# Patient Record
Sex: FEMALE | Race: WHITE | NOT HISPANIC OR LATINO | Employment: OTHER | URBAN - METROPOLITAN AREA
[De-identification: names, ages, dates, MRNs, and addresses within clinical notes are randomized per-mention and may not be internally consistent; named-entity substitution may affect disease eponyms.]

---

## 2019-02-25 ENCOUNTER — OFFICE VISIT (OUTPATIENT)
Dept: OBSTETRICS AND GYNECOLOGY | Facility: CLINIC | Age: 79
End: 2019-02-25
Payer: MEDICARE

## 2019-02-25 VITALS
SYSTOLIC BLOOD PRESSURE: 144 MMHG | WEIGHT: 130.75 LBS | BODY MASS INDEX: 26.36 KG/M2 | HEIGHT: 59 IN | DIASTOLIC BLOOD PRESSURE: 70 MMHG

## 2019-02-25 DIAGNOSIS — Z12.31 ENCOUNTER FOR SCREENING MAMMOGRAM FOR MALIGNANT NEOPLASM OF BREAST: ICD-10-CM

## 2019-02-25 DIAGNOSIS — N30.00 ACUTE CYSTITIS WITHOUT HEMATURIA: ICD-10-CM

## 2019-02-25 DIAGNOSIS — Z00.00 PREVENTATIVE HEALTH CARE: Primary | ICD-10-CM

## 2019-02-25 DIAGNOSIS — M89.9 DISORDER OF BONE: ICD-10-CM

## 2019-02-25 DIAGNOSIS — Z01.419 GYNECOLOGIC EXAM NORMAL: ICD-10-CM

## 2019-02-25 PROCEDURE — 87086 URINE CULTURE/COLONY COUNT: CPT

## 2019-02-25 PROCEDURE — 87186 SC STD MICRODIL/AGAR DIL: CPT

## 2019-02-25 PROCEDURE — G0101 PR CA SCREEN;PELVIC/BREAST EXAM: ICD-10-PCS | Mod: S$PBB,,, | Performed by: NURSE PRACTITIONER

## 2019-02-25 PROCEDURE — 99203 OFFICE O/P NEW LOW 30 MIN: CPT | Mod: PBBFAC | Performed by: NURSE PRACTITIONER

## 2019-02-25 PROCEDURE — 87088 URINE BACTERIA CULTURE: CPT

## 2019-02-25 PROCEDURE — 87480 CANDIDA DNA DIR PROBE: CPT

## 2019-02-25 PROCEDURE — 87077 CULTURE AEROBIC IDENTIFY: CPT

## 2019-02-25 PROCEDURE — 99999 PR PBB SHADOW E&M-NEW PATIENT-LVL III: ICD-10-PCS | Mod: PBBFAC,,, | Performed by: NURSE PRACTITIONER

## 2019-02-25 PROCEDURE — 99999 PR PBB SHADOW E&M-NEW PATIENT-LVL III: CPT | Mod: PBBFAC,,, | Performed by: NURSE PRACTITIONER

## 2019-02-25 PROCEDURE — G0101 CA SCREEN;PELVIC/BREAST EXAM: HCPCS | Mod: S$PBB,,, | Performed by: NURSE PRACTITIONER

## 2019-02-25 RX ORDER — GLIMEPIRIDE 4 MG/1
4 TABLET ORAL 2 TIMES DAILY
Refills: 1 | COMMUNITY
Start: 2018-11-23 | End: 2019-11-13 | Stop reason: SDUPTHER

## 2019-02-25 RX ORDER — NITROFURANTOIN 25; 75 MG/1; MG/1
100 CAPSULE ORAL 2 TIMES DAILY
Qty: 14 CAPSULE | Refills: 0 | Status: SHIPPED | OUTPATIENT
Start: 2019-02-25 | End: 2019-03-04

## 2019-02-25 RX ORDER — CARVEDILOL 12.5 MG/1
12.5 TABLET ORAL DAILY
COMMUNITY
End: 2020-11-09

## 2019-02-25 RX ORDER — LEVOTHYROXINE SODIUM 150 UG/1
TABLET ORAL
COMMUNITY
End: 2019-04-03 | Stop reason: SDUPTHER

## 2019-02-25 RX ORDER — TRIAMCINOLONE ACETONIDE 1 MG/G
CREAM TOPICAL 2 TIMES DAILY
Qty: 15 G | Refills: 0 | Status: SHIPPED | OUTPATIENT
Start: 2019-02-25 | End: 2021-05-31

## 2019-02-25 RX ORDER — LINAGLIPTIN 5 MG/1
TABLET, FILM COATED ORAL DAILY
Refills: 1 | COMMUNITY
Start: 2018-11-23 | End: 2021-03-03

## 2019-02-25 NOTE — PROGRESS NOTES
"CC: Well woman exam    Odin Oliva is a 79 y.o. female  presents for well woman exam.  LMP: . No AUB. Never had a mammogram or bone scan. Not sexually active. Reports vaginal itching. Urine in clinic indicated nitrates. Mild dysuria , no hematuria or flank pain.    Past Medical History:   Diagnosis Date    Diabetes mellitus     Thyroid disease      Past Surgical History:   Procedure Laterality Date    CHOLECYSTECTOMY      HERNIA MESH REMOVAL      tissue from uterus       TOTAL THYROIDECTOMY       Social History     Socioeconomic History    Marital status:      Spouse name: Not on file    Number of children: Not on file    Years of education: Not on file    Highest education level: Not on file   Social Needs    Financial resource strain: Not on file    Food insecurity - worry: Not on file    Food insecurity - inability: Not on file    Transportation needs - medical: Not on file    Transportation needs - non-medical: Not on file   Occupational History    Not on file   Tobacco Use    Smoking status: Never Smoker    Smokeless tobacco: Never Used   Substance and Sexual Activity    Alcohol use: No     Frequency: Never    Drug use: No    Sexual activity: No   Other Topics Concern    Not on file   Social History Narrative    Not on file     Family History   Problem Relation Age of Onset    Asthma Mother      OB History      Para Term  AB Living    7 5 5   2 5    SAB TAB Ectopic Multiple Live Births    2       5          BP (!) 144/70 (BP Location: Right arm, Patient Position: Sitting, BP Method: Medium (Manual))   Ht 4' 11.06" (1.5 m)   Wt 59.3 kg (130 lb 11.7 oz)   BMI 26.36 kg/m²       ROS:  GENERAL: Denies weight gain or weight loss. Feeling well overall.   SKIN: Denies rash or lesions.   HEAD: Denies head injury or headache.   NODES: Denies enlarged lymph nodes.   CHEST: Denies chest pain or shortness of breath.   CARDIOVASCULAR: Denies palpitations or left " sided chest pain.   ABDOMEN: No abdominal pain, constipation, diarrhea, nausea, vomiting or rectal bleeding.   URINARY:HPI  REPRODUCTIVE: See HPI.   BREASTS: The patient performs breast self-examination and denies pain, lumps, or nipple discharge.   HEMATOLOGIC: No easy bruisability or excessive bleeding.   MUSCULOSKELETAL: Denies joint pain or swelling.   NEUROLOGIC: Denies syncope or weakness.   PSYCHIATRIC: Denies depression, anxiety or mood swings.    PHYSICAL EXAM:  APPEARANCE: Well nourished, well developed, in no acute distress.  AFFECT: WNL, alert and oriented x 3  SKIN: No acne or hirsutism  NECK: Neck symmetric without masses or thyromegaly  NODES: No inguinal, cervical, axillary, or femoral lymph node enlargement  CHEST: Good respiratory effect  ABDOMEN: Soft.  No tenderness or masses.  No hepatosplenomegaly.  No hernias.  BREASTS: Symmetrical, no skin changes or visible lesions.  No palpable masses, nipple discharge bilaterally.  PELVIC: Normal external genitalia without lesions.  Normal hair distribution.  Adequate perineal body, normal urethral meatus.  Vagina atrophy.without lesions or discharge.  Cervix pink, without lesions, discharge or tenderness.  No significant cystocele or rectocele.  Bimanual exam shows uterus to be normal size, regular, mobile and nontender.  Adnexa without masses or tenderness.    EXTREMITIES: No edema.    1. Preventative health care  Mammo Digital Screening Bilat   2. Gynecologic exam normal  Mammo Digital Screening Bilat    DXA Bone Density Spine And Hip   3. Encounter for screening mammogram for malignant neoplasm of breast   Mammo Digital Screening Bilat   4. Disorder of bone   DXA Bone Density Spine And Hip   5. Acute cystitis without hematuria  Urine culture    PLAN:  Mammogram  Dex scan  Urine sent for cx  Macrobid rx   Kenalog cream

## 2019-02-28 ENCOUNTER — TELEPHONE (OUTPATIENT)
Dept: OBSTETRICS AND GYNECOLOGY | Facility: CLINIC | Age: 79
End: 2019-02-28

## 2019-02-28 LAB
BACTERIA UR CULT: NORMAL
BACTERIAL VAGINOSIS DNA: NEGATIVE
CANDIDA GLABRATA DNA: NEGATIVE
CANDIDA KRUSEI DNA: NEGATIVE
CANDIDA RRNA VAG QL PROBE: NEGATIVE
T VAGINALIS RRNA GENITAL QL PROBE: NEGATIVE

## 2019-02-28 NOTE — TELEPHONE ENCOUNTER
----- Message from Meagan Emanuel NP sent at 2/28/2019 11:11 AM CST -----  Please call patient and inform her that urine cx indicated e-coli. She needs to complete the Macrobid.

## 2019-02-28 NOTE — PROGRESS NOTES
Please call patient and inform her that urine cx indicated e-coli. She needs to complete the Macrobid.

## 2019-02-28 NOTE — TELEPHONE ENCOUNTER
Spoke with patient's daughter notified of urine culture results. Advised to complete Macrobid. Patient verbalized understanding.

## 2019-04-02 NOTE — PROGRESS NOTES
"Subjective:         Patient ID: Odin Oliva is a 79 y.o. female.  Patient's current PCP is [facility]     Chief Complaint: Diabetes Mellitus    HPI  Odin Oliva is a 79 y.o.  female presenting for new consultation for Diabetes. Patient has been diagnosed with diabetes for over 10 years and has the following complications associated with diabetes: nephropathy. Blood glucose testing is performed regularly. In the past 2 weeks patient reports blood glucose values to have approximately ranged from 130-300. Condition is uncontrolled. Patient is compliant with medication. .     Patient has previously taken metformin with no issue. Believes she was taken off due to kidney dysfunction.    She denies any recent hospital admissions or emergency room visits related to Diabetes Mellitus.      Height: 4' 11.5" (151.1 cm)  //  Weight: 59.1 kg (130 lb 4.7 oz), Body mass index is 25.88 kg/m².    Her blood sugar in clinic today is:   Lab Results   Component Value Date    POCGLU 138 (A) 04/03/2019       Labs reviewed and are noted below.    Her most recent A1C is: No results found for: HGBA1C  No results found for: CPEPTIDE  No results found for: GLUTAMICACID  No results found for: LDLDIRECT    CURRENT DM MEDICATIONS:   Current Outpatient Medications   Medication Sig Dispense Refill    TRADJENTA 5 mg Tab tablet TK 1 T PO QD  1     No current facility-administered medications for this visit.        Health Maintenance   Topic Date Due    Lipid Panel  1940    Foot Exam  01/16/1951    Eye Exam  01/16/1951    Urine Microalbumin  01/16/1951    TETANUS VACCINE  01/16/1959    DEXA SCAN  01/16/1981    Zoster Vaccine  01/16/2001    Pneumococcal Vaccine (65+ Low/Medium Risk) (1 of 2 - PCV13) 01/16/2006    Hemoglobin A1c  05/29/2013    Influenza Vaccine  08/01/2018       STANDARDS OF CARE:  Current Ophthalmologist/Optometrist: No   Current Podiatrist: No  ACE/ARB: No  Statin: No  She  has attended diabetes " education in the past.     LIFESTYLE:    BLOOD GLUCOSE TESTING: Patient reports testing on average a total of 2 times per day.       Review of Systems   Constitutional: Negative.  Negative for activity change, appetite change, fatigue and unexpected weight change.   Eyes: Negative for visual disturbance.   Gastrointestinal: Negative for constipation, diarrhea and nausea.   Endocrine: Negative.  Negative for cold intolerance, heat intolerance, polydipsia, polyphagia and polyuria.   Skin: Negative for wound.         Objective:      Physical Exam   Constitutional: She appears well-developed and well-nourished.   HENT:   Head: Normocephalic and atraumatic.   Cardiovascular: Normal rate.   Pulmonary/Chest: Effort normal.   Skin: Skin is warm and dry.   Psychiatric: She has a normal mood and affect. Her speech is normal and behavior is normal. Judgment and thought content normal.   Nursing note and vitals reviewed.      Assessment:       1. Type 2 diabetes mellitus with other specified complication, without long-term current use of insulin        Plan:   Type 2 diabetes mellitus with other specified complication, without long-term current use of insulin  -     POCT Glucose, Hand-Held Device  -     Comprehensive metabolic panel; Future; Expected date: 04/03/2019  -     Microalbumin/creatinine urine ratio; Future; Expected date: 04/03/2019  -     Hemoglobin A1c; Future; Expected date: 04/03/2019  -     TSH; Future; Expected date: 04/03/2019  -     GLUCOSE MONITORING CONTINUOUS MIN 72 HOURS; Future  -     lancets Misc; 1 each by Misc.(Non-Drug; Combo Route) route 3 (three) times daily.  Dispense: 100 each; Refill: 11  -     blood-glucose meter kit; Use as instructed  Dispense: 1 each; Refill: 0  -     blood sugar diagnostic Strp; 1 each by Misc.(Non-Drug; Combo Route) route 3 (three) times daily.  Dispense: 100 strip; Refill: 11  -     levothyroxine (SYNTHROID) 150 MCG tablet; 1 tablet(s) by mouth daily  Dispense: 90 tablet;  Refill: 0  -     T4, free; Future; Expected date: 04/03/2019      - Condition uncontrolled. CGMS for 2 weeks for evaluation. Labs today. DM education reviewed. Patient encouraged to carb count and exercise per recommendations. Labs and Referrals as noted. Patient instructed to send in log weekly for review and potential medication adjustments. RV scheduled in 2 weeks.         Additional Details:    1. Patient was instructed to monitor blood glucose 2 - 3 x daily, fasting and ac dinner or at bedtime. Discussed ADA goals for blood glucose levels and hypoglycemic protocol.  Reminded to bring BG records or meter to each visit for review.    2. The patient was explained the above plan and given opportunity to ask questions.  She understands, chooses and consents to this plan and accepts all the risks, which include but are not limited to the risks mentioned above. She understands the alternative of having no testing, interventions or treatments at this time. She left content and without further questions.     A total of 60 minutes was spent in face to face time, of which over 50% was spent in counseling patient on disease process, complications, treatment, and side effects of medications.        CHEYENNE Galvan

## 2019-04-03 ENCOUNTER — LAB VISIT (OUTPATIENT)
Dept: LAB | Facility: HOSPITAL | Age: 79
End: 2019-04-03
Attending: NURSE PRACTITIONER
Payer: MEDICARE

## 2019-04-03 ENCOUNTER — CLINICAL SUPPORT (OUTPATIENT)
Dept: DIABETES | Facility: CLINIC | Age: 79
End: 2019-04-03
Payer: MEDICARE

## 2019-04-03 ENCOUNTER — OFFICE VISIT (OUTPATIENT)
Dept: DIABETES | Facility: CLINIC | Age: 79
End: 2019-04-03
Payer: MEDICARE

## 2019-04-03 VITALS
HEIGHT: 60 IN | WEIGHT: 130.31 LBS | DIASTOLIC BLOOD PRESSURE: 60 MMHG | BODY MASS INDEX: 25.58 KG/M2 | SYSTOLIC BLOOD PRESSURE: 118 MMHG

## 2019-04-03 DIAGNOSIS — E11.69 TYPE 2 DIABETES MELLITUS WITH OTHER SPECIFIED COMPLICATION, WITHOUT LONG-TERM CURRENT USE OF INSULIN: ICD-10-CM

## 2019-04-03 DIAGNOSIS — E11.69 TYPE 2 DIABETES MELLITUS WITH OTHER SPECIFIED COMPLICATION, WITHOUT LONG-TERM CURRENT USE OF INSULIN: Primary | ICD-10-CM

## 2019-04-03 PROBLEM — E11.9 DIABETES MELLITUS: Status: ACTIVE | Noted: 2019-04-03

## 2019-04-03 LAB
ALBUMIN SERPL BCP-MCNC: 3.7 G/DL (ref 3.5–5.2)
ALP SERPL-CCNC: 98 U/L (ref 55–135)
ALT SERPL W/O P-5'-P-CCNC: 17 U/L (ref 10–44)
ANION GAP SERPL CALC-SCNC: 9 MMOL/L (ref 8–16)
AST SERPL-CCNC: 23 U/L (ref 10–40)
BILIRUB SERPL-MCNC: 0.3 MG/DL (ref 0.1–1)
BUN SERPL-MCNC: 30 MG/DL (ref 8–23)
CALCIUM SERPL-MCNC: 9 MG/DL (ref 8.7–10.5)
CHLORIDE SERPL-SCNC: 109 MMOL/L (ref 95–110)
CO2 SERPL-SCNC: 22 MMOL/L (ref 23–29)
CREAT SERPL-MCNC: 1.4 MG/DL (ref 0.5–1.4)
EST. GFR  (AFRICAN AMERICAN): 41.5 ML/MIN/1.73 M^2
EST. GFR  (NON AFRICAN AMERICAN): 36 ML/MIN/1.73 M^2
GLUCOSE SERPL-MCNC: 138 MG/DL (ref 70–110)
GLUCOSE SERPL-MCNC: 81 MG/DL (ref 70–110)
POTASSIUM SERPL-SCNC: 4.7 MMOL/L (ref 3.5–5.1)
PROT SERPL-MCNC: 7.1 G/DL (ref 6–8.4)
SODIUM SERPL-SCNC: 140 MMOL/L (ref 136–145)
T4 FREE SERPL-MCNC: 1.24 NG/DL (ref 0.71–1.51)
TSH SERPL DL<=0.005 MIU/L-ACNC: 0.61 UIU/ML (ref 0.4–4)

## 2019-04-03 PROCEDURE — 83036 HEMOGLOBIN GLYCOSYLATED A1C: CPT

## 2019-04-03 PROCEDURE — 99205 PR OFFICE/OUTPT VISIT, NEW, LEVL V, 60-74 MIN: ICD-10-PCS | Mod: S$PBB,,, | Performed by: NURSE PRACTITIONER

## 2019-04-03 PROCEDURE — 80053 COMPREHEN METABOLIC PANEL: CPT

## 2019-04-03 PROCEDURE — 99999 PR PBB SHADOW E&M-EST. PATIENT-LVL III: CPT | Mod: PBBFAC,,, | Performed by: NURSE PRACTITIONER

## 2019-04-03 PROCEDURE — 95250 CONT GLUC MNTR PHYS/QHP EQP: CPT | Mod: PBBFAC | Performed by: NURSE PRACTITIONER

## 2019-04-03 PROCEDURE — 82962 GLUCOSE BLOOD TEST: CPT | Mod: PBBFAC | Performed by: NURSE PRACTITIONER

## 2019-04-03 PROCEDURE — 36415 COLL VENOUS BLD VENIPUNCTURE: CPT

## 2019-04-03 PROCEDURE — 84439 ASSAY OF FREE THYROXINE: CPT

## 2019-04-03 PROCEDURE — 84443 ASSAY THYROID STIM HORMONE: CPT

## 2019-04-03 PROCEDURE — 99213 OFFICE O/P EST LOW 20 MIN: CPT | Mod: PBBFAC | Performed by: NURSE PRACTITIONER

## 2019-04-03 PROCEDURE — 99205 OFFICE O/P NEW HI 60 MIN: CPT | Mod: S$PBB,,, | Performed by: NURSE PRACTITIONER

## 2019-04-03 PROCEDURE — 99999 PR PBB SHADOW E&M-EST. PATIENT-LVL III: ICD-10-PCS | Mod: PBBFAC,,, | Performed by: NURSE PRACTITIONER

## 2019-04-03 RX ORDER — LEVOTHYROXINE SODIUM 150 UG/1
TABLET ORAL
Qty: 90 TABLET | Refills: 0 | Status: SHIPPED | OUTPATIENT
Start: 2019-04-03 | End: 2019-11-13 | Stop reason: SDUPTHER

## 2019-04-03 RX ORDER — INSULIN PUMP SYRINGE, 3 ML
EACH MISCELLANEOUS
Qty: 1 EACH | Refills: 0 | Status: SHIPPED | OUTPATIENT
Start: 2019-04-03 | End: 2019-05-08 | Stop reason: SDUPTHER

## 2019-04-03 RX ORDER — LANCETS
1 EACH MISCELLANEOUS 3 TIMES DAILY
Qty: 100 EACH | Refills: 11 | Status: SHIPPED | OUTPATIENT
Start: 2019-04-03 | End: 2019-05-08 | Stop reason: SDUPTHER

## 2019-04-03 NOTE — PROGRESS NOTES
Patient is here in clinic today for placement of continuous glucose monitoring system (CGMS) Freestyle [employer].      Site on back of patient's left upper arm was cleaned and sensor was placed and started.      No further questions from patient. Explained to patient that this is a blind procedure and will not actually see BG in real time.      Instructed pt. its ok to shower with sensor on and informed patient of need to check blood sugars as prescribed.      Patient was also informed to avoid all imaging procedures and acetaminophen during the procedure. Voiced understanding of all instructions given.     Follow up appointment given.

## 2019-04-04 LAB
ESTIMATED AVG GLUCOSE: 140 MG/DL (ref 68–131)
HBA1C MFR BLD HPLC: 6.5 % (ref 4–5.6)

## 2019-04-14 ENCOUNTER — HOSPITAL ENCOUNTER (EMERGENCY)
Facility: HOSPITAL | Age: 79
Discharge: HOME | End: 2019-04-14
Attending: EMERGENCY MEDICINE
Payer: MEDICARE

## 2019-04-14 PROCEDURE — 99284 EMERGENCY DEPT VISIT MOD MDM: CPT | Mod: 25

## 2019-04-14 PROCEDURE — 25000003 PHARM REV CODE 250: Performed by: EMERGENCY MEDICINE

## 2019-04-14 RX ORDER — HYDROCODONE BITARTRATE AND ACETAMINOPHEN 7.5; 325 MG/1; MG/1
1 TABLET ORAL EVERY 6 HOURS PRN
Status: DISCONTINUED | OUTPATIENT
Start: 2019-04-14 | End: 2019-04-14 | Stop reason: HOSPADM

## 2019-04-14 RX ORDER — ONDANSETRON 4 MG/1
TABLET, ORALLY DISINTEGRATING ORAL
Status: DISPENSED
Start: 2019-04-14 | End: 2019-04-15

## 2019-04-14 RX ORDER — ONDANSETRON 4 MG/1
4 TABLET, ORALLY DISINTEGRATING ORAL
Status: COMPLETED | OUTPATIENT
Start: 2019-04-14 | End: 2019-04-14

## 2019-04-14 RX ORDER — HYDROCODONE BITARTRATE AND ACETAMINOPHEN 7.5; 325 MG/1; MG/1
TABLET ORAL
Status: DISPENSED
Start: 2019-04-14 | End: 2019-04-15

## 2019-04-14 RX ADMIN — HYDROCODONE BITARTRATE AND ACETAMINOPHEN 1 TABLET: 7.5; 325 TABLET ORAL at 04:04

## 2019-04-14 RX ADMIN — ONDANSETRON 4 MG: 4 TABLET, ORALLY DISINTEGRATING ORAL at 04:04

## 2019-05-08 DIAGNOSIS — E11.69 TYPE 2 DIABETES MELLITUS WITH OTHER SPECIFIED COMPLICATION, WITHOUT LONG-TERM CURRENT USE OF INSULIN: ICD-10-CM

## 2019-05-08 RX ORDER — LANCETS
1 EACH MISCELLANEOUS 3 TIMES DAILY
Qty: 100 EACH | Refills: 11 | Status: SHIPPED | OUTPATIENT
Start: 2019-05-08 | End: 2021-02-02

## 2019-05-08 RX ORDER — INSULIN PUMP SYRINGE, 3 ML
EACH MISCELLANEOUS
Qty: 1 EACH | Refills: 0 | Status: SHIPPED | OUTPATIENT
Start: 2019-05-08 | End: 2021-02-02

## 2019-09-12 ENCOUNTER — PATIENT OUTREACH (OUTPATIENT)
Dept: ADMINISTRATIVE | Facility: HOSPITAL | Age: 79
End: 2019-09-12

## 2019-10-18 ENCOUNTER — CLINICAL SUPPORT (OUTPATIENT)
Dept: CARDIOLOGY | Facility: CLINIC | Age: 79
End: 2019-10-18
Payer: MEDICARE

## 2019-10-18 ENCOUNTER — OFFICE VISIT (OUTPATIENT)
Dept: PODIATRY | Facility: CLINIC | Age: 79
End: 2019-10-18
Payer: MEDICARE

## 2019-10-18 ENCOUNTER — HOSPITAL ENCOUNTER (OUTPATIENT)
Dept: RADIOLOGY | Facility: HOSPITAL | Age: 79
Discharge: HOME | End: 2019-10-18
Attending: PODIATRIST
Payer: MEDICARE

## 2019-10-18 VITALS
DIASTOLIC BLOOD PRESSURE: 78 MMHG | BODY MASS INDEX: 26.23 KG/M2 | WEIGHT: 133.63 LBS | HEIGHT: 60 IN | SYSTOLIC BLOOD PRESSURE: 142 MMHG | HEART RATE: 69 BPM

## 2019-10-18 DIAGNOSIS — M20.5X1 ADDUCTOVARUS ROTATION OF TOE, ACQUIRED, RIGHT: ICD-10-CM

## 2019-10-18 DIAGNOSIS — M20.5X2 ADDUCTOVARUS ROTATION OF TOE, ACQUIRED, LEFT: Primary | ICD-10-CM

## 2019-10-18 DIAGNOSIS — L60.0 ONYCHOCRYPTOSIS: ICD-10-CM

## 2019-10-18 DIAGNOSIS — E11.42 DM TYPE 2 WITH DIABETIC PERIPHERAL NEUROPATHY: ICD-10-CM

## 2019-10-18 DIAGNOSIS — Z01.818 PREOP TESTING: ICD-10-CM

## 2019-10-18 DIAGNOSIS — L84 CORN OR CALLUS: ICD-10-CM

## 2019-10-18 PROCEDURE — 73630 XR FOOT COMPLETE 3 VIEW BILATERAL: ICD-10-PCS | Mod: 26,50,, | Performed by: RADIOLOGY

## 2019-10-18 PROCEDURE — 73630 X-RAY EXAM OF FOOT: CPT | Mod: 26,50,, | Performed by: RADIOLOGY

## 2019-10-18 PROCEDURE — 93005 ELECTROCARDIOGRAM TRACING: CPT | Mod: PBBFAC | Performed by: INTERNAL MEDICINE

## 2019-10-18 PROCEDURE — 93010 EKG 12-LEAD: ICD-10-PCS | Mod: S$PBB,,, | Performed by: INTERNAL MEDICINE

## 2019-10-18 PROCEDURE — 99999 PR PBB SHADOW E&M-EST. PATIENT-LVL V: ICD-10-PCS | Mod: PBBFAC,,, | Performed by: PODIATRIST

## 2019-10-18 PROCEDURE — 71046 XR CHEST PA AND LATERAL: ICD-10-PCS | Mod: 26,,, | Performed by: RADIOLOGY

## 2019-10-18 PROCEDURE — 71046 X-RAY EXAM CHEST 2 VIEWS: CPT | Mod: 26,,, | Performed by: RADIOLOGY

## 2019-10-18 PROCEDURE — 99215 OFFICE O/P EST HI 40 MIN: CPT | Mod: PBBFAC,25 | Performed by: PODIATRIST

## 2019-10-18 PROCEDURE — 99203 OFFICE O/P NEW LOW 30 MIN: CPT | Mod: 25,S$PBB,, | Performed by: PODIATRIST

## 2019-10-18 PROCEDURE — 99999 PR PBB SHADOW E&M-EST. PATIENT-LVL V: CPT | Mod: PBBFAC,,, | Performed by: PODIATRIST

## 2019-10-18 PROCEDURE — 11056 PR TRIM BENIGN HYPERKERATOTIC SKIN LESION,2-4: ICD-10-PCS | Mod: Q9,S$PBB,, | Performed by: PODIATRIST

## 2019-10-18 PROCEDURE — 93010 ELECTROCARDIOGRAM REPORT: CPT | Mod: S$PBB,,, | Performed by: INTERNAL MEDICINE

## 2019-10-18 PROCEDURE — 11056 PARNG/CUTG B9 HYPRKR LES 2-4: CPT | Mod: Q9,PBBFAC | Performed by: PODIATRIST

## 2019-10-18 PROCEDURE — 11056 PARNG/CUTG B9 HYPRKR LES 2-4: CPT | Mod: Q9,S$PBB,, | Performed by: PODIATRIST

## 2019-10-18 PROCEDURE — 99203 PR OFFICE/OUTPT VISIT, NEW, LEVL III, 30-44 MIN: ICD-10-PCS | Mod: 25,S$PBB,, | Performed by: PODIATRIST

## 2019-10-18 PROCEDURE — 73630 X-RAY EXAM OF FOOT: CPT | Mod: 50,TC

## 2019-10-18 PROCEDURE — 71046 X-RAY EXAM CHEST 2 VIEWS: CPT | Mod: TC

## 2019-10-18 NOTE — PROGRESS NOTES
[facility]  PODIATRIC MEDICINE AND SURGERY      CHIEF COMPLAINT  Chief Complaint   Patient presents with    Diabetic Foot Exam     PCP Dr. Perez 10/1/19 DFE         HPI    SUBJECTIVE: Odin Oliva is a 79 y.o. female who  has a past medical history of Diabetes mellitus and Thyroid disease. Fatumahpresents to clinic for high risk diabetic foot exam and care.  Odin admits numbness, burning, and/or tingling sensations in their feet. Patient relates blood sugars normally run around 125 mg/dl. Pt has established care with primary care physician and would like to establish care with a podiatric physician. She complains about burning to both feet along toes. She complains about callus that continues to form on both fifth digit and fourth. This has been a longstanding problem. She has been treated for this in the past with conservative options- routine debridements, padding, and wider shoes. She is accompanied with her daughter who would like to discuss surgical intervention. Patient has no other pedal complaints at this time      HgA1c:   Hemoglobin A1C   Date Value Ref Range Status   04/03/2019 6.5 (H) 4.0 - 5.6 % Final     Comment:     ADA Screening Guidelines:  5.7-6.4%  Consistent with prediabetes  >or=6.5%  Consistent with diabetes  High levels of fetal hemoglobin interfere with the HbA1C  assay. Heterozygous hemoglobin variants (HbS, HgC, etc)do  not significantly interfere with this assay.   However, presence of multiple variants may affect accuracy.           PMH  Past Medical History:   Diagnosis Date    Diabetes mellitus     Thyroid disease        MEDS  Current Outpatient Medications on File Prior to Visit   Medication Sig Dispense Refill    blood sugar diagnostic Strp 1 each by Misc.(Non-Drug; Combo Route) route 3 (three) times daily. 100 strip 11    blood-glucose meter kit Use as instructed 1 each 0    carvedilol (COREG) 12.5 MG tablet Take 12.5 mg by mouth once daily.      glimepiride  "(AMARYL) 4 MG tablet TK 1 T PO BID  1    lancets Misc 1 each by Misc.(Non-Drug; Combo Route) route 3 (three) times daily. 100 each 11    levothyroxine (SYNTHROID) 150 MCG tablet 1 tablet(s) by mouth daily 90 tablet 0    TRADJENTA 5 mg Tab tablet TK 1 T PO QD  1    triamcinolone acetonide 0.1% (KENALOG) 0.1 % cream Apply topically 2 (two) times daily. Do not use on face for 14 days 15 g 0     No current facility-administered medications on file prior to visit.        PSH     Past Surgical History:   Procedure Laterality Date    CHOLECYSTECTOMY      HERNIA MESH REMOVAL      tissue from uterus       TOTAL THYROIDECTOMY          ALL  Review of patient's allergies indicates:  No Known Allergies    SOC     Social History     Tobacco Use    Smoking status: Never Smoker    Smokeless tobacco: Never Used   Substance Use Topics    Alcohol use: No     Frequency: Never    Drug use: No         Family HX    Family History   Problem Relation Age of Onset    Asthma Mother           REVIEW OF SYSTEMS  General: Denies any fever or chills  Chest: Denies shortness of breath, wheezing, coughing, or sputum production  Heart: Denies chest pain.  As noted above and per history of current illness above, otherwise negative in the remainder of the 14 systems.     PHYSICAL EXAM  Vitals:    10/18/19 1405   BP: (!) 142/78   Pulse: 69   Weight: 60.6 kg (133 lb 9.6 oz)   Height: 4' 11.5" (1.511 m)       GEN:  This patient is well-developed, well-nourished and appears stated age, well-oriented to person, place and time, and cooperative and pleasant on today's visit.      LOWER EXTREMITY PHYSICAL EXAMINATION   VASCULAR  DP pedal pulse 2/4 RIGHT, LEFT2/4   PT pedal pulse 2/4 RIGHT, LEFT2/4  Capillary refill time immediate to the toes.   Feet are warm to the touch. Skin temperature warm to warm from proximally to distally   There are varicosities, telangiectasias noted to bilateral foot and ankle regions.   There are NO ecchymoses noted to " bilateral foot and ankle regions.   There is NO gross lower extremity edema.    DERMATOLOGIC  Skin moist with healthy texture and turgor.  There are no open ulcerations, lacerations, or fissures to bilateral foot and ankle regions. There are no signs of infection as there is no erythema, no proximal-extending lymphangiitis, no fluctuance, or crepitus noted on palpation to bilateral foot and ankle regions.   There is no interdigital maceration.   There are hyperkeratotic lesions noted medial ASPECT of fifth digit b/l and lateral aspect of fourth digit.  Thickened nail LEFT hallux with lateral border incurvated, no acute SOI    NEUROLOGIC  Protective sensation intact at 10/10 sites upon examination with [place] Weinsten 5.07 g monofilament.  Propioception intact at 1st MTPJ b/l.  Achilles and patellar deep tendon reflexes intact  Babinski reflex absent    ORTHOPEDIC/BIOMECHANICAL  TTP fifth digit b/l, fourth digit  Muscle strength AT/EHL/EDL/PT: 5/5; Achilles/Gastroc/Soleus: 5/5; PB/PL: 5/5 Muscle tone is normal.  Ankle joint ROM limited DF/PF, non-crepitus  STJ ROM  inv/ev, non crepitus       ASSESSMENT  Encounter Diagnoses   Name Primary?    Adductovarus rotation of toe, acquired, left Yes    Adductovarus rotation of toe, acquired, right     Onychocryptosis     Preop testing     DM type 2 with diabetic peripheral neuropathy     Corn or callus          Plan:  -Discuss presenting problems, etiology, pathologic processes and management options with patient today.   -I counseled the patient on their conditions, their implications and medical management. An in depth discussion on diabetic management, risk prevention, amputation prevention verbally and provided educational literature in written format.    Reviewed findings and explained condition to the patient with visual reference to the foot and x-rays. I explained that the toe deformity is usually inherited or acquired over time. Repetitive pressure along the  side of the toe results in a thickened keratotic build up or a corn that can become tender and sensitive. I explained that sometimes surgical intervention involving a soft tissue re-balancing/repair, osseus reconstruction, or a combination of both is the permanent solution, but conservative measures should be attempted first. The keratotic lesions as noted in exam were sharply excisionally debrided x3 and offloading cushion pads applied. Patient was also guided on shoe gear selection of extra depth or larger toe boxed shoes. Pt would like to proceed with surgical intervention due to long standing deformities which have not been resolved with conservative management. We will schedule.  Surgery scheduled 11/8/19 b/l derotational arthroplasty  Preop testing and eval by his PCP Dr. Perez.    Surgical Consent Information  Procedure Date: 11/8/19     Treatment/Procedure: Derotational Arthroplasty Fifth Digit, Bilateral foot      Sedation: Moderate     Patient Condition/Indication for Procedure:  To alleviate pain and deformity on painful hammertoes of fifth digit, bilateral foot.     Utilizing sterile toenail clippers I aggressively trimmed the offending nail border approximately 3 mm from its edge and carried the nail plate incision down at an angle in order to wedge out the offending cryptotic portion of the nail plate. The offending border was then removed in toto. The remaining nail was grinded down with an electric  down to nail bed. Minimal blood was drawn. Applied betadine and covered with band-aid. Patient tolerated the procedure well and related significant relief.    - Shoe inspection. Diabetic Foot Education. Patient reminded of the importance of good nutrition and blood sugar control to help prevent podiatric complications of diabetes. Patient instructed on proper foot hygeine. We discussed wearing proper shoe gear, daily foot inspections, never walking without protective shoe gear, never putting sharp  instruments to feet, routine podiatric visits annually/semi annually or sooner if symptoms arise        Disclaimer: This note was partially prepared using a voice recognition system and is likely to have sound alike errors within the text.        Future Appointments   Date Time Provider Department Center   10/23/2019 11:30 AM Tabatha Posey NP [facility]  Medical    11/6/2019  2:20 PM Lissy Comer DPM [facility] POD [place]       Report Electronically Signed By:     Lissy Comer DPM   Podiatry  [facility]   10/20/2019

## 2019-10-18 NOTE — LETTER
October 20, 2019      [place]  [address] [place] [id number]           [facility]  [address] [facility]  [place] [address]  Phone: [phone]  Fax: [phone]          Patient: Odin Oliva   MR Number: [id number]   YOB: 1940   Date of Visit: 10/18/2019       Dear Tabatha Posey:    Thank you for referring Odin Oliva to me for evaluation. Attached you will find relevant portions of my assessment and plan of care.    If you have questions, please do not hesitate to call me. I look forward to following Odin Oliva along with you.    Sincerely,    Lissy Comer, SEBASTIAN    Enclosure  CC:  No Recipients    If you would like to receive this communication electronically, please contact [email or web address] or [phone] to request more information on [employer] access.    For providers and/or their staff who would like to refer a patient to [facility], please contact us through our one-stop-shop provider referral line, [facility] , at [phone].    If you feel you have received this communication in error or would no longer like to receive these types of communications, please e-mail [email or web address]

## 2019-10-20 RX ORDER — LIDOCAINE HYDROCHLORIDE 10 MG/ML
1 INJECTION, SOLUTION EPIDURAL; INFILTRATION; INTRACAUDAL; PERINEURAL ONCE
Status: CANCELLED | OUTPATIENT
Start: 2019-10-20 | End: 2019-10-20

## 2019-10-23 ENCOUNTER — OFFICE VISIT (OUTPATIENT)
Dept: DIABETES | Facility: CLINIC | Age: 79
End: 2019-10-23
Payer: MEDICARE

## 2019-10-23 ENCOUNTER — LAB VISIT (OUTPATIENT)
Dept: LAB | Facility: HOSPITAL | Age: 79
End: 2019-10-23
Attending: NURSE PRACTITIONER
Payer: MEDICARE

## 2019-10-23 VITALS
WEIGHT: 133.19 LBS | BODY MASS INDEX: 26.15 KG/M2 | DIASTOLIC BLOOD PRESSURE: 60 MMHG | HEIGHT: 60 IN | SYSTOLIC BLOOD PRESSURE: 102 MMHG

## 2019-10-23 DIAGNOSIS — E11.69 TYPE 2 DIABETES MELLITUS WITH OTHER SPECIFIED COMPLICATION, WITHOUT LONG-TERM CURRENT USE OF INSULIN: ICD-10-CM

## 2019-10-23 DIAGNOSIS — E11.69 TYPE 2 DIABETES MELLITUS WITH OTHER SPECIFIED COMPLICATION, WITHOUT LONG-TERM CURRENT USE OF INSULIN: Primary | ICD-10-CM

## 2019-10-23 DIAGNOSIS — N18.4 CHRONIC KIDNEY DISEASE (CKD), STAGE IV (SEVERE): ICD-10-CM

## 2019-10-23 DIAGNOSIS — E03.8 OTHER SPECIFIED HYPOTHYROIDISM: ICD-10-CM

## 2019-10-23 LAB — GLUCOSE SERPL-MCNC: 109 MG/DL (ref 70–110)

## 2019-10-23 PROCEDURE — 99214 PR OFFICE/OUTPT VISIT, EST, LEVL IV, 30-39 MIN: ICD-10-PCS | Mod: S$PBB,,, | Performed by: NURSE PRACTITIONER

## 2019-10-23 PROCEDURE — 80061 LIPID PANEL: CPT

## 2019-10-23 PROCEDURE — 83036 HEMOGLOBIN GLYCOSYLATED A1C: CPT

## 2019-10-23 PROCEDURE — 36415 COLL VENOUS BLD VENIPUNCTURE: CPT

## 2019-10-23 PROCEDURE — 99213 OFFICE O/P EST LOW 20 MIN: CPT | Mod: PBBFAC | Performed by: NURSE PRACTITIONER

## 2019-10-23 PROCEDURE — 99214 OFFICE O/P EST MOD 30 MIN: CPT | Mod: S$PBB,,, | Performed by: NURSE PRACTITIONER

## 2019-10-23 PROCEDURE — 82962 GLUCOSE BLOOD TEST: CPT | Mod: PBBFAC | Performed by: NURSE PRACTITIONER

## 2019-10-23 PROCEDURE — 99999 PR PBB SHADOW E&M-EST. PATIENT-LVL III: ICD-10-PCS | Mod: PBBFAC,,, | Performed by: NURSE PRACTITIONER

## 2019-10-23 PROCEDURE — 99999 PR PBB SHADOW E&M-EST. PATIENT-LVL III: CPT | Mod: PBBFAC,,, | Performed by: NURSE PRACTITIONER

## 2019-10-24 ENCOUNTER — TELEPHONE (OUTPATIENT)
Dept: DIABETES | Facility: CLINIC | Age: 79
End: 2019-10-24

## 2019-10-24 LAB
CHOLEST SERPL-MCNC: 161 MG/DL (ref 120–199)
CHOLEST/HDLC SERPL: 4.9 {RATIO} (ref 2–5)
ESTIMATED AVG GLUCOSE: 154 MG/DL (ref 68–131)
HBA1C MFR BLD HPLC: 7 % (ref 4–5.6)
HDLC SERPL-MCNC: 33 MG/DL (ref 40–75)
HDLC SERPL: 20.5 % (ref 20–50)
LDLC SERPL CALC-MCNC: 90.8 MG/DL (ref 63–159)
NONHDLC SERPL-MCNC: 128 MG/DL
TRIGL SERPL-MCNC: 186 MG/DL (ref 30–150)

## 2019-10-24 NOTE — TELEPHONE ENCOUNTER
----- Message from Tabatha Posey NP sent at 10/24/2019  2:03 PM CDT -----  Please call and let patient know all labs stable. A1C remains stable.

## 2019-10-28 PROBLEM — E03.8 OTHER SPECIFIED HYPOTHYROIDISM: Status: ACTIVE | Noted: 2019-10-28

## 2019-10-28 NOTE — PROGRESS NOTES
"Subjective:         Patient ID: Odin Oliva is a 79 y.o. female.  Patient's current PCP is Lucia Perez MD.     Chief Complaint: Diabetes Mellitus    HPI  Odin Oliva is a 79 y.o.  female presenting for follow up for Diabetes. Patient has been diagnosed with diabetes for over10 years and has the following complications associated with diabetes: nephropathy. Blood glucose testing is performed regularly. In the past 2 weeks patient reports blood glucose values to have approximately ranged from 130-150. Condition is controlled. Patient is compliant with medication. .     Patient has previously taken metformin with no issue. Believes she was taken off due to kidney dysfunction.    She denies any recent hospital admissions or emergency room visits related to Diabetes Mellitus.      Height: 4' 11.5" (151.1 cm)  //  Weight: 60.4 kg (133 lb 2.5 oz), Body mass index is 26.44 kg/m².    Her blood sugar in clinic today is:   Lab Results   Component Value Date    POCGLU 109 10/23/2019       Labs reviewed and are noted below.    Her most recent A1C is:   Lab Results   Component Value Date    HGBA1C 7.0 (H) 10/23/2019     No results found for: CPEPTIDE  No results found for: GLUTAMICACID  No results found for: LDLDIRECT    CURRENT DM MEDICATIONS:   Current Outpatient Medications   Medication Sig Dispense Refill    TRADJENTA 5 mg Tab tablet    Glimepiride TK 1 T PO QD  1     No current facility-administered medications for this visit.        Health Maintenance   Topic Date Due    Foot Exam  01/16/1950    Eye Exam  01/16/1950    DEXA SCAN  01/16/1981    Pneumococcal Vaccine (65+ High/Highest Risk) (1 of 2 - PCV13) 01/16/2006    Hemoglobin A1c  04/23/2020    Lipid Panel  10/23/2020    TETANUS VACCINE  04/11/2029       STANDARDS OF CARE:  Current Ophthalmologist/Optometrist: No   Current Podiatrist: UTD  ACE/ARB: No  Statin: No  She  has attended diabetes education in the past.     LIFESTYLE:    BLOOD GLUCOSE " TESTING: Patient reports testing on average a total of 2 times per day.       Review of Systems   Constitutional: Negative.  Negative for activity change, appetite change, fatigue and unexpected weight change.   Eyes: Negative for visual disturbance.   Gastrointestinal: Negative for constipation, diarrhea and nausea.   Endocrine: Negative.  Negative for cold intolerance, heat intolerance, polydipsia, polyphagia and polyuria.   Skin: Negative for wound.         Objective:      Physical Exam   Constitutional: She appears well-developed and well-nourished.   HENT:   Head: Normocephalic and atraumatic.   Cardiovascular: Normal rate.   Pulmonary/Chest: Effort normal.   Skin: Skin is warm and dry.   Psychiatric: She has a normal mood and affect. Her speech is normal and behavior is normal. Judgment and thought content normal.   Nursing note and vitals reviewed.      Assessment:       1. Type 2 diabetes mellitus with other specified complication, without long-term current use of insulin    2. Chronic kidney disease (CKD), stage IV (severe)        Plan:   Type 2 diabetes mellitus with other specified complication, without long-term current use of insulin  -     POCT Glucose, Hand-Held Device  -     Hemoglobin A1c; Future; Expected date: 10/23/2019  -     Lipid panel; Future; Expected date: 10/23/2019  -     Microalbumin/creatinine urine ratio; Future; Expected date: 10/23/2019  -     Ambulatory referral to Nephrology    Chronic kidney disease (CKD), stage IV (severe)      - Condition at goal. Continue current regimen. Labs today. DM education reviewed. Patient encouraged to carb count and exercise per recommendations. Labs and Referrals as noted. Patient instructed to send in log weekly for review and potential medication adjustments. RV scheduled in 12 weeks.         Additional Details:    1. Patient was instructed to monitor blood glucose 2 - 3 x daily, fasting and ac dinner or at bedtime. Discussed ADA goals for blood  glucose levels and hypoglycemic protocol.  Reminded to bring BG records or meter to each visit for review.    2. The patient was explained the above plan and given opportunity to ask questions.  She understands, chooses and consents to this plan and accepts all the risks, which include but are not limited to the risks mentioned above. She understands the alternative of having no testing, interventions or treatments at this time. She left content and without further questions.     A total of 30 minutes was spent in face to face time, of which over 50% was spent in counseling patient on disease process, complications, treatment, and side effects of medications.        ANGELO GalvanC

## 2019-10-29 ENCOUNTER — TELEPHONE (OUTPATIENT)
Dept: DIABETES | Facility: CLINIC | Age: 79
End: 2019-10-29

## 2019-10-29 NOTE — TELEPHONE ENCOUNTER
----- Message from Tabatha Posey NP sent at 10/28/2019  1:20 AM CDT -----  Please schedule eye exam for patient before end of the year

## 2019-11-04 ENCOUNTER — HOSPITAL ENCOUNTER (OUTPATIENT)
Dept: PREADMISSION TESTING | Facility: HOSPITAL | Age: 79
Discharge: HOME | End: 2019-11-04
Attending: ORTHOPAEDIC SURGERY
Payer: MEDICARE

## 2019-11-04 VITALS
DIASTOLIC BLOOD PRESSURE: 67 MMHG | SYSTOLIC BLOOD PRESSURE: 145 MMHG | TEMPERATURE: 98 F | HEART RATE: 68 BPM | RESPIRATION RATE: 16 BRPM | OXYGEN SATURATION: 100 %

## 2019-11-04 DIAGNOSIS — Z01.818 PREOP TESTING: Primary | ICD-10-CM

## 2019-11-04 DIAGNOSIS — I10 HYPERTENSION, UNSPECIFIED TYPE: ICD-10-CM

## 2019-11-04 DIAGNOSIS — Z48.89 AFTERCARE FOLLOWING SURGERY: ICD-10-CM

## 2019-11-04 DIAGNOSIS — M20.40 ACQUIRED HAMMER TOE: ICD-10-CM

## 2019-11-04 DIAGNOSIS — E03.8 OTHER SPECIFIED HYPOTHYROIDISM: ICD-10-CM

## 2019-11-04 DIAGNOSIS — N18.4 CHRONIC KIDNEY DISEASE (CKD), STAGE IV (SEVERE): ICD-10-CM

## 2019-11-04 RX ORDER — SODIUM CHLORIDE, SODIUM LACTATE, POTASSIUM CHLORIDE, CALCIUM CHLORIDE 600; 310; 30; 20 MG/100ML; MG/100ML; MG/100ML; MG/100ML
INJECTION, SOLUTION INTRAVENOUS
Status: CANCELLED | OUTPATIENT
Start: 2019-11-04 | End: 2019-11-04

## 2019-11-04 RX ORDER — FAMOTIDINE 20 MG/1
20 TABLET, FILM COATED ORAL
Status: CANCELLED | OUTPATIENT
Start: 2019-11-04 | End: 2019-11-04

## 2019-11-04 RX ORDER — ACETAMINOPHEN 500 MG
1000 TABLET ORAL
Status: CANCELLED | OUTPATIENT
Start: 2019-11-04 | End: 2019-11-04

## 2019-11-04 NOTE — H&P
Preoperative History and Physical                                                             Hospital Medicine      Chief Complaint: Preoperative evaluation     History of Present Illness:      Odin Oliva is a 79 y.o. female with DM, CKD4, HTN, Hypothyroid who presents to the office today for a preoperative consultation at the request of Dr. Comer who plans on performing 5th toe arthroplasty on November 8.     Functional Status:      The patient is not able to climb a flight of stairs. The patient is able to ambulate household distances without difficulty. The patient's functional status is affected by the surgical problem. The patient's functional status is not affected by shortness of breath, chest pain, dyspnea on exertion and fatigue.    MET score greater than 4    Past Medical History:      Past Medical History:   Diagnosis Date    Diabetes mellitus     HTN (hypertension)     Thyroid disease         Past Surgical History:      Past Surgical History:   Procedure Laterality Date    CATARACT EXTRACTION, BILATERAL      CHOLECYSTECTOMY      TOTAL THYROIDECTOMY      UMBILICAL HERNIA REPAIR      x 2        Social History:      Social History     Socioeconomic History    Marital status:      Spouse name: Not on file    Number of children: Not on file    Years of education: Not on file    Highest education level: Not on file   Occupational History    Not on file   Social Needs    Financial resource strain: Not on file    Food insecurity:     Worry: Not on file     Inability: Not on file    Transportation needs:     Medical: Not on file     Non-medical: Not on file   Tobacco Use    Smoking status: Never Smoker    Smokeless tobacco: Never Used   Substance and Sexual Activity    Alcohol use: No     Frequency: Never    Drug use: No    Sexual activity: Never   Lifestyle    Physical activity:     Days per week: Not on file     Minutes per  session: Not on file    Stress: Not on file   Relationships    Social connections:     Talks on phone: Not on file     Gets together: Not on file     Attends [employer] service: Not on file     Active member of club or organization: Not on file     Attends meetings of clubs or organizations: Not on file     Relationship status: Not on file   Other Topics Concern    Not on file   Social History Narrative    Not on file        Family History:      Family History   Problem Relation Age of Onset    Asthma Mother     No Known Problems Father     Lung cancer Brother 65    Diabetes Brother     Diabetes Brother 80    Heart disease Daughter        Allergies:      Review of patient's allergies indicates:  No Known Allergies    Medications:      Current Outpatient Medications   Medication Sig    carvedilol (COREG) 12.5 MG tablet Take 12.5 mg by mouth once daily. Take am of surgery    glimepiride (AMARYL) 4 MG tablet Take 4 mg by mouth 2 (two) times daily. Hold am of surgery    levothyroxine (SYNTHROID) 150 MCG tablet 1 tablet(s) by mouth daily    TRADJENTA 5 mg Tab tablet 2 (two) times daily.     blood sugar diagnostic Strp 1 each by Misc.(Non-Drug; Combo Route) route 3 (three) times daily.    blood-glucose meter kit Use as instructed    lancets Misc 1 each by Misc.(Non-Drug; Combo Route) route 3 (three) times daily.    triamcinolone acetonide 0.1% (KENALOG) 0.1 % cream Apply topically 2 (two) times daily. Do not use on face for 14 days     No current facility-administered medications for this encounter.        Vitals:      Vitals:    11/04/19 1050   BP: (!) 145/67   Pulse: 68   Resp: 16   Temp: 98 °F (36.7 °C)       Review of Systems:        Constitutional: Negative for fever, chills, weight loss, malaise/fatigue and diaphoresis.   HENT: Negative for hearing loss, ear pain, nosebleeds, congestion, sore throat, neck pain, tinnitus and ear discharge.    Eyes: Negative for blurred vision, double vision,  photophobia, pain, discharge and redness.   Respiratory: Negative for cough, hemoptysis, sputum production, shortness of breath, wheezing and stridor.    Cardiovascular: Negative for chest pain, palpitations, orthopnea, claudication, leg swelling and PND.   Gastrointestinal: Negative for heartburn, nausea, vomiting, abdominal pain, diarrhea, constipation, blood in stool and melena.   Genitourinary: Negative for dysuria, urgency, frequency, hematuria and flank pain.   Musculoskeletal: +chronic arthritis pain to hips, legs, and feet Negative for myalgias and falls.   Skin: Negative for itching and rash.   Neurological: Negative for dizziness, tingling, tremors, sensory change, speech change, focal weakness, seizures, loss of consciousness, weakness and headaches.   Endo/Heme/Allergies: Negative for environmental allergies and polydipsia. Does not bruise/bleed easily.   Psychiatric/Behavioral: Negative for depression, suicidal ideas, hallucinations, memory loss and substance abuse. The patient is not nervous/anxious and does not have insomnia.    All 14 systems reviewed and negative except as noted above.    Physical Exam:      Constitutional: Appears elderly, frail  and in no acute distress.  Patient is oriented to person, place, and time.   Head: Normocephalic and atraumatic. Mucous membranes moist.  Neck: Neck supple no mass.   Cardiovascular: Normal rate and regular rhythm.  S1 S2 appreciated by ascultation.  Pulmonary/Chest: Effort normal and clear to auscultation bilaterally. No respiratory distress.   Abdomen: Soft. Non-tender and non-distended. Bowel sounds are normal.   Neurological: Patient is alert and oriented to person, place and time. Moves all extremities.  Skin: Warm and dry. No lesions.  Extremities: No clubbing, cyanosis or edema.    Laboratory data:      Reviewed and noted in plan where applicable. Please see chart for full laboratory data.    No results for input(s): CPK, CPKMB, TROPONINI, MB in the  last 24 hours. No results for input(s): POCTGLUCOSE in the last 24 hours.     No results found for: INR, PROTIME    Lab Results   Component Value Date    WBC 4.06 10/18/2019    HGB 10.3 (L) 10/18/2019    HCT 31.0 (L) 10/18/2019    MCV 85 10/18/2019     10/18/2019       No results for input(s): GLU, NA, K, CL, CO2, BUN, CREATININE, CALCIUM, MG in the last 24 hours.    Predictors of intubation difficulty:       Morbid obesity? no   Anatomically abnormal facies? no   Prominent incisors? no   Receding mandible? no   Short, thick neck? no   Neck range of motion: normal   Dentition: Missing teeth (a few )    Cardiographics:      ECG:10/18/19  Normal sinus rhythm  Incomplete right bundle branch block  Minimal voltage criteria for LVH, may be normal variant  Borderline Abnormal ECG  No previous ECGs available  Confirmed by ALPA GUERRERO MD ([id number]) on 10/18/2019 5:52:13 PM    Imaging:      Chest x-ray: 10/18/19   Lungs are well expanded.  No focal consolidation.  No effusion.  Aortic atherosclerosis.  Cardiomediastinal silhouette is top-normal in size..  Degenerative changes of the spine are noted  Assessment and Plan:      Acquired hammer toe  The patient has hammer toe to the 5th digit bilaterally and will have Derotational Arthroplasty Fifth Digit, Bilateral foot on 11/8/19     Known risk factors for perioperative complications:     DM   CKD4  Hypothyroid  HTN      Difficulty with intubation is not anticipated.    Cardiac Risk Estimation: low-moderate intraoperative cardiac risk     1.) Preoperative workup as follows: none.  2.) Change in medication regimen before surgery:  Discontinue Glimepiride am of surgery   3.) Prophylaxis for cardiac events with perioperative beta-blockers: cont Coreg .  4.) Invasive hemodynamic monitoring perioperatively: not indicated.  5.) Deep vein thrombosis prophylaxis postoperatively: regimen to be chosen by surgical team.  6.) Surveillance for postoperative MI with ECG immediately  postoperatively and on postoperati ve days 1 and 2 AND troponin levels 24 hours postoperatively and on day 4 or hospital discharge (whichever comes first): not indicated.  7.) Current medications which may produce withdrawal symptoms if withheld perioperatively: none   8.) Other measures: Careful attention to perioperative glycemic control (Accu check pre/post ).  Adjustment of dosing of applicable medications for renal insufficiency.    Diabetes mellitus  Hgb A1c was 7.0 on 10/23/19   Cont Tradjenta and Glimepiride  Hold Glimepiride am of surgery     Chronic kidney disease (CKD), stage IV (severe)  Stable   Renal dose medications     Other specified hypothyroidism  Stable   Cont Levothyroxine     Hypertension  Cont Coreg

## 2019-11-04 NOTE — ASSESSMENT & PLAN NOTE
The patient has hammer toe to the 5th digit bilaterally and will have Derotational Arthroplasty Fifth Digit, Bilateral foot on 11/8/19     Known risk factors for perioperative complications:     DM   CKD4  Hypothyroid  HTN      Difficulty with intubation is not anticipated.    Cardiac Risk Estimation: low-moderate intraoperative cardiac risk     1.) Preoperative workup as follows: none.  2.) Change in medication regimen before surgery:  Discontinue Glimepiride am of surgery   3.) Prophylaxis for cardiac events with perioperative beta-blockers: cont Coreg .  4.) Invasive hemodynamic monitoring perioperatively: not indicated.  5.) Deep vein thrombosis prophylaxis postoperatively: regimen to be chosen by surgical team.  6.) Surveillance for postoperative MI with ECG immediately postoperatively and on postoperati ve days 1 and 2 AND troponin levels 24 hours postoperatively and on day 4 or hospital discharge (whichever comes first): not indicated.  7.) Current medications which may produce withdrawal symptoms if withheld perioperatively: none   8.) Other measures: Careful attention to perioperative glycemic control (Accu check pre/post ).  Adjustment of dosing of applicable medications for renal insufficiency.

## 2019-11-04 NOTE — DISCHARGE INSTRUCTIONS
To confirm, Your doctor has instructed you that surgery is scheduled for 11/8/2019.       Please report to [facility].  Pre admit office will call afternoon prior to surgery with final arrival time    INSTRUCTIONS IMPORTANT!!!   Do not eat, drink, or smoke after 12 midnight-including water. OK to brush teeth, no gum, candy or mints!    ¨ Take only these medicines with a small swallow of water-morning of surgery.  Coreg  Synthroid    Pre operative instructions:  Please review the Pre-Operative Instruction booklet that you were given.        Bathing Instructions--See page 6 in the Pre-operative booklet.      Prevention of surgical site infections:     -Keep incisions clean and dry.   -Do not soak/submerge incisions in water until completely healed.   -Do not apply lotions, powders, creams, or deodorants to site.   -Always make sure hands are cleaned with antibacterial soap/ alcohol-based   prior to touching the surgical site.  (This includes doctors,nurses, staff, and yourself.)    Signs and symptoms:   -Redness and pain around the area where you had surgery   -Drainage of cloudy fluid from your surgical wound   -Fever over 100.4       I have read or had read and explained to me, and understand the above information.  Additional comments or instructions:  Received a copy of Pre-operative instructions booklet, FAQ surgical site infection sheet, and packets of hibiclens (if indicated).

## 2019-11-05 ENCOUNTER — TELEPHONE (OUTPATIENT)
Dept: PODIATRY | Facility: CLINIC | Age: 79
End: 2019-11-05

## 2019-11-05 NOTE — TELEPHONE ENCOUNTER
Called patient to confirm per-op appointment with Dr. Jones on 11/6/19 at 2:20pm. No way to leave voicemail.

## 2019-11-06 ENCOUNTER — OFFICE VISIT (OUTPATIENT)
Dept: PODIATRY | Facility: CLINIC | Age: 79
End: 2019-11-06
Payer: MEDICARE

## 2019-11-06 ENCOUNTER — ANESTHESIA EVENT (OUTPATIENT)
Dept: SURGERY | Facility: HOSPITAL | Age: 79
End: 2019-11-06
Payer: MEDICARE

## 2019-11-06 VITALS
DIASTOLIC BLOOD PRESSURE: 65 MMHG | BODY MASS INDEX: 26.75 KG/M2 | HEIGHT: 59 IN | SYSTOLIC BLOOD PRESSURE: 144 MMHG | HEART RATE: 68 BPM | WEIGHT: 132.69 LBS

## 2019-11-06 DIAGNOSIS — M20.5X1 ADDUCTOVARUS ROTATION OF TOE, ACQUIRED, RIGHT: ICD-10-CM

## 2019-11-06 DIAGNOSIS — M79.675 TOE PAIN, BILATERAL: ICD-10-CM

## 2019-11-06 DIAGNOSIS — M79.674 TOE PAIN, BILATERAL: ICD-10-CM

## 2019-11-06 DIAGNOSIS — M20.5X2 ADDUCTOVARUS ROTATION OF TOE, ACQUIRED, LEFT: Primary | ICD-10-CM

## 2019-11-06 DIAGNOSIS — M89.8X9 EXOSTOSIS: ICD-10-CM

## 2019-11-06 DIAGNOSIS — L84 CORN OR CALLUS: ICD-10-CM

## 2019-11-06 PROCEDURE — 99213 OFFICE O/P EST LOW 20 MIN: CPT | Mod: PBBFAC | Performed by: PODIATRIST

## 2019-11-06 PROCEDURE — 99214 PR OFFICE/OUTPT VISIT, EST, LEVL IV, 30-39 MIN: ICD-10-PCS | Mod: S$PBB,,, | Performed by: PODIATRIST

## 2019-11-06 PROCEDURE — 99999 PR PBB SHADOW E&M-EST. PATIENT-LVL III: CPT | Mod: PBBFAC,,, | Performed by: PODIATRIST

## 2019-11-06 PROCEDURE — 99214 OFFICE O/P EST MOD 30 MIN: CPT | Mod: S$PBB,,, | Performed by: PODIATRIST

## 2019-11-06 PROCEDURE — 99999 PR PBB SHADOW E&M-EST. PATIENT-LVL III: ICD-10-PCS | Mod: PBBFAC,,, | Performed by: PODIATRIST

## 2019-11-06 RX ORDER — CEPHALEXIN 500 MG/1
500 CAPSULE ORAL EVERY 12 HOURS
Qty: 20 CAPSULE | Refills: 0 | Status: SHIPPED | OUTPATIENT
Start: 2019-11-06 | End: 2019-11-16

## 2019-11-06 RX ORDER — HYDROCODONE BITARTRATE AND ACETAMINOPHEN 5; 325 MG/1; MG/1
1 TABLET ORAL EVERY 6 HOURS PRN
Qty: 28 TABLET | Refills: 0 | Status: ON HOLD | OUTPATIENT
Start: 2019-11-06 | End: 2019-11-08

## 2019-11-06 NOTE — ANESTHESIA PREPROCEDURE EVALUATION
11/06/2019  Odin Oliva is a 79 y.o., female.    Anesthesia Evaluation    I have reviewed the Patient Summary Reports.    I have reviewed the Nursing Notes.   I have reviewed the Medications.     Review of Systems  Anesthesia Hx:  No problems with previous Anesthesia  Denies Family Hx of Anesthesia complications.   Denies Personal Hx of Anesthesia complications.   Social:  No Alcohol Use, Non-Smoker    Hematology/Oncology:         -- Anemia:   Cardiovascular:   Hypertension hyperlipidemia ECG has been reviewed.    Pulmonary:  Pulmonary Normal    Renal/:   Chronic Renal Disease, CRI    Hepatic/GI:  Hepatic/GI Normal    Musculoskeletal:   Arthritis  Elderly, frail, limited mobility.   Neurological:  Neurology Normal    Endocrine:   Diabetes, type 2 Hypothyroidism    Psych:  Psychiatric Normal           Physical Exam  General:  Well nourished    Airway/Jaw/Neck:  Airway Findings: Mouth Opening: Normal Tongue: Normal  General Airway Assessment: Adult  Mallampati: II  TM Distance: Normal, at least 6 cm  Jaw/Neck Findings:  Neck ROM: Normal ROM  Neck Findings:     Eyes/Ears/Nose:  Eyes/Ears/Nose Findings:    Dental:  Dental Findings: In tact   Chest/Lungs:  Chest/Lungs Findings: Clear to auscultation, Normal Respiratory Rate     Heart/Vascular:  Heart Findings: Rate: Normal  Rhythm: Regular Rhythm  Sounds: Normal  Heart murmur: negative Vascular Findings: Normal    Abdomen:  Abdomen Findings: Normal    Musculoskeletal:  Musculoskeletal Findings: Normal   Skin:  Skin Findings: Normal    Mental Status:  Mental Status Findings:  Alert and Oriented         Anesthesia Plan  Type of Anesthesia, risks & benefits discussed:  Anesthesia Type:  MAC  Patient's Preference:   Intra-op Monitoring Plan: standard ASA monitors  Intra-op Monitoring Plan Comments:   Post Op Pain Control Plan: per primary service following  discharge from PACU and multimodal analgesia  Post Op Pain Control Plan Comments:   Induction:   IV  Beta Blocker:  Patient is not currently on a Beta-Blocker (No further documentation required).       Informed Consent: Patient understands risks and agrees with Anesthesia plan.  Questions answered. Anesthesia consent signed with patient.  ASA Score: 3     Day of Surgery Review of History & Physical:    H&P update referred to the surgeon.         Ready For Surgery From Anesthesia Perspective.

## 2019-11-06 NOTE — PROGRESS NOTES
PODIATRIC MEDICINE AND SURGERY    PODIATRIC SURGERY PREOP HISTORY AND PHYSICAL EXAMINATION    CC:   Chief Complaint   Patient presents with    Pre-op Exam     Bilateral Derotational Arthroplasty, Fifth Digit; Procedure scheduled for 11/8/19; pt reports pain at 8/10 in toes.          AISHA Oliva is a 79 y.o. female who presents to clinic for preoperative history and physical examination for bilateral  foot surgery due to continued pain and deformity unresolved by conservative treatment. She has completed preoperative clearance. She is accompanied with her daughter. She denies any current N/V/F/C/SOB/CP.    Hemoglobin A1C   Date Value Ref Range Status   10/23/2019 7.0 (H) 4.0 - 5.6 % Final     Comment:     ADA Screening Guidelines:  5.7-6.4%  Consistent with prediabetes  >or=6.5%  Consistent with diabetes  High levels of fetal hemoglobin interfere with the HbA1C  assay. Heterozygous hemoglobin variants (HbS, HgC, etc)do  not significantly interfere with this assay.   However, presence of multiple variants may affect accuracy.     04/03/2019 6.5 (H) 4.0 - 5.6 % Final     Comment:     ADA Screening Guidelines:  5.7-6.4%  Consistent with prediabetes  >or=6.5%  Consistent with diabetes  High levels of fetal hemoglobin interfere with the HbA1C  assay. Heterozygous hemoglobin variants (HbS, HgC, etc)do  not significantly interfere with this assay.   However, presence of multiple variants may affect accuracy.       PMH  Past Medical History:   Diagnosis Date    Diabetes mellitus     HTN (hypertension)     Thyroid disease         PSH  Past Surgical History:   Procedure Laterality Date    CATARACT EXTRACTION, BILATERAL      CHOLECYSTECTOMY      TOTAL THYROIDECTOMY      UMBILICAL HERNIA REPAIR      x 2        MEDS    Current Outpatient Medications:     blood sugar diagnostic Strp, 1 each by Misc.(Non-Drug; Combo Route) route 3 (three) times daily., Disp: 100 strip, Rfl: 11    blood-glucose meter kit,  Use as instructed, Disp: 1 each, Rfl: 0    carvedilol (COREG) 12.5 MG tablet, Take 12.5 mg by mouth once daily. Take am of surgery, Disp: , Rfl:     glimepiride (AMARYL) 4 MG tablet, Take 4 mg by mouth 2 (two) times daily. Hold am of surgery, Disp: , Rfl: 1    lancets Misc, 1 each by Misc.(Non-Drug; Combo Route) route 3 (three) times daily., Disp: 100 each, Rfl: 11    levothyroxine (SYNTHROID) 150 MCG tablet, 1 tablet(s) by mouth daily, Disp: 90 tablet, Rfl: 0    cephALEXin (KEFLEX) 500 MG capsule, Take 1 capsule (500 mg total) by mouth every 12 (twelve) hours. for 10 days, Disp: 20 capsule, Rfl: 0    HYDROcodone-acetaminophen (NORCO) 5-325 mg per tablet, Take 1 tablet by mouth every 6 (six) hours as needed for Pain., Disp: 28 tablet, Rfl: 0    TRADJENTA 5 mg Tab tablet, 2 (two) times daily. , Disp: , Rfl: 1    triamcinolone acetonide 0.1% (KENALOG) 0.1 % cream, Apply topically 2 (two) times daily. Do not use on face for 14 days, Disp: 15 g, Rfl: 0    ALLERGIES  Review of patient's allergies indicates:  No Known Allergies    SOC HX  Social History     Socioeconomic History    Marital status:      Spouse name: Not on file    Number of children: Not on file    Years of education: Not on file    Highest education level: Not on file   Occupational History    Not on file   Social Needs    Financial resource strain: Not on file    Food insecurity:     Worry: Not on file     Inability: Not on file    Transportation needs:     Medical: Not on file     Non-medical: Not on file   Tobacco Use    Smoking status: Never Smoker    Smokeless tobacco: Never Used   Substance and Sexual Activity    Alcohol use: No     Frequency: Never    Drug use: No    Sexual activity: Never   Lifestyle    Physical activity:     Days per week: Not on file     Minutes per session: Not on file    Stress: Not on file   Relationships    Social connections:     Talks on phone: Not on file     Gets together: Not on file      "Attends Mor[employer] service: Not on file     Active member of club or organization: Not on file     Attends meetings of clubs or organizations: Not on file     Relationship status: Not on file   Other Topics Concern    Not on file   Social History Narrative    Not on file        FAM HX  Family History   Problem Relation Age of Onset    Asthma Mother     No Known Problems Father     Lung cancer Brother 65    Diabetes Brother     Diabetes Brother 80    Heart disease Daughter           REVIEW OF SYSTEMS   General: Denies any fever or chills   Chest: Denies shortness of breath, wheezing, coughing, or sputum production   Heart: Denies chest pain, cold extremities, orthopenia, or reduced exercise tolerance   As noted above and per history of current illness above, otherwise negative in the remainder of the 14 systems    The patient denies any current soft tissue infection, sores in the mouth, painful teeth, or any signifcant health changes since she last visit with his primary care physician.    PHYSICAL EXAM   BP (!) 144/65 (BP Location: Left arm, Patient Position: Sitting)   Pulse 68   Ht 4' 11" (1.499 m)   Wt 60.2 kg (132 lb 11.5 oz)   BMI 26.81 kg/m²       General: This patient is well-developed, well-nourished and appears stated age, well-oriented to person, place and time, and cooperative and pleasant on today's visit.    LOWER EXTREMITY PHYSICAL EXAMINATION   VASCULAR  DP pedal pulse 2/4 RIGHT, LEFT2/4   PT pedal pulse 2/4 RIGHT, LEFT2/4  Capillary refill time immediate to the toes.   Feet are warm to the touch. Skin temperature warm to warm from proximally to distally   There are varicosities, telangiectasias noted to bilateral foot and ankle regions.   There are NO ecchymoses noted to bilateral foot and ankle regions.   There is NO gross lower extremity edema.     DERMATOLOGIC  Skin moist with healthy texture and turgor.  There are no open ulcerations, lacerations, or fissures to bilateral foot and " ankle regions. There are no signs of infection as there is no erythema, no proximal-extending lymphangiitis, no fluctuance, or crepitus noted on palpation to bilateral foot and ankle regions.   There is no interdigital maceration.   There are hyperkeratotic lesions noted medial ASPECT of fifth digit b/l and lateral aspect of fourth digit.  Thickened nail LEFT hallux with lateral border incurvated, no acute SOI     NEUROLOGIC  Protective sensation intact at 10/10 sites upon examination with [place] Weinsten 5.07 g monofilament.  Propioception intact at 1st MTPJ b/l.  Achilles and patellar deep tendon reflexes intact  Babinski reflex absent     ORTHOPEDIC/BIOMECHANICAL  TTP fifth digit b/l, fourth digit LEFT foot, TTP at lateral aspect of RIGHT fifth digit nail fold  Muscle strength AT/EHL/EDL/PT: 5/5; Achilles/Gastroc/Soleus: 5/5; PB/PL: 5/5 Muscle tone is normal.  Ankle joint ROM limited DF/PF, non-crepitus  STJ ROM  inv/ev, non crepitu        IMAGING   Reviewed by me, 3 views of foot/ankle, reveal:   COMPARISON:  None    FINDINGS:  Bones are demineralized.  Small Achilles enthesophyte are seen on either side.  No fracture or dislocation.  Scattered multi articular degenerative changes including at the 1st MTP joint and at the DIP joints.     LABS  -Reviewed by me    ASSESSMENT  1. Adductovarus rotation fifth digit bilateral  2. Exostosis Fourth Digit  3. Painful callosities b/l foot  4. Listers corn, fifth digit, Right foot    PLAN:   The patient was examined and evaluated.     Planned surgical procedures:  1. Derotational arthroplasty, fifth digit, b/l  2. Condylectomy/Exostectomy, Fourth Digit, Left  3. Exostectomy, Fifth Digit, Right    Tentative surgical date: 11/8/19    Preop medical H&P and risk stratification performed by PAT.    Possible risks and complications of the surgical procedure(s) were discussed in detail with the patient including but not limited to soft tissue infection, bone infection, bleeding,  adhesions, scar formation, keloid scar formation, numbness and tingling sensation, damage to adjacent nerve(s), blood vessel(s), and/or tendon(s); stiffness of toes, foot, and/or ankle; delayed soft tissue or bone healing, wound healing complications, skin ulceration, nonunion (lack of bone healing), displacement/loosening of implants, malunion (healing of bone in abnormal position), inability to tolerate implants, allergic reaction to implants, recurrence of pain and/or deformity after surgery, possible need for amputation, pain in another location of the foot and/or ankle, altered gait, prolonged limping, continued pain despite surgery, prolonged or unresolved postoperative swelling, need for future procedure(s), complications with anesthesia, reflex sympathetic dystrophy - complex regional pain syndrome (increased pain response after surgery), blood clot (deep venous thrombosis), pulmonary embolism, and death.     The patient was provided with information on the diagnosis, possible/planned treatments and procedures, usual treatment or postoperative course, risks, complications, alternatives, benefits, prognosis and indications for the different treatment options and procedures. Perioperative expectations were reviewed both short-term and long-term.  They understood and had all of their questions answered to their satisfaction and came to the conclusion on their own accord and elected to proceed with the current treatment plan. Absolutely no guarantees were made or implied.     The patient was advised to not eat or drink anything after midnight the night prior to surgery.    Discussed that the patient will need to be WBAT to the b/l foot after surgery. The patient has the option of using crutches, a knee scooter, or wheel chair for ambulation assistance. The knee scooter is available commercially at multiple websites - patient understands that she needs to purchase it if desired.     Discussed that if the patient  has any problems including postoperative pain that is not controlled with pain medications, nausea, vomiting, fevers, chills, shortness of breath, chest pain, calf pain or other significant problem he is urged to call immediately and report to the emergency department.     A prescription for post operative medications were provided for patient, not to be started until after surgery.     The patient was scheduled to follow-up in 5-7 days after surgery, prior as needed.     She was urged to call to make an appointment if needed prior to the scheduled appointment.     She is welcome to call my MA or LPN  or email me with questions or concerns.     Future Appointments   Date Time Provider Department Center   11/14/2019  2:20 PM Lissy Comer DPM [facility]   11/21/2019  1:20 PM SEBASTIAN Khan [facility]OD [facility]   12/18/2019  2:20 PM MD XI [facility]ns[facility]   1/8/2020 11:00 AM NAVID Mayne [facility]       Report Electronically Signed By:  Lissy Comer DPM   Podiatric Medicine & Surgery  [facility]  11/6/2019

## 2019-11-06 NOTE — H&P (VIEW-ONLY)
PODIATRIC MEDICINE AND SURGERY    PODIATRIC SURGERY PREOP HISTORY AND PHYSICAL EXAMINATION    CC:   Chief Complaint   Patient presents with    Pre-op Exam     Bilateral Derotational Arthroplasty, Fifth Digit; Procedure scheduled for 11/8/19; pt reports pain at 8/10 in toes.          AISHA Oliva is a 79 y.o. female who presents to clinic for preoperative history and physical examination for bilateral  foot surgery due to continued pain and deformity unresolved by conservative treatment. She has completed preoperative clearance. She is accompanied with her daughter. She denies any current N/V/F/C/SOB/CP.    Hemoglobin A1C   Date Value Ref Range Status   10/23/2019 7.0 (H) 4.0 - 5.6 % Final     Comment:     ADA Screening Guidelines:  5.7-6.4%  Consistent with prediabetes  >or=6.5%  Consistent with diabetes  High levels of fetal hemoglobin interfere with the HbA1C  assay. Heterozygous hemoglobin variants (HbS, HgC, etc)do  not significantly interfere with this assay.   However, presence of multiple variants may affect accuracy.     04/03/2019 6.5 (H) 4.0 - 5.6 % Final     Comment:     ADA Screening Guidelines:  5.7-6.4%  Consistent with prediabetes  >or=6.5%  Consistent with diabetes  High levels of fetal hemoglobin interfere with the HbA1C  assay. Heterozygous hemoglobin variants (HbS, HgC, etc)do  not significantly interfere with this assay.   However, presence of multiple variants may affect accuracy.       PMH  Past Medical History:   Diagnosis Date    Diabetes mellitus     HTN (hypertension)     Thyroid disease         PSH  Past Surgical History:   Procedure Laterality Date    CATARACT EXTRACTION, BILATERAL      CHOLECYSTECTOMY      TOTAL THYROIDECTOMY      UMBILICAL HERNIA REPAIR      x 2        MEDS    Current Outpatient Medications:     blood sugar diagnostic Strp, 1 each by Misc.(Non-Drug; Combo Route) route 3 (three) times daily., Disp: 100 strip, Rfl: 11    blood-glucose meter kit,  Use as instructed, Disp: 1 each, Rfl: 0    carvedilol (COREG) 12.5 MG tablet, Take 12.5 mg by mouth once daily. Take am of surgery, Disp: , Rfl:     glimepiride (AMARYL) 4 MG tablet, Take 4 mg by mouth 2 (two) times daily. Hold am of surgery, Disp: , Rfl: 1    lancets Misc, 1 each by Misc.(Non-Drug; Combo Route) route 3 (three) times daily., Disp: 100 each, Rfl: 11    levothyroxine (SYNTHROID) 150 MCG tablet, 1 tablet(s) by mouth daily, Disp: 90 tablet, Rfl: 0    cephALEXin (KEFLEX) 500 MG capsule, Take 1 capsule (500 mg total) by mouth every 12 (twelve) hours. for 10 days, Disp: 20 capsule, Rfl: 0    HYDROcodone-acetaminophen (NORCO) 5-325 mg per tablet, Take 1 tablet by mouth every 6 (six) hours as needed for Pain., Disp: 28 tablet, Rfl: 0    TRADJENTA 5 mg Tab tablet, 2 (two) times daily. , Disp: , Rfl: 1    triamcinolone acetonide 0.1% (KENALOG) 0.1 % cream, Apply topically 2 (two) times daily. Do not use on face for 14 days, Disp: 15 g, Rfl: 0    ALLERGIES  Review of patient's allergies indicates:  No Known Allergies    SOC HX  Social History     Socioeconomic History    Marital status:      Spouse name: Not on file    Number of children: Not on file    Years of education: Not on file    Highest education level: Not on file   Occupational History    Not on file   Social Needs    Financial resource strain: Not on file    Food insecurity:     Worry: Not on file     Inability: Not on file    Transportation needs:     Medical: Not on file     Non-medical: Not on file   Tobacco Use    Smoking status: Never Smoker    Smokeless tobacco: Never Used   Substance and Sexual Activity    Alcohol use: No     Frequency: Never    Drug use: No    Sexual activity: Never   Lifestyle    Physical activity:     Days per week: Not on file     Minutes per session: Not on file    Stress: Not on file   Relationships    Social connections:     Talks on phone: Not on file     Gets together: Not on file      "Attends Zoroastrianism service: Not on file     Active member of club or organization: Not on file     Attends meetings of clubs or organizations: Not on file     Relationship status: Not on file   Other Topics Concern    Not on file   Social History Narrative    Not on file        FAM HX  Family History   Problem Relation Age of Onset    Asthma Mother     No Known Problems Father     Lung cancer Brother 65    Diabetes Brother     Diabetes Brother 80    Heart disease Daughter           REVIEW OF SYSTEMS   General: Denies any fever or chills   Chest: Denies shortness of breath, wheezing, coughing, or sputum production   Heart: Denies chest pain, cold extremities, orthopenia, or reduced exercise tolerance   As noted above and per history of current illness above, otherwise negative in the remainder of the 14 systems    The patient denies any current soft tissue infection, sores in the mouth, painful teeth, or any signifcant health changes since she last visit with his primary care physician.    PHYSICAL EXAM   BP (!) 144/65 (BP Location: Left arm, Patient Position: Sitting)   Pulse 68   Ht 4' 11" (1.499 m)   Wt 60.2 kg (132 lb 11.5 oz)   BMI 26.81 kg/m²       General: This patient is well-developed, well-nourished and appears stated age, well-oriented to person, place and time, and cooperative and pleasant on today's visit.    LOWER EXTREMITY PHYSICAL EXAMINATION   VASCULAR  DP pedal pulse 2/4 RIGHT, LEFT2/4   PT pedal pulse 2/4 RIGHT, LEFT2/4  Capillary refill time immediate to the toes.   Feet are warm to the touch. Skin temperature warm to warm from proximally to distally   There are varicosities, telangiectasias noted to bilateral foot and ankle regions.   There are NO ecchymoses noted to bilateral foot and ankle regions.   There is NO gross lower extremity edema.     DERMATOLOGIC  Skin moist with healthy texture and turgor.  There are no open ulcerations, lacerations, or fissures to bilateral foot and " ankle regions. There are no signs of infection as there is no erythema, no proximal-extending lymphangiitis, no fluctuance, or crepitus noted on palpation to bilateral foot and ankle regions.   There is no interdigital maceration.   There are hyperkeratotic lesions noted medial ASPECT of fifth digit b/l and lateral aspect of fourth digit.  Thickened nail LEFT hallux with lateral border incurvated, no acute SOI     NEUROLOGIC  Protective sensation intact at 10/10 sites upon examination with [place] Weinsten 5.07 g monofilament.  Propioception intact at 1st MTPJ b/l.  Achilles and patellar deep tendon reflexes intact  Babinski reflex absent     ORTHOPEDIC/BIOMECHANICAL  TTP fifth digit b/l, fourth digit LEFT foot, TTP at lateral aspect of RIGHT fifth digit nail fold  Muscle strength AT/EHL/EDL/PT: 5/5; Achilles/Gastroc/Soleus: 5/5; PB/PL: 5/5 Muscle tone is normal.  Ankle joint ROM limited DF/PF, non-crepitus  STJ ROM  inv/ev, non crepitu        IMAGING   Reviewed by me, 3 views of foot/ankle, reveal:   COMPARISON:  None    FINDINGS:  Bones are demineralized.  Small Achilles enthesophyte are seen on either side.  No fracture or dislocation.  Scattered multi articular degenerative changes including at the 1st MTP joint and at the DIP joints.     LABS  -Reviewed by me    ASSESSMENT  1. Adductovarus rotation fifth digit bilateral  2. Exostosis Fourth Digit  3. Painful callosities b/l foot  4. Listers corn, fifth digit, Right foot    PLAN:   The patient was examined and evaluated.     Planned surgical procedures:  1. Derotational arthroplasty, fifth digit, b/l  2. Condylectomy/Exostectomy, Fourth Digit, Left  3. Exostectomy, Fifth Digit, Right    Tentative surgical date: 11/8/19    Preop medical H&P and risk stratification performed by PAT.    Possible risks and complications of the surgical procedure(s) were discussed in detail with the patient including but not limited to soft tissue infection, bone infection, bleeding,  adhesions, scar formation, keloid scar formation, numbness and tingling sensation, damage to adjacent nerve(s), blood vessel(s), and/or tendon(s); stiffness of toes, foot, and/or ankle; delayed soft tissue or bone healing, wound healing complications, skin ulceration, nonunion (lack of bone healing), displacement/loosening of implants, malunion (healing of bone in abnormal position), inability to tolerate implants, allergic reaction to implants, recurrence of pain and/or deformity after surgery, possible need for amputation, pain in another location of the foot and/or ankle, altered gait, prolonged limping, continued pain despite surgery, prolonged or unresolved postoperative swelling, need for future procedure(s), complications with anesthesia, reflex sympathetic dystrophy - complex regional pain syndrome (increased pain response after surgery), blood clot (deep venous thrombosis), pulmonary embolism, and death.     The patient was provided with information on the diagnosis, possible/planned treatments and procedures, usual treatment or postoperative course, risks, complications, alternatives, benefits, prognosis and indications for the different treatment options and procedures. Perioperative expectations were reviewed both short-term and long-term.  They understood and had all of their questions answered to their satisfaction and came to the conclusion on their own accord and elected to proceed with the current treatment plan. Absolutely no guarantees were made or implied.     The patient was advised to not eat or drink anything after midnight the night prior to surgery.    Discussed that the patient will need to be WBAT to the b/l foot after surgery. The patient has the option of using crutches, a knee scooter, or wheel chair for ambulation assistance. The knee scooter is available commercially at multiple websites - patient understands that she needs to purchase it if desired.     Discussed that if the patient  has any problems including postoperative pain that is not controlled with pain medications, nausea, vomiting, fevers, chills, shortness of breath, chest pain, calf pain or other significant problem he is urged to call immediately and report to the emergency department.     A prescription for post operative medications were provided for patient, not to be started until after surgery.     The patient was scheduled to follow-up in 5-7 days after surgery, prior as needed.     She was urged to call to make an appointment if needed prior to the scheduled appointment.     She is welcome to call my MA or LPN  or email me with questions or concerns.     Future Appointments   Date Time Provider Department Center   11/14/2019  2:20 PM Lissy Comer DPM [facility]   11/21/2019  1:20 PM SEBASTIAN Khan POD [facility]   12/18/2019  2:20 PM MD XI Sorensen [facility]   1/8/2020 11:00 AM NAVID Mayen [facility]       Report Electronically Signed By:  Lissy Comer DPM   Podiatric Medicine & Surgery  [facility]  11/6/2019

## 2019-11-08 ENCOUNTER — HOSPITAL ENCOUNTER (OUTPATIENT)
Facility: HOSPITAL | Age: 79
Discharge: HOME | End: 2019-11-08
Attending: PODIATRIST | Admitting: PODIATRIST
Payer: MEDICARE

## 2019-11-08 ENCOUNTER — ANESTHESIA (OUTPATIENT)
Dept: SURGERY | Facility: HOSPITAL | Age: 79
End: 2019-11-08
Payer: MEDICARE

## 2019-11-08 ENCOUNTER — HOSPITAL ENCOUNTER (OUTPATIENT)
Dept: RADIOLOGY | Facility: HOSPITAL | Age: 79
Discharge: HOME | End: 2019-11-08
Attending: PODIATRIST | Admitting: PODIATRIST
Payer: MEDICARE

## 2019-11-08 DIAGNOSIS — M20.5X2 ADDUCTOVARUS ROTATION OF TOE, ACQUIRED, LEFT: ICD-10-CM

## 2019-11-08 DIAGNOSIS — M20.5X1 ADDUCTOVARUS ROTATION OF TOE, ACQUIRED, RIGHT: ICD-10-CM

## 2019-11-08 DIAGNOSIS — Z01.818 PREOP TESTING: ICD-10-CM

## 2019-11-08 LAB
POCT GLUCOSE: 111 MG/DL (ref 70–110)
POCT GLUCOSE: 111 MG/DL (ref 70–110)

## 2019-11-08 PROCEDURE — 25000003 PHARM REV CODE 250: Performed by: PODIATRIST

## 2019-11-08 PROCEDURE — D9220A PRA ANESTHESIA: ICD-10-PCS | Mod: ANES,,, | Performed by: ANESTHESIOLOGY

## 2019-11-08 PROCEDURE — 36000707: Performed by: PODIATRIST

## 2019-11-08 PROCEDURE — 27201423 OPTIME MED/SURG SUP & DEVICES STERILE SUPPLY: Performed by: PODIATRIST

## 2019-11-08 PROCEDURE — 71000033 HC RECOVERY, INTIAL HOUR: Performed by: PODIATRIST

## 2019-11-08 PROCEDURE — D9220A PRA ANESTHESIA: Mod: ANES,,, | Performed by: ANESTHESIOLOGY

## 2019-11-08 PROCEDURE — 63600175 PHARM REV CODE 636 W HCPCS: Performed by: PODIATRIST

## 2019-11-08 PROCEDURE — 37000008 HC ANESTHESIA 1ST 15 MINUTES: Performed by: PODIATRIST

## 2019-11-08 PROCEDURE — 82962 GLUCOSE BLOOD TEST: CPT | Performed by: PODIATRIST

## 2019-11-08 PROCEDURE — 73620 X-RAY EXAM OF FOOT: CPT | Mod: 26,RT,, | Performed by: RADIOLOGY

## 2019-11-08 PROCEDURE — 63600175 PHARM REV CODE 636 W HCPCS: Performed by: NURSE ANESTHETIST, CERTIFIED REGISTERED

## 2019-11-08 PROCEDURE — 25000003 PHARM REV CODE 250: Performed by: NURSE PRACTITIONER

## 2019-11-08 PROCEDURE — 73620 PR  X-RAY FOOT 2 VW: ICD-10-PCS | Mod: 26,59,LT, | Performed by: RADIOLOGY

## 2019-11-08 PROCEDURE — 28285 REPAIR OF HAMMERTOE: CPT | Mod: 51,50,ICN, | Performed by: PODIATRIST

## 2019-11-08 PROCEDURE — 37000009 HC ANESTHESIA EA ADD 15 MINS: Performed by: PODIATRIST

## 2019-11-08 PROCEDURE — S0020 INJECTION, BUPIVICAINE HYDRO: HCPCS | Performed by: PODIATRIST

## 2019-11-08 PROCEDURE — 73620 X-RAY EXAM OF FOOT: CPT | Mod: 50,TC

## 2019-11-08 PROCEDURE — 28288 PR PART REMV BONE METATARSAL HEAD,EA: ICD-10-PCS | Mod: 51,T9,ICN, | Performed by: PODIATRIST

## 2019-11-08 PROCEDURE — D9220A PRA ANESTHESIA: Mod: CRNA,,, | Performed by: NURSE ANESTHETIST, CERTIFIED REGISTERED

## 2019-11-08 PROCEDURE — 28285 PR REPAIR OF HAMMERTOE,ONE: ICD-10-PCS | Mod: 51,50,ICN, | Performed by: PODIATRIST

## 2019-11-08 PROCEDURE — 71000015 HC POSTOP RECOV 1ST HR: Performed by: PODIATRIST

## 2019-11-08 PROCEDURE — 63600175 PHARM REV CODE 636 W HCPCS: Performed by: ANESTHESIOLOGY

## 2019-11-08 PROCEDURE — 28288 PARTIAL REMOVAL OF FOOT BONE: CPT | Mod: T3,ICN,, | Performed by: PODIATRIST

## 2019-11-08 PROCEDURE — 73620 X-RAY EXAM OF FOOT: CPT | Mod: 26,59,LT, | Performed by: RADIOLOGY

## 2019-11-08 PROCEDURE — 36000706: Performed by: PODIATRIST

## 2019-11-08 PROCEDURE — D9220A PRA ANESTHESIA: ICD-10-PCS | Mod: CRNA,,, | Performed by: NURSE ANESTHETIST, CERTIFIED REGISTERED

## 2019-11-08 RX ORDER — LIDOCAINE HCL/PF 100 MG/5ML
SYRINGE (ML) INTRAVENOUS
Status: DISCONTINUED | OUTPATIENT
Start: 2019-11-08 | End: 2019-11-08

## 2019-11-08 RX ORDER — ONDANSETRON 2 MG/ML
4 INJECTION INTRAMUSCULAR; INTRAVENOUS ONCE AS NEEDED
Status: CANCELLED | OUTPATIENT
Start: 2019-11-08 | End: 2031-04-06

## 2019-11-08 RX ORDER — PROPOFOL 10 MG/ML
VIAL (ML) INTRAVENOUS
Status: DISCONTINUED | OUTPATIENT
Start: 2019-11-08 | End: 2019-11-08

## 2019-11-08 RX ORDER — LIDOCAINE HYDROCHLORIDE 10 MG/ML
INJECTION, SOLUTION EPIDURAL; INFILTRATION; INTRACAUDAL; PERINEURAL
Status: DISCONTINUED
Start: 2019-11-08 | End: 2019-11-08 | Stop reason: HOSPADM

## 2019-11-08 RX ORDER — MEPERIDINE HYDROCHLORIDE 25 MG/ML
12.5 INJECTION INTRAMUSCULAR; INTRAVENOUS; SUBCUTANEOUS ONCE
Status: DISCONTINUED | OUTPATIENT
Start: 2019-11-08 | End: 2019-11-08 | Stop reason: HOSPADM

## 2019-11-08 RX ORDER — FENTANYL CITRATE 50 UG/ML
25 INJECTION, SOLUTION INTRAMUSCULAR; INTRAVENOUS EVERY 5 MIN PRN
Status: DISCONTINUED | OUTPATIENT
Start: 2019-11-08 | End: 2019-11-08 | Stop reason: HOSPADM

## 2019-11-08 RX ORDER — ALBUTEROL SULFATE 0.83 MG/ML
2.5 SOLUTION RESPIRATORY (INHALATION) EVERY 4 HOURS PRN
Status: CANCELLED | OUTPATIENT
Start: 2019-11-08

## 2019-11-08 RX ORDER — SODIUM CHLORIDE, SODIUM LACTATE, POTASSIUM CHLORIDE, CALCIUM CHLORIDE 600; 310; 30; 20 MG/100ML; MG/100ML; MG/100ML; MG/100ML
INJECTION, SOLUTION INTRAVENOUS CONTINUOUS
Status: DISCONTINUED | OUTPATIENT
Start: 2019-11-08 | End: 2024-02-12

## 2019-11-08 RX ORDER — LIDOCAINE HYDROCHLORIDE 10 MG/ML
INJECTION, SOLUTION EPIDURAL; INFILTRATION; INTRACAUDAL; PERINEURAL
Status: DISCONTINUED | OUTPATIENT
Start: 2019-11-08 | End: 2019-11-08 | Stop reason: HOSPADM

## 2019-11-08 RX ORDER — LIDOCAINE HYDROCHLORIDE 10 MG/ML
1 INJECTION, SOLUTION EPIDURAL; INFILTRATION; INTRACAUDAL; PERINEURAL ONCE
Status: DISCONTINUED | OUTPATIENT
Start: 2019-11-08 | End: 2024-02-12

## 2019-11-08 RX ORDER — LIDOCAINE HYDROCHLORIDE 10 MG/ML
1 INJECTION, SOLUTION EPIDURAL; INFILTRATION; INTRACAUDAL; PERINEURAL ONCE
Status: DISCONTINUED | OUTPATIENT
Start: 2019-11-08 | End: 2019-11-08 | Stop reason: HOSPADM

## 2019-11-08 RX ORDER — KETOROLAC TROMETHAMINE 30 MG/ML
15 INJECTION, SOLUTION INTRAMUSCULAR; INTRAVENOUS EVERY 8 HOURS PRN
Status: CANCELLED | OUTPATIENT
Start: 2019-11-08 | End: 2019-11-10

## 2019-11-08 RX ORDER — DIPHENHYDRAMINE HYDROCHLORIDE 50 MG/ML
25 INJECTION INTRAMUSCULAR; INTRAVENOUS EVERY 6 HOURS PRN
Status: DISCONTINUED | OUTPATIENT
Start: 2019-11-08 | End: 2019-11-08 | Stop reason: HOSPADM

## 2019-11-08 RX ORDER — FENTANYL CITRATE 50 UG/ML
INJECTION, SOLUTION INTRAMUSCULAR; INTRAVENOUS
Status: DISCONTINUED | OUTPATIENT
Start: 2019-11-08 | End: 2019-11-08

## 2019-11-08 RX ORDER — BUPIVACAINE HYDROCHLORIDE 5 MG/ML
INJECTION, SOLUTION EPIDURAL; INTRACAUDAL
Status: DISCONTINUED
Start: 2019-11-08 | End: 2019-11-08 | Stop reason: HOSPADM

## 2019-11-08 RX ORDER — ACETAMINOPHEN 500 MG
1000 TABLET ORAL
Status: COMPLETED | OUTPATIENT
Start: 2019-11-08 | End: 2019-11-08

## 2019-11-08 RX ORDER — PROPOFOL 10 MG/ML
VIAL (ML) INTRAVENOUS CONTINUOUS PRN
Status: DISCONTINUED | OUTPATIENT
Start: 2019-11-08 | End: 2019-11-08

## 2019-11-08 RX ORDER — HYDROCODONE BITARTRATE AND ACETAMINOPHEN 5; 325 MG/1; MG/1
1 TABLET ORAL
Status: DISCONTINUED | OUTPATIENT
Start: 2019-11-08 | End: 2019-11-08 | Stop reason: HOSPADM

## 2019-11-08 RX ORDER — VALSARTAN 160 MG/1
160 TABLET ORAL DAILY
COMMUNITY
End: 2020-11-09

## 2019-11-08 RX ORDER — HYDROCODONE BITARTRATE AND ACETAMINOPHEN 5; 325 MG/1; MG/1
1 TABLET ORAL
Status: CANCELLED | OUTPATIENT
Start: 2019-11-08

## 2019-11-08 RX ORDER — FAMOTIDINE 20 MG/1
20 TABLET, FILM COATED ORAL
Status: COMPLETED | OUTPATIENT
Start: 2019-11-08 | End: 2019-11-08

## 2019-11-08 RX ORDER — FENTANYL CITRATE 50 UG/ML
25 INJECTION, SOLUTION INTRAMUSCULAR; INTRAVENOUS EVERY 5 MIN PRN
Status: CANCELLED | OUTPATIENT
Start: 2019-11-08

## 2019-11-08 RX ORDER — CEFAZOLIN SODIUM 2 G/50ML
2 SOLUTION INTRAVENOUS
Status: DISCONTINUED | OUTPATIENT
Start: 2019-11-08 | End: 2019-11-08 | Stop reason: HOSPADM

## 2019-11-08 RX ORDER — ONDANSETRON 2 MG/ML
INJECTION INTRAMUSCULAR; INTRAVENOUS
Status: DISCONTINUED | OUTPATIENT
Start: 2019-11-08 | End: 2019-11-08

## 2019-11-08 RX ORDER — MEPERIDINE HYDROCHLORIDE 25 MG/ML
12.5 INJECTION INTRAMUSCULAR; INTRAVENOUS; SUBCUTANEOUS
Status: CANCELLED | OUTPATIENT
Start: 2019-11-08

## 2019-11-08 RX ORDER — ALBUTEROL SULFATE 0.83 MG/ML
2.5 SOLUTION RESPIRATORY (INHALATION) EVERY 4 HOURS PRN
Status: DISCONTINUED | OUTPATIENT
Start: 2019-11-08 | End: 2019-11-08 | Stop reason: HOSPADM

## 2019-11-08 RX ORDER — BUPIVACAINE HYDROCHLORIDE 5 MG/ML
INJECTION, SOLUTION EPIDURAL; INTRACAUDAL
Status: DISCONTINUED | OUTPATIENT
Start: 2019-11-08 | End: 2019-11-08 | Stop reason: HOSPADM

## 2019-11-08 RX ORDER — DIPHENHYDRAMINE HYDROCHLORIDE 50 MG/ML
25 INJECTION INTRAMUSCULAR; INTRAVENOUS EVERY 6 HOURS PRN
Status: CANCELLED | OUTPATIENT
Start: 2019-11-08

## 2019-11-08 RX ORDER — SODIUM CHLORIDE, SODIUM LACTATE, POTASSIUM CHLORIDE, CALCIUM CHLORIDE 600; 310; 30; 20 MG/100ML; MG/100ML; MG/100ML; MG/100ML
INJECTION, SOLUTION INTRAVENOUS
Status: DISCONTINUED | OUTPATIENT
Start: 2019-11-08 | End: 2019-11-08 | Stop reason: HOSPADM

## 2019-11-08 RX ORDER — ONDANSETRON 2 MG/ML
4 INJECTION INTRAMUSCULAR; INTRAVENOUS ONCE AS NEEDED
Status: DISCONTINUED | OUTPATIENT
Start: 2019-11-08 | End: 2019-11-08 | Stop reason: HOSPADM

## 2019-11-08 RX ADMIN — ACETAMINOPHEN 1000 MG: 500 TABLET ORAL at 07:11

## 2019-11-08 RX ADMIN — PROPOFOL 10 MG: 10 INJECTION, EMULSION INTRAVENOUS at 08:11

## 2019-11-08 RX ADMIN — FENTANYL CITRATE 50 MCG: 50 INJECTION, SOLUTION INTRAMUSCULAR; INTRAVENOUS at 08:11

## 2019-11-08 RX ADMIN — LIDOCAINE HYDROCHLORIDE 60 MG: 20 INJECTION, SOLUTION INTRAVENOUS at 08:11

## 2019-11-08 RX ADMIN — CEFAZOLIN SODIUM 2 G: 2 SOLUTION INTRAVENOUS at 08:11

## 2019-11-08 RX ADMIN — PROPOFOL 25 MCG/KG/MIN: 10 INJECTION, EMULSION INTRAVENOUS at 08:11

## 2019-11-08 RX ADMIN — FAMOTIDINE 20 MG: 20 TABLET, FILM COATED ORAL at 07:11

## 2019-11-08 RX ADMIN — SODIUM CHLORIDE, SODIUM LACTATE, POTASSIUM CHLORIDE, AND CALCIUM CHLORIDE: .6; .31; .03; .02 INJECTION, SOLUTION INTRAVENOUS at 07:11

## 2019-11-08 RX ADMIN — PROPOFOL 20 MG: 10 INJECTION, EMULSION INTRAVENOUS at 08:11

## 2019-11-08 RX ADMIN — ONDANSETRON 4 MG: 2 INJECTION, SOLUTION INTRAMUSCULAR; INTRAVENOUS at 08:11

## 2019-11-08 NOTE — DISCHARGE SUMMARY
[facility]  Discharge Note  Short Stay    Admit Date: 11/8/2019    Discharge Date and Time: No discharge date for patient encounter.     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       Discharged Condition: stable    Hospital Course: Patient was admitted for an outpatient procedure and tolerated the procedure well with no complications.    Final Diagnoses: Same as principal problem.    Disposition: Home or Self Care    Follow up/Patient Instructions:    Medications:  Reconciled Home Medications:      Medication List      STOP taking these medications    HYDROcodone-acetaminophen 5-325 mg per tablet  Commonly known as:  NORCO        ASK your doctor about these medications    blood sugar diagnostic Strp  1 each by Misc.(Non-Drug; Combo Route) route 3 (three) times daily.     blood-glucose meter kit  Use as instructed     carvedilol 12.5 MG tablet  Commonly known as:  COREG  Take 12.5 mg by mouth once daily. Take am of surgery     cephALEXin 500 MG capsule  Commonly known as:  KEFLEX  Take 1 capsule (500 mg total) by mouth every 12 (twelve) hours. for 10 days     glimepiride 4 MG tablet  Commonly known as:  AMARYL  Take 4 mg by mouth 2 (two) times daily. Hold am of surgery     lancets Misc  1 each by Misc.(Non-Drug; Combo Route) route 3 (three) times daily.     levothyroxine 150 MCG tablet  Commonly known as:  SYNTHROID  1 tablet(s) by mouth daily     TRADJENTA 5 mg Tab tablet  Generic drug:  linaGLIPtin  2 (two) times daily.     triamcinolone acetonide 0.1% 0.1 % cream  Commonly known as:  KENALOG  Apply topically 2 (two) times daily. Do not use on face for 14 days     valsartan 160 MG tablet  Commonly known as:  DIOVAN  Take 160 mg by mouth once daily.          No discharge  procedures on file.      Discharge Procedure Orders (must include Diet, Follow-up, Activity):  No discharge procedures on file.

## 2019-11-08 NOTE — TRANSFER OF CARE
Anesthesia Transfer of Care Note    Patient: Odin Oliva    Procedure(s) Performed: Procedure(s) (LRB):  HAMMER TOE, DEROTATIONAL ARTHROPLASTY FIFTH DIGIT (Bilateral)    Patient location: PACU    Anesthesia Type: MAC    Transport from OR: Transported from OR on room air with adequate spontaneous ventilation    Post pain: adequate analgesia    Post assessment: tolerated procedure well and no apparent anesthetic complications    Post vital signs: stable    Level of consciousness: awake, alert and oriented    Nausea/Vomiting: no nausea/vomiting    Complications: none    Transfer of care protocol was followed      Last vitals:   Visit Vitals  BP (!) 145/65 (BP Location: Right arm, Patient Position: Lying)   Pulse 61   Temp 36.2 °C (97.2 °F) (Temporal)   Resp 20   Ht 5' (1.524 m)   Wt 60 kg (132 lb 4.4 oz)   SpO2 100%   Breastfeeding? No   BMI 25.83 kg/m²

## 2019-11-08 NOTE — ANESTHESIA POSTPROCEDURE EVALUATION
Anesthesia Post Evaluation    Patient: Odin Oliva    Procedure(s) Performed: Procedure(s) (LRB):  HAMMER TOE, DEROTATIONAL ARTHROPLASTY FIFTH DIGIT (Bilateral)    Final Anesthesia Type: MAC  Patient location during evaluation: PACU  Patient participation: Yes- Able to Participate  Level of consciousness: awake and alert and oriented  Post-procedure vital signs: reviewed and stable  Pain management: adequate  Airway patency: patent  PONV status at discharge: No PONV  Anesthetic complications: no      Cardiovascular status: hemodynamically stable  Respiratory status: unassisted  Hydration status: euvolemic  Follow-up not needed.          Vitals Value Taken Time   /68 11/8/2019 10:43 AM   Temp 36.2 °C (97.2 °F) 11/8/2019 10:11 AM   Pulse 57 11/8/2019 10:44 AM   Resp 26 11/8/2019 10:44 AM   SpO2 100 % 11/8/2019 10:44 AM   Vitals shown include unvalidated device data.      No case tracking events are documented in the log.      Pain/Adia Score: Pain Rating Prior to Med Admin: 0 (11/8/2019  7:43 AM)  Adia Score: 9 (11/8/2019 10:25 AM)

## 2019-11-08 NOTE — CARE UPDATE
PT'S DAUGHTERS VERBALIZED UNDERSTANDING OF D/C INSTRUCTIONS.  PT TO HAVE WALKER DELIVERED TO HOME TODAY PER DR SCHROEDER.  PT HAS POST OP MEDS AT HOME.  PT HAPPY, DENIES PAIN, VSS.

## 2019-11-08 NOTE — DISCHARGE INSTRUCTIONS
[facility]    PODIATRIC SURGERY DISCHARGE INSTRUCTIONS    GENERAL ANESTHESIA OR SEDATION  1.  Do NOT drive until cleared by Dr. Comer.  2.  Do NOT drink alcohol; take tranquilizers, or sleeping pills while taking narcotic pain medications.  3. You need to have a responsible adult to be home with you tonight or readily available (nearby) if needed.      ACTIVITY  1.  You are advised to go directly home from the hospital.  Restrict activities and rest.    2.  No heavy lifting, no pushing, no straining until cleared by Dr. Comer  3.  In the next 2 weeks keep your surgical foot / ankle above the heart as much as possible (Toes above the Nose).  Elevate it on 3-4 pillows.    4.  For bathing you may sponge bathe, or cover your cast/bandage with a trash bag and tape above the knee with duct tape.  There are also commercially available cast protectors to purchase at the pharmacy ([employer] or [employer], ~$15.00, www[employer])  5.  Do not get your cast / bandage WET.  If it gets SOAKED, IT WILL NEED TO BE CHANGED.  Do NOT sit with a wet bandage. Your foot can get infected.  If it is during office hours, come to our clinic. If after hours, page the podiatry physician on call.  6.You must remain NON WEIGHT BEARING to your operative foot with use of crutches or wheelchair for assistance. Do not put any pressure on the BALL OF YOUR operative foot until you are instructed by your surgeon, Dr. Comer. You may use crutches or also have the option of purchasing a knee scooter to assist you with getting around. If you have any problems using crutches, please contact your surgeon.    FLUID, DIET, AND URINATION  1.  Begin with clear liquids; clear soup, Jell-O, dry toast and soda crackers.  If not nauseated, you may go to regular diet when you desire.  2.  Greasy and spicy foods are not advised.  3.  If unable to urinate after 8 hours after surgery, call the clinic or hospital to speak with the physician.    MEDICATIONS  1.   "Prescriptions have been sent with you or should already be filled.  Use as directed on instructions.  When using pain medications, you may experience dizziness or drowsiness.  Do NOT drink alcohol or drive when you are taking these medications.  Always eat before taking pain medications.    2.  Use a stool softener to minimize constipation while taking pain medications.      OPERATIVE SITE  1.  Keep the dressing / cast clean and dry until follow up with your surgeon. (If it becomes wet, you must inform your doctor immediately)  2.  Do NOT remove the dressing / cast.  3.  If some bleeding through the bandages is noted, add some more bandages.  If the bleeding continues then notify the clinic.  4.  Ice for 20 minutes every hour while you are awake for the next 48 hours.  If you have a cast/splint, ice behind the knee.  If you have a bandage, place the ice bag over the ankle or top of the foot.    5. You should expect to have some numbness in your foot following surgery for several hours.     SIGNS / SYMPTOMS "Danger Signals" TO REPORT TO THE PHYSICIAN OR ADVICE CENTER  1.  Temperature > 101.5 degrees F or higher  2.  Excessive pain not controlled with pain medications.  3.  Excessive nausea and/or vomiting. (Nausea is common after having anesthesia for the first 24-36 hours).  4.  Increased bleeding from the incision site.  (It is common for blood to strike through the bandage in the first 24  hours following surgery especially if your foot down for a period of time. Elevate your foot, you may reinforce dressing. If any symptoms of blood loss (Lightheaded, dizziness, bandages are completely soaked, then this is considered abnormal. Call the clinic or report to ER for evaluation).  5. Toes become cold or purple  6. Calf pain, shortness of breath, or chest pain    Danger signals should be taken seriously. If you have any of the above symptoms, you must call the hospital or clinic immediately and notify your " surgeon    PHONE ADVICE NUMBERS  -If you have any questions, here is how you can reach Dr. Comer or the podiatrist on call :    [facility] [place]: [phone]  [facility] [place] [place]:  [phone]    After 5 PM, the clinic is closed.  You can reach the On-Call Registered Nurse at:  [phone]    [facility] (You can call the hospital directly and ask to speak to the Podiatrist On Call)   [phone]      If you feel there is an Emergency, then Go to the nearest Emergency Room or call [phone].        FOLLOW UP APPOINTMENT  You are scheduled to follow up as listed below for your post operative visit. Please pay attention to the LOCATION ([facility] or [facility]). Arrive to your appointment at least 15 minutes before time of appointment.    Future Appointments   Date Time Provider Department Center   11/14/2019  2:20 PM Lissy Comer DPM [facility] POD [place]   11/21/2019  1:20 PM Lissy Comer DPM [facility] POD [place]   12/18/2019  2:20 PM Noman Caceres MD [facility]   1/8/2020 11:00 AM Tabatha Posey NP [facility]

## 2019-11-08 NOTE — INTERVAL H&P NOTE
The patient has been examined and the H&P has been reviewed:    I concur with the findings and no changes have occurred since H&P was written.    Anesthesia/Surgery risks, benefits and alternative options discussed and understood by patient/family.          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

## 2019-11-08 NOTE — OP NOTE
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 female who complains of a painful digits of both feet which pain has been unresponsive to conservative treatment with progression of pain with time and deformity. The patient's preoperative radiographs and objective clinical findings were reviewed with the patient. Potential risks and complications were discussed with the patient in detail. The patient voiced understanding of such. The patient elected to undergo surgical treatment and accepts risks and possible complications. Preoperative history and physical were reviewed. There were no obvious contraindications noted to the surgical procedure. All the patient's questions were answered. Absolutely no guarantees were given or implied.     Procedure in Detail:   In the preoperative holding area, the patient's identity and correct operative sites were verified. The operative extremities were marked with a skin marking pen following standard preoperative protocol. Thereafter, under mild sedation, the patient was brought to the operating room and was  placed on the operating table in the supine position. A formal timeout was taken to identify the patient and the correct operative sites prior to commencing the case.  The anesthesia team administered IV sedation and prophylactic antibiotics.  A preoperative block was then performed after adequate sedation was obtained consisting of 15 ml total in both feet of a 1:1 mixture of 1% lidocaine plain and 0.5% marcaine plain. A well padded pneumatic ankle tourniquet  was then applied to each operative ankle.  The operative feet were scrubbed, prepped, and draped in the normal aseptic technique. A second final timeout was taken prior to commencing the case. The left  lower extremity was elevated and exsanguinated utilizing an esmarch bandage. The pneumatic ankle tourniquet was inflated to 250 mm Hg and the lower extremity was lowered to the table.     Exostectomy Phalanx, Fourth Dight, Left foot   Attention directed to lateral aspect of the fourth left digit. A longitudinal incision was made overlying the hyperkeratotic lesion. Skin wedge removed and the lateral aspect of the condyle was visualized. A rongeur was used to excise the bony condyle and then the area was rasped smooth was a bone rasp. The wound was then irrigated with saline. The incision site was closed with 3-0 nylon with care taken to reapproximate the skin edges to the residual nail plate.     Derotational Arthroplasty Fifth Digit, Left foot  Two semi-elliptical incisions were placed over the proximal interphalangeal joint of the LEFT fifth toe in oblique fashion. The distal arm of the incision was along the medial fifth toe and the proximal arm of the oblique incisions was along the lateral fifth toe so as to derotate the toe after closure. The proximal interphalangeal joint was released after performing a transverse tenotomy of the extensor digitorum longus tendon. The head of the proximal phalanx was resected with a sagittal saw blade proximal to the  articular cartilage. The wound was irrigated with normal sterile saline. The extensor digitorum longus tendon was then reapproximated with vicryl suture and the skin was reapproximated with 4-0 nylon sutures in standard fashion with rectus positioning of the fifth toe noted at closure. The tourniquet was released and immediate hyperemia were noted to all digits of the left foot.     The right lower extremity was elevated and exsanguinated utilizing an esmarch bandage. The pneumatic ankle tourniquet was inflated to 250 mm Hg and the lower extremity was lowered to the table.     Derotational Arthroplasty Fifth Digit, Right foot  Essentially the same procedure performed on the left foot was performed on the right foot.    Exostectomy Phalanx, Fifth Dight, Right foot   Attention directed to dorsal lateral aspect of right fifth digit. A double semi elliptical incision was made overlying the hyperkeratotic lesion. Skin wedge removed and the lateral condyle was visualized. A rongeur was used to excise the bony condyle and then the area was rasped smooth was a bone rasp. The wound was then irrigated with saline. The incision site was closed with 3-0 nylon with care taken to reapproximate the skin edges to the residual nail plate.     Intraoperative fluroscopy was utilized to capture final imaging. The incision sites were then flushed and irrigated. The incision sites were dressed with betadine impregnated adaptic, gauze, Heather and an ace bandage.  The tourniquet was deflated at this time and a prompt hyperemic response was noted to all digits of the operative foot.     The patient tolerated the procedure and anesthesia well without complication and was transferred to the recovery room with vital signs stable and vascular status intact to all toes of the operative foot. After a period of postoperative monitoring, the patient will be discharged home with written instructions for postoperative recovery and rehabilitation. The  patient is scheduled in my clinical office in approximately 5-7 days.    Significant Surgical Tasks Conducted by the Assistant(s), if Applicable: N/A     Complications: No    Estimated Blood Loss (EBL):  <5 mL           Implants: * No implants in log *    Specimens:   Specimen (12h ago, onward)    None                  Condition: Good    Disposition: PACU - hemodynamically stable.    Attestation: I was present and scrubbed for the entire procedure.

## 2019-11-13 ENCOUNTER — TELEPHONE (OUTPATIENT)
Dept: PODIATRY | Facility: CLINIC | Age: 79
End: 2019-11-13

## 2019-11-13 VITALS
OXYGEN SATURATION: 100 % | BODY MASS INDEX: 25.97 KG/M2 | HEART RATE: 59 BPM | RESPIRATION RATE: 18 BRPM | SYSTOLIC BLOOD PRESSURE: 150 MMHG | TEMPERATURE: 97 F | WEIGHT: 132.25 LBS | HEIGHT: 60 IN | DIASTOLIC BLOOD PRESSURE: 71 MMHG

## 2019-11-13 DIAGNOSIS — E11.69 TYPE 2 DIABETES MELLITUS WITH OTHER SPECIFIED COMPLICATION, WITHOUT LONG-TERM CURRENT USE OF INSULIN: ICD-10-CM

## 2019-11-13 RX ORDER — GLIMEPIRIDE 4 MG/1
4 TABLET ORAL 2 TIMES DAILY
Qty: 30 TABLET | Refills: 1 | Status: SHIPPED | OUTPATIENT
Start: 2019-11-13 | End: 2021-02-02

## 2019-11-13 RX ORDER — LEVOTHYROXINE SODIUM 150 UG/1
TABLET ORAL
Qty: 90 TABLET | Refills: 0 | Status: SHIPPED | OUTPATIENT
Start: 2019-11-13 | End: 2021-02-04 | Stop reason: SDUPTHER

## 2019-11-13 NOTE — TELEPHONE ENCOUNTER
----- Message from Preeti De Paz sent at 11/13/2019 11:30 AM CST -----  Contact: Lisa/[facility]  Please call Lisa @ [phone] regarding clinical note from 11/8, or fax to [phone].

## 2019-11-14 ENCOUNTER — OFFICE VISIT (OUTPATIENT)
Dept: PODIATRY | Facility: CLINIC | Age: 79
End: 2019-11-14
Payer: MEDICARE

## 2019-11-14 VITALS
HEART RATE: 65 BPM | SYSTOLIC BLOOD PRESSURE: 147 MMHG | WEIGHT: 136.44 LBS | BODY MASS INDEX: 26.79 KG/M2 | DIASTOLIC BLOOD PRESSURE: 68 MMHG | HEIGHT: 60 IN

## 2019-11-14 DIAGNOSIS — Z09 SURGERY FOLLOW-UP EXAMINATION: Primary | ICD-10-CM

## 2019-11-14 PROCEDURE — 99213 OFFICE O/P EST LOW 20 MIN: CPT | Mod: PBBFAC | Performed by: PODIATRIST

## 2019-11-14 PROCEDURE — 99024 PR POST-OP FOLLOW-UP VISIT: ICD-10-PCS | Mod: POP,,, | Performed by: PODIATRIST

## 2019-11-14 PROCEDURE — 99999 PR PBB SHADOW E&M-EST. PATIENT-LVL III: CPT | Mod: PBBFAC,,, | Performed by: PODIATRIST

## 2019-11-14 PROCEDURE — 99024 POSTOP FOLLOW-UP VISIT: CPT | Mod: POP,,, | Performed by: PODIATRIST

## 2019-11-14 PROCEDURE — 99999 PR PBB SHADOW E&M-EST. PATIENT-LVL III: ICD-10-PCS | Mod: PBBFAC,,, | Performed by: PODIATRIST

## 2019-11-14 RX ORDER — ATORVASTATIN CALCIUM 10 MG/1
TABLET, FILM COATED ORAL
COMMUNITY
End: 2021-02-04

## 2019-11-14 NOTE — PROGRESS NOTES
PODIATRIC MEDICINE AND SURGERY      Chief Complaint   Patient presents with    Post-op Evaluation     DOS: hammertoe correction, derotational arthroplasty, 5th digit, bilateral; pt reports pain at 8/10 at present.           SUBJECTIVE   Odin Oliva is a 79 y.o. female status post b/l derotational arthroplasty and exostectomy 4th digit right foot, DOS 11/8/19. Pt is appx 6 days (s) post operatively. Pain is moderate controlled with pain medications. Pt has been WBAT in surgical shoe. Daughter present and states mother has been ambulating more than instructed. States she walked to [place] house over the weekend.  States  has kept dressing clean/dry; Pt denies fever, chills, nausea, and/or vomiting. Pt has no new complaint(s).     OBJECTIVE   Vitals:    11/14/19 1442   BP: (!) 147/68   Pulse: 65   Weight: 61.9 kg (136 lb 7.4 oz)   Height: 5' (1.524 m)   PainSc:   8       Neurovascular status - vascular status grossly intact.   Surgical incision well coapted with sutures  Pins - N/A   Erythema - none  Edema - mild  Warmth - no increase in skin temp from prox to distally b/l   TTP - mild at the incision site consistent with po course  ROM - good, pain free   Dehiscence- none  Drainage- none    Radiographs reviewed:   -  post op.    ASSESSMENT  1. Surgery follow-up examination    PLAN  - Weight bearing status - WBAT; reinforced compliance   - Ambulatory aids -none  - Dressing - Dressing change was performed. Pt instructed to keep clean, dry, intact. Do not get wet.   - Follow up - RTC as scheduled     Future Appointments   Date Time Provider Department Center   11/21/2019  1:20 PM Lissy Comer DPM [facility] POD [place]   12/18/2019  2:20 PM Noman Caceres MD [facility]  Medical C   1/8/2020 11:00 AM Tabatha Posey NP [facility] DIABETE  Medical C       Report Electronically Signed By:     Lissy Comer DPM   Podiatry  [facility]  11/14/2019

## 2019-11-21 ENCOUNTER — OFFICE VISIT (OUTPATIENT)
Dept: PODIATRY | Facility: CLINIC | Age: 79
End: 2019-11-21
Payer: MEDICARE

## 2019-11-21 VITALS
WEIGHT: 136.69 LBS | HEIGHT: 60 IN | DIASTOLIC BLOOD PRESSURE: 77 MMHG | BODY MASS INDEX: 26.84 KG/M2 | SYSTOLIC BLOOD PRESSURE: 155 MMHG | HEART RATE: 66 BPM

## 2019-11-21 DIAGNOSIS — Z09 SURGERY FOLLOW-UP EXAMINATION: Primary | ICD-10-CM

## 2019-11-21 PROCEDURE — 99999 PR PBB SHADOW E&M-EST. PATIENT-LVL III: ICD-10-PCS | Mod: PBBFAC,,, | Performed by: PODIATRIST

## 2019-11-21 PROCEDURE — 99024 POSTOP FOLLOW-UP VISIT: CPT | Mod: POP,,, | Performed by: PODIATRIST

## 2019-11-21 PROCEDURE — 99213 OFFICE O/P EST LOW 20 MIN: CPT | Mod: PBBFAC | Performed by: PODIATRIST

## 2019-11-21 PROCEDURE — 99024 PR POST-OP FOLLOW-UP VISIT: ICD-10-PCS | Mod: POP,,, | Performed by: PODIATRIST

## 2019-11-21 PROCEDURE — 99999 PR PBB SHADOW E&M-EST. PATIENT-LVL III: CPT | Mod: PBBFAC,,, | Performed by: PODIATRIST

## 2019-11-22 NOTE — PROGRESS NOTES
PODIATRIC MEDICINE AND SURGERY      Chief Complaint   Patient presents with    Post-op Evaluation     DOS: 11/8/19-Hammertoe repair, bilateral; derotational arthroplasty, fifth digit, bilateral; pt reports pain at 4/10.         SUBJECTIVE   Odin Oliva is a 79 y.o. female status post b/l derotational arthroplasty and exostectomy 4th digit right foot, DOS 11/8/19. Pt is appx 2 weeks (s) post operatively. Pain is intermittent. Pt has been WBAT in surgical shoe. States she walked to [employer] over the weekend.  States  has kept dressing clean/dry; Pt denies fever, chills, nausea, and/or vomiting. Pt has no new complaint(s).     OBJECTIVE   Vitals:    11/21/19 1345   BP: (!) 155/77   Pulse: 66   Weight: 62 kg (136 lb 11 oz)   Height: 5' (1.524 m)   PainSc:   4       Neurovascular status - vascular status grossly intact.   Surgical incision well coapted with sutures  Pins - N/A   Erythema - none  Edema - mild  Warmth - no increase in skin temp from prox to distally b/l   TTP - mild at the incision site consistent with po course  ROM - good, pain free   Dehiscence- none  Drainage- none    Radiographs reviewed:   -  post op.    ASSESSMENT  1. Surgery follow-up examination    PLAN  - Weight bearing status - WBAT; reinforced compliance   - Ambulatory aids -none  - Dressing - Dressing change was performed. Sutures removed, steri strips applied. Pt instructed to keep clean, dry, intact. May start to shower w/in 24 hrs.  - Follow up - RTC as scheduled     Future Appointments   Date Time Provider Department Center   12/5/2019  2:00 PM Lissy Comer DPM [facility] POD [place]   12/18/2019  2:20 PM MD JUAN Sorensen NE[facility]  Medical [facility]   1/8/2020 11:00 AM NAVID Mayen DIABETE  Medical C       Report Electronically Signed By:     Lissy Comer DPM   Podiatry  [facility]  11/22/2019

## 2019-12-05 ENCOUNTER — OFFICE VISIT (OUTPATIENT)
Dept: PODIATRY | Facility: CLINIC | Age: 79
End: 2019-12-05
Payer: MEDICARE

## 2019-12-05 VITALS
BODY MASS INDEX: 26.84 KG/M2 | SYSTOLIC BLOOD PRESSURE: 121 MMHG | WEIGHT: 136.69 LBS | HEIGHT: 60 IN | DIASTOLIC BLOOD PRESSURE: 58 MMHG | HEART RATE: 69 BPM

## 2019-12-05 DIAGNOSIS — Z09 SURGERY FOLLOW-UP EXAMINATION: Primary | ICD-10-CM

## 2019-12-05 PROCEDURE — 99213 OFFICE O/P EST LOW 20 MIN: CPT | Mod: PBBFAC | Performed by: PODIATRIST

## 2019-12-05 PROCEDURE — 99024 POSTOP FOLLOW-UP VISIT: CPT | Mod: POP,,, | Performed by: PODIATRIST

## 2019-12-05 PROCEDURE — 99999 PR PBB SHADOW E&M-EST. PATIENT-LVL III: CPT | Mod: PBBFAC,,, | Performed by: PODIATRIST

## 2019-12-05 PROCEDURE — 99024 PR POST-OP FOLLOW-UP VISIT: ICD-10-PCS | Mod: POP,,, | Performed by: PODIATRIST

## 2019-12-05 PROCEDURE — 99999 PR PBB SHADOW E&M-EST. PATIENT-LVL III: ICD-10-PCS | Mod: PBBFAC,,, | Performed by: PODIATRIST

## 2019-12-05 NOTE — PROGRESS NOTES
PODIATRIC MEDICINE AND SURGERY      Chief Complaint   Patient presents with    Post-op Evaluation     4 wk Post Op DOS:11/08/19 HAMMER TOE, DEROTATIONAL ARTHROPLASTY FIFTH DIGIT B/L         SUBJECTIVE    Odin Oliva is a 79 y.o. female status post b/l derotational arthroplasty and exostectomy 4th digit right foot, DOS 11/8/19. Pt is appx 4 weeks (s) post operatively. She denies any pain to sites.     OBJECTIVE   Vitals:    12/05/19 1359   BP: (!) 121/58   Pulse: 69   Weight: 62 kg (136 lb 11 oz)   Height: 5' (1.524 m)       Neurovascular status - vascular status grossly intact.   Surgical incision well coapted and healed   Pins - N/A   Erythema - none  Edema - mild  Warmth - no increase in skin temp from prox to distally b/l   TTP - none  ROM - good, pain free   Dehiscence- none  Drainage- none    Radiographs reviewed:   -  post op.    ASSESSMENT  1. Surgery follow-up examination    PLAN  - surgical sites cleaned, well healed  -discharged from clinic, RTC if any issues    Future Appointments   Date Time Provider Department Center   12/18/2019  2:20 PM MD XI Sorensen [facility]   1/8/2020 11:00 AM NAVID Mayen [facility]       Report Electronically Signed By:     Lissy Comer DPM   Podiatry  [facility]  12/5/2019

## 2019-12-18 ENCOUNTER — OFFICE VISIT (OUTPATIENT)
Dept: NEPHROLOGY | Facility: CLINIC | Age: 79
End: 2019-12-18
Payer: MEDICARE

## 2019-12-18 VITALS
SYSTOLIC BLOOD PRESSURE: 136 MMHG | BODY MASS INDEX: 24.1 KG/M2 | HEART RATE: 66 BPM | DIASTOLIC BLOOD PRESSURE: 81 MMHG | WEIGHT: 127.63 LBS | HEIGHT: 61 IN

## 2019-12-18 DIAGNOSIS — E79.0 HYPERURICEMIA: ICD-10-CM

## 2019-12-18 DIAGNOSIS — N18.4 CKD (CHRONIC KIDNEY DISEASE) STAGE 4, GFR 15-29 ML/MIN: Primary | ICD-10-CM

## 2019-12-18 DIAGNOSIS — E11.29 MICROALBUMINURIA DUE TO TYPE 2 DIABETES MELLITUS: ICD-10-CM

## 2019-12-18 DIAGNOSIS — R80.9 MICROALBUMINURIA DUE TO TYPE 2 DIABETES MELLITUS: ICD-10-CM

## 2019-12-18 PROCEDURE — 99999 PR PBB SHADOW E&M-EST. PATIENT-LVL III: ICD-10-PCS | Mod: PBBFAC,,, | Performed by: INTERNAL MEDICINE

## 2019-12-18 PROCEDURE — 1126F PR PAIN SEVERITY QUANTIFIED, NO PAIN PRESENT: ICD-10-PCS | Mod: ,,, | Performed by: INTERNAL MEDICINE

## 2019-12-18 PROCEDURE — 1159F MED LIST DOCD IN RCRD: CPT | Mod: ,,, | Performed by: INTERNAL MEDICINE

## 2019-12-18 PROCEDURE — 99999 PR PBB SHADOW E&M-EST. PATIENT-LVL III: CPT | Mod: PBBFAC,,, | Performed by: INTERNAL MEDICINE

## 2019-12-18 PROCEDURE — 99205 PR OFFICE/OUTPT VISIT, NEW, LEVL V, 60-74 MIN: ICD-10-PCS | Mod: S$PBB,,, | Performed by: INTERNAL MEDICINE

## 2019-12-18 PROCEDURE — 99213 OFFICE O/P EST LOW 20 MIN: CPT | Mod: PBBFAC | Performed by: INTERNAL MEDICINE

## 2019-12-18 PROCEDURE — 99205 OFFICE O/P NEW HI 60 MIN: CPT | Mod: S$PBB,,, | Performed by: INTERNAL MEDICINE

## 2019-12-18 PROCEDURE — 1159F PR MEDICATION LIST DOCUMENTED IN MEDICAL RECORD: ICD-10-PCS | Mod: ,,, | Performed by: INTERNAL MEDICINE

## 2019-12-18 PROCEDURE — 1126F AMNT PAIN NOTED NONE PRSNT: CPT | Mod: ,,, | Performed by: INTERNAL MEDICINE

## 2019-12-18 NOTE — PROGRESS NOTES
NEPHROLOGY CONSULTATION    PHYSICIAN REQUESTING THE CONSULT: Dr. Lucia Perez    REASON FOR CONSULTATION: Renal insufficiency    79 y.o. female with history of hypothyroidism, HTN, DM2, seasonal allergies, UTIs, hyperuricemia presents to the renal clinic for evaluation of renal insufficiency. A consultation has been requested by the patient's PMD, Dr. Lucia Perez. Patient today presents to the clinic without any major complaints. Specifically, she denies any headaches, congenital hearing loss, chest pain, SOB, hemoptysis, abdominal or flank pain, diarrhea, nausea/vomiting, hematuria, dysuria, LE swelling, rashes, hand/foot paresthesia, nasal congestion. Patient reports occasional NSAID use in distant past (details unclear).   Patient has a longstanding history of DM2 that was diagnosed > 20 years ago. She is not aware of any associated diabetic retinopathy. Blood glucose is currently well controlled with last HgA1c at 7 (10/23/19). Patient also reports > 10 year history of hypertension. BP is currently controlled at 136/81. In addition, patient reports history of thyroid disease (s/p total thyroidectomy for goiter, now on synthroid), she also reports hyperuricemia. She denies any history of nephrolithiasis or heart disease. Patient reports that her daughter suffers from renal disease. Laboratory review revealed that the patient's renal function has been mildly affected with last creatinine at 1.7 (10/18/19).      Past Medical History:   Diagnosis Date    Diabetes mellitus     HTN (hypertension)     Thyroid disease        Past Surgical History:   Procedure Laterality Date    CATARACT EXTRACTION, BILATERAL      CHOLECYSTECTOMY      EXOSTECTOMY Bilateral 11/8/2019    Procedure: EXOSTECTOMY;  Surgeon: Lissy Comer DPM;  Location: [facility];  Service: Podiatry;  Laterality: Bilateral;  left fourth digit, right fifth digit    FOOT MASS EXCISION Bilateral 11/8/2019    Procedure: HAMMER TOE,  DEROTATIONAL ARTHROPLASTY FIFTH DIGIT;  Surgeon: Lissy Comer DPM;  Location: [facility];  Service: Podiatry;  Laterality: Bilateral;  Left 5th toe incision @ 08:51.  Left 4th toe incision @ 08:54.  Right 5th toe incision : 09:24.    TOTAL THYROIDECTOMY      UMBILICAL HERNIA REPAIR      x 2       Review of patient's allergies indicates:  No Known Allergies    Current Outpatient Medications   Medication Sig Dispense Refill    atorvastatin (LIPITOR) 10 MG tablet 1 tablet(s) by mouth daily      blood sugar diagnostic Strp 1 each by Misc.(Non-Drug; Combo Route) route 3 (three) times daily. 100 strip 11    blood-glucose meter kit Use as instructed 1 each 0    glimepiride (AMARYL) 4 MG tablet Take 1 tablet (4 mg total) by mouth 2 (two) times daily. Hold the morning of surgery 30 tablet 1    lancets Misc 1 each by Misc.(Non-Drug; Combo Route) route 3 (three) times daily. 100 each 11    levothyroxine (SYNTHROID) 150 MCG tablet 1 tablet(s) by mouth daily 90 tablet 0    valsartan (DIOVAN) 160 MG tablet Take 160 mg by mouth once daily.      carvedilol (COREG) 12.5 MG tablet Take 12.5 mg by mouth once daily. Take am of surgery      TRADJENTA 5 mg Tab tablet once daily.   1    triamcinolone acetonide 0.1% (KENALOG) 0.1 % cream Apply topically 2 (two) times daily. Do not use on face for 14 days 15 g 0     No current facility-administered medications for this visit.      Facility-Administered Medications Ordered in Other Visits   Medication Dose Route Frequency Provider Last Rate Last Dose    lactated ringers infusion   Intravenous Continuous Geraldine Vaughn MD 10 mL/hr at 11/08/19 0744      lidocaine (PF) 10 mg/ml (1%) injection 10 mg  1 mL Intradermal Once Geraldine Vaughn MD           Family History   Problem Relation Age of Onset    Asthma Mother     No Known Problems Father     Lung cancer Brother 65    Diabetes Brother     Diabetes Brother 80    Heart disease Daughter        Social History      Socioeconomic History    Marital status:      Spouse name: Not on file    Number of children: Not on file    Years of education: Not on file    Highest education level: Not on file   Occupational History    Not on file   Social Needs    Financial resource strain: Not on file    Food insecurity:     Worry: Not on file     Inability: Not on file    Transportation needs:     Medical: Not on file     Non-medical: Not on file   Tobacco Use    Smoking status: Never Smoker    Smokeless tobacco: Never Used   Substance and Sexual Activity    Alcohol use: No     Frequency: Never    Drug use: No    Sexual activity: Never   Lifestyle    Physical activity:     Days per week: Not on file     Minutes per session: Not on file    Stress: Not on file   Relationships    Social connections:     Talks on phone: Not on file     Gets together: Not on file     Attends Confucianist service: Not on file     Active member of club or organization: Not on file     Attends meetings of clubs or organizations: Not on file     Relationship status: Not on file   Other Topics Concern    Not on file   Social History Narrative    Not on file       Review of Systems:  1. GENERAL: patient denies any fever, weight changes, generalized weakness, dizziness.  2. HEENT: patient denies headaches, visual disturbances, swallowing problems, sinus pain, nasal congestion.  3. CARDIOVASCULAR: patient denies chest pain, palpitations.  4. PULMONARY: patient denies SOB, coughing, hemoptysis, wheezing.  5. GASTROINTESTINAL: patient denies abdominal pain, nausea, vomiting, diarrhea.  6. GENITOURINARY: patient denies dysuria, hematuria, hesitancy, frequency.  7. EXTREMITIES: patient denies LE edema, LE cramping.  8. DERMATOLOGY: patient denies rashes, ulcers.  9. NEURO: patient denies tremors, extremity weakness, extremity numbness/tingling.  10. MUSCULOSKELETAL: patient denies joint pain, joint swelling.  11. HEMATOLOGY: patient denies rectal or  "gum bleeding.  12: PSYCH: patient denies anxiety, depression.      PHYSICAL EXAM:  /81   Pulse 66   Ht 5' 1" (1.549 m)   Wt 57.9 kg (127 lb 10.3 oz)   BMI 24.12 kg/m²     GENERAL: Pleasant lady presents to clinic with non-labored breathing.  HEENT: PER, no nasal discharge, no icterus, no oral exudates, moist mucosal membranes.  NECK: no thyroid mass, no lymphadenopathy.  HEART: RRR S1/S2, no rubs, good peripheral pulses.  LUNGS: CTA bilaterally, no wheezing, breathing is nonlabored.  ABDOMEN: soft, nontender, not distended, bowel sounds are present, no abdominal hernia.  EXTREM: no LE edema.  SKIN: no rashes, skin is warm and dry.  NEURO: A & O x 3, no obvious focal signs.    LABORATORY RESULTS:    Lab Results   Component Value Date    CREATININE 1.7 (H) 10/18/2019    BUN 38 (H) 10/18/2019     10/18/2019    K 4.4 10/18/2019     10/18/2019    CO2 25 10/18/2019      Lab Results   Component Value Date    CALCIUM 9.5 10/18/2019     Lab Results   Component Value Date    ALBUMIN 4.2 10/18/2019     Lab Results   Component Value Date    WBC 4.06 10/18/2019    HGB 10.3 (L) 10/18/2019    HCT 31.0 (L) 10/18/2019    MCV 85 10/18/2019     10/18/2019     Microalbumin creatinine ratio: 101 (10/23/19)      ASSESSMENT AND PLAN:  79 y.o.  female with history of hypothyroidism, HTN, DM2, seasonal allergies, UTIs, hyperuricemia presents to the renal clinic for evaluation of renal insufficiency.    1. Renal insufficiency: Patient presents with mild renal insufficiency, consistent with CKD stage 3/4. Likely cause of her renal insufficiency is her long-standing diabetes mellitus +/- HTN. She presents with mild proteinuria and Valsartan will be continued. Patient's renal function will be monitored closely and she will return to the clinic in 1 month for follow up. I will order a renal panel, CBC, urinalysis, protein creatinine ratio, PTH, uric acid prior to her return visit. Patient was advised to avoid NSAID " pain medications such as advil, aleve, motrin, ibuprofen, naprosyn, meloxicam, diclofenac, mobic and to hydrate with 60-65 ounces of water per day.     2. Electrolytes: Within normal limits.    3. Acid base status: No acute issues.    4. Volume: Euvolemic.    5. Hypertension: Good BP control.     6. Medications: Reviewed. Agree with current medical regimen.     7. Anemia: Will monitor.    8. Diabetes mellitus: well controlled with last HgA1c at 7.           Thank you very much for this consult. Please see my note in Epic for recommendations.    Total time spent was about 45 min. 50 % or more of time was spent on counseling patient about treatment options and educating patient about disease etiology. Complex case. Level 5 consult.

## 2019-12-18 NOTE — PATIENT INSTRUCTIONS
Hydrate with 60-80 ounces of water per day.       Avoid NSAID pain medications such as advil, aleve, motrin, ibuprofen, naprosyn, meloxicam, diclofenac, mobic.

## 2020-02-18 ENCOUNTER — TELEPHONE (OUTPATIENT)
Dept: DIABETES | Facility: CLINIC | Age: 80
End: 2020-02-18

## 2020-02-18 NOTE — TELEPHONE ENCOUNTER
Appointment scheduled.   ----- Message from Preeti De Paz sent at 2/18/2020 11:47 AM CST -----  Contact: geoffrey  Please call pt @ [phone] regarding scheduling an appt.

## 2020-02-24 LAB
A1C EXTERNAL: 7.1
CHOLEST SERPL-MSCNC: 155 MG/DL (ref 0–200)
HDLC SERPL-MCNC: 39 MG/DL
LDLC SERPL CALC-MCNC: 93 MG/DL
NON HDL CHOL. (LDL+VLDL): 116
TRIGL SERPL-MCNC: 109 MG/DL
TSH SERPL DL<=0.005 MIU/L-ACNC: 0.45 UIU/ML (ref 0.41–5.9)

## 2020-05-12 ENCOUNTER — TELEPHONE (OUTPATIENT)
Dept: DIABETES | Facility: CLINIC | Age: 80
End: 2020-05-12

## 2020-11-07 ENCOUNTER — PATIENT OUTREACH (OUTPATIENT)
Dept: ADMINISTRATIVE | Facility: HOSPITAL | Age: 80
End: 2020-11-07

## 2020-11-09 ENCOUNTER — LAB VISIT (OUTPATIENT)
Dept: LAB | Facility: HOSPITAL | Age: 80
End: 2020-11-09
Attending: FAMILY MEDICINE
Payer: MEDICARE

## 2020-11-09 ENCOUNTER — OFFICE VISIT (OUTPATIENT)
Dept: INTERNAL MEDICINE | Facility: CLINIC | Age: 80
End: 2020-11-09
Payer: MEDICARE

## 2020-11-09 VITALS
OXYGEN SATURATION: 99 % | DIASTOLIC BLOOD PRESSURE: 78 MMHG | TEMPERATURE: 98 F | WEIGHT: 132.69 LBS | HEIGHT: 61 IN | HEART RATE: 63 BPM | SYSTOLIC BLOOD PRESSURE: 142 MMHG | BODY MASS INDEX: 25.05 KG/M2

## 2020-11-09 DIAGNOSIS — N18.4 CONTROLLED TYPE 2 DIABETES MELLITUS WITH STAGE 4 CHRONIC KIDNEY DISEASE, WITHOUT LONG-TERM CURRENT USE OF INSULIN: ICD-10-CM

## 2020-11-09 DIAGNOSIS — Z11.59 NEED FOR HEPATITIS C SCREENING TEST: ICD-10-CM

## 2020-11-09 DIAGNOSIS — Z79.899 ENCOUNTER FOR LONG-TERM (CURRENT) USE OF MEDICATIONS: ICD-10-CM

## 2020-11-09 DIAGNOSIS — E79.0 HYPERURICEMIA: ICD-10-CM

## 2020-11-09 DIAGNOSIS — E03.9 HYPOTHYROIDISM, UNSPECIFIED TYPE: ICD-10-CM

## 2020-11-09 DIAGNOSIS — Z76.89 ESTABLISHING CARE WITH NEW DOCTOR, ENCOUNTER FOR: Primary | ICD-10-CM

## 2020-11-09 DIAGNOSIS — I10 HYPERTENSION, UNSPECIFIED TYPE: ICD-10-CM

## 2020-11-09 DIAGNOSIS — Z78.0 POST-MENOPAUSAL: ICD-10-CM

## 2020-11-09 DIAGNOSIS — N18.4 CHRONIC KIDNEY DISEASE, STAGE 4 (SEVERE): ICD-10-CM

## 2020-11-09 DIAGNOSIS — E11.22 CONTROLLED TYPE 2 DIABETES MELLITUS WITH STAGE 4 CHRONIC KIDNEY DISEASE, WITHOUT LONG-TERM CURRENT USE OF INSULIN: ICD-10-CM

## 2020-11-09 PROCEDURE — 3077F SYST BP >= 140 MM HG: CPT | Mod: CPTII,S$GLB,, | Performed by: FAMILY MEDICINE

## 2020-11-09 PROCEDURE — 80053 COMPREHEN METABOLIC PANEL: CPT

## 2020-11-09 PROCEDURE — 1101F PT FALLS ASSESS-DOCD LE1/YR: CPT | Mod: CPTII,S$GLB,, | Performed by: FAMILY MEDICINE

## 2020-11-09 PROCEDURE — 84443 ASSAY THYROID STIM HORMONE: CPT

## 2020-11-09 PROCEDURE — 99999 PR PBB SHADOW E&M-EST. PATIENT-LVL IV: ICD-10-PCS | Mod: PBBFAC,,, | Performed by: FAMILY MEDICINE

## 2020-11-09 PROCEDURE — 85025 COMPLETE CBC W/AUTO DIFF WBC: CPT

## 2020-11-09 PROCEDURE — 83036 HEMOGLOBIN GLYCOSYLATED A1C: CPT

## 2020-11-09 PROCEDURE — 1126F AMNT PAIN NOTED NONE PRSNT: CPT | Mod: S$GLB,,, | Performed by: FAMILY MEDICINE

## 2020-11-09 PROCEDURE — 3078F PR MOST RECENT DIASTOLIC BLOOD PRESSURE < 80 MM HG: ICD-10-PCS | Mod: CPTII,S$GLB,, | Performed by: FAMILY MEDICINE

## 2020-11-09 PROCEDURE — 3077F PR MOST RECENT SYSTOLIC BLOOD PRESSURE >= 140 MM HG: ICD-10-PCS | Mod: CPTII,S$GLB,, | Performed by: FAMILY MEDICINE

## 2020-11-09 PROCEDURE — 3051F PR MOST RECENT HEMOGLOBIN A1C LEVEL 7.0 - < 8.0%: ICD-10-PCS | Mod: CPTII,S$GLB,, | Performed by: FAMILY MEDICINE

## 2020-11-09 PROCEDURE — 1159F MED LIST DOCD IN RCRD: CPT | Mod: S$GLB,,, | Performed by: FAMILY MEDICINE

## 2020-11-09 PROCEDURE — 3051F HG A1C>EQUAL 7.0%<8.0%: CPT | Mod: CPTII,S$GLB,, | Performed by: FAMILY MEDICINE

## 2020-11-09 PROCEDURE — 99204 PR OFFICE/OUTPT VISIT, NEW, LEVL IV, 45-59 MIN: ICD-10-PCS | Mod: S$GLB,,, | Performed by: FAMILY MEDICINE

## 2020-11-09 PROCEDURE — 36415 COLL VENOUS BLD VENIPUNCTURE: CPT

## 2020-11-09 PROCEDURE — 82306 VITAMIN D 25 HYDROXY: CPT

## 2020-11-09 PROCEDURE — 86803 HEPATITIS C AB TEST: CPT

## 2020-11-09 PROCEDURE — 84550 ASSAY OF BLOOD/URIC ACID: CPT

## 2020-11-09 PROCEDURE — 1101F PR PT FALLS ASSESS DOC 0-1 FALLS W/OUT INJ PAST YR: ICD-10-PCS | Mod: CPTII,S$GLB,, | Performed by: FAMILY MEDICINE

## 2020-11-09 PROCEDURE — 99999 PR PBB SHADOW E&M-EST. PATIENT-LVL IV: CPT | Mod: PBBFAC,,, | Performed by: FAMILY MEDICINE

## 2020-11-09 PROCEDURE — 3078F DIAST BP <80 MM HG: CPT | Mod: CPTII,S$GLB,, | Performed by: FAMILY MEDICINE

## 2020-11-09 PROCEDURE — 1159F PR MEDICATION LIST DOCUMENTED IN MEDICAL RECORD: ICD-10-PCS | Mod: S$GLB,,, | Performed by: FAMILY MEDICINE

## 2020-11-09 PROCEDURE — 80061 LIPID PANEL: CPT

## 2020-11-09 PROCEDURE — 1126F PR PAIN SEVERITY QUANTIFIED, NO PAIN PRESENT: ICD-10-PCS | Mod: S$GLB,,, | Performed by: FAMILY MEDICINE

## 2020-11-09 PROCEDURE — 99204 OFFICE O/P NEW MOD 45 MIN: CPT | Mod: S$GLB,,, | Performed by: FAMILY MEDICINE

## 2020-11-09 RX ORDER — VALSARTAN AND HYDROCHLOROTHIAZIDE 160; 12.5 MG/1; MG/1
1 TABLET, FILM COATED ORAL DAILY
COMMUNITY

## 2020-11-09 RX ORDER — ALLOPURINOL 300 MG/1
300 TABLET ORAL DAILY
COMMUNITY
End: 2021-05-31

## 2020-11-09 NOTE — PROGRESS NOTES
"Subjective:       Patient ID: Odin Oliva is a 80 y.o. female.    Chief Complaint: Establish Care    HPI     80    Past Medical History:   Diagnosis Date    CKD (chronic kidney disease), stage IV     Diabetes mellitus     HTN (hypertension)     Hyperuricemia     Seasonal allergies     Thyroid disease     Urinary tract infection    CKD IV  Seasonal allergy  H/o UTIs  hyperuricemia        Past Surgical History:   Procedure Laterality Date    CATARACT EXTRACTION, BILATERAL      CHOLECYSTECTOMY      EXOSTECTOMY Bilateral 11/8/2019    Procedure: EXOSTECTOMY;  Surgeon: Lissy Comer DPM;  Location: [facility] OR;  Service: Podiatry;  Laterality: Bilateral;  left fourth digit, right fifth digit    FOOT MASS EXCISION Bilateral 11/8/2019    Procedure: HAMMER TOE, DEROTATIONAL ARTHROPLASTY FIFTH DIGIT;  Surgeon: Lissy Comer DPM;  Location: [facility] OR;  Service: Podiatry;  Laterality: Bilateral;  Left 5th toe incision @ 08:51.  Left 4th toe incision @ 08:54.  Right 5th toe incision : 09:24.    TOTAL THYROIDECTOMY      UMBILICAL HERNIA REPAIR      x 2     Social History     Tobacco Use   Smoking Status Never Smoker   Smokeless Tobacco Never Used     Family History   Problem Relation Age of Onset    Asthma Mother     No Known Problems Father     Lung cancer Brother 65    Diabetes Brother     Diabetes Brother 80    Heart disease Daughter      Presents to establish care    Has been several months since seen MD    Feels "ok"    Sometimes she gets dizzy  Ear hurts her    2 years ago had an accident  Nose trauma  Sometimes has nose bleeds  Comes/goes    Did not take her BP medications    Able to walk well  Sometimes gets dizzy        Review of Systems   Constitutional: Negative for chills and fever.   HENT: Negative for trouble swallowing.    Eyes: Negative for visual disturbance.   Respiratory: Negative for shortness of breath.    Cardiovascular: Negative for chest pain.   Gastrointestinal: Negative for " constipation.   Genitourinary: Negative for difficulty urinating.   Musculoskeletal: Negative for gait problem.   Skin: Negative for color change.   Neurological: Positive for dizziness.   Psychiatric/Behavioral: Negative for confusion.       Objective:     Vitals:    11/09/20 1314   BP: (!) 142/78   Pulse: 63   Temp: 98.2 °F (36.8 °C)       Physical Exam  Constitutional:       General: She is not in acute distress.     Appearance: Normal appearance. She is well-developed.   HENT:      Head: Normocephalic and atraumatic.   Eyes:      General: Lids are normal.      Conjunctiva/sclera: Conjunctivae normal.   Neck:      Musculoskeletal: Full passive range of motion without pain and normal range of motion.   Cardiovascular:      Rate and Rhythm: Normal rate and regular rhythm.      Heart sounds: Normal heart sounds.   Pulmonary:      Effort: Pulmonary effort is normal. No respiratory distress.      Breath sounds: Normal breath sounds.   Abdominal:      General: There is no distension.      Palpations: Abdomen is soft.   Musculoskeletal: Normal range of motion.   Lymphadenopathy:      Cervical: No cervical adenopathy.   Skin:     Coloration: Skin is not pale.   Neurological:      Mental Status: She is alert and oriented to person, place, and time. She is not disoriented.   Psychiatric:         Speech: Speech normal.         Behavior: Behavior normal.         Thought Content: Thought content normal.         Assessment:       1. Establishing care with new doctor, encounter for    2. Hypertension, unspecified type    3. Controlled type 2 diabetes mellitus with stage 4 chronic kidney disease, without long-term current use of insulin    4. Hyperuricemia    5. Encounter for long-term (current) use of medications    6. Hypothyroidism, unspecified type    7. Need for hepatitis C screening test    8. Chronic kidney disease, stage 4 (severe)     9. Post-menopausal        Plan:   Establishing care with new doctor, encounter  for    Hypertension, unspecified type    -valsartan HCTZ    -     CBC Auto Differential; Future; Expected date: 11/09/2020  -     Comprehensive Metabolic Panel; Future; Expected date: 11/09/2020    Controlled type 2 diabetes mellitus with stage 4 chronic kidney disease, without long-term current use of insulin    -tradjenta  -amaryl    - on statin    -     Hemoglobin A1C; Future; Expected date: 11/09/2020  -     Lipid Panel; Future; Expected date: 11/09/2020    - will set up for eye exam      Hyperuricemia    On allopurinol  She has not been taking it recently -  Will check  Suspect dose adjustment indicated given her renal function.    -     Uric Acid; Future; Expected date: 11/09/2020    Encounter for long-term (current) use of medications  -labs    Hypothyroidism, unspecified type    On synthroid  -     TSH; Future; Expected date: 11/09/2020    Need for hepatitis C screening test  -     Hepatitis C Antibody; Future; Expected date: 11/09/2020  -     Vitamin D; Future; Expected date: 11/09/2020    Chronic kidney disease, stage 4 (severe)   -     Vitamin D; Future; Expected date: 11/09/2020      Postmenopausal  Bone scan    Declines all vaccinations  2 week follow up  Will check her pressure ON her medicine  Will review labs and dexa scan in person    Adjust meds as needed.

## 2020-11-09 NOTE — PATIENT INSTRUCTIONS
Dr. Barbosa instructions.    It was a pleasure to meet you.    Lets plan to see each other again in roughly 2 weeks so I can check your pressure ON your medicine and we can review your blood work results and adjust medications if need be.    We will also review the bone scan to make sure your bone density is appropriate.    See you in 2 weeks.  Take your medicine that day!

## 2020-11-10 LAB
25(OH)D3+25(OH)D2 SERPL-MCNC: 16 NG/ML (ref 30–96)
ALBUMIN SERPL BCP-MCNC: 4 G/DL (ref 3.5–5.2)
ALP SERPL-CCNC: 140 U/L (ref 55–135)
ALT SERPL W/O P-5'-P-CCNC: 15 U/L (ref 10–44)
ANION GAP SERPL CALC-SCNC: 10 MMOL/L (ref 8–16)
AST SERPL-CCNC: 20 U/L (ref 10–40)
BASOPHILS # BLD AUTO: 0.06 K/UL (ref 0–0.2)
BASOPHILS NFR BLD: 1.4 % (ref 0–1.9)
BILIRUB SERPL-MCNC: 0.4 MG/DL (ref 0.1–1)
BUN SERPL-MCNC: 34 MG/DL (ref 8–23)
CALCIUM SERPL-MCNC: 8.9 MG/DL (ref 8.7–10.5)
CHLORIDE SERPL-SCNC: 105 MMOL/L (ref 95–110)
CHOLEST SERPL-MCNC: 224 MG/DL (ref 120–199)
CHOLEST/HDLC SERPL: 6.4 {RATIO} (ref 2–5)
CO2 SERPL-SCNC: 23 MMOL/L (ref 23–29)
CREAT SERPL-MCNC: 1.6 MG/DL (ref 0.5–1.4)
DIFFERENTIAL METHOD: ABNORMAL
EOSINOPHIL # BLD AUTO: 0.3 K/UL (ref 0–0.5)
EOSINOPHIL NFR BLD: 6.6 % (ref 0–8)
ERYTHROCYTE [DISTWIDTH] IN BLOOD BY AUTOMATED COUNT: 13.3 % (ref 11.5–14.5)
EST. GFR  (AFRICAN AMERICAN): 35.1 ML/MIN/1.73 M^2
EST. GFR  (NON AFRICAN AMERICAN): 30.4 ML/MIN/1.73 M^2
ESTIMATED AVG GLUCOSE: 171 MG/DL (ref 68–131)
GLUCOSE SERPL-MCNC: 122 MG/DL (ref 70–110)
HBA1C MFR BLD HPLC: 7.6 % (ref 4–5.6)
HCT VFR BLD AUTO: 33.4 % (ref 37–48.5)
HCV AB SERPL QL IA: NEGATIVE
HDLC SERPL-MCNC: 35 MG/DL (ref 40–75)
HDLC SERPL: 15.6 % (ref 20–50)
HGB BLD-MCNC: 10.5 G/DL (ref 12–16)
IMM GRANULOCYTES # BLD AUTO: 0.01 K/UL (ref 0–0.04)
IMM GRANULOCYTES NFR BLD AUTO: 0.2 % (ref 0–0.5)
LDLC SERPL CALC-MCNC: 134.6 MG/DL (ref 63–159)
LYMPHOCYTES # BLD AUTO: 1 K/UL (ref 1–4.8)
LYMPHOCYTES NFR BLD: 23.4 % (ref 18–48)
MCH RBC QN AUTO: 27.1 PG (ref 27–31)
MCHC RBC AUTO-ENTMCNC: 31.4 G/DL (ref 32–36)
MCV RBC AUTO: 86 FL (ref 82–98)
MONOCYTES # BLD AUTO: 0.4 K/UL (ref 0.3–1)
MONOCYTES NFR BLD: 8.6 % (ref 4–15)
NEUTROPHILS # BLD AUTO: 2.6 K/UL (ref 1.8–7.7)
NEUTROPHILS NFR BLD: 59.8 % (ref 38–73)
NONHDLC SERPL-MCNC: 189 MG/DL
NRBC BLD-RTO: 0 /100 WBC
PLATELET # BLD AUTO: 235 K/UL (ref 150–350)
PMV BLD AUTO: 10.4 FL (ref 9.2–12.9)
POTASSIUM SERPL-SCNC: 4.3 MMOL/L (ref 3.5–5.1)
PROT SERPL-MCNC: 7.4 G/DL (ref 6–8.4)
RBC # BLD AUTO: 3.88 M/UL (ref 4–5.4)
SODIUM SERPL-SCNC: 138 MMOL/L (ref 136–145)
TRIGL SERPL-MCNC: 272 MG/DL (ref 30–150)
TSH SERPL DL<=0.005 MIU/L-ACNC: 0.8 UIU/ML (ref 0.4–4)
URATE SERPL-MCNC: 9.7 MG/DL (ref 2.4–5.7)
WBC # BLD AUTO: 4.41 K/UL (ref 3.9–12.7)

## 2020-11-27 ENCOUNTER — TELEPHONE (OUTPATIENT)
Dept: INTERNAL MEDICINE | Facility: CLINIC | Age: 80
End: 2020-11-27

## 2020-11-27 NOTE — TELEPHONE ENCOUNTER
----- Message from Julián Forte sent at 11/27/2020  2:46 PM CST -----  Contact: Cata Ivy is calling in regards to Fozeh test results. Please call her back at  [phone].      Thanks  DD

## 2020-12-11 ENCOUNTER — PATIENT MESSAGE (OUTPATIENT)
Dept: OTHER | Facility: OTHER | Age: 80
End: 2020-12-11

## 2021-02-02 ENCOUNTER — OFFICE VISIT (OUTPATIENT)
Dept: DIABETES | Facility: CLINIC | Age: 81
End: 2021-02-02
Payer: MEDICARE

## 2021-02-02 ENCOUNTER — TELEPHONE (OUTPATIENT)
Dept: INTERNAL MEDICINE | Facility: CLINIC | Age: 81
End: 2021-02-02

## 2021-02-02 VITALS
WEIGHT: 130.94 LBS | BODY MASS INDEX: 24.72 KG/M2 | SYSTOLIC BLOOD PRESSURE: 155 MMHG | DIASTOLIC BLOOD PRESSURE: 79 MMHG | HEIGHT: 61 IN

## 2021-02-02 DIAGNOSIS — E11.69 TYPE 2 DIABETES MELLITUS WITH OTHER SPECIFIED COMPLICATION, WITHOUT LONG-TERM CURRENT USE OF INSULIN: Primary | ICD-10-CM

## 2021-02-02 PROCEDURE — 99999 PR PBB SHADOW E&M-EST. PATIENT-LVL IV: ICD-10-PCS | Mod: PBBFAC,,, | Performed by: NURSE PRACTITIONER

## 2021-02-02 PROCEDURE — 99204 OFFICE O/P NEW MOD 45 MIN: CPT | Mod: S$PBB,,, | Performed by: NURSE PRACTITIONER

## 2021-02-02 PROCEDURE — 99204 PR OFFICE/OUTPT VISIT, NEW, LEVL IV, 45-59 MIN: ICD-10-PCS | Mod: S$PBB,,, | Performed by: NURSE PRACTITIONER

## 2021-02-02 PROCEDURE — 99999 PR PBB SHADOW E&M-EST. PATIENT-LVL IV: CPT | Mod: PBBFAC,,, | Performed by: NURSE PRACTITIONER

## 2021-02-02 RX ORDER — LANCETS
1 EACH MISCELLANEOUS 3 TIMES DAILY
Qty: 100 EACH | Refills: 11 | Status: SHIPPED | OUTPATIENT
Start: 2021-02-02 | End: 2022-10-28

## 2021-02-02 RX ORDER — EMPAGLIFLOZIN 10 MG/1
10 TABLET, FILM COATED ORAL DAILY
Qty: 30 TABLET | Refills: 1 | Status: SHIPPED | OUTPATIENT
Start: 2021-02-02 | End: 2021-03-03

## 2021-02-02 RX ORDER — INSULIN PUMP SYRINGE, 3 ML
EACH MISCELLANEOUS
Qty: 1 EACH | Refills: 0 | Status: SHIPPED | OUTPATIENT
Start: 2021-02-02 | End: 2022-10-28 | Stop reason: SDUPTHER

## 2021-02-04 ENCOUNTER — OFFICE VISIT (OUTPATIENT)
Dept: PODIATRY | Facility: CLINIC | Age: 81
End: 2021-02-04
Payer: MEDICARE

## 2021-02-04 ENCOUNTER — OFFICE VISIT (OUTPATIENT)
Dept: INTERNAL MEDICINE | Facility: CLINIC | Age: 81
End: 2021-02-04
Payer: MEDICARE

## 2021-02-04 ENCOUNTER — LAB VISIT (OUTPATIENT)
Dept: LAB | Facility: HOSPITAL | Age: 81
End: 2021-02-04
Attending: FAMILY MEDICINE
Payer: MEDICARE

## 2021-02-04 VITALS
WEIGHT: 131.81 LBS | SYSTOLIC BLOOD PRESSURE: 108 MMHG | HEIGHT: 61 IN | HEART RATE: 68 BPM | TEMPERATURE: 97 F | OXYGEN SATURATION: 99 % | DIASTOLIC BLOOD PRESSURE: 60 MMHG | BODY MASS INDEX: 24.89 KG/M2

## 2021-02-04 DIAGNOSIS — E11.69 TYPE 2 DIABETES MELLITUS WITH OTHER SPECIFIED COMPLICATION, WITHOUT LONG-TERM CURRENT USE OF INSULIN: ICD-10-CM

## 2021-02-04 DIAGNOSIS — N18.4 CHRONIC KIDNEY DISEASE (CKD), STAGE IV (SEVERE): ICD-10-CM

## 2021-02-04 DIAGNOSIS — E11.22 CONTROLLED TYPE 2 DIABETES MELLITUS WITH STAGE 4 CHRONIC KIDNEY DISEASE, WITHOUT LONG-TERM CURRENT USE OF INSULIN: ICD-10-CM

## 2021-02-04 DIAGNOSIS — N18.4 CONTROLLED TYPE 2 DIABETES MELLITUS WITH STAGE 4 CHRONIC KIDNEY DISEASE, WITHOUT LONG-TERM CURRENT USE OF INSULIN: ICD-10-CM

## 2021-02-04 DIAGNOSIS — L98.8 MACERATION OF SKIN: ICD-10-CM

## 2021-02-04 DIAGNOSIS — D64.9 ANEMIA, UNSPECIFIED TYPE: Primary | ICD-10-CM

## 2021-02-04 DIAGNOSIS — E79.0 HYPERURICEMIA: ICD-10-CM

## 2021-02-04 DIAGNOSIS — I10 HYPERTENSION, UNSPECIFIED TYPE: ICD-10-CM

## 2021-02-04 DIAGNOSIS — E11.9 ENCOUNTER FOR COMPREHENSIVE DIABETIC FOOT EXAMINATION, TYPE 2 DIABETES MELLITUS: Primary | ICD-10-CM

## 2021-02-04 PROCEDURE — 3078F DIAST BP <80 MM HG: CPT | Mod: CPTII,S$GLB,, | Performed by: FAMILY MEDICINE

## 2021-02-04 PROCEDURE — 3078F PR MOST RECENT DIASTOLIC BLOOD PRESSURE < 80 MM HG: ICD-10-PCS | Mod: CPTII,S$GLB,, | Performed by: PODIATRIST

## 2021-02-04 PROCEDURE — 3288F FALL RISK ASSESSMENT DOCD: CPT | Mod: CPTII,S$GLB,, | Performed by: PODIATRIST

## 2021-02-04 PROCEDURE — 1159F PR MEDICATION LIST DOCUMENTED IN MEDICAL RECORD: ICD-10-PCS | Mod: S$GLB,,, | Performed by: PODIATRIST

## 2021-02-04 PROCEDURE — 1101F PR PT FALLS ASSESS DOC 0-1 FALLS W/OUT INJ PAST YR: ICD-10-PCS | Mod: CPTII,S$GLB,, | Performed by: FAMILY MEDICINE

## 2021-02-04 PROCEDURE — 1101F PT FALLS ASSESS-DOCD LE1/YR: CPT | Mod: CPTII,S$GLB,, | Performed by: PODIATRIST

## 2021-02-04 PROCEDURE — 3074F PR MOST RECENT SYSTOLIC BLOOD PRESSURE < 130 MM HG: ICD-10-PCS | Mod: CPTII,S$GLB,, | Performed by: PODIATRIST

## 2021-02-04 PROCEDURE — 1159F MED LIST DOCD IN RCRD: CPT | Mod: S$GLB,,, | Performed by: PODIATRIST

## 2021-02-04 PROCEDURE — 99999 PR PBB SHADOW E&M-EST. PATIENT-LVL IV: CPT | Mod: PBBFAC,,, | Performed by: FAMILY MEDICINE

## 2021-02-04 PROCEDURE — 3288F PR FALLS RISK ASSESSMENT DOCUMENTED: ICD-10-PCS | Mod: CPTII,S$GLB,, | Performed by: PODIATRIST

## 2021-02-04 PROCEDURE — 99999 PR PBB SHADOW E&M-EST. PATIENT-LVL IV: ICD-10-PCS | Mod: PBBFAC,,, | Performed by: FAMILY MEDICINE

## 2021-02-04 PROCEDURE — 3051F HG A1C>EQUAL 7.0%<8.0%: CPT | Mod: CPTII,S$GLB,, | Performed by: PODIATRIST

## 2021-02-04 PROCEDURE — 3051F HG A1C>EQUAL 7.0%<8.0%: CPT | Mod: CPTII,S$GLB,, | Performed by: FAMILY MEDICINE

## 2021-02-04 PROCEDURE — 1101F PT FALLS ASSESS-DOCD LE1/YR: CPT | Mod: CPTII,S$GLB,, | Performed by: FAMILY MEDICINE

## 2021-02-04 PROCEDURE — 3074F SYST BP LT 130 MM HG: CPT | Mod: CPTII,S$GLB,, | Performed by: PODIATRIST

## 2021-02-04 PROCEDURE — 99213 OFFICE O/P EST LOW 20 MIN: CPT | Mod: S$GLB,,, | Performed by: PODIATRIST

## 2021-02-04 PROCEDURE — 99999 PR PBB SHADOW E&M-EST. PATIENT-LVL III: CPT | Mod: PBBFAC,,, | Performed by: PODIATRIST

## 2021-02-04 PROCEDURE — 99999 PR PBB SHADOW E&M-EST. PATIENT-LVL III: ICD-10-PCS | Mod: PBBFAC,,, | Performed by: PODIATRIST

## 2021-02-04 PROCEDURE — 99213 PR OFFICE/OUTPT VISIT, EST, LEVL III, 20-29 MIN: ICD-10-PCS | Mod: S$GLB,,, | Performed by: PODIATRIST

## 2021-02-04 PROCEDURE — 99214 OFFICE O/P EST MOD 30 MIN: CPT | Mod: S$GLB,,, | Performed by: FAMILY MEDICINE

## 2021-02-04 PROCEDURE — 3288F PR FALLS RISK ASSESSMENT DOCUMENTED: ICD-10-PCS | Mod: CPTII,S$GLB,, | Performed by: FAMILY MEDICINE

## 2021-02-04 PROCEDURE — 3074F SYST BP LT 130 MM HG: CPT | Mod: CPTII,S$GLB,, | Performed by: FAMILY MEDICINE

## 2021-02-04 PROCEDURE — 3288F FALL RISK ASSESSMENT DOCD: CPT | Mod: CPTII,S$GLB,, | Performed by: FAMILY MEDICINE

## 2021-02-04 PROCEDURE — 1101F PR PT FALLS ASSESS DOC 0-1 FALLS W/OUT INJ PAST YR: ICD-10-PCS | Mod: CPTII,S$GLB,, | Performed by: PODIATRIST

## 2021-02-04 PROCEDURE — 3051F PR MOST RECENT HEMOGLOBIN A1C LEVEL 7.0 - < 8.0%: ICD-10-PCS | Mod: CPTII,S$GLB,, | Performed by: PODIATRIST

## 2021-02-04 PROCEDURE — 1159F PR MEDICATION LIST DOCUMENTED IN MEDICAL RECORD: ICD-10-PCS | Mod: S$GLB,,, | Performed by: FAMILY MEDICINE

## 2021-02-04 PROCEDURE — 3078F PR MOST RECENT DIASTOLIC BLOOD PRESSURE < 80 MM HG: ICD-10-PCS | Mod: CPTII,S$GLB,, | Performed by: FAMILY MEDICINE

## 2021-02-04 PROCEDURE — 3074F PR MOST RECENT SYSTOLIC BLOOD PRESSURE < 130 MM HG: ICD-10-PCS | Mod: CPTII,S$GLB,, | Performed by: FAMILY MEDICINE

## 2021-02-04 PROCEDURE — 99214 PR OFFICE/OUTPT VISIT, EST, LEVL IV, 30-39 MIN: ICD-10-PCS | Mod: S$GLB,,, | Performed by: FAMILY MEDICINE

## 2021-02-04 PROCEDURE — 3051F PR MOST RECENT HEMOGLOBIN A1C LEVEL 7.0 - < 8.0%: ICD-10-PCS | Mod: CPTII,S$GLB,, | Performed by: FAMILY MEDICINE

## 2021-02-04 PROCEDURE — 1159F MED LIST DOCD IN RCRD: CPT | Mod: S$GLB,,, | Performed by: FAMILY MEDICINE

## 2021-02-04 PROCEDURE — 3078F DIAST BP <80 MM HG: CPT | Mod: CPTII,S$GLB,, | Performed by: PODIATRIST

## 2021-02-04 RX ORDER — AZITHROMYCIN 250 MG/1
TABLET, FILM COATED ORAL
COMMUNITY
Start: 2020-11-22 | End: 2021-05-31

## 2021-02-04 RX ORDER — ATORVASTATIN CALCIUM 20 MG/1
20 TABLET, FILM COATED ORAL DAILY
Qty: 90 TABLET | Refills: 1 | Status: SHIPPED | OUTPATIENT
Start: 2021-02-04 | End: 2022-05-30 | Stop reason: SDUPTHER

## 2021-02-04 RX ORDER — LEVOTHYROXINE SODIUM 150 UG/1
TABLET ORAL
Qty: 90 TABLET | Refills: 1 | Status: SHIPPED | OUTPATIENT
Start: 2021-02-04 | End: 2021-06-09 | Stop reason: DRUGHIGH

## 2021-02-04 RX ORDER — MUPIROCIN 20 MG/G
OINTMENT TOPICAL
COMMUNITY
Start: 2021-01-30 | End: 2022-01-26 | Stop reason: SDUPTHER

## 2021-02-04 RX ORDER — ALBUTEROL SULFATE 90 UG/1
AEROSOL, METERED RESPIRATORY (INHALATION)
COMMUNITY
Start: 2020-11-22 | End: 2022-01-26 | Stop reason: SDUPTHER

## 2021-03-03 ENCOUNTER — OFFICE VISIT (OUTPATIENT)
Dept: DIABETES | Facility: CLINIC | Age: 81
End: 2021-03-03
Payer: MEDICARE

## 2021-03-03 DIAGNOSIS — E11.69 TYPE 2 DIABETES MELLITUS WITH OTHER SPECIFIED COMPLICATION, WITHOUT LONG-TERM CURRENT USE OF INSULIN: Primary | ICD-10-CM

## 2021-03-03 PROCEDURE — 99441 PR PHYSICIAN TELEPHONE EVALUATION 5-10 MIN: CPT | Mod: 95,,, | Performed by: NURSE PRACTITIONER

## 2021-03-03 PROCEDURE — 99441 PR PHYSICIAN TELEPHONE EVALUATION 5-10 MIN: ICD-10-PCS | Mod: 95,,, | Performed by: NURSE PRACTITIONER

## 2021-03-03 RX ORDER — EMPAGLIFLOZIN 25 MG/1
25 TABLET, FILM COATED ORAL DAILY
Qty: 30 TABLET | Refills: 2 | Status: SHIPPED | OUTPATIENT
Start: 2021-03-03 | End: 2021-05-04 | Stop reason: SDUPTHER

## 2021-03-03 RX ORDER — GLIMEPIRIDE 4 MG/1
4 TABLET ORAL 2 TIMES DAILY
COMMUNITY
End: 2021-05-04 | Stop reason: SDUPTHER

## 2021-03-31 ENCOUNTER — IMMUNIZATION (OUTPATIENT)
Dept: INTERNAL MEDICINE | Facility: CLINIC | Age: 81
End: 2021-03-31
Payer: MEDICARE

## 2021-03-31 DIAGNOSIS — Z23 NEED FOR VACCINATION: Primary | ICD-10-CM

## 2021-03-31 PROCEDURE — 91300 COVID-19, MRNA, LNP-S, PF, 30 MCG/0.3 ML DOSE VACCINE: CPT | Mod: PBBFAC | Performed by: FAMILY MEDICINE

## 2021-04-21 ENCOUNTER — IMMUNIZATION (OUTPATIENT)
Dept: INTERNAL MEDICINE | Facility: CLINIC | Age: 81
End: 2021-04-21
Payer: MEDICARE

## 2021-04-21 DIAGNOSIS — Z23 NEED FOR VACCINATION: Primary | ICD-10-CM

## 2021-04-21 PROCEDURE — 0002A COVID-19, MRNA, LNP-S, PF, 30 MCG/0.3 ML DOSE VACCINE: CPT | Mod: PBBFAC,HCNC

## 2021-04-21 PROCEDURE — 91300 COVID-19, MRNA, LNP-S, PF, 30 MCG/0.3 ML DOSE VACCINE: CPT | Mod: PBBFAC,HCNC

## 2021-05-04 ENCOUNTER — OFFICE VISIT (OUTPATIENT)
Dept: DIABETES | Facility: CLINIC | Age: 81
End: 2021-05-04
Payer: MEDICARE

## 2021-05-04 VITALS
WEIGHT: 127.63 LBS | DIASTOLIC BLOOD PRESSURE: 63 MMHG | SYSTOLIC BLOOD PRESSURE: 131 MMHG | HEART RATE: 64 BPM | BODY MASS INDEX: 24.12 KG/M2

## 2021-05-04 DIAGNOSIS — E11.69 TYPE 2 DIABETES MELLITUS WITH OTHER SPECIFIED COMPLICATION, WITHOUT LONG-TERM CURRENT USE OF INSULIN: Primary | ICD-10-CM

## 2021-05-04 LAB — GLUCOSE SERPL-MCNC: 136 MG/DL (ref 70–110)

## 2021-05-04 PROCEDURE — 1126F PR PAIN SEVERITY QUANTIFIED, NO PAIN PRESENT: ICD-10-PCS | Mod: S$GLB,,, | Performed by: NURSE PRACTITIONER

## 2021-05-04 PROCEDURE — 1101F PR PT FALLS ASSESS DOC 0-1 FALLS W/OUT INJ PAST YR: ICD-10-PCS | Mod: CPTII,S$GLB,, | Performed by: NURSE PRACTITIONER

## 2021-05-04 PROCEDURE — 99999 PR PBB SHADOW E&M-EST. PATIENT-LVL III: ICD-10-PCS | Mod: PBBFAC,,, | Performed by: NURSE PRACTITIONER

## 2021-05-04 PROCEDURE — 82962 GLUCOSE BLOOD TEST: CPT | Mod: S$GLB,,, | Performed by: NURSE PRACTITIONER

## 2021-05-04 PROCEDURE — 1101F PT FALLS ASSESS-DOCD LE1/YR: CPT | Mod: CPTII,S$GLB,, | Performed by: NURSE PRACTITIONER

## 2021-05-04 PROCEDURE — 3288F PR FALLS RISK ASSESSMENT DOCUMENTED: ICD-10-PCS | Mod: CPTII,S$GLB,, | Performed by: NURSE PRACTITIONER

## 2021-05-04 PROCEDURE — 3051F PR MOST RECENT HEMOGLOBIN A1C LEVEL 7.0 - < 8.0%: ICD-10-PCS | Mod: CPTII,S$GLB,, | Performed by: NURSE PRACTITIONER

## 2021-05-04 PROCEDURE — 99214 PR OFFICE/OUTPT VISIT, EST, LEVL IV, 30-39 MIN: ICD-10-PCS | Mod: S$GLB,,, | Performed by: NURSE PRACTITIONER

## 2021-05-04 PROCEDURE — 99214 OFFICE O/P EST MOD 30 MIN: CPT | Mod: S$GLB,,, | Performed by: NURSE PRACTITIONER

## 2021-05-04 PROCEDURE — 3051F HG A1C>EQUAL 7.0%<8.0%: CPT | Mod: CPTII,S$GLB,, | Performed by: NURSE PRACTITIONER

## 2021-05-04 PROCEDURE — 1126F AMNT PAIN NOTED NONE PRSNT: CPT | Mod: S$GLB,,, | Performed by: NURSE PRACTITIONER

## 2021-05-04 PROCEDURE — 99999 PR PBB SHADOW E&M-EST. PATIENT-LVL III: CPT | Mod: PBBFAC,,, | Performed by: NURSE PRACTITIONER

## 2021-05-04 PROCEDURE — 1159F MED LIST DOCD IN RCRD: CPT | Mod: S$GLB,,, | Performed by: NURSE PRACTITIONER

## 2021-05-04 PROCEDURE — 3288F FALL RISK ASSESSMENT DOCD: CPT | Mod: CPTII,S$GLB,, | Performed by: NURSE PRACTITIONER

## 2021-05-04 PROCEDURE — 82962 POCT GLUCOSE, HAND-HELD DEVICE: ICD-10-PCS | Mod: S$GLB,,, | Performed by: NURSE PRACTITIONER

## 2021-05-04 PROCEDURE — 1159F PR MEDICATION LIST DOCUMENTED IN MEDICAL RECORD: ICD-10-PCS | Mod: S$GLB,,, | Performed by: NURSE PRACTITIONER

## 2021-05-04 RX ORDER — GLIMEPIRIDE 4 MG/1
4 TABLET ORAL 2 TIMES DAILY
Qty: 180 TABLET | Refills: 0 | Status: SHIPPED | OUTPATIENT
Start: 2021-05-04 | End: 2021-08-10 | Stop reason: SDUPTHER

## 2021-05-04 RX ORDER — EMPAGLIFLOZIN 25 MG/1
25 TABLET, FILM COATED ORAL DAILY
Qty: 30 TABLET | Refills: 2 | Status: SHIPPED | OUTPATIENT
Start: 2021-05-04 | End: 2021-05-31 | Stop reason: DRUGHIGH

## 2021-05-04 RX ORDER — FLUCONAZOLE 150 MG/1
150 TABLET ORAL ONCE
Qty: 2 TABLET | Refills: 0 | Status: SHIPPED | OUTPATIENT
Start: 2021-05-04 | End: 2021-05-05

## 2021-05-11 ENCOUNTER — PATIENT OUTREACH (OUTPATIENT)
Dept: ADMINISTRATIVE | Facility: OTHER | Age: 81
End: 2021-05-11

## 2021-05-31 ENCOUNTER — OFFICE VISIT (OUTPATIENT)
Dept: INTERNAL MEDICINE | Facility: CLINIC | Age: 81
End: 2021-05-31
Payer: MEDICARE

## 2021-05-31 ENCOUNTER — LAB VISIT (OUTPATIENT)
Dept: LAB | Facility: HOSPITAL | Age: 81
End: 2021-05-31
Attending: FAMILY MEDICINE
Payer: MEDICARE

## 2021-05-31 VITALS
HEIGHT: 61 IN | BODY MASS INDEX: 23.68 KG/M2 | HEART RATE: 72 BPM | WEIGHT: 125.44 LBS | OXYGEN SATURATION: 98 % | DIASTOLIC BLOOD PRESSURE: 60 MMHG | TEMPERATURE: 97 F | SYSTOLIC BLOOD PRESSURE: 112 MMHG

## 2021-05-31 DIAGNOSIS — N18.4 CHRONIC KIDNEY DISEASE, STAGE 4 (SEVERE): ICD-10-CM

## 2021-05-31 DIAGNOSIS — I10 HYPERTENSION, UNSPECIFIED TYPE: ICD-10-CM

## 2021-05-31 DIAGNOSIS — N18.4 CONTROLLED TYPE 2 DIABETES MELLITUS WITH STAGE 4 CHRONIC KIDNEY DISEASE, WITHOUT LONG-TERM CURRENT USE OF INSULIN: ICD-10-CM

## 2021-05-31 DIAGNOSIS — D64.9 ANEMIA, UNSPECIFIED TYPE: ICD-10-CM

## 2021-05-31 DIAGNOSIS — E03.9 HYPOTHYROIDISM, UNSPECIFIED TYPE: ICD-10-CM

## 2021-05-31 DIAGNOSIS — E11.22 CONTROLLED TYPE 2 DIABETES MELLITUS WITH STAGE 4 CHRONIC KIDNEY DISEASE, WITHOUT LONG-TERM CURRENT USE OF INSULIN: ICD-10-CM

## 2021-05-31 DIAGNOSIS — D64.9 ANEMIA, UNSPECIFIED TYPE: Primary | ICD-10-CM

## 2021-05-31 DIAGNOSIS — Z87.39 HISTORY OF GOUT: ICD-10-CM

## 2021-05-31 LAB
ALBUMIN SERPL BCP-MCNC: 4.1 G/DL (ref 3.5–5.2)
ALP SERPL-CCNC: 130 U/L (ref 55–135)
ALT SERPL W/O P-5'-P-CCNC: 19 U/L (ref 10–44)
ANION GAP SERPL CALC-SCNC: 13 MMOL/L (ref 8–16)
AST SERPL-CCNC: 24 U/L (ref 10–40)
BASOPHILS # BLD AUTO: 0.07 K/UL (ref 0–0.2)
BASOPHILS NFR BLD: 1.2 % (ref 0–1.9)
BILIRUB SERPL-MCNC: 0.5 MG/DL (ref 0.1–1)
BUN SERPL-MCNC: 35 MG/DL (ref 8–23)
CALCIUM SERPL-MCNC: 9.7 MG/DL (ref 8.7–10.5)
CHLORIDE SERPL-SCNC: 110 MMOL/L (ref 95–110)
CO2 SERPL-SCNC: 20 MMOL/L (ref 23–29)
CREAT SERPL-MCNC: 1.7 MG/DL (ref 0.5–1.4)
DIFFERENTIAL METHOD: ABNORMAL
EOSINOPHIL # BLD AUTO: 0.3 K/UL (ref 0–0.5)
EOSINOPHIL NFR BLD: 4.4 % (ref 0–8)
ERYTHROCYTE [DISTWIDTH] IN BLOOD BY AUTOMATED COUNT: 13.4 % (ref 11.5–14.5)
EST. GFR  (AFRICAN AMERICAN): 32.4 ML/MIN/1.73 M^2
EST. GFR  (NON AFRICAN AMERICAN): 28.1 ML/MIN/1.73 M^2
ESTIMATED AVG GLUCOSE: 174 MG/DL (ref 68–131)
FERRITIN SERPL-MCNC: 29 NG/ML (ref 20–300)
GLUCOSE SERPL-MCNC: 132 MG/DL (ref 70–110)
HBA1C MFR BLD: 7.7 % (ref 4–5.6)
HCT VFR BLD AUTO: 34.3 % (ref 37–48.5)
HGB BLD-MCNC: 10.9 G/DL (ref 12–16)
IMM GRANULOCYTES # BLD AUTO: 0.02 K/UL (ref 0–0.04)
IMM GRANULOCYTES NFR BLD AUTO: 0.3 % (ref 0–0.5)
IRON SERPL-MCNC: 60 UG/DL (ref 30–160)
LYMPHOCYTES # BLD AUTO: 1.2 K/UL (ref 1–4.8)
LYMPHOCYTES NFR BLD: 21.4 % (ref 18–48)
MCH RBC QN AUTO: 26.2 PG (ref 27–31)
MCHC RBC AUTO-ENTMCNC: 31.8 G/DL (ref 32–36)
MCV RBC AUTO: 83 FL (ref 82–98)
MONOCYTES # BLD AUTO: 0.4 K/UL (ref 0.3–1)
MONOCYTES NFR BLD: 7.5 % (ref 4–15)
NEUTROPHILS # BLD AUTO: 3.7 K/UL (ref 1.8–7.7)
NEUTROPHILS NFR BLD: 65.2 % (ref 38–73)
NRBC BLD-RTO: 0 /100 WBC
PLATELET # BLD AUTO: 235 K/UL (ref 150–450)
PMV BLD AUTO: 10.6 FL (ref 9.2–12.9)
POTASSIUM SERPL-SCNC: 4.9 MMOL/L (ref 3.5–5.1)
PROT SERPL-MCNC: 7.6 G/DL (ref 6–8.4)
RBC # BLD AUTO: 4.16 M/UL (ref 4–5.4)
SATURATED IRON: 13 % (ref 20–50)
SODIUM SERPL-SCNC: 143 MMOL/L (ref 136–145)
T4 FREE SERPL-MCNC: 1.16 NG/DL (ref 0.71–1.51)
TOTAL IRON BINDING CAPACITY: 465 UG/DL (ref 250–450)
TRANSFERRIN SERPL-MCNC: 314 MG/DL (ref 200–375)
TSH SERPL DL<=0.005 MIU/L-ACNC: 0.07 UIU/ML (ref 0.4–4)
WBC # BLD AUTO: 5.74 K/UL (ref 3.9–12.7)

## 2021-05-31 PROCEDURE — 83540 ASSAY OF IRON: CPT | Performed by: FAMILY MEDICINE

## 2021-05-31 PROCEDURE — 1159F MED LIST DOCD IN RCRD: CPT | Mod: S$GLB,,, | Performed by: FAMILY MEDICINE

## 2021-05-31 PROCEDURE — 84439 ASSAY OF FREE THYROXINE: CPT | Performed by: FAMILY MEDICINE

## 2021-05-31 PROCEDURE — 83036 HEMOGLOBIN GLYCOSYLATED A1C: CPT | Performed by: FAMILY MEDICINE

## 2021-05-31 PROCEDURE — 1101F PR PT FALLS ASSESS DOC 0-1 FALLS W/OUT INJ PAST YR: ICD-10-PCS | Mod: CPTII,S$GLB,, | Performed by: FAMILY MEDICINE

## 2021-05-31 PROCEDURE — 84443 ASSAY THYROID STIM HORMONE: CPT | Performed by: FAMILY MEDICINE

## 2021-05-31 PROCEDURE — 3288F FALL RISK ASSESSMENT DOCD: CPT | Mod: CPTII,S$GLB,, | Performed by: FAMILY MEDICINE

## 2021-05-31 PROCEDURE — 36415 COLL VENOUS BLD VENIPUNCTURE: CPT | Performed by: FAMILY MEDICINE

## 2021-05-31 PROCEDURE — 3051F HG A1C>EQUAL 7.0%<8.0%: CPT | Mod: CPTII,S$GLB,, | Performed by: FAMILY MEDICINE

## 2021-05-31 PROCEDURE — 1101F PT FALLS ASSESS-DOCD LE1/YR: CPT | Mod: CPTII,S$GLB,, | Performed by: FAMILY MEDICINE

## 2021-05-31 PROCEDURE — 99999 PR PBB SHADOW E&M-EST. PATIENT-LVL IV: CPT | Mod: PBBFAC,,, | Performed by: FAMILY MEDICINE

## 2021-05-31 PROCEDURE — 80053 COMPREHEN METABOLIC PANEL: CPT | Performed by: FAMILY MEDICINE

## 2021-05-31 PROCEDURE — 85025 COMPLETE CBC W/AUTO DIFF WBC: CPT | Performed by: FAMILY MEDICINE

## 2021-05-31 PROCEDURE — 1159F PR MEDICATION LIST DOCUMENTED IN MEDICAL RECORD: ICD-10-PCS | Mod: S$GLB,,, | Performed by: FAMILY MEDICINE

## 2021-05-31 PROCEDURE — 99999 PR PBB SHADOW E&M-EST. PATIENT-LVL IV: ICD-10-PCS | Mod: PBBFAC,,, | Performed by: FAMILY MEDICINE

## 2021-05-31 PROCEDURE — 99214 PR OFFICE/OUTPT VISIT, EST, LEVL IV, 30-39 MIN: ICD-10-PCS | Mod: S$GLB,,, | Performed by: FAMILY MEDICINE

## 2021-05-31 PROCEDURE — 3051F PR MOST RECENT HEMOGLOBIN A1C LEVEL 7.0 - < 8.0%: ICD-10-PCS | Mod: CPTII,S$GLB,, | Performed by: FAMILY MEDICINE

## 2021-05-31 PROCEDURE — 82728 ASSAY OF FERRITIN: CPT | Performed by: FAMILY MEDICINE

## 2021-05-31 PROCEDURE — 3288F PR FALLS RISK ASSESSMENT DOCUMENTED: ICD-10-PCS | Mod: CPTII,S$GLB,, | Performed by: FAMILY MEDICINE

## 2021-05-31 PROCEDURE — 99214 OFFICE O/P EST MOD 30 MIN: CPT | Mod: S$GLB,,, | Performed by: FAMILY MEDICINE

## 2021-05-31 RX ORDER — EMPAGLIFLOZIN 10 MG/1
10 TABLET, FILM COATED ORAL DAILY
Qty: 90 TABLET | Refills: 1 | Status: SHIPPED | OUTPATIENT
Start: 2021-05-31 | End: 2021-08-10 | Stop reason: SDUPTHER

## 2021-06-09 RX ORDER — LEVOTHYROXINE SODIUM 125 UG/1
125 TABLET ORAL
Qty: 90 TABLET | Refills: 1 | Status: SHIPPED | OUTPATIENT
Start: 2021-06-09 | End: 2021-08-05 | Stop reason: DRUGHIGH

## 2021-06-16 ENCOUNTER — TELEPHONE (OUTPATIENT)
Dept: DIABETES | Facility: CLINIC | Age: 81
End: 2021-06-16

## 2021-06-21 ENCOUNTER — PATIENT MESSAGE (OUTPATIENT)
Dept: PHARMACY | Facility: CLINIC | Age: 81
End: 2021-06-21

## 2021-07-14 DIAGNOSIS — N18.4 CHRONIC KIDNEY DISEASE (CKD), STAGE IV (SEVERE): Primary | ICD-10-CM

## 2021-07-16 ENCOUNTER — LAB VISIT (OUTPATIENT)
Dept: LAB | Facility: HOSPITAL | Age: 81
End: 2021-07-16
Attending: INTERNAL MEDICINE
Payer: MEDICARE

## 2021-07-16 DIAGNOSIS — N18.4 CHRONIC KIDNEY DISEASE (CKD), STAGE IV (SEVERE): ICD-10-CM

## 2021-07-16 PROCEDURE — 36415 COLL VENOUS BLD VENIPUNCTURE: CPT | Performed by: INTERNAL MEDICINE

## 2021-07-16 PROCEDURE — 85025 COMPLETE CBC W/AUTO DIFF WBC: CPT | Performed by: INTERNAL MEDICINE

## 2021-07-16 PROCEDURE — 80069 RENAL FUNCTION PANEL: CPT | Performed by: INTERNAL MEDICINE

## 2021-07-17 LAB
ALBUMIN SERPL BCP-MCNC: 3.8 G/DL (ref 3.5–5.2)
ANION GAP SERPL CALC-SCNC: 9 MMOL/L (ref 8–16)
BASOPHILS # BLD AUTO: 0.04 K/UL (ref 0–0.2)
BASOPHILS NFR BLD: 0.9 % (ref 0–1.9)
BUN SERPL-MCNC: 33 MG/DL (ref 8–23)
CALCIUM SERPL-MCNC: 9.1 MG/DL (ref 8.7–10.5)
CHLORIDE SERPL-SCNC: 111 MMOL/L (ref 95–110)
CO2 SERPL-SCNC: 21 MMOL/L (ref 23–29)
CREAT SERPL-MCNC: 1.5 MG/DL (ref 0.5–1.4)
DIFFERENTIAL METHOD: ABNORMAL
EOSINOPHIL # BLD AUTO: 0.3 K/UL (ref 0–0.5)
EOSINOPHIL NFR BLD: 5.8 % (ref 0–8)
ERYTHROCYTE [DISTWIDTH] IN BLOOD BY AUTOMATED COUNT: 13 % (ref 11.5–14.5)
EST. GFR  (AFRICAN AMERICAN): 37.7 ML/MIN/1.73 M^2
EST. GFR  (NON AFRICAN AMERICAN): 32.7 ML/MIN/1.73 M^2
GLUCOSE SERPL-MCNC: 138 MG/DL (ref 70–110)
HCT VFR BLD AUTO: 31.7 % (ref 37–48.5)
HGB BLD-MCNC: 10.2 G/DL (ref 12–16)
IMM GRANULOCYTES # BLD AUTO: 0.01 K/UL (ref 0–0.04)
IMM GRANULOCYTES NFR BLD AUTO: 0.2 % (ref 0–0.5)
LYMPHOCYTES # BLD AUTO: 1.2 K/UL (ref 1–4.8)
LYMPHOCYTES NFR BLD: 25.1 % (ref 18–48)
MCH RBC QN AUTO: 26.6 PG (ref 27–31)
MCHC RBC AUTO-ENTMCNC: 32.2 G/DL (ref 32–36)
MCV RBC AUTO: 83 FL (ref 82–98)
MONOCYTES # BLD AUTO: 0.4 K/UL (ref 0.3–1)
MONOCYTES NFR BLD: 8.2 % (ref 4–15)
NEUTROPHILS # BLD AUTO: 2.8 K/UL (ref 1.8–7.7)
NEUTROPHILS NFR BLD: 59.8 % (ref 38–73)
NRBC BLD-RTO: 0 /100 WBC
PHOSPHATE SERPL-MCNC: 3 MG/DL (ref 2.7–4.5)
PLATELET # BLD AUTO: 228 K/UL (ref 150–450)
PMV BLD AUTO: 10.7 FL (ref 9.2–12.9)
POTASSIUM SERPL-SCNC: 4.8 MMOL/L (ref 3.5–5.1)
RBC # BLD AUTO: 3.83 M/UL (ref 4–5.4)
SODIUM SERPL-SCNC: 141 MMOL/L (ref 136–145)
WBC # BLD AUTO: 4.66 K/UL (ref 3.9–12.7)

## 2021-07-29 ENCOUNTER — LAB VISIT (OUTPATIENT)
Dept: LAB | Facility: HOSPITAL | Age: 81
End: 2021-07-29
Attending: FAMILY MEDICINE
Payer: MEDICARE

## 2021-07-29 ENCOUNTER — OFFICE VISIT (OUTPATIENT)
Dept: INTERNAL MEDICINE | Facility: CLINIC | Age: 81
End: 2021-07-29
Payer: MEDICARE

## 2021-07-29 VITALS
DIASTOLIC BLOOD PRESSURE: 68 MMHG | TEMPERATURE: 98 F | HEIGHT: 61 IN | OXYGEN SATURATION: 99 % | BODY MASS INDEX: 24.1 KG/M2 | HEART RATE: 70 BPM | WEIGHT: 127.63 LBS | SYSTOLIC BLOOD PRESSURE: 120 MMHG

## 2021-07-29 DIAGNOSIS — E03.9 HYPOTHYROIDISM, UNSPECIFIED TYPE: ICD-10-CM

## 2021-07-29 DIAGNOSIS — I10 HYPERTENSION, UNSPECIFIED TYPE: ICD-10-CM

## 2021-07-29 DIAGNOSIS — E03.9 HYPOTHYROIDISM, UNSPECIFIED TYPE: Primary | ICD-10-CM

## 2021-07-29 PROCEDURE — 1159F MED LIST DOCD IN RCRD: CPT | Mod: CPTII,S$GLB,, | Performed by: FAMILY MEDICINE

## 2021-07-29 PROCEDURE — 3074F SYST BP LT 130 MM HG: CPT | Mod: CPTII,S$GLB,, | Performed by: FAMILY MEDICINE

## 2021-07-29 PROCEDURE — 99999 PR PBB SHADOW E&M-EST. PATIENT-LVL III: CPT | Mod: PBBFAC,,, | Performed by: FAMILY MEDICINE

## 2021-07-29 PROCEDURE — 1101F PT FALLS ASSESS-DOCD LE1/YR: CPT | Mod: CPTII,S$GLB,, | Performed by: FAMILY MEDICINE

## 2021-07-29 PROCEDURE — 99214 PR OFFICE/OUTPT VISIT, EST, LEVL IV, 30-39 MIN: ICD-10-PCS | Mod: S$GLB,,, | Performed by: FAMILY MEDICINE

## 2021-07-29 PROCEDURE — 3078F DIAST BP <80 MM HG: CPT | Mod: CPTII,S$GLB,, | Performed by: FAMILY MEDICINE

## 2021-07-29 PROCEDURE — 84439 ASSAY OF FREE THYROXINE: CPT | Performed by: FAMILY MEDICINE

## 2021-07-29 PROCEDURE — 3288F PR FALLS RISK ASSESSMENT DOCUMENTED: ICD-10-PCS | Mod: CPTII,S$GLB,, | Performed by: FAMILY MEDICINE

## 2021-07-29 PROCEDURE — 3078F PR MOST RECENT DIASTOLIC BLOOD PRESSURE < 80 MM HG: ICD-10-PCS | Mod: CPTII,S$GLB,, | Performed by: FAMILY MEDICINE

## 2021-07-29 PROCEDURE — 84443 ASSAY THYROID STIM HORMONE: CPT | Performed by: FAMILY MEDICINE

## 2021-07-29 PROCEDURE — 36415 COLL VENOUS BLD VENIPUNCTURE: CPT | Performed by: FAMILY MEDICINE

## 2021-07-29 PROCEDURE — 99999 PR PBB SHADOW E&M-EST. PATIENT-LVL III: ICD-10-PCS | Mod: PBBFAC,,, | Performed by: FAMILY MEDICINE

## 2021-07-29 PROCEDURE — 1101F PR PT FALLS ASSESS DOC 0-1 FALLS W/OUT INJ PAST YR: ICD-10-PCS | Mod: CPTII,S$GLB,, | Performed by: FAMILY MEDICINE

## 2021-07-29 PROCEDURE — 3074F PR MOST RECENT SYSTOLIC BLOOD PRESSURE < 130 MM HG: ICD-10-PCS | Mod: CPTII,S$GLB,, | Performed by: FAMILY MEDICINE

## 2021-07-29 PROCEDURE — 1159F PR MEDICATION LIST DOCUMENTED IN MEDICAL RECORD: ICD-10-PCS | Mod: CPTII,S$GLB,, | Performed by: FAMILY MEDICINE

## 2021-07-29 PROCEDURE — 99214 OFFICE O/P EST MOD 30 MIN: CPT | Mod: S$GLB,,, | Performed by: FAMILY MEDICINE

## 2021-07-29 PROCEDURE — 3288F FALL RISK ASSESSMENT DOCD: CPT | Mod: CPTII,S$GLB,, | Performed by: FAMILY MEDICINE

## 2021-07-30 LAB
T4 FREE SERPL-MCNC: 1.29 NG/DL (ref 0.71–1.51)
TSH SERPL DL<=0.005 MIU/L-ACNC: 0.05 UIU/ML (ref 0.4–4)

## 2021-08-05 ENCOUNTER — TELEPHONE (OUTPATIENT)
Dept: INTERNAL MEDICINE | Facility: CLINIC | Age: 81
End: 2021-08-05

## 2021-08-05 RX ORDER — LEVOTHYROXINE SODIUM 100 UG/1
100 TABLET ORAL
Qty: 90 TABLET | Refills: 1 | Status: SHIPPED | OUTPATIENT
Start: 2021-08-05 | End: 2022-04-08 | Stop reason: SDUPTHER

## 2021-08-10 ENCOUNTER — TELEPHONE (OUTPATIENT)
Dept: DIABETES | Facility: CLINIC | Age: 81
End: 2021-08-10

## 2021-08-10 ENCOUNTER — PATIENT OUTREACH (OUTPATIENT)
Dept: ADMINISTRATIVE | Facility: OTHER | Age: 81
End: 2021-08-10

## 2021-08-10 ENCOUNTER — OFFICE VISIT (OUTPATIENT)
Dept: DIABETES | Facility: CLINIC | Age: 81
End: 2021-08-10
Payer: MEDICARE

## 2021-08-10 DIAGNOSIS — E11.69 TYPE 2 DIABETES MELLITUS WITH OTHER SPECIFIED COMPLICATION, WITHOUT LONG-TERM CURRENT USE OF INSULIN: Primary | ICD-10-CM

## 2021-08-10 PROCEDURE — 1160F PR REVIEW ALL MEDS BY PRESCRIBER/CLIN PHARMACIST DOCUMENTED: ICD-10-PCS | Mod: CPTII,95,, | Performed by: NURSE PRACTITIONER

## 2021-08-10 PROCEDURE — 1160F RVW MEDS BY RX/DR IN RCRD: CPT | Mod: CPTII,95,, | Performed by: NURSE PRACTITIONER

## 2021-08-10 PROCEDURE — 99441 PR PHYSICIAN TELEPHONE EVALUATION 5-10 MIN: ICD-10-PCS | Mod: 95,,, | Performed by: NURSE PRACTITIONER

## 2021-08-10 PROCEDURE — 1159F PR MEDICATION LIST DOCUMENTED IN MEDICAL RECORD: ICD-10-PCS | Mod: CPTII,95,, | Performed by: NURSE PRACTITIONER

## 2021-08-10 PROCEDURE — 99441 PR PHYSICIAN TELEPHONE EVALUATION 5-10 MIN: CPT | Mod: 95,,, | Performed by: NURSE PRACTITIONER

## 2021-08-10 PROCEDURE — 1159F MED LIST DOCD IN RCRD: CPT | Mod: CPTII,95,, | Performed by: NURSE PRACTITIONER

## 2021-08-10 RX ORDER — EMPAGLIFLOZIN 10 MG/1
10 TABLET, FILM COATED ORAL DAILY
Qty: 90 TABLET | Refills: 0 | Status: SHIPPED | OUTPATIENT
Start: 2021-08-10 | End: 2022-03-01 | Stop reason: SDUPTHER

## 2021-08-10 RX ORDER — GLIMEPIRIDE 4 MG/1
4 TABLET ORAL 2 TIMES DAILY
Qty: 180 TABLET | Refills: 0 | Status: SHIPPED | OUTPATIENT
Start: 2021-08-10 | End: 2022-02-22 | Stop reason: SDUPTHER

## 2021-08-17 ENCOUNTER — PATIENT MESSAGE (OUTPATIENT)
Dept: PHARMACY | Facility: CLINIC | Age: 81
End: 2021-08-17

## 2021-08-20 ENCOUNTER — TELEPHONE (OUTPATIENT)
Dept: NEPHROLOGY | Facility: CLINIC | Age: 81
End: 2021-08-20

## 2021-10-01 ENCOUNTER — LAB VISIT (OUTPATIENT)
Dept: LAB | Facility: HOSPITAL | Age: 81
End: 2021-10-01
Attending: FAMILY MEDICINE
Payer: MEDICARE

## 2021-10-01 ENCOUNTER — OFFICE VISIT (OUTPATIENT)
Dept: INTERNAL MEDICINE | Facility: CLINIC | Age: 81
End: 2021-10-01
Payer: MEDICARE

## 2021-10-01 VITALS
DIASTOLIC BLOOD PRESSURE: 60 MMHG | BODY MASS INDEX: 24.69 KG/M2 | HEIGHT: 61 IN | HEART RATE: 71 BPM | TEMPERATURE: 99 F | SYSTOLIC BLOOD PRESSURE: 120 MMHG | OXYGEN SATURATION: 98 % | RESPIRATION RATE: 17 BRPM | WEIGHT: 130.75 LBS

## 2021-10-01 DIAGNOSIS — M25.561 ACUTE PAIN OF RIGHT KNEE: Primary | ICD-10-CM

## 2021-10-01 DIAGNOSIS — N18.4 CONTROLLED TYPE 2 DIABETES MELLITUS WITH STAGE 4 CHRONIC KIDNEY DISEASE, WITHOUT LONG-TERM CURRENT USE OF INSULIN: ICD-10-CM

## 2021-10-01 DIAGNOSIS — E11.22 CONTROLLED TYPE 2 DIABETES MELLITUS WITH STAGE 4 CHRONIC KIDNEY DISEASE, WITHOUT LONG-TERM CURRENT USE OF INSULIN: ICD-10-CM

## 2021-10-01 DIAGNOSIS — R79.89 LOW TSH LEVEL: ICD-10-CM

## 2021-10-01 DIAGNOSIS — I10 HYPERTENSION, UNSPECIFIED TYPE: ICD-10-CM

## 2021-10-01 DIAGNOSIS — M25.561 ACUTE PAIN OF RIGHT KNEE: ICD-10-CM

## 2021-10-01 LAB
ESTIMATED AVG GLUCOSE: 166 MG/DL (ref 68–131)
HBA1C MFR BLD: 7.4 % (ref 4–5.6)
URATE SERPL-MCNC: 6.8 MG/DL (ref 2.4–5.7)

## 2021-10-01 PROCEDURE — 1159F MED LIST DOCD IN RCRD: CPT | Mod: HCNC,CPTII,S$GLB, | Performed by: FAMILY MEDICINE

## 2021-10-01 PROCEDURE — 1125F AMNT PAIN NOTED PAIN PRSNT: CPT | Mod: HCNC,CPTII,S$GLB, | Performed by: FAMILY MEDICINE

## 2021-10-01 PROCEDURE — 1101F PR PT FALLS ASSESS DOC 0-1 FALLS W/OUT INJ PAST YR: ICD-10-PCS | Mod: HCNC,CPTII,S$GLB, | Performed by: FAMILY MEDICINE

## 2021-10-01 PROCEDURE — 3074F SYST BP LT 130 MM HG: CPT | Mod: HCNC,CPTII,S$GLB, | Performed by: FAMILY MEDICINE

## 2021-10-01 PROCEDURE — 36415 COLL VENOUS BLD VENIPUNCTURE: CPT | Mod: HCNC | Performed by: FAMILY MEDICINE

## 2021-10-01 PROCEDURE — 99499 UNLISTED E&M SERVICE: CPT | Mod: S$GLB,,, | Performed by: FAMILY MEDICINE

## 2021-10-01 PROCEDURE — 3288F FALL RISK ASSESSMENT DOCD: CPT | Mod: HCNC,CPTII,S$GLB, | Performed by: FAMILY MEDICINE

## 2021-10-01 PROCEDURE — 1159F PR MEDICATION LIST DOCUMENTED IN MEDICAL RECORD: ICD-10-PCS | Mod: HCNC,CPTII,S$GLB, | Performed by: FAMILY MEDICINE

## 2021-10-01 PROCEDURE — 99214 PR OFFICE/OUTPT VISIT, EST, LEVL IV, 30-39 MIN: ICD-10-PCS | Mod: HCNC,S$GLB,, | Performed by: FAMILY MEDICINE

## 2021-10-01 PROCEDURE — 1125F PR PAIN SEVERITY QUANTIFIED, PAIN PRESENT: ICD-10-PCS | Mod: HCNC,CPTII,S$GLB, | Performed by: FAMILY MEDICINE

## 2021-10-01 PROCEDURE — 3051F HG A1C>EQUAL 7.0%<8.0%: CPT | Mod: HCNC,CPTII,S$GLB, | Performed by: FAMILY MEDICINE

## 2021-10-01 PROCEDURE — 99499 RISK ADDL DX/OHS AUDIT: ICD-10-PCS | Mod: S$GLB,,, | Performed by: FAMILY MEDICINE

## 2021-10-01 PROCEDURE — 99999 PR PBB SHADOW E&M-EST. PATIENT-LVL IV: ICD-10-PCS | Mod: PBBFAC,HCNC,, | Performed by: FAMILY MEDICINE

## 2021-10-01 PROCEDURE — 84481 FREE ASSAY (FT-3): CPT | Mod: HCNC | Performed by: FAMILY MEDICINE

## 2021-10-01 PROCEDURE — 1101F PT FALLS ASSESS-DOCD LE1/YR: CPT | Mod: HCNC,CPTII,S$GLB, | Performed by: FAMILY MEDICINE

## 2021-10-01 PROCEDURE — 3051F PR MOST RECENT HEMOGLOBIN A1C LEVEL 7.0 - < 8.0%: ICD-10-PCS | Mod: HCNC,CPTII,S$GLB, | Performed by: FAMILY MEDICINE

## 2021-10-01 PROCEDURE — 99214 OFFICE O/P EST MOD 30 MIN: CPT | Mod: HCNC,S$GLB,, | Performed by: FAMILY MEDICINE

## 2021-10-01 PROCEDURE — 3288F PR FALLS RISK ASSESSMENT DOCUMENTED: ICD-10-PCS | Mod: HCNC,CPTII,S$GLB, | Performed by: FAMILY MEDICINE

## 2021-10-01 PROCEDURE — 99999 PR PBB SHADOW E&M-EST. PATIENT-LVL IV: CPT | Mod: PBBFAC,HCNC,, | Performed by: FAMILY MEDICINE

## 2021-10-01 PROCEDURE — 3078F PR MOST RECENT DIASTOLIC BLOOD PRESSURE < 80 MM HG: ICD-10-PCS | Mod: HCNC,CPTII,S$GLB, | Performed by: FAMILY MEDICINE

## 2021-10-01 PROCEDURE — 83036 HEMOGLOBIN GLYCOSYLATED A1C: CPT | Mod: HCNC | Performed by: FAMILY MEDICINE

## 2021-10-01 PROCEDURE — 84439 ASSAY OF FREE THYROXINE: CPT | Mod: HCNC | Performed by: FAMILY MEDICINE

## 2021-10-01 PROCEDURE — 84550 ASSAY OF BLOOD/URIC ACID: CPT | Mod: HCNC | Performed by: FAMILY MEDICINE

## 2021-10-01 PROCEDURE — 84443 ASSAY THYROID STIM HORMONE: CPT | Mod: HCNC | Performed by: FAMILY MEDICINE

## 2021-10-01 PROCEDURE — 3074F PR MOST RECENT SYSTOLIC BLOOD PRESSURE < 130 MM HG: ICD-10-PCS | Mod: HCNC,CPTII,S$GLB, | Performed by: FAMILY MEDICINE

## 2021-10-01 PROCEDURE — 3078F DIAST BP <80 MM HG: CPT | Mod: HCNC,CPTII,S$GLB, | Performed by: FAMILY MEDICINE

## 2021-10-01 RX ORDER — TRAMADOL HYDROCHLORIDE 50 MG/1
50 TABLET ORAL EVERY 6 HOURS PRN
COMMUNITY
Start: 2021-09-26 | End: 2021-10-01 | Stop reason: SDUPTHER

## 2021-10-01 RX ORDER — MELOXICAM 7.5 MG/1
7.5 TABLET ORAL DAILY
COMMUNITY
Start: 2021-09-26 | End: 2021-10-01 | Stop reason: ALTCHOICE

## 2021-10-01 RX ORDER — DICLOFENAC SODIUM 10 MG/G
2 GEL TOPICAL DAILY
Qty: 1 TUBE | Refills: 0 | Status: SHIPPED | OUTPATIENT
Start: 2021-10-01 | End: 2022-01-26 | Stop reason: SDUPTHER

## 2021-10-01 RX ORDER — TRAMADOL HYDROCHLORIDE 50 MG/1
25 TABLET ORAL EVERY 6 HOURS PRN
Qty: 10 TABLET | Refills: 0 | Status: SHIPPED | OUTPATIENT
Start: 2021-10-01 | End: 2021-12-30

## 2021-10-02 LAB
T3FREE SERPL-MCNC: 2.1 PG/ML (ref 2.3–4.2)
T4 FREE SERPL-MCNC: 0.89 NG/DL (ref 0.71–1.51)
TSH SERPL DL<=0.005 MIU/L-ACNC: 5.25 UIU/ML (ref 0.4–4)

## 2021-10-06 DIAGNOSIS — M25.561 ACUTE PAIN OF RIGHT KNEE: Primary | ICD-10-CM

## 2021-10-07 ENCOUNTER — HOSPITAL ENCOUNTER (OUTPATIENT)
Dept: RADIOLOGY | Facility: HOSPITAL | Age: 81
Discharge: HOME | End: 2021-10-07
Attending: PHYSICAL MEDICINE & REHABILITATION
Payer: MEDICARE

## 2021-10-07 ENCOUNTER — OFFICE VISIT (OUTPATIENT)
Dept: SPORTS MEDICINE | Facility: CLINIC | Age: 81
End: 2021-10-07
Payer: MEDICARE

## 2021-10-07 VITALS — WEIGHT: 130.75 LBS | HEIGHT: 61 IN | BODY MASS INDEX: 24.69 KG/M2

## 2021-10-07 DIAGNOSIS — M25.561 ACUTE PAIN OF RIGHT KNEE: ICD-10-CM

## 2021-10-07 DIAGNOSIS — M70.50 PES ANSERINE BURSITIS: ICD-10-CM

## 2021-10-07 DIAGNOSIS — M25.561 ACUTE PAIN OF RIGHT KNEE: Primary | ICD-10-CM

## 2021-10-07 PROCEDURE — 3288F PR FALLS RISK ASSESSMENT DOCUMENTED: ICD-10-PCS | Mod: HCNC,CPTII,S$GLB, | Performed by: PHYSICAL MEDICINE & REHABILITATION

## 2021-10-07 PROCEDURE — 73564 X-RAY EXAM KNEE 4 OR MORE: CPT | Mod: TC,HCNC,RT

## 2021-10-07 PROCEDURE — 3288F FALL RISK ASSESSMENT DOCD: CPT | Mod: HCNC,CPTII,S$GLB, | Performed by: PHYSICAL MEDICINE & REHABILITATION

## 2021-10-07 PROCEDURE — 1101F PR PT FALLS ASSESS DOC 0-1 FALLS W/OUT INJ PAST YR: ICD-10-PCS | Mod: HCNC,CPTII,S$GLB, | Performed by: PHYSICAL MEDICINE & REHABILITATION

## 2021-10-07 PROCEDURE — 1159F PR MEDICATION LIST DOCUMENTED IN MEDICAL RECORD: ICD-10-PCS | Mod: HCNC,CPTII,S$GLB, | Performed by: PHYSICAL MEDICINE & REHABILITATION

## 2021-10-07 PROCEDURE — 1159F MED LIST DOCD IN RCRD: CPT | Mod: HCNC,CPTII,S$GLB, | Performed by: PHYSICAL MEDICINE & REHABILITATION

## 2021-10-07 PROCEDURE — 99204 PR OFFICE/OUTPT VISIT, NEW, LEVL IV, 45-59 MIN: ICD-10-PCS | Mod: HCNC,S$GLB,, | Performed by: PHYSICAL MEDICINE & REHABILITATION

## 2021-10-07 PROCEDURE — 99999 PR PBB SHADOW E&M-EST. PATIENT-LVL IV: ICD-10-PCS | Mod: PBBFAC,HCNC,, | Performed by: PHYSICAL MEDICINE & REHABILITATION

## 2021-10-07 PROCEDURE — 1125F PR PAIN SEVERITY QUANTIFIED, PAIN PRESENT: ICD-10-PCS | Mod: HCNC,CPTII,S$GLB, | Performed by: PHYSICAL MEDICINE & REHABILITATION

## 2021-10-07 PROCEDURE — 73562 X-RAY EXAM OF KNEE 3: CPT | Mod: 26,HCNC,LT, | Performed by: RADIOLOGY

## 2021-10-07 PROCEDURE — 1160F PR REVIEW ALL MEDS BY PRESCRIBER/CLIN PHARMACIST DOCUMENTED: ICD-10-PCS | Mod: HCNC,CPTII,S$GLB, | Performed by: PHYSICAL MEDICINE & REHABILITATION

## 2021-10-07 PROCEDURE — 1160F RVW MEDS BY RX/DR IN RCRD: CPT | Mod: HCNC,CPTII,S$GLB, | Performed by: PHYSICAL MEDICINE & REHABILITATION

## 2021-10-07 PROCEDURE — 99204 OFFICE O/P NEW MOD 45 MIN: CPT | Mod: HCNC,S$GLB,, | Performed by: PHYSICAL MEDICINE & REHABILITATION

## 2021-10-07 PROCEDURE — 1101F PT FALLS ASSESS-DOCD LE1/YR: CPT | Mod: HCNC,CPTII,S$GLB, | Performed by: PHYSICAL MEDICINE & REHABILITATION

## 2021-10-07 PROCEDURE — 73562 XR KNEE ORTHO RIGHT WITH FLEXION: ICD-10-PCS | Mod: 26,HCNC,LT, | Performed by: RADIOLOGY

## 2021-10-07 PROCEDURE — 99999 PR PBB SHADOW E&M-EST. PATIENT-LVL IV: CPT | Mod: PBBFAC,HCNC,, | Performed by: PHYSICAL MEDICINE & REHABILITATION

## 2021-10-07 PROCEDURE — 1125F AMNT PAIN NOTED PAIN PRSNT: CPT | Mod: HCNC,CPTII,S$GLB, | Performed by: PHYSICAL MEDICINE & REHABILITATION

## 2021-10-07 PROCEDURE — 73564 X-RAY EXAM KNEE 4 OR MORE: CPT | Mod: 26,HCNC,RT, | Performed by: RADIOLOGY

## 2021-10-07 PROCEDURE — 73564 XR KNEE ORTHO RIGHT WITH FLEXION: ICD-10-PCS | Mod: 26,HCNC,RT, | Performed by: RADIOLOGY

## 2021-10-20 ENCOUNTER — LAB VISIT (OUTPATIENT)
Dept: LAB | Facility: HOSPITAL | Age: 81
End: 2021-10-20
Attending: INTERNAL MEDICINE
Payer: MEDICARE

## 2021-10-20 DIAGNOSIS — N18.4 CHRONIC KIDNEY DISEASE (CKD), STAGE IV (SEVERE): ICD-10-CM

## 2021-10-20 PROCEDURE — 82570 ASSAY OF URINE CREATININE: CPT | Mod: HCNC | Performed by: INTERNAL MEDICINE

## 2021-10-21 LAB
CREAT UR-MCNC: 46 MG/DL (ref 15–325)
PROT UR-MCNC: 7 MG/DL (ref 0–15)
PROT/CREAT UR: 0.15 MG/G{CREAT} (ref 0–0.2)

## 2021-10-26 DIAGNOSIS — N18.4 CHRONIC KIDNEY DISEASE (CKD), STAGE IV (SEVERE): Primary | ICD-10-CM

## 2021-10-27 ENCOUNTER — LAB VISIT (OUTPATIENT)
Dept: LAB | Facility: HOSPITAL | Age: 81
End: 2021-10-27
Attending: INTERNAL MEDICINE
Payer: MEDICARE

## 2021-10-27 DIAGNOSIS — N18.4 CHRONIC KIDNEY DISEASE (CKD), STAGE IV (SEVERE): ICD-10-CM

## 2021-10-27 LAB
ALBUMIN SERPL BCP-MCNC: 3.7 G/DL (ref 3.5–5.2)
ANION GAP SERPL CALC-SCNC: 8 MMOL/L (ref 8–16)
BASOPHILS # BLD AUTO: 0.05 K/UL (ref 0–0.2)
BASOPHILS NFR BLD: 1.3 % (ref 0–1.9)
BUN SERPL-MCNC: 27 MG/DL (ref 8–23)
CALCIUM SERPL-MCNC: 9.7 MG/DL (ref 8.7–10.5)
CHLORIDE SERPL-SCNC: 109 MMOL/L (ref 95–110)
CO2 SERPL-SCNC: 22 MMOL/L (ref 23–29)
CREAT SERPL-MCNC: 1.4 MG/DL (ref 0.5–1.4)
DIFFERENTIAL METHOD: ABNORMAL
EOSINOPHIL # BLD AUTO: 0.2 K/UL (ref 0–0.5)
EOSINOPHIL NFR BLD: 5.1 % (ref 0–8)
ERYTHROCYTE [DISTWIDTH] IN BLOOD BY AUTOMATED COUNT: 12.9 % (ref 11.5–14.5)
EST. GFR  (AFRICAN AMERICAN): 40.9 ML/MIN/1.73 M^2
EST. GFR  (NON AFRICAN AMERICAN): 35.5 ML/MIN/1.73 M^2
GLUCOSE SERPL-MCNC: 160 MG/DL (ref 70–110)
HCT VFR BLD AUTO: 32 % (ref 37–48.5)
HGB BLD-MCNC: 10.4 G/DL (ref 12–16)
IMM GRANULOCYTES # BLD AUTO: 0.01 K/UL (ref 0–0.04)
IMM GRANULOCYTES NFR BLD AUTO: 0.3 % (ref 0–0.5)
LYMPHOCYTES # BLD AUTO: 1 K/UL (ref 1–4.8)
LYMPHOCYTES NFR BLD: 25.9 % (ref 18–48)
MCH RBC QN AUTO: 27.5 PG (ref 27–31)
MCHC RBC AUTO-ENTMCNC: 32.5 G/DL (ref 32–36)
MCV RBC AUTO: 85 FL (ref 82–98)
MONOCYTES # BLD AUTO: 0.3 K/UL (ref 0.3–1)
MONOCYTES NFR BLD: 7.9 % (ref 4–15)
NEUTROPHILS # BLD AUTO: 2.4 K/UL (ref 1.8–7.7)
NEUTROPHILS NFR BLD: 59.5 % (ref 38–73)
NRBC BLD-RTO: 0 /100 WBC
PHOSPHATE SERPL-MCNC: 3.3 MG/DL (ref 2.7–4.5)
PLATELET # BLD AUTO: 203 K/UL (ref 150–450)
PMV BLD AUTO: 11 FL (ref 9.2–12.9)
POTASSIUM SERPL-SCNC: 4.7 MMOL/L (ref 3.5–5.1)
PTH-INTACT SERPL-MCNC: 138.9 PG/ML (ref 9–77)
RBC # BLD AUTO: 3.78 M/UL (ref 4–5.4)
SODIUM SERPL-SCNC: 139 MMOL/L (ref 136–145)
WBC # BLD AUTO: 3.94 K/UL (ref 3.9–12.7)

## 2021-10-27 PROCEDURE — 83970 ASSAY OF PARATHORMONE: CPT | Mod: HCNC | Performed by: INTERNAL MEDICINE

## 2021-10-27 PROCEDURE — 80069 RENAL FUNCTION PANEL: CPT | Mod: HCNC | Performed by: INTERNAL MEDICINE

## 2021-10-27 PROCEDURE — 36415 COLL VENOUS BLD VENIPUNCTURE: CPT | Mod: HCNC | Performed by: INTERNAL MEDICINE

## 2021-10-27 PROCEDURE — 85025 COMPLETE CBC W/AUTO DIFF WBC: CPT | Mod: HCNC | Performed by: INTERNAL MEDICINE

## 2021-10-28 ENCOUNTER — PATIENT OUTREACH (OUTPATIENT)
Dept: ADMINISTRATIVE | Facility: OTHER | Age: 81
End: 2021-10-28
Payer: MEDICAID

## 2021-10-29 ENCOUNTER — OFFICE VISIT (OUTPATIENT)
Dept: NEPHROLOGY | Facility: CLINIC | Age: 81
End: 2021-10-29
Payer: MEDICARE

## 2021-10-29 VITALS
DIASTOLIC BLOOD PRESSURE: 69 MMHG | SYSTOLIC BLOOD PRESSURE: 157 MMHG | WEIGHT: 129 LBS | BODY MASS INDEX: 26 KG/M2 | HEIGHT: 59 IN | HEART RATE: 62 BPM

## 2021-10-29 DIAGNOSIS — N25.81 SECONDARY HYPERPARATHYROIDISM OF RENAL ORIGIN: ICD-10-CM

## 2021-10-29 DIAGNOSIS — N18.32 STAGE 3B CHRONIC KIDNEY DISEASE: Primary | ICD-10-CM

## 2021-10-29 DIAGNOSIS — N18.4 CHRONIC KIDNEY DISEASE, STAGE 4 (SEVERE): ICD-10-CM

## 2021-10-29 DIAGNOSIS — I12.9 PARENCHYMAL RENAL HYPERTENSION, STAGE 1 THROUGH STAGE 4 OR UNSPECIFIED CHRONIC KIDNEY DISEASE: ICD-10-CM

## 2021-10-29 DIAGNOSIS — R80.9 PROTEINURIA, UNSPECIFIED TYPE: ICD-10-CM

## 2021-10-29 PROCEDURE — 99999 PR PBB SHADOW E&M-EST. PATIENT-LVL III: CPT | Mod: PBBFAC,HCNC,, | Performed by: INTERNAL MEDICINE

## 2021-10-29 PROCEDURE — 1101F PT FALLS ASSESS-DOCD LE1/YR: CPT | Mod: HCNC,CPTII,S$GLB, | Performed by: INTERNAL MEDICINE

## 2021-10-29 PROCEDURE — 3288F PR FALLS RISK ASSESSMENT DOCUMENTED: ICD-10-PCS | Mod: HCNC,CPTII,S$GLB, | Performed by: INTERNAL MEDICINE

## 2021-10-29 PROCEDURE — 1159F PR MEDICATION LIST DOCUMENTED IN MEDICAL RECORD: ICD-10-PCS | Mod: HCNC,CPTII,S$GLB, | Performed by: INTERNAL MEDICINE

## 2021-10-29 PROCEDURE — 3288F FALL RISK ASSESSMENT DOCD: CPT | Mod: HCNC,CPTII,S$GLB, | Performed by: INTERNAL MEDICINE

## 2021-10-29 PROCEDURE — 1126F AMNT PAIN NOTED NONE PRSNT: CPT | Mod: HCNC,CPTII,S$GLB, | Performed by: INTERNAL MEDICINE

## 2021-10-29 PROCEDURE — 1159F MED LIST DOCD IN RCRD: CPT | Mod: HCNC,CPTII,S$GLB, | Performed by: INTERNAL MEDICINE

## 2021-10-29 PROCEDURE — 3078F PR MOST RECENT DIASTOLIC BLOOD PRESSURE < 80 MM HG: ICD-10-PCS | Mod: HCNC,CPTII,S$GLB, | Performed by: INTERNAL MEDICINE

## 2021-10-29 PROCEDURE — 1126F PR PAIN SEVERITY QUANTIFIED, NO PAIN PRESENT: ICD-10-PCS | Mod: HCNC,CPTII,S$GLB, | Performed by: INTERNAL MEDICINE

## 2021-10-29 PROCEDURE — 3077F PR MOST RECENT SYSTOLIC BLOOD PRESSURE >= 140 MM HG: ICD-10-PCS | Mod: HCNC,CPTII,S$GLB, | Performed by: INTERNAL MEDICINE

## 2021-10-29 PROCEDURE — 99499 RISK ADDL DX/OHS AUDIT: ICD-10-PCS | Mod: S$GLB,,, | Performed by: INTERNAL MEDICINE

## 2021-10-29 PROCEDURE — 99499 UNLISTED E&M SERVICE: CPT | Mod: S$GLB,,, | Performed by: INTERNAL MEDICINE

## 2021-10-29 PROCEDURE — 1160F PR REVIEW ALL MEDS BY PRESCRIBER/CLIN PHARMACIST DOCUMENTED: ICD-10-PCS | Mod: HCNC,CPTII,S$GLB, | Performed by: INTERNAL MEDICINE

## 2021-10-29 PROCEDURE — 1160F RVW MEDS BY RX/DR IN RCRD: CPT | Mod: HCNC,CPTII,S$GLB, | Performed by: INTERNAL MEDICINE

## 2021-10-29 PROCEDURE — 3078F DIAST BP <80 MM HG: CPT | Mod: HCNC,CPTII,S$GLB, | Performed by: INTERNAL MEDICINE

## 2021-10-29 PROCEDURE — 1101F PR PT FALLS ASSESS DOC 0-1 FALLS W/OUT INJ PAST YR: ICD-10-PCS | Mod: HCNC,CPTII,S$GLB, | Performed by: INTERNAL MEDICINE

## 2021-10-29 PROCEDURE — 99999 PR PBB SHADOW E&M-EST. PATIENT-LVL III: ICD-10-PCS | Mod: PBBFAC,HCNC,, | Performed by: INTERNAL MEDICINE

## 2021-10-29 PROCEDURE — 3077F SYST BP >= 140 MM HG: CPT | Mod: HCNC,CPTII,S$GLB, | Performed by: INTERNAL MEDICINE

## 2021-10-29 PROCEDURE — 99214 PR OFFICE/OUTPT VISIT, EST, LEVL IV, 30-39 MIN: ICD-10-PCS | Mod: HCNC,S$GLB,, | Performed by: INTERNAL MEDICINE

## 2021-10-29 PROCEDURE — 99214 OFFICE O/P EST MOD 30 MIN: CPT | Mod: HCNC,S$GLB,, | Performed by: INTERNAL MEDICINE

## 2021-11-16 ENCOUNTER — TELEPHONE (OUTPATIENT)
Dept: DIABETES | Facility: CLINIC | Age: 81
End: 2021-11-16
Payer: MEDICAID

## 2021-12-30 ENCOUNTER — OFFICE VISIT (OUTPATIENT)
Dept: INTERNAL MEDICINE | Facility: CLINIC | Age: 81
End: 2021-12-30
Payer: MEDICARE

## 2021-12-30 ENCOUNTER — LAB VISIT (OUTPATIENT)
Dept: LAB | Facility: HOSPITAL | Age: 81
End: 2021-12-30
Attending: FAMILY MEDICINE
Payer: MEDICAID

## 2021-12-30 ENCOUNTER — PATIENT OUTREACH (OUTPATIENT)
Dept: ADMINISTRATIVE | Facility: OTHER | Age: 81
End: 2021-12-30
Payer: MEDICAID

## 2021-12-30 VITALS
DIASTOLIC BLOOD PRESSURE: 68 MMHG | WEIGHT: 129.19 LBS | HEART RATE: 73 BPM | SYSTOLIC BLOOD PRESSURE: 118 MMHG | BODY MASS INDEX: 26.09 KG/M2 | OXYGEN SATURATION: 96 % | TEMPERATURE: 97 F

## 2021-12-30 DIAGNOSIS — N18.4 CONTROLLED TYPE 2 DIABETES MELLITUS WITH STAGE 4 CHRONIC KIDNEY DISEASE, WITHOUT LONG-TERM CURRENT USE OF INSULIN: ICD-10-CM

## 2021-12-30 DIAGNOSIS — E11.22 CONTROLLED TYPE 2 DIABETES MELLITUS WITH STAGE 4 CHRONIC KIDNEY DISEASE, WITHOUT LONG-TERM CURRENT USE OF INSULIN: ICD-10-CM

## 2021-12-30 DIAGNOSIS — R79.89 LOW TSH LEVEL: ICD-10-CM

## 2021-12-30 DIAGNOSIS — Z79.899 ENCOUNTER FOR LONG-TERM (CURRENT) USE OF MEDICATIONS: ICD-10-CM

## 2021-12-30 DIAGNOSIS — N18.4 CONTROLLED TYPE 2 DIABETES MELLITUS WITH STAGE 4 CHRONIC KIDNEY DISEASE, WITHOUT LONG-TERM CURRENT USE OF INSULIN: Primary | ICD-10-CM

## 2021-12-30 DIAGNOSIS — R04.0 EPISTAXIS: ICD-10-CM

## 2021-12-30 DIAGNOSIS — Z78.0 POSTMENOPAUSAL: ICD-10-CM

## 2021-12-30 DIAGNOSIS — E11.22 CONTROLLED TYPE 2 DIABETES MELLITUS WITH STAGE 4 CHRONIC KIDNEY DISEASE, WITHOUT LONG-TERM CURRENT USE OF INSULIN: Primary | ICD-10-CM

## 2021-12-30 DIAGNOSIS — B37.9 YEAST INFECTION: ICD-10-CM

## 2021-12-30 LAB
CHOLEST SERPL-MCNC: 209 MG/DL (ref 120–199)
CHOLEST/HDLC SERPL: 5.8 {RATIO} (ref 2–5)
ESTIMATED AVG GLUCOSE: 180 MG/DL (ref 68–131)
HBA1C MFR BLD: 7.9 % (ref 4–5.6)
HDLC SERPL-MCNC: 36 MG/DL (ref 40–75)
HDLC SERPL: 17.2 % (ref 20–50)
LDLC SERPL CALC-MCNC: 132.8 MG/DL (ref 63–159)
NONHDLC SERPL-MCNC: 173 MG/DL
TRIGL SERPL-MCNC: 201 MG/DL (ref 30–150)
TSH SERPL DL<=0.005 MIU/L-ACNC: 0.44 UIU/ML (ref 0.4–4)

## 2021-12-30 PROCEDURE — 84443 ASSAY THYROID STIM HORMONE: CPT | Mod: HCNC | Performed by: FAMILY MEDICINE

## 2021-12-30 PROCEDURE — 36415 COLL VENOUS BLD VENIPUNCTURE: CPT | Mod: HCNC | Performed by: FAMILY MEDICINE

## 2021-12-30 PROCEDURE — 3074F SYST BP LT 130 MM HG: CPT | Mod: HCNC,CPTII,S$GLB, | Performed by: FAMILY MEDICINE

## 2021-12-30 PROCEDURE — 3288F FALL RISK ASSESSMENT DOCD: CPT | Mod: HCNC,CPTII,S$GLB, | Performed by: FAMILY MEDICINE

## 2021-12-30 PROCEDURE — 3051F HG A1C>EQUAL 7.0%<8.0%: CPT | Mod: HCNC,CPTII,S$GLB, | Performed by: FAMILY MEDICINE

## 2021-12-30 PROCEDURE — 99214 OFFICE O/P EST MOD 30 MIN: CPT | Mod: HCNC,S$GLB,, | Performed by: FAMILY MEDICINE

## 2021-12-30 PROCEDURE — 3078F PR MOST RECENT DIASTOLIC BLOOD PRESSURE < 80 MM HG: ICD-10-PCS | Mod: HCNC,CPTII,S$GLB, | Performed by: FAMILY MEDICINE

## 2021-12-30 PROCEDURE — 3051F PR MOST RECENT HEMOGLOBIN A1C LEVEL 7.0 - < 8.0%: ICD-10-PCS | Mod: HCNC,CPTII,S$GLB, | Performed by: FAMILY MEDICINE

## 2021-12-30 PROCEDURE — 1101F PR PT FALLS ASSESS DOC 0-1 FALLS W/OUT INJ PAST YR: ICD-10-PCS | Mod: HCNC,CPTII,S$GLB, | Performed by: FAMILY MEDICINE

## 2021-12-30 PROCEDURE — 1159F PR MEDICATION LIST DOCUMENTED IN MEDICAL RECORD: ICD-10-PCS | Mod: HCNC,CPTII,S$GLB, | Performed by: FAMILY MEDICINE

## 2021-12-30 PROCEDURE — 99999 PR PBB SHADOW E&M-EST. PATIENT-LVL IV: CPT | Mod: PBBFAC,HCNC,, | Performed by: FAMILY MEDICINE

## 2021-12-30 PROCEDURE — 3078F DIAST BP <80 MM HG: CPT | Mod: HCNC,CPTII,S$GLB, | Performed by: FAMILY MEDICINE

## 2021-12-30 PROCEDURE — 1126F AMNT PAIN NOTED NONE PRSNT: CPT | Mod: HCNC,CPTII,S$GLB, | Performed by: FAMILY MEDICINE

## 2021-12-30 PROCEDURE — 1159F MED LIST DOCD IN RCRD: CPT | Mod: HCNC,CPTII,S$GLB, | Performed by: FAMILY MEDICINE

## 2021-12-30 PROCEDURE — 3288F PR FALLS RISK ASSESSMENT DOCUMENTED: ICD-10-PCS | Mod: HCNC,CPTII,S$GLB, | Performed by: FAMILY MEDICINE

## 2021-12-30 PROCEDURE — 83036 HEMOGLOBIN GLYCOSYLATED A1C: CPT | Mod: HCNC | Performed by: FAMILY MEDICINE

## 2021-12-30 PROCEDURE — 80061 LIPID PANEL: CPT | Mod: HCNC | Performed by: FAMILY MEDICINE

## 2021-12-30 PROCEDURE — 3074F PR MOST RECENT SYSTOLIC BLOOD PRESSURE < 130 MM HG: ICD-10-PCS | Mod: HCNC,CPTII,S$GLB, | Performed by: FAMILY MEDICINE

## 2021-12-30 PROCEDURE — 99214 PR OFFICE/OUTPT VISIT, EST, LEVL IV, 30-39 MIN: ICD-10-PCS | Mod: HCNC,S$GLB,, | Performed by: FAMILY MEDICINE

## 2021-12-30 PROCEDURE — 99999 PR PBB SHADOW E&M-EST. PATIENT-LVL IV: ICD-10-PCS | Mod: PBBFAC,HCNC,, | Performed by: FAMILY MEDICINE

## 2021-12-30 PROCEDURE — 1126F PR PAIN SEVERITY QUANTIFIED, NO PAIN PRESENT: ICD-10-PCS | Mod: HCNC,CPTII,S$GLB, | Performed by: FAMILY MEDICINE

## 2021-12-30 PROCEDURE — 1101F PT FALLS ASSESS-DOCD LE1/YR: CPT | Mod: HCNC,CPTII,S$GLB, | Performed by: FAMILY MEDICINE

## 2021-12-30 RX ORDER — FLUCONAZOLE 150 MG/1
TABLET ORAL
Qty: 2 TABLET | Refills: 0 | Status: SHIPPED | OUTPATIENT
Start: 2021-12-30 | End: 2022-03-01

## 2021-12-30 NOTE — PROGRESS NOTES
Subjective:       Patient ID: Odin Oliva is a 81 y.o. female.    Chief Complaint: Vaginitis (Iching between legs)    HPI    Patient Active Problem List   Diagnosis    Diabetes mellitus    Chronic kidney disease (CKD), stage IV (severe)    Other specified hypothyroidism    Acquired hammer toe    Hypertension    Adductovarus rotation of toe, acquired, left       Past Medical History:   Diagnosis Date    CKD (chronic kidney disease), stage IV     Diabetes mellitus     HTN (hypertension)     Hyperuricemia     Seasonal allergies     Thyroid disease     Urinary tract infection        Past Surgical History:   Procedure Laterality Date    CATARACT EXTRACTION, BILATERAL      CHOLECYSTECTOMY      EXOSTECTOMY Bilateral 11/8/2019    Procedure: EXOSTECTOMY;  Surgeon: Lissy Comer DPM;  Location: [facility] OR;  Service: Podiatry;  Laterality: Bilateral;  left fourth digit, right fifth digit    FOOT MASS EXCISION Bilateral 11/8/2019    Procedure: HAMMER TOE, DEROTATIONAL ARTHROPLASTY FIFTH DIGIT;  Surgeon: Lissy Comer DPM;  Location: [facility] OR;  Service: Podiatry;  Laterality: Bilateral;  Left 5th toe incision @ 08:51.  Left 4th toe incision @ 08:54.  Right 5th toe incision : 09:24.    TOTAL THYROIDECTOMY      UMBILICAL HERNIA REPAIR      x 2       Family History   Problem Relation Age of Onset    Asthma Mother     No Known Problems Father     Lung cancer Brother 65    Diabetes Brother     Diabetes Brother 80    Heart disease Daughter        Social History     Tobacco Use   Smoking Status Never Smoker   Smokeless Tobacco Never Used       Wt Readings from Last 5 Encounters:   12/30/21 58.6 kg (129 lb 3 oz)   10/29/21 58.5 kg (128 lb 15.5 oz)   10/07/21 59.3 kg (130 lb 11.7 oz)   10/01/21 59.3 kg (130 lb 11.7 oz)   07/29/21 57.9 kg (127 lb 10.3 oz)       For further HPI details, see assessment and plan.    Review of Systems    Objective:      Vitals:    12/30/21 1426   BP: 118/68   Pulse: 73    Temp: 97.4 °F (36.3 °C)       Physical Exam  Constitutional:       General: She is not in acute distress.     Appearance: Normal appearance. She is well-developed.   Cardiovascular:      Rate and Rhythm: Normal rate and regular rhythm.   Pulmonary:      Effort: Pulmonary effort is normal. No respiratory distress.      Breath sounds: Normal breath sounds.   Musculoskeletal:         General: Normal range of motion.   Neurological:      Mental Status: She is alert. She is not disoriented.   Psychiatric:         Mood and Affect: Mood normal.         Speech: Speech normal.         Assessment:       1. Controlled type 2 diabetes mellitus with stage 4 chronic kidney disease, without long-term current use of insulin    2. Low TSH level    3. Encounter for long-term (current) use of medications    4. Postmenopausal    5. Epistaxis    6. Yeast infection        Plan:   Controlled type 2 diabetes mellitus with stage 4 chronic kidney disease, without long-term current use of insulin  -     Hemoglobin A1C; Future; Expected date: 12/30/2021  -     Lipid Panel; Future; Expected date: 12/30/2021    Low TSH level  -     TSH; Future; Expected date: 12/30/2021    Encounter for long-term (current) use of medications  -     TSH; Future; Expected date: 12/30/2021    Postmenopausal  -     DXA Bone Density Spine And Hip; Future; Expected date: 12/30/2021    Epistaxis  -     Ambulatory referral/consult to ENT; Future; Expected date: 01/06/2022    Yeast infection    Other orders  -     fluconazole (DIFLUCAN) 150 MG Tab; Take 1 tablet. If problem persist take 2nd tablet 1 week later.  Dispense: 2 tablet; Refill: 0        Last visit  Knee pain  Improved    She has fallen   Recently  Tripped over wood.  She fell forward.  Hurt her nose.    CKD III  Reviewed neph visit      htn  Controlled  Valsartan-hctz 160-12.5    Recurrent nose bleed  excarbated   ENT    DM II  Relatively stable  At goal jeancarlos  Target to keep under  8.0  januvia    Hypothyroidism  Lab Results   Component Value Date    TSH 0.438 12/30/2021   on synthroid 100 mcg  Will need to recheck    Yeast infection  Itching  No discharge  Previously improved w/ what I suspect to be diflucan  Trial again      5 m

## 2022-01-03 ENCOUNTER — TELEPHONE (OUTPATIENT)
Dept: OTOLARYNGOLOGY | Facility: CLINIC | Age: 82
End: 2022-01-03
Payer: MEDICAID

## 2022-01-03 ENCOUNTER — OFFICE VISIT (OUTPATIENT)
Dept: OTOLARYNGOLOGY | Facility: CLINIC | Age: 82
End: 2022-01-03
Payer: MEDICARE

## 2022-01-03 ENCOUNTER — APPOINTMENT (OUTPATIENT)
Dept: RADIOLOGY | Facility: HOSPITAL | Age: 82
End: 2022-01-03
Attending: FAMILY MEDICINE
Payer: MEDICARE

## 2022-01-03 VITALS — WEIGHT: 129.63 LBS | BODY MASS INDEX: 26.18 KG/M2 | TEMPERATURE: 98 F

## 2022-01-03 DIAGNOSIS — Z78.0 POSTMENOPAUSAL: ICD-10-CM

## 2022-01-03 DIAGNOSIS — R04.0 EPISTAXIS: Primary | ICD-10-CM

## 2022-01-03 PROCEDURE — 1159F PR MEDICATION LIST DOCUMENTED IN MEDICAL RECORD: ICD-10-PCS | Mod: HCNC,CPTII,S$GLB, | Performed by: PHYSICIAN ASSISTANT

## 2022-01-03 PROCEDURE — 99204 PR OFFICE/OUTPT VISIT, NEW, LEVL IV, 45-59 MIN: ICD-10-PCS | Mod: HCNC,S$GLB,, | Performed by: PHYSICIAN ASSISTANT

## 2022-01-03 PROCEDURE — 77080 DXA BONE DENSITY AXIAL: CPT | Mod: TC,HCNC

## 2022-01-03 PROCEDURE — 1159F MED LIST DOCD IN RCRD: CPT | Mod: HCNC,CPTII,S$GLB, | Performed by: PHYSICIAN ASSISTANT

## 2022-01-03 PROCEDURE — 77080 DXA BONE DENSITY AXIAL: CPT | Mod: 26,HCNC,, | Performed by: RADIOLOGY

## 2022-01-03 PROCEDURE — 77080 DEXA BONE DENSITY SPINE HIP: ICD-10-PCS | Mod: 26,HCNC,, | Performed by: RADIOLOGY

## 2022-01-03 PROCEDURE — 99204 OFFICE O/P NEW MOD 45 MIN: CPT | Mod: HCNC,S$GLB,, | Performed by: PHYSICIAN ASSISTANT

## 2022-01-03 PROCEDURE — 99999 PR PBB SHADOW E&M-EST. PATIENT-LVL III: CPT | Mod: PBBFAC,HCNC,, | Performed by: PHYSICIAN ASSISTANT

## 2022-01-03 PROCEDURE — 99999 PR PBB SHADOW E&M-EST. PATIENT-LVL III: ICD-10-PCS | Mod: PBBFAC,HCNC,, | Performed by: PHYSICIAN ASSISTANT

## 2022-01-03 RX ORDER — MUPIROCIN 20 MG/G
OINTMENT TOPICAL 2 TIMES DAILY
Qty: 15 G | Refills: 3 | Status: SHIPPED | OUTPATIENT
Start: 2022-01-03 | End: 2022-01-03

## 2022-01-03 RX ORDER — MUPIROCIN 20 MG/G
OINTMENT TOPICAL 2 TIMES DAILY
Qty: 15 G | Refills: 3 | Status: SHIPPED | OUTPATIENT
Start: 2022-01-03 | End: 2022-01-13

## 2022-01-03 NOTE — PROGRESS NOTES
Subjective:   Patient ID: Odin Oliva is a 81 y.o. female.    Chief Complaint: Epistaxis (Pt. States the nose bleed lasts about 5 minutes)    80 yo female here to see me today with recurrent nosebleeds. Her last bleed was 2 weeks ago. She states that she has a history of nosebleeds. She is not on any blood thinners. She states that the bleeding is always from the right nare.     Review of patient's allergies indicates:  No Known Allergies        Review of Systems   HENT: Positive for nosebleeds.          Objective:   Temp 97.9 °F (36.6 °C) (Temporal)   Wt 58.8 kg (129 lb 10.1 oz)   BMI 26.18 kg/m²     Physical Exam  Constitutional:       General: She is not in acute distress.     Appearance: She is well-developed and well-nourished.   HENT:      Head: Normocephalic and atraumatic.      Right Ear: Tympanic membrane, ear canal and external ear normal.      Left Ear: Tympanic membrane, ear canal and external ear normal.      Nose: Nose normal. No nasal deformity, septal deviation, mucosal edema, rhinorrhea or epistaxis.      Right Sinus: No maxillary sinus tenderness or frontal sinus tenderness.      Left Sinus: No maxillary sinus tenderness or frontal sinus tenderness.      Comments: Dry mucosa in right nare, ulcerated septum     Mouth/Throat:      Mouth: Oropharynx is clear and moist and mucous membranes are normal. Mucous membranes are not pale and not dry.      Dentition: No dental caries.      Pharynx: Uvula midline. No oropharyngeal exudate or posterior oropharyngeal erythema.   Eyes:      General: Lids are normal. No scleral icterus.     Extraocular Movements: EOM normal.      Right eye: Normal extraocular motion and no nystagmus.      Left eye: Normal extraocular motion and no nystagmus.      Conjunctiva/sclera: Conjunctivae normal.      Right eye: Right conjunctiva is not injected. No chemosis.     Left eye: Left conjunctiva is not injected. No chemosis.     Pupils: Pupils are equal, round, and reactive to  light.   Neck:      Thyroid: No thyroid mass or thyromegaly.      Trachea: Trachea and phonation normal. No tracheal tenderness or tracheal deviation.   Cardiovascular:      Pulses: Intact distal pulses.   Pulmonary:      Effort: Pulmonary effort is normal. No respiratory distress.      Breath sounds: No stridor.   Abdominal:      General: There is no distension.   Lymphadenopathy:      Head:      Right side of head: No submental, submandibular, preauricular, posterior auricular or occipital adenopathy.      Left side of head: No submental, submandibular, preauricular, posterior auricular or occipital adenopathy.      Cervical: No cervical adenopathy.   Skin:     General: Skin is warm and dry.      Findings: No erythema or rash.   Neurological:      Mental Status: She is alert and oriented to person, place, and time.      Cranial Nerves: No cranial nerve deficit.   Psychiatric:         Mood and Affect: Mood and affect normal.         Behavior: Behavior normal.              Assessment:     1. Epistaxis        Plan:     Epistaxis    Other orders  -     Discontinue: mupirocin (BACTROBAN) 2 % ointment; by Nasal route 2 (two) times daily. Apply to nose twice daily for 10 days  Dispense: 15 g; Refill: 3  -     mupirocin (BACTROBAN) 2 % ointment; by Nasal route 2 (two) times daily. Apply to nose twice daily for 10 days  Dispense: 15 g; Refill: 3    Nose Bleed Instructions:  We had a long discussion regarding the importance of nasal moisture, and the use of a nasal saline spray or gel into nose four times daily to keep moist.    Bactroban ointment in nostril twice daily if ordered.  Do not sleep or sit for long periods of time under a ceiling fan or other source of aggressive airflow.  Use a humidifier in bedroom or any room in your home you spend long periods of time.  Engage in only light activity. No strenuous activity. No heavy lifting or straining.   Use a stool softener to avoid straining.   No bending over at the  hips. Keep nose above your heart at all times.  Sneeze with an open mouth to reduce pressure from nose.   Avoid foods or drinks hot in temperature for at least 48 hours then progress slowly.  Avoid hot steamy showers or baths for one week.

## 2022-01-23 DIAGNOSIS — M81.0 OSTEOPOROSIS, UNSPECIFIED OSTEOPOROSIS TYPE, UNSPECIFIED PATHOLOGICAL FRACTURE PRESENCE: Primary | ICD-10-CM

## 2022-01-26 ENCOUNTER — OFFICE VISIT (OUTPATIENT)
Dept: RHEUMATOLOGY | Facility: CLINIC | Age: 82
End: 2022-01-26
Payer: MEDICARE

## 2022-01-26 ENCOUNTER — LAB VISIT (OUTPATIENT)
Dept: LAB | Facility: HOSPITAL | Age: 82
End: 2022-01-26
Attending: FAMILY MEDICINE
Payer: MEDICARE

## 2022-01-26 VITALS — BODY MASS INDEX: 24.4 KG/M2 | HEIGHT: 59 IN | WEIGHT: 121.06 LBS

## 2022-01-26 DIAGNOSIS — M81.0 OSTEOPOROSIS, UNSPECIFIED OSTEOPOROSIS TYPE, UNSPECIFIED PATHOLOGICAL FRACTURE PRESENCE: ICD-10-CM

## 2022-01-26 DIAGNOSIS — M25.561 ACUTE PAIN OF RIGHT KNEE: ICD-10-CM

## 2022-01-26 DIAGNOSIS — Z71.89 COUNSELING ON HEALTH PROMOTION AND DISEASE PREVENTION: ICD-10-CM

## 2022-01-26 DIAGNOSIS — M81.0 OSTEOPOROSIS, UNSPECIFIED OSTEOPOROSIS TYPE, UNSPECIFIED PATHOLOGICAL FRACTURE PRESENCE: Primary | ICD-10-CM

## 2022-01-26 LAB
ALBUMIN SERPL BCP-MCNC: 3.9 G/DL (ref 3.5–5.2)
ALP SERPL-CCNC: 102 U/L (ref 55–135)
ALT SERPL W/O P-5'-P-CCNC: 18 U/L (ref 10–44)
ANION GAP SERPL CALC-SCNC: 10 MMOL/L (ref 8–16)
AST SERPL-CCNC: 21 U/L (ref 10–40)
BILIRUB SERPL-MCNC: 0.4 MG/DL (ref 0.1–1)
BUN SERPL-MCNC: 30 MG/DL (ref 8–23)
CALCIUM SERPL-MCNC: 9.2 MG/DL (ref 8.7–10.5)
CHLORIDE SERPL-SCNC: 104 MMOL/L (ref 95–110)
CO2 SERPL-SCNC: 24 MMOL/L (ref 23–29)
CREAT SERPL-MCNC: 1.4 MG/DL (ref 0.5–1.4)
EST. GFR  (AFRICAN AMERICAN): 41 ML/MIN/1.73 M^2
EST. GFR  (NON AFRICAN AMERICAN): 35 ML/MIN/1.73 M^2
GLUCOSE SERPL-MCNC: 131 MG/DL (ref 70–110)
POTASSIUM SERPL-SCNC: 4.9 MMOL/L (ref 3.5–5.1)
PROT SERPL-MCNC: 7.6 G/DL (ref 6–8.4)
PTH-INTACT SERPL-MCNC: 173.5 PG/ML (ref 9–77)

## 2022-01-26 PROCEDURE — 36415 COLL VENOUS BLD VENIPUNCTURE: CPT | Mod: HCNC | Performed by: INTERNAL MEDICINE

## 2022-01-26 PROCEDURE — 3288F FALL RISK ASSESSMENT DOCD: CPT | Mod: HCNC,CPTII,S$GLB, | Performed by: INTERNAL MEDICINE

## 2022-01-26 PROCEDURE — 1126F AMNT PAIN NOTED NONE PRSNT: CPT | Mod: HCNC,CPTII,S$GLB, | Performed by: INTERNAL MEDICINE

## 2022-01-26 PROCEDURE — 1159F PR MEDICATION LIST DOCUMENTED IN MEDICAL RECORD: ICD-10-PCS | Mod: HCNC,CPTII,S$GLB, | Performed by: INTERNAL MEDICINE

## 2022-01-26 PROCEDURE — 1100F PR PT FALLS ASSESS DOC 2+ FALLS/FALL W/INJURY/YR: ICD-10-PCS | Mod: HCNC,CPTII,S$GLB, | Performed by: INTERNAL MEDICINE

## 2022-01-26 PROCEDURE — 99204 OFFICE O/P NEW MOD 45 MIN: CPT | Mod: HCNC,S$GLB,, | Performed by: INTERNAL MEDICINE

## 2022-01-26 PROCEDURE — 3288F PR FALLS RISK ASSESSMENT DOCUMENTED: ICD-10-PCS | Mod: HCNC,CPTII,S$GLB, | Performed by: INTERNAL MEDICINE

## 2022-01-26 PROCEDURE — 1126F PR PAIN SEVERITY QUANTIFIED, NO PAIN PRESENT: ICD-10-PCS | Mod: HCNC,CPTII,S$GLB, | Performed by: INTERNAL MEDICINE

## 2022-01-26 PROCEDURE — 83970 ASSAY OF PARATHORMONE: CPT | Mod: HCNC | Performed by: INTERNAL MEDICINE

## 2022-01-26 PROCEDURE — 80053 COMPREHEN METABOLIC PANEL: CPT | Mod: HCNC | Performed by: INTERNAL MEDICINE

## 2022-01-26 PROCEDURE — 99999 PR PBB SHADOW E&M-EST. PATIENT-LVL III: CPT | Mod: PBBFAC,HCNC,, | Performed by: INTERNAL MEDICINE

## 2022-01-26 PROCEDURE — 99204 PR OFFICE/OUTPT VISIT, NEW, LEVL IV, 45-59 MIN: ICD-10-PCS | Mod: HCNC,S$GLB,, | Performed by: INTERNAL MEDICINE

## 2022-01-26 PROCEDURE — 1100F PTFALLS ASSESS-DOCD GE2>/YR: CPT | Mod: HCNC,CPTII,S$GLB, | Performed by: INTERNAL MEDICINE

## 2022-01-26 PROCEDURE — 99999 PR PBB SHADOW E&M-EST. PATIENT-LVL III: ICD-10-PCS | Mod: PBBFAC,HCNC,, | Performed by: INTERNAL MEDICINE

## 2022-01-26 PROCEDURE — 1159F MED LIST DOCD IN RCRD: CPT | Mod: HCNC,CPTII,S$GLB, | Performed by: INTERNAL MEDICINE

## 2022-01-26 RX ORDER — MUPIROCIN 20 MG/G
OINTMENT TOPICAL DAILY
Qty: 22 G | Refills: 1 | Status: SHIPPED | OUTPATIENT
Start: 2022-01-26

## 2022-01-26 RX ORDER — DICLOFENAC SODIUM 10 MG/G
2 GEL TOPICAL DAILY
Qty: 100 G | Refills: 0 | Status: SHIPPED | OUTPATIENT
Start: 2022-01-26

## 2022-01-26 RX ORDER — ALBUTEROL SULFATE 90 UG/1
2 AEROSOL, METERED RESPIRATORY (INHALATION) EVERY 4 HOURS PRN
Qty: 8.5 G | Refills: 1 | Status: SHIPPED | OUTPATIENT
Start: 2022-01-26 | End: 2022-12-12 | Stop reason: SDUPTHER

## 2022-01-26 NOTE — PATIENT INSTRUCTIONS
"Patient Education       Osteoporosis   The Basics   Written by the doctors and editors at [facility]   What is osteoporosis? -- Osteoporosis is a disease that makes your bones weak. People with the disease can break their bones too easily. For instance, people with osteoporosis sometimes break a bone after falling down at home.  Breaking a bone can be serious, especially if the bone is in the hip. People who break a hip sometimes lose the ability to walk on their own. Many of them end up in a nursing home. That's why it is so important to avoid breaking a bone in the first place.  How do I know if I have osteoporosis? -- Osteoporosis does not cause symptoms until you break a bone. But your doctor or nurse can have you tested for it. The best test is a bone density test called the "DXA test." It is a special kind of X-ray.  Experts recommend bone density testing for women older than 65. That is because women in this age group have the highest risk of osteoporosis. Still, other people should sometimes be tested, too. Ask your doctor or nurse if you should be tested.  Some people learn that they have osteoporosis because they break a bone during a fall or a mild impact. This is called a "fragility fracture," because people with healthy bones should not break a bone that easily. People who have fragility fractures are at high risk of having other bones break.  What can I do to keep my bones as healthy as possible? -- You can:  · Eat foods with a lot of calcium, such as milk, yogurt, and green leafy vegetables (table 1 and figure 1)  · Eat foods with a lot of vitamin D, such as milk that has vitamin D added, and fish from the ocean  · Take calcium and vitamin D pills (if you do not get enough from the food that you eat)  · Be active for at least 30 minutes, most days of the week  · Avoid smoking   · Limit the amount of alcohol you drink to 1 to 2 drinks a day at most  Do your best to keep from falling, too -- It sounds " simple, but you can prevent a lot of fractures by reducing the chances of a fall. To do that:  · Make sure all your rugs have a no-slip backing to keep them in place  · Tuck away any electrical cords, so they are not in your way  · Light all walkways well  · Watch out for slippery floors  · Wear sturdy, comfortable shoes with rubber soles  · Have your eyes checked  · Ask your doctor or nurse to check whether any of your medicines might make you dizzy or increase your risk of falling  Can osteoporosis be treated? -- Yes, there are a few medicines to treat osteoporosis. These medicines can reduce the chances that you will break a bone.  Doctors and nurses usually suggest trying medicines called bisphosphonates first. If those medicines do not do enough or if they cause side effects that you cannot stand, there are other medicines to try.  How will I know the treatment is working? -- Doctors and nurses often order bone density tests to check if osteoporosis medicines are working. These are the same tests they use to find osteoporosis in the first place. Sometimes a blood or urine test is also needed.  All topics are updated as new evidence becomes available and our peer review process is complete.  This topic retrieved from adjust on: Sep 21, 2021.  Topic 66819 Version 9.0  Release: 29.4.2 - C29.263  © 2021 UpToDate, Inc. and/or its affiliates. All rights reserved.  table 1: Foods and drinks with calcium  Food  Calcium in milligrams    Milk (skim, 2%, or whole; 8 oz [240 mL]) 300   Yogurt (6 oz [168 g]) 250   Orange juice (with calcium; 8 oz [240 mL]) 300   Tofu with calcium (0.5 cup [113 g]) 435   Cheese (1 oz [28 g]) 195 to 335 (hard cheese = higher calcium)   Cottage cheese (0.5 cup [113 g]) 130   Ice cream or frozen yogurt (0.5 cup [113 g]) 100   Soy milk (8 oz [240 mL]) 300   Beans (0.5 cup cooked [113 g]) 60 to 80   Dark, leafy green vegetables (0.5 cup cooked [113 g]) 50 to 135   Almonds (24 whole) 70   [place]  "(1 medium) 60   Graphic 64619 Version 6.0  figure 1: Foods and drinks with calcium and vitamin D     Foods rich in calcium include ice cream, soy milk, breads, kale, broccoli, milk, cheese, cottage cheese, almonds, yogurt, ready-to-eat cereals, beans, and tofu. Foods rich in vitamin D include milk, fortified plant-based "milks" (soy, almond), canned tuna fish, cod liver oil, yogurt, ready-to-eat-cereals, cooked salmon, canned sardines, mackerel, and eggs. Some of these foods are rich in both.  Graphic 03272 Version 4.0    Consumer Information Use and Disclaimer   This information is not specific medical advice and does not replace information you receive from your health care provider. This is only a brief summary of general information. It does NOT include all information about conditions, illnesses, injuries, tests, procedures, treatments, therapies, discharge instructions or life-style choices that may apply to you. You must talk with your health care provider for complete information about your health and treatment options. This information should not be used to decide whether or not to accept your health care provider's advice, instructions or recommendations. Only your health care provider has the knowledge and training to provide advice that is right for you. The use of this information is governed by the [employer] End User License Agreement, available at https://www.Zomazz.Mang?rKart/en/solutions/[employer]/about/jamal.The use of [employer] content is governed by the [employer] Terms of Use. ©2021 UpToDate, Inc. All rights reserved.  Copyright   © 2021 UpToDate, Inc. and/or its affiliates. All rights reserved.  Patient Education       Osteoporosis Discharge Instructions   About this topic   Osteoporosis is a health problem where bones become weak and fragile. All through life, old bone is taken away by the body and new bone is added. Before age 30, more bone is added than taken away. This builds stronger, heavier " bones. After age 30, more bone is taken away than is made. This can cause bones to become weak. Then, they are more likely to break.  If your bones are just starting to weaken, it may be called osteopenia. The bones are less dense than they should be. This is not as bad as osteoporosis. Both of these conditions often happen without any signs. About half of all women older than 50 and about a fourth of men over 50 will break a bone because of osteoporosis.  What care is needed at home?   · Ask your doctor what you need to do when you go home. Make sure you ask questions if you do not understand what the doctor says. This way you will know what you need to do.  · Take your drugs as ordered by your doctor.  · Make changes to your diet that will help lessen more bone loss.  · Do weight-bearing exercise each day.  What follow-up care is needed?   · Your doctor may ask you to make visits to the office to check on your progress. Be sure to keep these visits.  · Your doctor may send you to physical therapy (PT) to help you learn exercises for balance and safe walking.  · Your doctor may send you to rehab after a bone break.  What drugs may be needed?   The doctor may order drugs to:  · Prevent bone loss  · Build up your bones  · Give you extra vitamins and minerals  · Balance hormones  Will physical activity be limited?   You should avoid activities that put you at a high risk for breaking a bone. Talk to your doctor about the right amount of activity for you.  What changes to diet are needed?   · Eat a diet rich in calcium and vitamin D. Good sources of calcium are low-fat dairy products, dark green leafy vegetables, canned salmon or sardines, tofu, and calcium-fortified orange juice and cereals.  · Avoid high protein diets. Protein is important but too much can cause bone loss.  · Limit caffeine. Moderate amounts of coffee and tea are fine. Avoid carbonated cola drinks as studies show drinking these puts you at greater  risk for bone loss.  What problems could happen?   · Broken bones  · Slumped posture  · Loss of height  · Poor bone healing  · Loss of motion  What can be done to prevent this health problem?   · Stay active and work out to keep your muscles strong and flexible.  · Do weight-bearing exercises to build strong bones.  · Keep your home clutter-free to lessen your chances of falling. Keep the house well-lit. Use shower mats to avoid slipping in a wet shower.  · If you have balance problems, use a cane or walker for safe walking.  · Be extra careful in winter weather to avoid slipping on ice.  When do I need to call the doctor?   · Sudden back pain with or without numbness, tingling, or muscle weakness in the arms and legs. This could be a sign of a bone break in a spinal bone. A spinal bone break can happen even without falling.  · You are not feeling better in 2 to 3 days or you are feeling worse  Teach Back: Helping You Understand   The Teach Back Method helps you understand the information we are giving you. After you talk with the staff, tell them in your own words what you learned. This helps to make sure the staff has described each thing clearly. It also helps to explain things that may have been confusing. Before going home, make sure you can do these:  · I can tell you about my condition.  · I can tell you what changes I need to make with my diet, drugs, or activities.  · I can tell you what I will do if I have sudden back pain.  Where can I learn more?   American Academy of Family Physicians  https://[email or web address]/condition/osteoporosis/   National Osteoporosis Foundation  http://www.nof.org/learn/basics   [facility] Osteoporosis and Related Bone Diseases National Resource Center  http://www.niams.nih.gov/Health_Info/Bone/Osteoporosis/overview.asp   Last Reviewed Date   2020-08-26  Consumer Information Use and Disclaimer   This information is not specific medical advice and does not replace information you receive  "from your health care provider. This is only a brief summary of general information. It does NOT include all information about conditions, illnesses, injuries, tests, procedures, treatments, therapies, discharge instructions or life-style choices that may apply to you. You must talk with your health care provider for complete information about your health and treatment options. This information should not be used to decide whether or not to accept your health care providers advice, instructions or recommendations. Only your health care provider has the knowledge and training to provide advice that is right for you.  Copyright   Copyright © 2021 UpToDate, Inc. and its affiliates and/or licensors. All rights reserved.  Patient Education       Osteoporosis   The Basics   Written by the doctors and editors at [employer]   What is osteoporosis? -- Osteoporosis is a disease that makes your bones weak. People with the disease can break their bones too easily. For instance, people with osteoporosis sometimes break a bone after falling down at home.  Breaking a bone can be serious, especially if the bone is in the hip. People who break a hip sometimes lose the ability to walk on their own. Many of them end up in a nursing home. That's why it is so important to avoid breaking a bone in the first place.  How do I know if I have osteoporosis? -- Osteoporosis does not cause symptoms until you break a bone. But your doctor or nurse can have you tested for it. The best test is a bone density test called the "DXA test." It is a special kind of X-ray.  Experts recommend bone density testing for women older than 65. That is because women in this age group have the highest risk of osteoporosis. Still, other people should sometimes be tested, too. Ask your doctor or nurse if you should be tested.  Some people learn that they have osteoporosis because they break a bone during a fall or a mild impact. This is called a "fragility " "fracture," because people with healthy bones should not break a bone that easily. People who have fragility fractures are at high risk of having other bones break.  What can I do to keep my bones as healthy as possible? -- You can:  · Eat foods with a lot of calcium, such as milk, yogurt, and green leafy vegetables (table 1 and figure 1)  · Eat foods with a lot of vitamin D, such as milk that has vitamin D added, and fish from the ocean  · Take calcium and vitamin D pills (if you do not get enough from the food that you eat)  · Be active for at least 30 minutes, most days of the week  · Avoid smoking   · Limit the amount of alcohol you drink to 1 to 2 drinks a day at most  Do your best to keep from falling, too -- It sounds simple, but you can prevent a lot of fractures by reducing the chances of a fall. To do that:  · Make sure all your rugs have a no-slip backing to keep them in place  · Tuck away any electrical cords, so they are not in your way  · Light all walkways well  · Watch out for slippery floors  · Wear sturdy, comfortable shoes with rubber soles  · Have your eyes checked  · Ask your doctor or nurse to check whether any of your medicines might make you dizzy or increase your risk of falling  Can osteoporosis be treated? -- Yes, there are a few medicines to treat osteoporosis. These medicines can reduce the chances that you will break a bone.  Doctors and nurses usually suggest trying medicines called bisphosphonates first. If those medicines do not do enough or if they cause side effects that you cannot stand, there are other medicines to try.  How will I know the treatment is working? -- Doctors and nurses often order bone density tests to check if osteoporosis medicines are working. These are the same tests they use to find osteoporosis in the first place. Sometimes a blood or urine test is also needed.  All topics are updated as new evidence becomes available and our peer review process is " "complete.  This topic retrieved from [employer] on: Sep 21, 2021.  Topic 27920 Version 9.0  Release: 29.4.2 - C29.263  © 2021 UpToDate, Inc. and/or its affiliates. All rights reserved.  table 1: Foods and drinks with calcium  Food  Calcium in milligrams    Milk (skim, 2%, or whole; 8 oz [240 mL]) 300   Yogurt (6 oz [168 g]) 250   Orange juice (with calcium; 8 oz [240 mL]) 300   Tofu with calcium (0.5 cup [113 g]) 435   Cheese (1 oz [28 g]) 195 to 335 (hard cheese = higher calcium)   Cottage cheese (0.5 cup [113 g]) 130   Ice cream or frozen yogurt (0.5 cup [113 g]) 100   Soy milk (8 oz [240 mL]) 300   Beans (0.5 cup cooked [113 g]) 60 to 80   Dark, leafy green vegetables (0.5 cup cooked [113 g]) 50 to 135   Almonds (24 whole) 70   Orange (1 medium) 60   Graphic 49597 Version 6.0  figure 1: Foods and drinks with calcium and vitamin D     Foods rich in calcium include ice cream, soy milk, breads, kale, broccoli, milk, cheese, cottage cheese, almonds, yogurt, ready-to-eat cereals, beans, and tofu. Foods rich in vitamin D include milk, fortified plant-based "milks" (soy, almond), canned tuna fish, cod liver oil, yogurt, ready-to-eat-cereals, cooked salmon, canned sardines, mackerel, and eggs. Some of these foods are rich in both.  Graphic 72494 Version 4.0    Consumer Information Use and Disclaimer   This information is not specific medical advice and does not replace information you receive from your health care provider. This is only a brief summary of general information. It does NOT include all information about conditions, illnesses, injuries, tests, procedures, treatments, therapies, discharge instructions or life-style choices that may apply to you. You must talk with your health care provider for complete information about your health and treatment options. This information should not be used to decide whether or not to accept your health care provider's advice, instructions or recommendations. Only your health " care provider has the knowledge and training to provide advice that is right for you. The use of this information is governed by the "[employer] End User License Agreement, available at https://www."[employer]Tinkercad/en/solutions/[employer]/about/jamal.The use of [employer] content is governed by the [employer] Terms of Use. ©2021 UpToDate, Inc. All rights reserved.  Copyright   © 2021 UpToDate, Inc. and/or its affiliates. All rights reserved.  Patient Education       Abaloparatide (a bal oh PAR a tide)   Brand Names: [place] Tymlos   Warning   · This drug caused a higher rate of bone cancer in rats. Rarely, bone cancer has happened in humans taking this drug. However, a higher risk of bone cancer has not been seen in human studies. Call your doctor right away if you have bone pain, any pain that does not go away, or a tender lump or swelling under your skin.  · Do not take this drug for longer than you were told by your doctor.    What is this drug used for?   · It is used to treat soft, brittle bones (osteoporosis) after menopause.  · It may be given to you for other reasons. Talk with the doctor.    What do I need to tell my doctor BEFORE I take this drug?   · If you are allergic to this drug; any part of this drug; or any other drugs, foods, or substances. Tell your doctor about the allergy and what signs you had.  · If you have any of these health problems: High calcium levels or overactive parathyroid gland.  · If the patient is a child. Do not give this drug to a child.  This is not a list of all drugs or health problems that interact with this drug.  Tell your doctor and pharmacist about all of your drugs (prescription or OTC, natural products, vitamins) and health problems. You must check to make sure that it is safe for you to take this drug with all of your drugs and health problems. Do not start, stop, or change the dose of any drug without checking with your doctor.  What are some things I need to know or do while I  take this drug?   · Tell all of your health care providers that you take this drug. This includes your doctors, nurses, pharmacists, and dentists.  · To lower the chance of feeling dizzy or passing out, rise slowly if you have been sitting or lying down. Be careful going up and down stairs.  · Have a bone density test as you have been told by your doctor. Talk with your doctor.  · Have blood work checked as you have been told by the doctor. Talk with the doctor.  · Have your urine checked as you have been told by your doctor.  · Take calcium and vitamin D as you were told by your doctor.  · Do not share pen or cartridge devices with another person even if the needle has been changed. Sharing these devices may pass infections from one person to another. This includes infections you may not know you have.  · This drug is not approved for use if you have not been through menopause. If you are pregnant, plan on getting pregnant, or are breast-feeding, talk with your doctor.    What are some side effects that I need to call my doctor about right away?   WARNING/CAUTION: Even though it may be rare, some people may have very bad and sometimes deadly side effects when taking a drug. Tell your doctor or get medical help right away if you have any of the following signs or symptoms that may be related to a very bad side effect:  · Signs of an allergic reaction, like rash; hives; itching; red, swollen, blistered, or peeling skin with or without fever; wheezing; tightness in the chest or throat; trouble breathing, swallowing, or talking; unusual hoarseness; or swelling of the mouth, face, lips, tongue, or throat.  · Signs of high calcium levels like weakness, confusion, feeling tired, headache, upset stomach and throwing up, constipation, or bone pain.  · Very bad dizziness or passing out.  · Feeling like you are spinning.  · Fast or abnormal heartbeat.  · Muscle weakness.  · Back pain, belly pain, or blood in the urine. May be  signs of a kidney stone.  · Pain when passing urine.  What are some other side effects of this drug?   All drugs may cause side effects. However, many people have no side effects or only have minor side effects. Call your doctor or get medical help if any of these side effects or any other side effects bother you or do not go away:  · Feeling dizzy, tired, or weak.  · Upset stomach.  · Headache.  · Stomach pain.  · Irritation where the shot is given.  These are not all of the side effects that may occur. If you have questions about side effects, call your doctor. Call your doctor for medical advice about side effects.  You may report side effects to your national health agency.  You may report side effects to the FDA at 1-273.234.9767. You may also report side effects at https://www.fda.gov/medwatch.  How is this drug best taken?   Use this drug as ordered by your doctor. Read all information given to you. Follow all instructions closely.  · It is given as a shot into the fatty part of the skin in the belly area.  · If you will be giving yourself the shot, your doctor or nurse will teach you how to give the shot.  · Do not use if the solution is cloudy, leaking, or has particles.  · Do not use if solution changes color.  · Move the site where you give the shot with each shot.  · Do not give into skin within 2 inches of the belly button.  · You may need to use this drug where you can sit or lie down right away if you get dizzy or feel like passing out. Talk with your doctor.  · Take this drug at the same time of day.  · Throw away needles in a needle/sharp disposal box. Do not reuse needles or other items. When the box is full, follow all local rules for getting rid of it. Talk with a doctor or pharmacist if you have any questions.  · Do not move this drug from the pen to a syringe.  What do I do if I miss a dose?   · Take a missed dose as soon as you think about it on the same day you missed the dose.  · If you do  not think about the missed dose until the next day, skip the missed dose and go back to your normal time.  · Do not take more than 1 dose of this drug in the same day.    How do I store and/or throw out this drug?   · Store in a refrigerator. Do not freeze.  · After first use, store at room temperature. Throw away any part not used after 30 days.  · Protect from heat.  · Keep all drugs in a safe place. Keep all drugs out of the reach of children and pets.  · Throw away unused or  drugs. Do not flush down a toilet or pour down a drain unless you are told to do so. Check with your pharmacist if you have questions about the best way to throw out drugs. There may be drug take-back programs in your area.    General drug facts   · If your symptoms or health problems do not get better or if they become worse, call your doctor.  · Do not share your drugs with others and do not take anyone else's drugs.  · Some drugs may have another patient information leaflet. If you have any questions about this drug, please talk with your doctor, nurse, pharmacist, or other health care provider.  · This drug comes with an extra patient fact sheet called a Medication Guide. Read it with care. Read it again each time this drug is refilled. If you have any questions about this drug, please talk with the doctor, pharmacist, or other health care provider.  · If you think there has been an overdose, call your poison control center or get medical care right away. Be ready to tell or show what was taken, how much, and when it happened.    Consumer Information Use and Disclaimer   This generalized information is a limited summary of diagnosis, treatment, and/or medication information. It is not meant to be comprehensive and should be used as a tool to help the user understand and/or assess potential diagnostic and treatment options. It does NOT include all information about conditions, treatments, medications, side effects, or risks that  may apply to a specific patient. It is not intended to be medical advice or a substitute for the medical advice, diagnosis, or treatment of a health care provider based on the health care provider's examination and assessment of a patient's specific and unique circumstances. Patients must speak with a health care provider for complete information about their health, medical questions, and treatment options, including any risks or benefits regarding use of medications. This information does not endorse any treatments or medications as safe, effective, or approved for treating a specific patient. UpToDate, Inc. and its affiliates disclaim any warranty or liability relating to this information or the use thereof. The use of this information is governed by the Terms of Use, available at https://www.[employer].com/en/solutions/lexicomp/about/jamal.  Last Reviewed Date   2020-11-18  Copyright   © 2021 UpToDate, Inc. and its affiliates and/or licensors. All rights reserved.

## 2022-01-26 NOTE — TELEPHONE ENCOUNTER
No new care gaps identified.  Powered by "[employer] by [employer]. Reference number: [id number].   1/26/2022 1:47:45 PM CST

## 2022-01-26 NOTE — PROGRESS NOTES
RHEUMATOLOGY OUTPATIENT CLINIC NOTE    1/26/2022    Attending Rheumatologist: Gerald Robles  Primary Care Provider/Physician Requesting Consultation: Juventino Barbosa MD   Chief Complaint/Reason For Consultation:  Osteoporosis      Subjective:     Odin Oliva is a 82 y.o. White female with osteoporosis referred for therapeutic recommendations.  No acute complaints.  Denies past fragility fractures or prior bone antiresorptive therapy. Currently not planned for invasive dental procedures.  Denies history of radiation or nephrolithiasis within the year.    Review of Systems   Constitutional: Negative for fever and malaise/fatigue.   Musculoskeletal: Positive for falls. Negative for joint pain.   Skin: Negative for rash.   Neurological: Negative for focal weakness.       Chronic comorbid conditions affecting medical decision making today:  Past Medical History:   Diagnosis Date    CKD (chronic kidney disease), stage IV     Diabetes mellitus     HTN (hypertension)     Hyperuricemia     Seasonal allergies     Thyroid disease     Urinary tract infection      Past Surgical History:   Procedure Laterality Date    CATARACT EXTRACTION, BILATERAL      CHOLECYSTECTOMY      EXOSTECTOMY Bilateral 11/8/2019    Procedure: EXOSTECTOMY;  Surgeon: Lissy Comer DPM;  Location: [facility] OR;  Service: Podiatry;  Laterality: Bilateral;  left fourth digit, right fifth digit    FOOT MASS EXCISION Bilateral 11/8/2019    Procedure: HAMMER TOE, DEROTATIONAL ARTHROPLASTY FIFTH DIGIT;  Surgeon: Lissy Comer DPM;  Location: [facility] OR;  Service: Podiatry;  Laterality: Bilateral;  Left 5th toe incision @ 08:51.  Left 4th toe incision @ 08:54.  Right 5th toe incision : 09:24.    TOTAL THYROIDECTOMY      UMBILICAL HERNIA REPAIR      x 2     Family History   Problem Relation Age of Onset    Asthma Mother     No Known Problems Father     Lung cancer Brother 65    Diabetes Brother     Diabetes Brother 80    Heart disease  Daughter      Social History     Substance and Sexual Activity   Alcohol Use No     Social History     Tobacco Use   Smoking Status Never Smoker   Smokeless Tobacco Never Used     Social History     Substance and Sexual Activity   Drug Use No       Current Outpatient Medications:     albuterol (PROVENTIL/VENTOLIN HFA) 90 mcg/actuation inhaler, albuterol sulfate Inhale 1-2 puff(s) (Oral) 4 times per day PRN - Wheezing 20201122 HFA aerosol inhaler 4 times per day Oral No set duration recorded No set duration amount recorded suspended 90 mcg/actuation, Disp: , Rfl:     blood sugar diagnostic Strp, 1 each by Misc.(Non-Drug; Combo Route) route 3 (three) times daily. Insurance preferred, Disp: 100 strip, Rfl: 11    blood-glucose meter kit, Use as instructed. Insurance preferred, Disp: 1 each, Rfl: 0    diclofenac sodium (VOLTAREN) 1 % Gel, Apply 2 g topically once daily., Disp: 1 Tube, Rfl: 0    empagliflozin (JARDIANCE) 10 mg tablet, Take 1 tablet (10 mg total) by mouth once daily., Disp: 90 tablet, Rfl: 0    lancets Misc, 1 each by Misc.(Non-Drug; Combo Route) route 3 (three) times daily. Insurance preferred, Disp: 100 each, Rfl: 11    levothyroxine (SYNTHROID) 100 MCG tablet, Take 1 tablet (100 mcg total) by mouth before breakfast., Disp: 90 tablet, Rfl: 1    mupirocin (BACTROBAN) 2 % ointment, , Disp: , Rfl:     valsartan-hydrochlorothiazide (DIOVAN-HCT) 160-12.5 mg per tablet, Take 1 tablet by mouth once daily. , Disp: , Rfl:     atorvastatin (LIPITOR) 20 MG tablet, Take 1 tablet (20 mg total) by mouth once daily. (Patient not taking: Reported on 1/26/2022), Disp: 90 tablet, Rfl: 1    fluconazole (DIFLUCAN) 150 MG Tab, Take 1 tablet. If problem persist take 2nd tablet 1 week later. (Patient not taking: Reported on 1/26/2022), Disp: 2 tablet, Rfl: 0    glimepiride (AMARYL) 4 MG tablet, Take 1 tablet (4 mg total) by mouth 2 (two) times daily., Disp: 180 tablet, Rfl: 0    SITagliptin (JANUVIA) 100 MG Tab,  Take 1 tablet (100 mg total) by mouth once daily., Disp: 90 tablet, Rfl: 0  No current facility-administered medications for this visit.    Facility-Administered Medications Ordered in Other Visits:     lactated ringers infusion, , Intravenous, Continuous, Geraldine Vaughn MD, Last Rate: 10 mL/hr at 11/08/19 0744, New Bag at 11/08/19 0744    lidocaine (PF) 10 mg/ml (1%) injection 10 mg, 1 mL, Intradermal, Once, Geraldine Vaughn MD     Objective:     There were no vitals filed for this visit.  Physical Exam   Constitutional: She does not appear ill.   Eyes: Conjunctivae are normal.   Pulmonary/Chest: No respiratory distress.   Musculoskeletal:         General: No swelling or tenderness.       Reviewed available old and all outside pertinent medical records available.    All lab results personally reviewed and interpreted by me.  Lab Results   Component Value Date    WBC 3.94 10/27/2021    HGB 10.4 (L) 10/27/2021    HCT 32.0 (L) 10/27/2021    MCV 85 10/27/2021    RDW 12.9 10/27/2021     10/27/2021       Lab Results   Component Value Date     10/27/2021    K 4.7 10/27/2021     10/27/2021    CO2 22 (L) 10/27/2021     (H) 10/27/2021    BUN 27 (H) 10/27/2021    CALCIUM 9.7 10/27/2021    PROT 7.6 05/31/2021    ALBUMIN 3.7 10/27/2021    BILITOT 0.5 05/31/2021    AST 24 05/31/2021    ALKPHOS 130 05/31/2021    ALT 19 05/31/2021       Lab Results   Component Value Date    COLORU Yellow 10/27/2021    APPEARANCEUA Clear 10/27/2021    SPECGRAV 1.015 10/27/2021    PHUR 5.0 10/27/2021    PROTEINUA Negative 10/27/2021    KETONESU Negative 10/27/2021    LEUKOCYTESUR 2+ (A) 10/27/2021    NITRITE Negative 10/27/2021       Lab Results   Component Value Date    .9 (H) 10/27/2021       Lab Results   Component Value Date    URICACID 6.8 (H) 10/01/2021       No results found for: CRP, ERYTHROCYTES    No results found for: ANATITER    No components found for: TSPOTTB,  QUANTIFERON     ASSESSMENT:      Osteoporosis per densitometry test criteria with -3.5 T score on L3.  Denies past fragility fractures or prior therapy for osteoporosis.  History of CKD with stable kidney function, no recent hypocalcemia.  Recommend bone antiresorptive therapy with Prolia to decrease fragility fracture risk.  Another option would be bone anabolic therapy if no contraindication on labs.  Denies past history of bone radiation.  Patient prefers Prolia every 6 months.  Clinical side effects of therapy discussed in detail, including ONJ and hypocalcemia.  Recommend blood work today and prior next visit.  Avoid immobility, fall prevention.  Continue adequate dietary calcium and vitamin-D intake, may continue with supplementation.    PLAN:     1. Osteoporosis, unspecified osteoporosis type, unspecified pathological fracture presence  - Ambulatory referral/consult to Rheumatology  - Comprehensive Metabolic Panel; Standing  - PTH, Intact; Standing    2. Other specified counseling  - Nutrition and exercise counseling.  - Medication counseling provided.      Follow up in about 6 months (around 7/26/2022).      Disclaimer: This note is prepared using voice recognition software and as such is likely to have errors and has not been proof read. Please contact me for questions.       Gerald Robles M.D.

## 2022-01-26 NOTE — TELEPHONE ENCOUNTER
----- Message from Juventino Barbosa MD sent at 1/26/2022  1:10 PM CST -----  Please call and inquire what meds need refills and send as refill request.  ----- Message -----  From: Juventino Barbosa MD  Sent: 12/30/2021   2:59 PM CST  To: Juventino Barbosa MD    Refill all meds after seeing lab work

## 2022-02-09 ENCOUNTER — TELEPHONE (OUTPATIENT)
Dept: INFUSION THERAPY | Facility: HOSPITAL | Age: 82
End: 2022-02-09
Payer: MEDICAID

## 2022-02-22 DIAGNOSIS — E11.69 TYPE 2 DIABETES MELLITUS WITH OTHER SPECIFIED COMPLICATION, WITHOUT LONG-TERM CURRENT USE OF INSULIN: ICD-10-CM

## 2022-02-22 RX ORDER — GLIMEPIRIDE 4 MG/1
4 TABLET ORAL 2 TIMES DAILY
Qty: 180 TABLET | Refills: 0 | Status: SHIPPED | OUTPATIENT
Start: 2022-02-22 | End: 2022-10-18 | Stop reason: SDUPTHER

## 2022-02-22 NOTE — TELEPHONE ENCOUNTER
No new care gaps identified.  Powered by [employer]. Reference number: [id number].   2/22/2022 11:42:20 AM CST

## 2022-02-23 RX ORDER — LEVOTHYROXINE SODIUM 150 UG/1
TABLET ORAL
Qty: 90 TABLET | Refills: 1 | OUTPATIENT
Start: 2022-02-23

## 2022-03-01 ENCOUNTER — TELEPHONE (OUTPATIENT)
Dept: DIABETES | Facility: CLINIC | Age: 82
End: 2022-03-01
Payer: MEDICAID

## 2022-03-01 ENCOUNTER — OFFICE VISIT (OUTPATIENT)
Dept: DIABETES | Facility: CLINIC | Age: 82
End: 2022-03-01
Payer: MEDICARE

## 2022-03-01 DIAGNOSIS — E11.69 TYPE 2 DIABETES MELLITUS WITH OTHER SPECIFIED COMPLICATION, WITHOUT LONG-TERM CURRENT USE OF INSULIN: Primary | ICD-10-CM

## 2022-03-01 PROCEDURE — 99442 PR PHYSICIAN TELEPHONE EVALUATION 11-20 MIN: CPT | Mod: 95,,, | Performed by: NURSE PRACTITIONER

## 2022-03-01 PROCEDURE — 99442 PR PHYSICIAN TELEPHONE EVALUATION 11-20 MIN: ICD-10-PCS | Mod: 95,,, | Performed by: NURSE PRACTITIONER

## 2022-03-01 RX ORDER — LINAGLIPTIN 5 MG/1
5 TABLET, FILM COATED ORAL DAILY
Qty: 90 TABLET | Refills: 0 | Status: SHIPPED | OUTPATIENT
Start: 2022-03-01 | End: 2022-12-12 | Stop reason: SDUPTHER

## 2022-03-01 RX ORDER — EMPAGLIFLOZIN 10 MG/1
10 TABLET, FILM COATED ORAL DAILY
Qty: 90 TABLET | Refills: 0 | Status: SHIPPED | OUTPATIENT
Start: 2022-03-01 | End: 2022-12-12 | Stop reason: SDUPTHER

## 2022-03-01 NOTE — PROGRESS NOTES
Subjective:         Patient ID: Odin Oliva is a 82 y.o. female.  Patient's current PCP is Juventino Barbosa MD.       Chief Complaint: No chief complaint on file.    HPI  Odin Oliva is a 82 y.o.  female presenting for a follow up for diabetes. Patient has been diagnosed with diabetes for > 10 years. Unable to come in today due to daughter being sick (transportation)    Complications related to diabetes:  HTN, Hyperlipidemia, nephropathy; denies Pancreatitis; denies Gastroparesis; denies DKA; denies Hx/family Hx of MEN2/MTC; denies Frequent UTIs/yeast infections     Past failed treatment include: Metformin- taken off due to kidney dysfunction; Tradjenta- not effective     Blood glucose testing is performed regularly; fasting Bg generally 120 -130, denies hypoglycemia     Compliance:The patient reports medication compliance all of the time and diet noncompliance some of the time.    .The patient location is: [place], [place]  The chief complaint leading to consultation is: DM follow up    Visit type: audio only due to technical issues    Face to Face time with patient: 15 minutes of total time spent on the encounter, which includes face to face time and non-face to face time preparing to see the patient (eg, review of tests), Obtaining and/or reviewing separately obtained history, Documenting clinical information in the electronic or other health record, Independently interpreting results (not separately reported) and communicating results to the patient/family/caregiver, or Care coordination (not separately reported). Each patient to whom he or she provides medical services by telemedicine is:  (1) informed of the relationship between the physician and patient and the respective role of any other health care provider with respect to management of the patient; and (2) notified that he or she may decline to receive medical services by telemedicine and may withdraw from such care at any time.           //   , There is  no height or weight on file to calculate BMI.  Her blood sugar in clinic today is:   Lab Results   Component Value Date    POCGLU 136 (A) 05/04/2021       Labs reviewed and are noted below.  Her most recent A1C is:  Lab Results   Component Value Date    HGBA1C 7.9 (H) 12/30/2021    HGBA1C 7.4 (H) 10/01/2021    HGBA1C 7.7 (H) 05/31/2021     No results found for: CPEPTIDE  No results found for: GLUTAMICACID  Glucose   Date Value Ref Range Status   01/26/2022 131 (H) 70 - 110 mg/dL Final     Anion Gap   Date Value Ref Range Status   01/26/2022 10 8 - 16 mmol/L Final     eGFR if    Date Value Ref Range Status   01/26/2022 41 (A) >60 mL/min/1.73 m^2 Final     eGFR if non    Date Value Ref Range Status   01/26/2022 35 (A) >60 mL/min/1.73 m^2 Final     Comment:     Calculation used to obtain the estimated glomerular filtration  rate (eGFR) is the CKD-EPI equation.          CURRENT DM MEDICATIONS:   Diabetes Medications             empagliflozin (JARDIANCE) 10 mg tablet Take 1 tablet (10 mg total) by mouth once daily.    glimepiride (AMARYL) 4 MG tablet Take 1 tablet (4 mg total) by mouth 2 (two) times daily.    SITagliptin (JANUVIA) 50 MG Tab Take 1 tablet (50 mg total) by mouth once daily.          Diabetes Management Status    Statin: Taking  ACE/ARB: Taking    Screening or Prevention Patient's value Goal Complete/Controlled?   HgA1C Testing and Control   Lab Results   Component Value Date    HGBA1C 7.9 (H) 12/30/2021      Annually/Less than 8% Yes   Lipid profile : 12/30/2021 Annually Yes   LDL control Lab Results   Component Value Date    LDLCALC 132.8 12/30/2021    Annually/Less than 100 mg/dl  No   Nephropathy screening Lab Results   Component Value Date    LABMICR 126.0 11/09/2020     Lab Results   Component Value Date    PROTEINUA Negative 10/27/2021    Annually Yes   Blood pressure BP Readings from Last 1 Encounters:   12/30/21 118/68    Less than 140/90 No   Dilated retinal exam  Most Recent Eye Exam Date: Not Found Annually No   Foot exam   : 02/04/2021 Annually No       Review of Systems   Constitutional: Negative for activity change and appetite change.   Eyes: Negative for visual disturbance.   Gastrointestinal: Negative for abdominal pain, constipation, diarrhea, nausea and vomiting.   Endocrine: Negative for polydipsia, polyphagia and polyuria.   Genitourinary: Negative for dysuria and vaginal discharge.   Neurological: Negative for syncope and weakness.   Psychiatric/Behavioral: Negative for confusion.         Objective:      Physical Exam  Psychiatric:         Speech: Speech normal.         Assessment:       1. Type 2 diabetes mellitus with other specified complication, without long-term current use of insulin        Plan:   Type 2 diabetes mellitus with other specified complication, without long-term current use of insulin      PLAN:   - Condition: controlled  - based on age and medical hx  - Monitor blood glucose:  2x daily.Goals reviewed  - Hypoglycemia protocol: reviewed routinely; risk discussed  - Diet: reviewed; Encouraged healthy low fat, low carb diet and increase physical activity  - BP and LDL- Rec lifestyle modifications and follow up with PCP for management.   - Medication Changes:  Change Januvia to Tradjenta, Continue all other medications, if A1c increase- I will consider trying Jardiance 25 mg again  -  Risks of uncontrolled DM explained; Importance of routine foot and eye exams reviewed  - The patient was explained the above plan and given opportunity to ask questions.  She understands, chooses and consents to this plan and accepts all the risks, which include but are not limited to the risks mentioned above.   - Pt complains of dizziness with changing positions- follow up with PCP to be scheduled, slow position changes advised, fall risk discussed  - Labs ordered as above  - Nurse visit: deferred   - Follow up: 3 month follow up    A total of 15 minutes was spent  in face to face time, of which over 50% was spent in counseling patient on disease process, complications, treatment, and side effects of medications.

## 2022-03-01 NOTE — TELEPHONE ENCOUNTER
Appointment rescheduled.   ----- Message from Sue Victoria sent at 3/1/2022  9:33 AM CST -----  Contact: [phone]  Patient is calling regarding appt. Please call back at [phone] ,thanks/teo

## 2022-03-04 RX ORDER — LEVOTHYROXINE SODIUM 125 UG/1
125 TABLET ORAL
Qty: 90 TABLET | Refills: 1 | OUTPATIENT
Start: 2022-03-04 | End: 2023-03-04

## 2022-03-04 NOTE — TELEPHONE ENCOUNTER
No new care gaps identified.  Powered by [employer]. Reference number: [id number].   3/04/2022 3:07:33 PM CST

## 2022-04-08 NOTE — TELEPHONE ENCOUNTER
----- Message from Sowmya Ted sent at 4/8/2022  1:39 PM CDT -----  Regarding: refill  Contact: Daughter  1. What is the name of the medication you are requesting? Thyroid medication  2. What is the dose? n/a  3. How do you take the medication? Orally, topically, etc? n/a  4. How often do you take this medication? n/a  5. Do you need a 30 day or 90 day supply? n/a  6. How many refills are you requesting? n/a  7. What is your preferred pharmacy and location of the pharmacy? [facility]  8. Who can we contact with further questions? The pt at [phone]

## 2022-04-08 NOTE — TELEPHONE ENCOUNTER
No new care gaps identified.  Powered by [employer]. Reference number: [id number].   4/08/2022 1:57:45 PM CDT

## 2022-04-11 RX ORDER — LEVOTHYROXINE SODIUM 100 UG/1
100 TABLET ORAL
Qty: 90 TABLET | Refills: 1 | Status: SHIPPED | OUTPATIENT
Start: 2022-04-11 | End: 2023-02-15 | Stop reason: SDUPTHER

## 2022-04-18 ENCOUNTER — TELEPHONE (OUTPATIENT)
Dept: INTERNAL MEDICINE | Facility: CLINIC | Age: 82
End: 2022-04-18
Payer: MEDICAID

## 2022-04-18 NOTE — TELEPHONE ENCOUNTER
----- Message from Tabatha Eaton sent at 4/18/2022 12:47 PM CDT -----  PT's daughter would like a call back in regards to the pt's thyroid medicine. Please call her at .[phone]

## 2022-04-20 ENCOUNTER — PATIENT MESSAGE (OUTPATIENT)
Dept: PHARMACY | Facility: CLINIC | Age: 82
End: 2022-04-20
Payer: MEDICAID

## 2022-05-30 ENCOUNTER — LAB VISIT (OUTPATIENT)
Dept: LAB | Facility: HOSPITAL | Age: 82
End: 2022-05-30
Attending: FAMILY MEDICINE
Payer: MEDICARE

## 2022-05-30 ENCOUNTER — OFFICE VISIT (OUTPATIENT)
Dept: INTERNAL MEDICINE | Facility: CLINIC | Age: 82
End: 2022-05-30
Payer: MEDICARE

## 2022-05-30 VITALS
HEIGHT: 59 IN | DIASTOLIC BLOOD PRESSURE: 70 MMHG | OXYGEN SATURATION: 98 % | SYSTOLIC BLOOD PRESSURE: 126 MMHG | BODY MASS INDEX: 25.64 KG/M2 | WEIGHT: 127.19 LBS | HEART RATE: 73 BPM | TEMPERATURE: 98 F

## 2022-05-30 DIAGNOSIS — N18.32 STAGE 3B CHRONIC KIDNEY DISEASE: ICD-10-CM

## 2022-05-30 DIAGNOSIS — E11.22 CONTROLLED TYPE 2 DIABETES MELLITUS WITH STAGE 4 CHRONIC KIDNEY DISEASE, WITHOUT LONG-TERM CURRENT USE OF INSULIN: ICD-10-CM

## 2022-05-30 DIAGNOSIS — E03.9 HYPOTHYROIDISM, UNSPECIFIED TYPE: ICD-10-CM

## 2022-05-30 DIAGNOSIS — R42 DIZZINESS: ICD-10-CM

## 2022-05-30 DIAGNOSIS — E11.22 CONTROLLED TYPE 2 DIABETES MELLITUS WITH STAGE 4 CHRONIC KIDNEY DISEASE, WITHOUT LONG-TERM CURRENT USE OF INSULIN: Primary | ICD-10-CM

## 2022-05-30 DIAGNOSIS — J30.9 ALLERGIC RHINITIS, UNSPECIFIED SEASONALITY, UNSPECIFIED TRIGGER: ICD-10-CM

## 2022-05-30 DIAGNOSIS — I10 HYPERTENSION, UNSPECIFIED TYPE: ICD-10-CM

## 2022-05-30 DIAGNOSIS — N18.4 CONTROLLED TYPE 2 DIABETES MELLITUS WITH STAGE 4 CHRONIC KIDNEY DISEASE, WITHOUT LONG-TERM CURRENT USE OF INSULIN: Primary | ICD-10-CM

## 2022-05-30 DIAGNOSIS — M81.0 OSTEOPOROSIS, UNSPECIFIED OSTEOPOROSIS TYPE, UNSPECIFIED PATHOLOGICAL FRACTURE PRESENCE: ICD-10-CM

## 2022-05-30 DIAGNOSIS — N18.4 CONTROLLED TYPE 2 DIABETES MELLITUS WITH STAGE 4 CHRONIC KIDNEY DISEASE, WITHOUT LONG-TERM CURRENT USE OF INSULIN: ICD-10-CM

## 2022-05-30 LAB
ALBUMIN SERPL BCP-MCNC: 3.7 G/DL (ref 3.5–5.2)
ALP SERPL-CCNC: 103 U/L (ref 55–135)
ALT SERPL W/O P-5'-P-CCNC: 18 U/L (ref 10–44)
ANION GAP SERPL CALC-SCNC: 9 MMOL/L (ref 8–16)
AST SERPL-CCNC: 20 U/L (ref 10–40)
BASOPHILS # BLD AUTO: 0.06 K/UL (ref 0–0.2)
BASOPHILS NFR BLD: 1.2 % (ref 0–1.9)
BILIRUB SERPL-MCNC: 0.3 MG/DL (ref 0.1–1)
BUN SERPL-MCNC: 40 MG/DL (ref 8–23)
CALCIUM SERPL-MCNC: 9 MG/DL (ref 8.7–10.5)
CHLORIDE SERPL-SCNC: 106 MMOL/L (ref 95–110)
CO2 SERPL-SCNC: 23 MMOL/L (ref 23–29)
CREAT SERPL-MCNC: 1.7 MG/DL (ref 0.5–1.4)
DIFFERENTIAL METHOD: ABNORMAL
EOSINOPHIL # BLD AUTO: 0.3 K/UL (ref 0–0.5)
EOSINOPHIL NFR BLD: 5.3 % (ref 0–8)
ERYTHROCYTE [DISTWIDTH] IN BLOOD BY AUTOMATED COUNT: 13.4 % (ref 11.5–14.5)
EST. GFR  (AFRICAN AMERICAN): 32.1 ML/MIN/1.73 M^2
EST. GFR  (NON AFRICAN AMERICAN): 27.9 ML/MIN/1.73 M^2
ESTIMATED AVG GLUCOSE: 146 MG/DL (ref 68–131)
GLUCOSE SERPL-MCNC: 155 MG/DL (ref 70–110)
HBA1C MFR BLD: 6.7 % (ref 4–5.6)
HCT VFR BLD AUTO: 35.3 % (ref 37–48.5)
HGB BLD-MCNC: 10.8 G/DL (ref 12–16)
IMM GRANULOCYTES # BLD AUTO: 0.01 K/UL (ref 0–0.04)
IMM GRANULOCYTES NFR BLD AUTO: 0.2 % (ref 0–0.5)
LYMPHOCYTES # BLD AUTO: 1.1 K/UL (ref 1–4.8)
LYMPHOCYTES NFR BLD: 22.4 % (ref 18–48)
MCH RBC QN AUTO: 26.3 PG (ref 27–31)
MCHC RBC AUTO-ENTMCNC: 30.6 G/DL (ref 32–36)
MCV RBC AUTO: 86 FL (ref 82–98)
MONOCYTES # BLD AUTO: 0.4 K/UL (ref 0.3–1)
MONOCYTES NFR BLD: 7.7 % (ref 4–15)
NEUTROPHILS # BLD AUTO: 3.1 K/UL (ref 1.8–7.7)
NEUTROPHILS NFR BLD: 63.2 % (ref 38–73)
NRBC BLD-RTO: 0 /100 WBC
PLATELET # BLD AUTO: 254 K/UL (ref 150–450)
PMV BLD AUTO: 10.4 FL (ref 9.2–12.9)
POTASSIUM SERPL-SCNC: 4.5 MMOL/L (ref 3.5–5.1)
PROT SERPL-MCNC: 7.6 G/DL (ref 6–8.4)
RBC # BLD AUTO: 4.1 M/UL (ref 4–5.4)
SODIUM SERPL-SCNC: 138 MMOL/L (ref 136–145)
WBC # BLD AUTO: 4.91 K/UL (ref 3.9–12.7)

## 2022-05-30 PROCEDURE — 36415 COLL VENOUS BLD VENIPUNCTURE: CPT | Performed by: FAMILY MEDICINE

## 2022-05-30 PROCEDURE — 1101F PR PT FALLS ASSESS DOC 0-1 FALLS W/OUT INJ PAST YR: ICD-10-PCS | Mod: CPTII,S$GLB,, | Performed by: FAMILY MEDICINE

## 2022-05-30 PROCEDURE — 85025 COMPLETE CBC W/AUTO DIFF WBC: CPT | Performed by: FAMILY MEDICINE

## 2022-05-30 PROCEDURE — 3074F PR MOST RECENT SYSTOLIC BLOOD PRESSURE < 130 MM HG: ICD-10-PCS | Mod: CPTII,S$GLB,, | Performed by: FAMILY MEDICINE

## 2022-05-30 PROCEDURE — 3078F PR MOST RECENT DIASTOLIC BLOOD PRESSURE < 80 MM HG: ICD-10-PCS | Mod: CPTII,S$GLB,, | Performed by: FAMILY MEDICINE

## 2022-05-30 PROCEDURE — 99214 PR OFFICE/OUTPT VISIT, EST, LEVL IV, 30-39 MIN: ICD-10-PCS | Mod: S$GLB,,, | Performed by: FAMILY MEDICINE

## 2022-05-30 PROCEDURE — 80053 COMPREHEN METABOLIC PANEL: CPT | Performed by: FAMILY MEDICINE

## 2022-05-30 PROCEDURE — 1159F MED LIST DOCD IN RCRD: CPT | Mod: CPTII,S$GLB,, | Performed by: FAMILY MEDICINE

## 2022-05-30 PROCEDURE — 3288F FALL RISK ASSESSMENT DOCD: CPT | Mod: CPTII,S$GLB,, | Performed by: FAMILY MEDICINE

## 2022-05-30 PROCEDURE — 1101F PT FALLS ASSESS-DOCD LE1/YR: CPT | Mod: CPTII,S$GLB,, | Performed by: FAMILY MEDICINE

## 2022-05-30 PROCEDURE — 99214 OFFICE O/P EST MOD 30 MIN: CPT | Mod: S$GLB,,, | Performed by: FAMILY MEDICINE

## 2022-05-30 PROCEDURE — 1159F PR MEDICATION LIST DOCUMENTED IN MEDICAL RECORD: ICD-10-PCS | Mod: CPTII,S$GLB,, | Performed by: FAMILY MEDICINE

## 2022-05-30 PROCEDURE — 3074F SYST BP LT 130 MM HG: CPT | Mod: CPTII,S$GLB,, | Performed by: FAMILY MEDICINE

## 2022-05-30 PROCEDURE — 3288F PR FALLS RISK ASSESSMENT DOCUMENTED: ICD-10-PCS | Mod: CPTII,S$GLB,, | Performed by: FAMILY MEDICINE

## 2022-05-30 PROCEDURE — 83036 HEMOGLOBIN GLYCOSYLATED A1C: CPT | Performed by: FAMILY MEDICINE

## 2022-05-30 PROCEDURE — 99999 PR PBB SHADOW E&M-EST. PATIENT-LVL IV: CPT | Mod: PBBFAC,,, | Performed by: FAMILY MEDICINE

## 2022-05-30 PROCEDURE — 3078F DIAST BP <80 MM HG: CPT | Mod: CPTII,S$GLB,, | Performed by: FAMILY MEDICINE

## 2022-05-30 PROCEDURE — 99999 PR PBB SHADOW E&M-EST. PATIENT-LVL IV: ICD-10-PCS | Mod: PBBFAC,,, | Performed by: FAMILY MEDICINE

## 2022-05-30 RX ORDER — ATORVASTATIN CALCIUM 20 MG/1
20 TABLET, FILM COATED ORAL DAILY
Qty: 90 TABLET | Refills: 1 | Status: SHIPPED | OUTPATIENT
Start: 2022-05-30 | End: 2023-01-17

## 2022-05-30 RX ORDER — FLUTICASONE PROPIONATE 50 MCG
1 SPRAY, SUSPENSION (ML) NASAL DAILY
Qty: 16 G | Refills: 1 | Status: SHIPPED | OUTPATIENT
Start: 2022-05-30 | End: 2022-05-30

## 2022-05-30 RX ORDER — AZELASTINE 1 MG/ML
1 SPRAY, METERED NASAL 2 TIMES DAILY
Qty: 30 ML | Refills: 0 | Status: SHIPPED | OUTPATIENT
Start: 2022-05-30 | End: 2023-02-24 | Stop reason: SDUPTHER

## 2022-05-30 RX ORDER — ALBUTEROL SULFATE 90 UG/1
2 AEROSOL, METERED RESPIRATORY (INHALATION) EVERY 6 HOURS PRN
Qty: 18 G | Refills: 6 | Status: SHIPPED | OUTPATIENT
Start: 2022-05-30 | End: 2023-04-11

## 2022-05-30 NOTE — PROGRESS NOTES
Subjective:       Patient ID: Odin Oliva is a 82 y.o. female.    Chief Complaint: Follow-up    HPI    Patient Active Problem List   Diagnosis    Diabetes mellitus    Chronic kidney disease (CKD), stage IV (severe)    Other specified hypothyroidism    Acquired hammer toe    Hypertension    Adductovarus rotation of toe, acquired, left    Osteoporosis       Past Medical History:   Diagnosis Date    CKD (chronic kidney disease), stage IV     Diabetes mellitus     HTN (hypertension)     Hyperuricemia     Seasonal allergies     Thyroid disease     Urinary tract infection        Past Surgical History:   Procedure Laterality Date    CATARACT EXTRACTION, BILATERAL      CHOLECYSTECTOMY      EXOSTECTOMY Bilateral 11/8/2019    Procedure: EXOSTECTOMY;  Surgeon: Lissy Comer DPM;  Location: [facility] OR;  Service: Podiatry;  Laterality: Bilateral;  left fourth digit, right fifth digit    FOOT MASS EXCISION Bilateral 11/8/2019    Procedure: HAMMER TOE, DEROTATIONAL ARTHROPLASTY FIFTH DIGIT;  Surgeon: Lissy Comer DPM;  Location: [facility] OR;  Service: Podiatry;  Laterality: Bilateral;  Left 5th toe incision @ 08:51.  Left 4th toe incision @ 08:54.  Right 5th toe incision : 09:24.    TOTAL THYROIDECTOMY      UMBILICAL HERNIA REPAIR      x 2       Family History   Problem Relation Age of Onset    Asthma Mother     No Known Problems Father     Lung cancer Brother 65    Diabetes Brother     Diabetes Brother 80    Heart disease Daughter        Social History     Tobacco Use   Smoking Status Never Smoker   Smokeless Tobacco Never Used       Wt Readings from Last 5 Encounters:   05/30/22 57.7 kg (127 lb 3.3 oz)   01/26/22 54.9 kg (121 lb 0.5 oz)   01/03/22 58.8 kg (129 lb 10.1 oz)   12/30/21 58.6 kg (129 lb 3 oz)   10/29/21 58.5 kg (128 lb 15.5 oz)       For further HPI details, see assessment and plan.    Review of Systems    Objective:      Vitals:    05/30/22 1516   BP: 126/70   Pulse: 73   Temp: 98  °F (36.7 °C)     Protective Sensation (w/ 10 gram monofilament):  Right: Intact  Left: Intact    Visual Inspection:  Normal -  Bilateral    Pedal Pulses:   Right: Present  Left: Present    Posterior tibialis:   Right:Present  Left: Present      Physical Exam    Assessment:       1. Controlled type 2 diabetes mellitus with stage 4 chronic kidney disease, without long-term current use of insulin    2. Stage 3b chronic kidney disease    3. Dizziness    4. Allergic rhinitis, unspecified seasonality, unspecified trigger        Plan:   Controlled type 2 diabetes mellitus with stage 4 chronic kidney disease, without long-term current use of insulin  -     Hemoglobin A1C; Future; Expected date: 05/30/2022    Stage 3b chronic kidney disease  -     Comprehensive Metabolic Panel; Future; Expected date: 05/30/2022    Dizziness  -     CBC Auto Differential; Future; Expected date: 05/30/2022    Allergic rhinitis, unspecified seasonality, unspecified trigger    Other orders  -     Discontinue: fluticasone propionate (FLONASE) 50 mcg/actuation nasal spray; 1 spray (50 mcg total) by Each Nostril route once daily.  Dispense: 16 g; Refill: 1  -     azelastine (ASTELIN) 137 mcg (0.1 %) nasal spray; 1 spray (137 mcg total) by Nasal route 2 (two) times daily.  Dispense: 30 mL; Refill: 0  -     atorvastatin (LIPITOR) 20 MG tablet; Take 1 tablet (20 mg total) by mouth once daily.  Dispense: 90 tablet; Refill: 1  -     albuterol (VENTOLIN HFA) 90 mcg/actuation inhaler; Inhale 2 puffs into the lungs every 6 (six) hours as needed for Shortness of Breath. Rescue  Dispense: 18 g; Refill: 6        HTN  Controlled  Continue meds  Valsartan-hctz 160-25  BP Readings from Last 3 Encounters:   05/30/22 126/70   12/30/21 118/68   10/29/21 (!) 157/69         DM II  Lab Results   Component Value Date    HGBA1C 6.7 (H) 05/30/2022     Reasonable level of control  Overdue for a1c  Will check  Goal is to keep less than 8  Working w/ DM mngt   - jardiance  "10  -amaryl 4 mg  -tradjenta 5 mg    Lab Results   Component Value Date    LDLCALC 132.8 12/30/2021     lipitor is listed but listed as she is not taking it.  - she stopped taking it.  - she states " they said its not good for your kidneys"  - advised she resume her statin.      Needs eye exam. She refuses - she states she doesn't like the eye doctor.  Foot exam done today.    CKD III/IV  Will montior  Lab Results   Component Value Date    CREATININE 1.7 (H) 05/30/2022   - last GFR 35    Hypothyroidism  Lab Results   Component Value Date    TSH 0.438 12/30/2021   On synthtroid  100 mcg    Osteoporosis -  Consulted rheumatology  Reviewed that note  prolia opted for.  Patient refused treatment    Dizziness  She doesn't think its her blood pressure  She checked her pressure yesterday while dizzy 137/80. It was not low.  She suspects its allergy related    Runny/watery eyes  Itching  Coughing    - will try astelin and zyrtec. Her daughter states she uses a [employer] brand ( [employer] ) version. I would be ok with that.  - initially prescribed flonase - but astelin maybe easier on her recurrent nasal bleeding.  She wants to try an inhaler as when she coughs she feels chest tighten up /wheezy  Refused vaccinations.    F/u me in 4 months          This note was verbally dictated, please excuse any type errors.  "

## 2022-05-31 RX ORDER — AZELASTINE 1 MG/ML
SPRAY, METERED NASAL
Qty: 90 ML | OUTPATIENT
Start: 2022-05-31

## 2022-05-31 NOTE — TELEPHONE ENCOUNTER
No new care gaps identified.  [facility] Embedded Care Gaps. Reference number: [id number]. 5/31/2022   8:49:22 AM CDT

## 2022-05-31 NOTE — TELEPHONE ENCOUNTER
Lissette DC. Request already responded to by other means (e.g. phone or fax)   Refill Authorization Note   [facility]  is requesting a refill authorization.  Brief Assessment and Rationale for Refill:  Quick Discontinue  Medication Therapy Plan:  Request responded 5/30/22    Medication Reconciliation Completed:  No      Comments:     Note composed:11:07 AM 05/31/2022

## 2022-10-18 DIAGNOSIS — E11.69 TYPE 2 DIABETES MELLITUS WITH OTHER SPECIFIED COMPLICATION, WITHOUT LONG-TERM CURRENT USE OF INSULIN: ICD-10-CM

## 2022-10-18 RX ORDER — GLIMEPIRIDE 4 MG/1
4 TABLET ORAL 2 TIMES DAILY
Qty: 180 TABLET | Refills: 0 | Status: SHIPPED | OUTPATIENT
Start: 2022-10-18 | End: 2023-02-15 | Stop reason: SDUPTHER

## 2022-10-28 ENCOUNTER — OFFICE VISIT (OUTPATIENT)
Dept: DIABETES | Facility: CLINIC | Age: 82
End: 2022-10-28
Payer: MEDICARE

## 2022-10-28 DIAGNOSIS — E11.69 TYPE 2 DIABETES MELLITUS WITH OTHER SPECIFIED COMPLICATION, WITHOUT LONG-TERM CURRENT USE OF INSULIN: Primary | ICD-10-CM

## 2022-10-28 DIAGNOSIS — N18.4 CHRONIC KIDNEY DISEASE (CKD), STAGE IV (SEVERE): ICD-10-CM

## 2022-10-28 DIAGNOSIS — I10 HYPERTENSION, UNSPECIFIED TYPE: ICD-10-CM

## 2022-10-28 DIAGNOSIS — M81.0 OSTEOPOROSIS, UNSPECIFIED OSTEOPOROSIS TYPE, UNSPECIFIED PATHOLOGICAL FRACTURE PRESENCE: ICD-10-CM

## 2022-10-28 DIAGNOSIS — E03.8 OTHER SPECIFIED HYPOTHYROIDISM: ICD-10-CM

## 2022-10-28 PROCEDURE — 99214 OFFICE O/P EST MOD 30 MIN: CPT | Mod: 95,,, | Performed by: PHYSICIAN ASSISTANT

## 2022-10-28 PROCEDURE — 99499 UNLISTED E&M SERVICE: CPT | Mod: 95,,, | Performed by: PHYSICIAN ASSISTANT

## 2022-10-28 PROCEDURE — 99499 RISK ADDL DX/OHS AUDIT: ICD-10-PCS | Mod: 95,,, | Performed by: PHYSICIAN ASSISTANT

## 2022-10-28 PROCEDURE — 99214 PR OFFICE/OUTPT VISIT, EST, LEVL IV, 30-39 MIN: ICD-10-PCS | Mod: 95,,, | Performed by: PHYSICIAN ASSISTANT

## 2022-10-28 RX ORDER — LANCETS
1 EACH MISCELLANEOUS 3 TIMES DAILY
Qty: 100 EACH | Refills: 11 | Status: SHIPPED | OUTPATIENT
Start: 2022-10-28

## 2022-10-28 RX ORDER — INSULIN PUMP SYRINGE, 3 ML
EACH MISCELLANEOUS
Qty: 1 EACH | Refills: 0 | Status: SHIPPED | OUTPATIENT
Start: 2022-10-28

## 2022-10-28 RX ORDER — FLASH GLUCOSE SCANNING READER
1 EACH MISCELLANEOUS ONCE
Qty: 1 EACH | Refills: 0 | Status: SHIPPED | OUTPATIENT
Start: 2022-10-28 | End: 2022-10-28

## 2022-10-28 RX ORDER — FLASH GLUCOSE SENSOR
1 KIT MISCELLANEOUS
Qty: 2 KIT | Refills: 11 | Status: SHIPPED | OUTPATIENT
Start: 2022-10-28

## 2022-10-28 NOTE — PROGRESS NOTES
PCP: Juventino Barbosa MD    Subjective:     Chief Complaint: Diabetes - Established Patient    TELEMEDICINE VISIT:     The patient location is: Home  The chief complaint leading to consultation is: Diabetes Follow up  Visit type: Virtual visit with synchronous audio and video  Total time spent with patient: 30  Each patient to whom he or she provides medical services by telemedicine is:  (1) informed of the relationship between the physician and patient and the respective role of any other health care provider with respect to management of the patient; and (2) notified that he or she may decline to receive medical services by telemedicine and may withdraw from such care at any time.    Notes: N/A      HISTORY OF PRESENT ILLNESS: 82 y.o. female presenting for diabetes management visit.   Patient has previously been established with the Diabetes Management Department and was last seen by Inna Setphen NP  on 03/01/22.   Patient is new to me and is here for establishment of future care .   Patient has had Type II diabetes since 10 years or more.  Pertinent to decision making is the following comorbidities: HTN, CKD IV, Hypothyroidism, and Osteoporosis  Patient has the following Diabetes complications: with diabetic chronic kidney disease  She  is to be enrolled in diabetes education classes.     Patient's most recent A1c of 6.7% was completed 5 months ago.   Patient states since Her last A1c Her blood glucose levels have been high  in the morning / fasting.   Patient monitors blood glucose 4 times per day with meter : Fasting, Before Lunch, Before Dinner, and Before Bed.   Patient blood glucose monitoring device will not be uploaded into [employer] today secondary to patient forgetting blood glucose monitoring device.   Patient's records show fasting blood sugars ranging 130 - 160.   Patient endorses the following diabetes related symptoms: None.   Patient is due today for the following diabetes-related health  maintenance standards: Eye Exam, Urine Microalbumin/creatinine ratio, Influenza Vaccine, and COVID-19 Vaccine .   She denies recent hospital admissions or emergency room visits.   She denies having hypoglycemia.   Patient's concerns today include glycemic control.   Patient medication regimen is as below.     CURRENT DM MEDICATIONS:   Jardiance 10 mg daily   Tradjenta 5 mg daily   Amaryl 4 mg BID after meals     Patient has failed the following Diabetes medications:   See Medication history       Labs Reviewed.       No results found for: CPEPTIDE  No results found for: GLUTAMICACID       //   , There is no height or weight on file to calculate BMI.  Her blood sugar in clinic today is:    Lab Results   Component Value Date    POCGLU 136 (A) 05/04/2021       Review of Systems   Constitutional:  Negative for activity change, appetite change, chills and fever.   HENT:  Negative for dental problem, mouth sores, nosebleeds, sore throat and trouble swallowing.    Eyes:  Negative for pain and discharge.   Respiratory:  Negative for shortness of breath, wheezing and stridor.    Cardiovascular:  Negative for chest pain, palpitations and leg swelling.   Gastrointestinal:  Negative for abdominal pain, diarrhea, nausea and vomiting.   Endocrine: Negative for polydipsia, polyphagia and polyuria.   Genitourinary:  Negative for dysuria, frequency and urgency.   Musculoskeletal:  Negative for joint swelling and myalgias.   Skin:  Negative for rash and wound.   Neurological:  Negative for dizziness, syncope, weakness and headaches.   Psychiatric/Behavioral:  Negative for behavioral problems and dysphoric mood.        Diabetes Management Status  Statin: Taking  ACE/ARB: Taking    Screening or Prevention Patient's value Goal Complete/Controlled?   HgA1C Testing and Control   Lab Results   Component Value Date    HGBA1C 6.7 (H) 05/30/2022      Annually/Less than 8% Yes     Lipid profile : 12/30/2021 Annually Yes     LDL control Lab  Results   Component Value Date    LDLCALC 132.8 12/30/2021    Annually/Less than 100 mg/dl  No     Nephropathy screening Lab Results   Component Value Date    MICALBCREAT 116.7 (H) 11/09/2020     Lab Results   Component Value Date    PROTEINUA Negative 10/27/2021    Annually No     Blood pressure BP Readings from Last 1 Encounters:   05/30/22 126/70    Less than 140/90 Yes     Dilated retinal exam Most Recent Eye Exam Date: Not Found Annually No    Foot exam   Most Recent Foot Exam Date: Not Found Annually Yes       Social History     Socioeconomic History    Marital status:    Tobacco Use    Smoking status: Never    Smokeless tobacco: Never   Substance and Sexual Activity    Alcohol use: No    Drug use: No    Sexual activity: Never     Past Medical History:   Diagnosis Date    CKD (chronic kidney disease), stage IV     Diabetes mellitus     HTN (hypertension)     Hyperuricemia     Seasonal allergies     Thyroid disease     Urinary tract infection        Objective:        Physical Exam  Constitutional:       General: She is not in acute distress.     Appearance: She is well-developed. She is not diaphoretic.   HENT:      Head: Normocephalic and atraumatic.      Right Ear: External ear normal.      Left Ear: External ear normal.      Nose: Nose normal.   Eyes:      General:         Right eye: No discharge.         Left eye: No discharge.   Pulmonary:      Effort: Pulmonary effort is normal. No respiratory distress.      Breath sounds: No stridor.   Musculoskeletal:         General: Normal range of motion.      Cervical back: Normal range of motion.   Skin:     Coloration: Skin is not pale.   Neurological:      Mental Status: She is alert and oriented to person, place, and time.      Motor: No abnormal muscle tone.      Coordination: Coordination normal.   Psychiatric:         Behavior: Behavior normal.         Thought Content: Thought content normal.         Judgment: Judgment normal.         Assessment /  Plan:     Type 2 diabetes mellitus with other specified complication, without long-term current use of insulin  -     Microalbumin/Creatinine Ratio, Urine; Future; Expected date: 10/28/2022  -     Hemoglobin A1C; Standing  -     Comprehensive Metabolic Panel; Future; Expected date: 10/28/2022  -     TSH; Future; Expected date: 10/28/2022  -     Lipid Panel; Future; Expected date: 10/28/2022  -     CBC Auto Differential; Future; Expected date: 10/28/2022  -     T4, Free; Future; Expected date: 10/28/2022  -     blood-glucose meter kit; Use as instructed. Insurance preferred  Dispense: 1 each; Refill: 0  -     blood sugar diagnostic Strp; 1 each by Misc.(Non-Drug; Combo Route) route 3 (three) times daily. Insurance preferred  Dispense: 100 strip; Refill: 11  -     lancets Misc; 1 each by Misc.(Non-Drug; Combo Route) route 3 (three) times daily.  Dispense: 100 each; Refill: 11  -     flash glucose scanning reader (FREESTYLE HAROLDO 14 DAY READER) Misc; 1 each by Misc.(Non-Drug; Combo Route) route once. for 1 dose  Dispense: 1 each; Refill: 0  -     flash glucose sensor (FREESTYLE HAROLDO 14 DAY SENSOR) Kit; 1 each by Misc.(Non-Drug; Combo Route) route every 14 (fourteen) days.  Dispense: 2 kit; Refill: 11    Chronic kidney disease (CKD), stage IV (severe)    Other specified hypothyroidism  -     TSH; Future; Expected date: 10/28/2022  -     T4, Free; Future; Expected date: 10/28/2022    Osteoporosis, unspecified osteoporosis type, unspecified pathological fracture presence    Hypertension, unspecified type    Additional Plan Details:    - POCT Glucose  - Encouraged continuation of lifestyle changes including regular exercise and limiting carbohydrates to 30-45 grams per meal threes times daily and 15 grams per snack with a limit of two daily.   - Encouraged continued monitoring of blood glucose with maintenance of 4 times daily: Fasting, Before Lunch, Before Dinner, and Before Bed. Insurance preferred Meter and supplies Rx  today. [facility].   - Current DM Medication Regimen: Continue Tradjenta 5 mg daily. Continue Jardiance 10 mg daily. Change Amaryl 2 mg BID before meals.   - Health Maintenance standards addressed today: Eye Exam - will be completed outside of [facility] and patient will schedule, Urine Microalbumin / Creatinine Ratio scheduled, Flu Shot - patient would like to complete outside of [facility], and COVID - 19 Vaccine - patient will schedule outside of [facility]   - Fasting labs scheduled  - Nursing Visit: Patient is below goal range for nursing visit for age group and will not need nursing visit at this time .   - Follow up in 6 weeks with A1c prior.       Blakeney McKnight, PA-C [facility]

## 2022-10-28 NOTE — PATIENT INSTRUCTIONS
CURRENT DM MEDICATIONS:   Jardiance 10 mg daily   Tradjenta 5 mg daily   Amaryl 4 mg BID before meals

## 2022-10-28 NOTE — Clinical Note
Call to sched fasting labs 6 weeks follow up - schedule in person on a mon with daughter (MRN [id number]) scheduled in slot next to her. Will come together in person

## 2022-11-01 ENCOUNTER — LAB VISIT (OUTPATIENT)
Dept: LAB | Facility: HOSPITAL | Age: 82
End: 2022-11-01
Attending: PHYSICIAN ASSISTANT
Payer: MEDICARE

## 2022-11-01 DIAGNOSIS — E11.69 TYPE 2 DIABETES MELLITUS WITH OTHER SPECIFIED COMPLICATION, WITHOUT LONG-TERM CURRENT USE OF INSULIN: ICD-10-CM

## 2022-11-01 DIAGNOSIS — E03.8 OTHER SPECIFIED HYPOTHYROIDISM: ICD-10-CM

## 2022-11-01 LAB
ALBUMIN SERPL BCP-MCNC: 3.6 G/DL (ref 3.5–5.2)
ALP SERPL-CCNC: 91 U/L (ref 55–135)
ALT SERPL W/O P-5'-P-CCNC: 14 U/L (ref 10–44)
ANION GAP SERPL CALC-SCNC: 11 MMOL/L (ref 8–16)
AST SERPL-CCNC: 19 U/L (ref 10–40)
BASOPHILS # BLD AUTO: 0.04 K/UL (ref 0–0.2)
BASOPHILS NFR BLD: 0.7 % (ref 0–1.9)
BILIRUB SERPL-MCNC: 0.5 MG/DL (ref 0.1–1)
BUN SERPL-MCNC: 34 MG/DL (ref 8–23)
CALCIUM SERPL-MCNC: 8.6 MG/DL (ref 8.7–10.5)
CHLORIDE SERPL-SCNC: 104 MMOL/L (ref 95–110)
CHOLEST SERPL-MCNC: 189 MG/DL (ref 120–199)
CHOLEST/HDLC SERPL: 6.1 {RATIO} (ref 2–5)
CO2 SERPL-SCNC: 21 MMOL/L (ref 23–29)
CREAT SERPL-MCNC: 1.5 MG/DL (ref 0.5–1.4)
DIFFERENTIAL METHOD: ABNORMAL
EOSINOPHIL # BLD AUTO: 0.3 K/UL (ref 0–0.5)
EOSINOPHIL NFR BLD: 4.6 % (ref 0–8)
ERYTHROCYTE [DISTWIDTH] IN BLOOD BY AUTOMATED COUNT: 12.7 % (ref 11.5–14.5)
EST. GFR  (NO RACE VARIABLE): 34.8 ML/MIN/1.73 M^2
ESTIMATED AVG GLUCOSE: 151 MG/DL (ref 68–131)
GLUCOSE SERPL-MCNC: 112 MG/DL (ref 70–110)
HBA1C MFR BLD: 6.9 % (ref 4–5.6)
HCT VFR BLD AUTO: 33.8 % (ref 37–48.5)
HDLC SERPL-MCNC: 31 MG/DL (ref 40–75)
HDLC SERPL: 16.4 % (ref 20–50)
HGB BLD-MCNC: 10.9 G/DL (ref 12–16)
IMM GRANULOCYTES # BLD AUTO: 0.04 K/UL (ref 0–0.04)
IMM GRANULOCYTES NFR BLD AUTO: 0.7 % (ref 0–0.5)
LDLC SERPL CALC-MCNC: 117.2 MG/DL (ref 63–159)
LYMPHOCYTES # BLD AUTO: 1.2 K/UL (ref 1–4.8)
LYMPHOCYTES NFR BLD: 20.4 % (ref 18–48)
MCH RBC QN AUTO: 27.5 PG (ref 27–31)
MCHC RBC AUTO-ENTMCNC: 32.2 G/DL (ref 32–36)
MCV RBC AUTO: 85 FL (ref 82–98)
MONOCYTES # BLD AUTO: 0.5 K/UL (ref 0.3–1)
MONOCYTES NFR BLD: 8.1 % (ref 4–15)
NEUTROPHILS # BLD AUTO: 3.9 K/UL (ref 1.8–7.7)
NEUTROPHILS NFR BLD: 65.5 % (ref 38–73)
NONHDLC SERPL-MCNC: 158 MG/DL
NRBC BLD-RTO: 0 /100 WBC
PLATELET # BLD AUTO: 304 K/UL (ref 150–450)
PMV BLD AUTO: 10.4 FL (ref 9.2–12.9)
POTASSIUM SERPL-SCNC: 4.7 MMOL/L (ref 3.5–5.1)
PROT SERPL-MCNC: 7 G/DL (ref 6–8.4)
RBC # BLD AUTO: 3.97 M/UL (ref 4–5.4)
SODIUM SERPL-SCNC: 136 MMOL/L (ref 136–145)
TRIGL SERPL-MCNC: 204 MG/DL (ref 30–150)
WBC # BLD AUTO: 5.93 K/UL (ref 3.9–12.7)

## 2022-11-01 PROCEDURE — 80061 LIPID PANEL: CPT | Performed by: PHYSICIAN ASSISTANT

## 2022-11-01 PROCEDURE — 83036 HEMOGLOBIN GLYCOSYLATED A1C: CPT | Performed by: PHYSICIAN ASSISTANT

## 2022-11-01 PROCEDURE — 85025 COMPLETE CBC W/AUTO DIFF WBC: CPT | Performed by: PHYSICIAN ASSISTANT

## 2022-11-01 PROCEDURE — 84443 ASSAY THYROID STIM HORMONE: CPT | Performed by: PHYSICIAN ASSISTANT

## 2022-11-01 PROCEDURE — 80053 COMPREHEN METABOLIC PANEL: CPT | Performed by: PHYSICIAN ASSISTANT

## 2022-11-01 PROCEDURE — 36415 COLL VENOUS BLD VENIPUNCTURE: CPT | Performed by: PHYSICIAN ASSISTANT

## 2022-11-01 PROCEDURE — 84439 ASSAY OF FREE THYROXINE: CPT | Performed by: PHYSICIAN ASSISTANT

## 2022-11-02 LAB
T4 FREE SERPL-MCNC: 1.14 NG/DL (ref 0.71–1.51)
TSH SERPL DL<=0.005 MIU/L-ACNC: 2.03 UIU/ML (ref 0.4–4)

## 2022-11-03 ENCOUNTER — TELEPHONE (OUTPATIENT)
Dept: DIABETES | Facility: CLINIC | Age: 82
End: 2022-11-03
Payer: MEDICARE

## 2022-11-03 ENCOUNTER — TELEPHONE (OUTPATIENT)
Dept: INTERNAL MEDICINE | Facility: CLINIC | Age: 82
End: 2022-11-03
Payer: MEDICARE

## 2022-11-03 DIAGNOSIS — N18.32 STAGE 3B CHRONIC KIDNEY DISEASE: Primary | ICD-10-CM

## 2022-11-03 NOTE — TELEPHONE ENCOUNTER
----- Message from Juventino Barbosa MD sent at 11/3/2022  3:05 PM CDT -----  Place order for repeat CMP in 1 week please    ----- Message -----  From: Xiang Al PA-C  Sent: 11/2/2022   4:10 PM CDT  To: Juventino Barbosa MD    Most labs stable. Will cc to PCP for review of very mild hypocalcemia to see if CMP needs to be repeated

## 2022-12-09 ENCOUNTER — TELEPHONE (OUTPATIENT)
Dept: INTERNAL MEDICINE | Facility: CLINIC | Age: 82
End: 2022-12-09
Payer: MEDICARE

## 2022-12-09 NOTE — TELEPHONE ENCOUNTER
Spoke with the daughter stating patient having cough, lethargic and pale. Informed to sent the patient to the ER to get treated. Verbally agreed and requesting to set up a visit to be seen next week as her next option if don't need patient send to ER. Appointment that been agreed sent to [facility] for [facility].

## 2022-12-12 ENCOUNTER — HOSPITAL ENCOUNTER (OUTPATIENT)
Dept: RADIOLOGY | Facility: HOSPITAL | Age: 82
Discharge: HOME | End: 2022-12-12
Attending: NURSE PRACTITIONER
Payer: MEDICARE

## 2022-12-12 ENCOUNTER — OFFICE VISIT (OUTPATIENT)
Dept: INTERNAL MEDICINE | Facility: CLINIC | Age: 82
End: 2022-12-12
Payer: MEDICARE

## 2022-12-12 VITALS
OXYGEN SATURATION: 99 % | RESPIRATION RATE: 20 BRPM | SYSTOLIC BLOOD PRESSURE: 122 MMHG | HEIGHT: 59 IN | WEIGHT: 129.44 LBS | BODY MASS INDEX: 26.09 KG/M2 | TEMPERATURE: 98 F | DIASTOLIC BLOOD PRESSURE: 70 MMHG | HEART RATE: 69 BPM

## 2022-12-12 DIAGNOSIS — R05.3 CHRONIC COUGH: Primary | ICD-10-CM

## 2022-12-12 DIAGNOSIS — E11.69 TYPE 2 DIABETES MELLITUS WITH OTHER SPECIFIED COMPLICATION, WITHOUT LONG-TERM CURRENT USE OF INSULIN: ICD-10-CM

## 2022-12-12 DIAGNOSIS — R05.3 CHRONIC COUGH: ICD-10-CM

## 2022-12-12 PROCEDURE — 99999 PR PBB SHADOW E&M-EST. PATIENT-LVL III: CPT | Mod: PBBFAC,HCNC,, | Performed by: NURSE PRACTITIONER

## 2022-12-12 PROCEDURE — 71046 X-RAY EXAM CHEST 2 VIEWS: CPT | Mod: TC,HCNC

## 2022-12-12 PROCEDURE — 1101F PT FALLS ASSESS-DOCD LE1/YR: CPT | Mod: HCNC,CPTII,S$GLB, | Performed by: NURSE PRACTITIONER

## 2022-12-12 PROCEDURE — 71046 X-RAY EXAM CHEST 2 VIEWS: CPT | Mod: 26,HCNC,, | Performed by: RADIOLOGY

## 2022-12-12 PROCEDURE — 1160F PR REVIEW ALL MEDS BY PRESCRIBER/CLIN PHARMACIST DOCUMENTED: ICD-10-PCS | Mod: HCNC,CPTII,S$GLB, | Performed by: NURSE PRACTITIONER

## 2022-12-12 PROCEDURE — 3288F PR FALLS RISK ASSESSMENT DOCUMENTED: ICD-10-PCS | Mod: HCNC,CPTII,S$GLB, | Performed by: NURSE PRACTITIONER

## 2022-12-12 PROCEDURE — 3078F DIAST BP <80 MM HG: CPT | Mod: HCNC,CPTII,S$GLB, | Performed by: NURSE PRACTITIONER

## 2022-12-12 PROCEDURE — 3074F PR MOST RECENT SYSTOLIC BLOOD PRESSURE < 130 MM HG: ICD-10-PCS | Mod: HCNC,CPTII,S$GLB, | Performed by: NURSE PRACTITIONER

## 2022-12-12 PROCEDURE — 99213 OFFICE O/P EST LOW 20 MIN: CPT | Mod: 25,HCNC,S$GLB, | Performed by: NURSE PRACTITIONER

## 2022-12-12 PROCEDURE — 3078F PR MOST RECENT DIASTOLIC BLOOD PRESSURE < 80 MM HG: ICD-10-PCS | Mod: HCNC,CPTII,S$GLB, | Performed by: NURSE PRACTITIONER

## 2022-12-12 PROCEDURE — 1101F PR PT FALLS ASSESS DOC 0-1 FALLS W/OUT INJ PAST YR: ICD-10-PCS | Mod: HCNC,CPTII,S$GLB, | Performed by: NURSE PRACTITIONER

## 2022-12-12 PROCEDURE — 1159F PR MEDICATION LIST DOCUMENTED IN MEDICAL RECORD: ICD-10-PCS | Mod: HCNC,CPTII,S$GLB, | Performed by: NURSE PRACTITIONER

## 2022-12-12 PROCEDURE — 99999 PR PBB SHADOW E&M-EST. PATIENT-LVL III: ICD-10-PCS | Mod: PBBFAC,HCNC,, | Performed by: NURSE PRACTITIONER

## 2022-12-12 PROCEDURE — 1160F RVW MEDS BY RX/DR IN RCRD: CPT | Mod: HCNC,CPTII,S$GLB, | Performed by: NURSE PRACTITIONER

## 2022-12-12 PROCEDURE — 71046 XR CHEST PA AND LATERAL: ICD-10-PCS | Mod: 26,HCNC,, | Performed by: RADIOLOGY

## 2022-12-12 PROCEDURE — 3074F SYST BP LT 130 MM HG: CPT | Mod: HCNC,CPTII,S$GLB, | Performed by: NURSE PRACTITIONER

## 2022-12-12 PROCEDURE — 1159F MED LIST DOCD IN RCRD: CPT | Mod: HCNC,CPTII,S$GLB, | Performed by: NURSE PRACTITIONER

## 2022-12-12 PROCEDURE — 96372 PR INJECTION,THERAP/PROPH/DIAG2ST, IM OR SUBCUT: ICD-10-PCS | Mod: HCNC,S$GLB,, | Performed by: NURSE PRACTITIONER

## 2022-12-12 PROCEDURE — 3288F FALL RISK ASSESSMENT DOCD: CPT | Mod: HCNC,CPTII,S$GLB, | Performed by: NURSE PRACTITIONER

## 2022-12-12 PROCEDURE — 99213 PR OFFICE/OUTPT VISIT, EST, LEVL III, 20-29 MIN: ICD-10-PCS | Mod: 25,HCNC,S$GLB, | Performed by: NURSE PRACTITIONER

## 2022-12-12 PROCEDURE — 96372 THER/PROPH/DIAG INJ SC/IM: CPT | Mod: HCNC,S$GLB,, | Performed by: NURSE PRACTITIONER

## 2022-12-12 RX ORDER — PROMETHAZINE HYDROCHLORIDE AND DEXTROMETHORPHAN HYDROBROMIDE 6.25; 15 MG/5ML; MG/5ML
10 SYRUP ORAL EVERY 4 HOURS PRN
Qty: 240 ML | Refills: 0 | Status: SHIPPED | OUTPATIENT
Start: 2022-12-12 | End: 2022-12-22

## 2022-12-12 RX ORDER — DEXAMETHASONE SODIUM PHOSPHATE 100 MG/10ML
10 INJECTION INTRAMUSCULAR; INTRAVENOUS
Status: COMPLETED | OUTPATIENT
Start: 2022-12-12 | End: 2022-12-12

## 2022-12-12 RX ORDER — LINAGLIPTIN 5 MG/1
5 TABLET, FILM COATED ORAL DAILY
Qty: 90 TABLET | Refills: 0 | Status: SHIPPED | OUTPATIENT
Start: 2022-12-12 | End: 2023-02-15 | Stop reason: SDUPTHER

## 2022-12-12 RX ORDER — ALBUTEROL SULFATE 90 UG/1
2 AEROSOL, METERED RESPIRATORY (INHALATION) EVERY 4 HOURS PRN
Qty: 8.5 G | Refills: 1 | Status: SHIPPED | OUTPATIENT
Start: 2022-12-12 | End: 2023-02-15 | Stop reason: SDUPTHER

## 2022-12-12 RX ORDER — IPRATROPIUM BROMIDE AND ALBUTEROL SULFATE 2.5; .5 MG/3ML; MG/3ML
3 SOLUTION RESPIRATORY (INHALATION) EVERY 6 HOURS PRN
Qty: 90 ML | Refills: 0 | Status: SHIPPED | OUTPATIENT
Start: 2022-12-12 | End: 2023-04-11 | Stop reason: RX

## 2022-12-12 RX ADMIN — DEXAMETHASONE SODIUM PHOSPHATE 10 MG: 100 INJECTION INTRAMUSCULAR; INTRAVENOUS at 11:12

## 2022-12-12 NOTE — PROGRESS NOTES
Odin Oliva  12/12/2022  [id number]    Juventino Barbosa MD  Patient Care Team:  Juventino Barbosa MD as PCP - General (Family Medicine)          Visit Type:an urgent visit for an existing problem     Chief Complaint:  Chief Complaint   Patient presents with    Cough     For a month now    Foot Swelling       History of Present Illness:    82 year old female presents with complaint of cough  for one month. Pt was seen by urgent care two times and was prescribed abx and anit-histamine. Denies ear pain, fever,  chills, body aches, chest pain, nausea, vomiting, diarrhea, abdominal pain, weakness, shortness of breath, wheezing.       History:  Past Medical History:   Diagnosis Date    CKD (chronic kidney disease), stage IV     Diabetes mellitus     HTN (hypertension)     Hyperuricemia     Seasonal allergies     Thyroid disease     Urinary tract infection      Past Surgical History:   Procedure Laterality Date    CATARACT EXTRACTION, BILATERAL      CHOLECYSTECTOMY      EXOSTECTOMY Bilateral 11/8/2019    Procedure: EXOSTECTOMY;  Surgeon: Lissy Comer DPM;  Location: [facility] OR;  Service: Podiatry;  Laterality: Bilateral;  left fourth digit, right fifth digit    FOOT MASS EXCISION Bilateral 11/8/2019    Procedure: HAMMER TOE, DEROTATIONAL ARTHROPLASTY FIFTH DIGIT;  Surgeon: Lissy Comer DPM;  Location: [facility] OR;  Service: Podiatry;  Laterality: Bilateral;  Left 5th toe incision @ 08:51.  Left 4th toe incision @ 08:54.  Right 5th toe incision : 09:24.    TOTAL THYROIDECTOMY      UMBILICAL HERNIA REPAIR      x 2     Family History   Problem Relation Age of Onset    Asthma Mother     No Known Problems Father     Lung cancer Brother 65    Diabetes Brother     Diabetes Brother 80    Heart disease Daughter      Social History     Socioeconomic History    Marital status:    Tobacco Use    Smoking status: Never    Smokeless tobacco: Never   Substance and Sexual Activity    Alcohol use: No    Drug use: No    Sexual  activity: Never     Patient Active Problem List   Diagnosis    Diabetes mellitus    Chronic kidney disease (CKD), stage IV (severe)    Other specified hypothyroidism    Acquired hammer toe    Hypertension    Adductovarus rotation of toe, acquired, left    Osteoporosis     Review of patient's allergies indicates:  Not on File    The following were reviewed at this visit: active problem list, medication list, allergies, family history, social history, and health maintenance.    Medications:  Current Outpatient Medications on File Prior to Visit   Medication Sig Dispense Refill    albuterol (PROVENTIL/VENTOLIN HFA) 90 mcg/actuation inhaler Inhale 2 puffs into the lungs every 4 (four) hours as needed for Wheezing. Rescue 8.5 g 1    albuterol (VENTOLIN HFA) 90 mcg/actuation inhaler Inhale 2 puffs into the lungs every 6 (six) hours as needed for Shortness of Breath. Rescue 18 g 6    atorvastatin (LIPITOR) 20 MG tablet Take 1 tablet (20 mg total) by mouth once daily. 90 tablet 1    azelastine (ASTELIN) 137 mcg (0.1 %) nasal spray 1 spray (137 mcg total) by Nasal route 2 (two) times daily. 30 mL 0    blood sugar diagnostic Strp 1 each by Misc.(Non-Drug; Combo Route) route 3 (three) times daily. Insurance preferred 100 strip 11    blood-glucose meter kit Use as instructed. Insurance preferred 1 each 0    diclofenac sodium (VOLTAREN) 1 % Gel Apply 2 grams topically once daily. 100 g 0    empagliflozin (JARDIANCE) 10 mg tablet Take 1 tablet (10 mg total) by mouth once daily. 90 tablet 0    flash glucose sensor (FREESTYLE HAROLDO 14 DAY SENSOR) Kit 1 each by Misc.(Non-Drug; Combo Route) route every 14 (fourteen) days. 2 kit 11    glimepiride (AMARYL) 4 MG tablet Take 1 tablet (4 mg total) by mouth 2 (two) times daily. 180 tablet 0    lancets Misc 1 each by Misc.(Non-Drug; Combo Route) route 3 (three) times daily. 100 each 11    levothyroxine (SYNTHROID) 100 MCG tablet Take 1 tablet (100 mcg total) by mouth before breakfast. 90  tablet 1    linaGLIPtin (TRADJENTA) 5 mg Tab tablet Take 1 tablet (5 mg total) by mouth once daily. 90 tablet 0    mupirocin (BACTROBAN) 2 % ointment Apply topically once daily. 22 g 1    valsartan-hydrochlorothiazide (DIOVAN-HCT) 160-12.5 mg per tablet Take 1 tablet by mouth once daily.        Current Facility-Administered Medications on File Prior to Visit   Medication Dose Route Frequency Provider Last Rate Last Admin    lactated ringers infusion   Intravenous Continuous Geraldine Vaughn MD 10 mL/hr at 11/08/19 0744 New Bag at 11/08/19 0744    lidocaine (PF) 10 mg/ml (1%) injection 10 mg  1 mL Intradermal Once Geraldine Vaughn MD           Medications have been reviewed and reconciled with patient at this visit.  Barriers to medications reviewed with patient.    Adverse reactions to current medications reviewed with patient..    Over the counter medications reviewed and reconciled with patient.    Exam:  Wt Readings from Last 3 Encounters:   12/12/22 58.7 kg (129 lb 6.6 oz)   05/30/22 57.7 kg (127 lb 3.3 oz)   01/26/22 54.9 kg (121 lb 0.5 oz)     Temp Readings from Last 3 Encounters:   12/12/22 98.1 °F (36.7 °C) (Temporal)   05/30/22 98 °F (36.7 °C) (Tympanic)   01/03/22 97.9 °F (36.6 °C) (Temporal)     BP Readings from Last 3 Encounters:   12/12/22 122/70   05/30/22 126/70   12/30/21 118/68     Pulse Readings from Last 3 Encounters:   12/12/22 69   05/30/22 73   12/30/21 73     Body mass index is 26.14 kg/m².      Review of Systems   Constitutional: Negative.    HENT: Negative.     Eyes: Negative.    Respiratory:  Positive for cough.    Cardiovascular: Negative.    Gastrointestinal: Negative.    Genitourinary: Negative.    Musculoskeletal: Negative.    Skin: Negative.    Neurological: Negative.    Endo/Heme/Allergies: Negative.    Psychiatric/Behavioral: Negative.     Physical Exam  Vitals and nursing note reviewed.   Constitutional:       Appearance: Normal appearance. She is obese.   HENT:      Head:  Normocephalic and atraumatic.      Right Ear: Tympanic membrane, ear canal and external ear normal.      Left Ear: Tympanic membrane, ear canal and external ear normal.      Nose: Congestion present.      Mouth/Throat:      Mouth: Mucous membranes are moist.      Pharynx: Oropharynx is clear.   Eyes:      Extraocular Movements: Extraocular movements intact.      Conjunctiva/sclera: Conjunctivae normal.      Pupils: Pupils are equal, round, and reactive to light.   Cardiovascular:      Rate and Rhythm: Normal rate and regular rhythm.      Pulses: Normal pulses.      Heart sounds: Normal heart sounds.   Pulmonary:      Effort: Pulmonary effort is normal.      Breath sounds: Normal breath sounds.   Abdominal:      General: Bowel sounds are normal.      Palpations: Abdomen is soft.   Musculoskeletal:         General: Normal range of motion.      Cervical back: Normal range of motion and neck supple.   Skin:     General: Skin is warm and dry.      Capillary Refill: Capillary refill takes less than 2 seconds.   Neurological:      General: No focal deficit present.      Mental Status: She is alert.   Psychiatric:         Mood and Affect: Mood normal.         Behavior: Behavior normal.         Thought Content: Thought content normal.         Judgment: Judgment normal.       Laboratory Reviewed ({Yes)  Lab Results   Component Value Date    WBC 5.93 11/01/2022    HGB 10.9 (L) 11/01/2022    HCT 33.8 (L) 11/01/2022     11/01/2022    CHOL 189 11/01/2022    TRIG 204 (H) 11/01/2022    HDL 31 (L) 11/01/2022    ALT 14 11/01/2022    AST 19 11/01/2022     11/01/2022    K 4.7 11/01/2022     11/01/2022    CREATININE 1.5 (H) 11/01/2022    BUN 34 (H) 11/01/2022    CO2 21 (L) 11/01/2022    TSH 2.027 11/01/2022    HGBA1C 6.9 (H) 11/01/2022       Odin was seen today for cough and foot swelling.    Diagnoses and all orders for this visit:    Chronic cough                Care Plan/Goals: Reviewed    Goals    None     Odin  was seen today for cough and foot swelling.    Diagnoses and all orders for this visit:    Chronic cough  -     X-Ray Chest PA And Lateral; Future    Type 2 diabetes mellitus with other specified complication, without long-term current use of insulin  -     empagliflozin (JARDIANCE) 10 mg tablet; Take 1 tablet (10 mg total) by mouth once daily.  -     linaGLIPtin (TRADJENTA) 5 mg Tab tablet; Take 1 tablet (5 mg total) by mouth once daily.    Other orders  -     dexAMETHasone injection 10 mg  -     albuterol-ipratropium (DUO-NEB) 2.5 mg-0.5 mg/3 mL nebulizer solution; Take 3 mLs by nebulization every 6 (six) hours as needed for Wheezing. Rescue  -     promethazine-dextromethorphan (PROMETHAZINE-DM) 6.25-15 mg/5 mL Syrp; Take 10 mLs by mouth every 4 (four) hours as needed.  -     albuterol (PROVENTIL/VENTOLIN HFA) 90 mcg/actuation inhaler; Inhale 2 puffs into the lungs every 4 (four) hours as needed for Wheezing. Rescue     Pt reports she will monitor her blood glucose closely    Follow up: No follow-ups on file.    After visit summary was printed and given to patient upon discharge today.  Patient goals and care plan are included in After Visit Summary.

## 2023-01-16 NOTE — TELEPHONE ENCOUNTER
No new care gaps identified.  [facility] Embedded Care Gaps. Reference number: [id number]. 1/16/2023   4:34:01 AM CST

## 2023-01-17 RX ORDER — ATORVASTATIN CALCIUM 20 MG/1
TABLET, FILM COATED ORAL
Qty: 90 TABLET | Refills: 1 | Status: SHIPPED | OUTPATIENT
Start: 2023-01-17

## 2023-01-17 NOTE — TELEPHONE ENCOUNTER
Refill Decision Note   Odin Oliva  is requesting a refill authorization.  Brief Assessment and Rationale for Refill:  Approve     Medication Therapy Plan:       Medication Reconciliation Completed: No   Comments:     No Care Gaps recommended.     Note composed:6:11 AM 01/17/2023

## 2023-02-15 DIAGNOSIS — E11.69 TYPE 2 DIABETES MELLITUS WITH OTHER SPECIFIED COMPLICATION, WITHOUT LONG-TERM CURRENT USE OF INSULIN: ICD-10-CM

## 2023-02-15 RX ORDER — LINAGLIPTIN 5 MG/1
5 TABLET, FILM COATED ORAL DAILY
Qty: 90 TABLET | Refills: 0 | Status: SHIPPED | OUTPATIENT
Start: 2023-02-15 | End: 2023-05-31 | Stop reason: SDUPTHER

## 2023-02-15 RX ORDER — ALBUTEROL SULFATE 90 UG/1
2 AEROSOL, METERED RESPIRATORY (INHALATION) EVERY 4 HOURS PRN
Qty: 8.5 G | Refills: 1 | Status: SHIPPED | OUTPATIENT
Start: 2023-02-15 | End: 2023-04-11 | Stop reason: SDUPTHER

## 2023-02-15 RX ORDER — GLIMEPIRIDE 4 MG/1
4 TABLET ORAL 2 TIMES DAILY
Qty: 180 TABLET | Refills: 0 | Status: SHIPPED | OUTPATIENT
Start: 2023-02-15 | End: 2023-02-20

## 2023-02-15 RX ORDER — LEVOTHYROXINE SODIUM 100 UG/1
100 TABLET ORAL
Qty: 90 TABLET | Refills: 1 | Status: SHIPPED | OUTPATIENT
Start: 2023-02-15 | End: 2023-10-27 | Stop reason: SDUPTHER

## 2023-02-15 NOTE — TELEPHONE ENCOUNTER
No new care gaps identified.  [facility] Embedded Care Gaps. Reference number: [id number]. 2/15/2023   1:04:04 PM CST

## 2023-02-20 ENCOUNTER — OFFICE VISIT (OUTPATIENT)
Dept: DIABETES | Facility: CLINIC | Age: 83
End: 2023-02-20
Payer: MEDICAID

## 2023-02-20 DIAGNOSIS — M81.0 OSTEOPOROSIS, UNSPECIFIED OSTEOPOROSIS TYPE, UNSPECIFIED PATHOLOGICAL FRACTURE PRESENCE: ICD-10-CM

## 2023-02-20 DIAGNOSIS — E11.69 TYPE 2 DIABETES MELLITUS WITH OTHER SPECIFIED COMPLICATION, WITHOUT LONG-TERM CURRENT USE OF INSULIN: Primary | ICD-10-CM

## 2023-02-20 DIAGNOSIS — N18.4 CHRONIC KIDNEY DISEASE (CKD), STAGE IV (SEVERE): ICD-10-CM

## 2023-02-20 DIAGNOSIS — I10 PRIMARY HYPERTENSION: ICD-10-CM

## 2023-02-20 PROCEDURE — 99214 OFFICE O/P EST MOD 30 MIN: CPT | Mod: 95,,, | Performed by: NURSE PRACTITIONER

## 2023-02-20 PROCEDURE — 99214 PR OFFICE/OUTPT VISIT, EST, LEVL IV, 30-39 MIN: ICD-10-PCS | Mod: 95,,, | Performed by: NURSE PRACTITIONER

## 2023-02-20 RX ORDER — GLIMEPIRIDE 2 MG/1
2 TABLET ORAL 2 TIMES DAILY
Qty: 180 TABLET | Refills: 0 | Status: SHIPPED | OUTPATIENT
Start: 2023-02-20 | End: 2023-05-31 | Stop reason: SDUPTHER

## 2023-02-20 RX ORDER — GLIMEPIRIDE 2 MG/1
2 TABLET ORAL
Qty: 90 TABLET | Refills: 0 | Status: SHIPPED | OUTPATIENT
Start: 2023-02-20 | End: 2023-02-20

## 2023-02-20 NOTE — PATIENT INSTRUCTIONS
PATIENT INSTRUCTIONS    Decrease glimeperide to 2 mg by mouth twice daily before meals.   Continue Tradjenta 5 mg by mouth daily.   Continue Jardiance 10 mg by mouth daily.   Check blood sugar twice daily. Every morning when you wake up and 1-2 hours after main meal of the day. Keep log and upload to [facility] prior to each visit.   Blood Sugar Goals:       Fastin-130.       1-2 hours after a meal: Less than 180.    Fasting labs to be done 1 week prior to follow up appointment with me.    Schedule eye exam with eye care provider.

## 2023-02-20 NOTE — PROGRESS NOTES
Telemedicine Visit    The patient location is home  The chief complaint leading to consultation is: Diabetes    Visit type: audiovisual    Face to Face time with patient: 30  50 minutes of total time spent on the encounter, which includes face to face time and non-face to face time preparing to see the patient (eg, review of tests), Obtaining and/or reviewing separately obtained history, Documenting clinical information in the electronic or other health record, Independently interpreting results (not separately reported) and communicating results to the patient/family/caregiver, or Care coordination (not separately reported).  Each patient to whom he or she provides medical services by telemedicine is:  (1) informed of the relationship between the physician and patient and the respective role of any other health care provider with respect to management of the patient; and (2) notified that he or she may decline to receive medical services by telemedicine and may withdraw from such care at any time.  Notes:         Odin Oliva is a 83 y.o. female who  has a past medical history of CKD (chronic kidney disease), stage IV, Diabetes mellitus, HTN (hypertension), Hyperuricemia, Seasonal allergies, Thyroid disease, and Urinary tract infection., who present for an initial evaluation of Type 2 diabetes mellitus.     CHIEF COMPLAINT: Diabetes Consultation    PCP: Juventino Barbosa MD     The patient was initially diagnosed with diabetes at age 56.   Previously followed at [facility] by Inna Stephen NP, last visit 3/1/2022.     Previous failed treatments include:  Metformin - stopped due to decline in renal function.    Social Documentation:  Patient lives in her home in [place], daughter and son in law live with her.   Occupation: does not work.   Exercise: walking outside/gardening.    Current monitoring regimen: capillary blood glucose monitoring with finger sticks.      Current Diabetes related  symptoms/problems include none and have been unchanged.     Diabetes related complications:   nephropathy.   denies Pancreatitis  denies Gastroparesis  denies DKA  denies Hx/family Hx of MEN2/MTC  denies Frequent UTIs/yeast infections    Cardiovascular Risk Factors: advanced age (>55 for men, >65 for women) and hypertension.    Any episodes of hypoglycemia?  Occasionally. - still taking amaryl 4 mg po bid.  Hypoglycemia Unawareness? no  Severe hypoglycemia requiring 3rd party? no    Seen by Diabetes Education in last year? no    Diabetes Medications               empagliflozin (JARDIANCE) 10 mg tablet Take 1 tablet (10 mg total) by mouth once daily.    glimepiride (AMARYL) 4 MG tablet Take 1 tablet (4 mg total) by mouth 2 (two) times daily. -     linaGLIPtin (TRADJENTA) 5 mg Tab tablet Take 1 tablet (5 mg total) by mouth once daily.      DIABETES DISEASE MANAGEMENT STATUS  Statin: Taking  ACE/ARB: Taking  Screening or Prevention Patient's value Goal Complete/Controlled?   HgA1C Testing and Control   Lab Results   Component Value Date    HGBA1C 6.9 (H) 11/01/2022      Annually/Less than 8% Yes     Lipid profile : 11/01/2022 Annually Yes     LDL control Lab Results   Component Value Date    LDLCALC 117.2 11/01/2022    Annually/Less than 100 mg/dl  No     Nephropathy screening Lab Results   Component Value Date    LABMICR 251.0 11/01/2022     Lab Results   Component Value Date    PROTEINUA Negative 10/27/2021     Lab Results   Component Value Date    UTPCR 0.27 (H) 10/27/2021      Annually Yes     Blood pressure BP Readings from Last 1 Encounters:   12/12/22 122/70    Less than 140/90 Yes     Dilated retinal exam Most Recent Eye Exam Date: Not Found Annually No     Foot exam   Most Recent Foot Exam Date: Not Found Annually No     Patient's medications, allergies, past medical, surgical, social and family histories were reviewed and updated as appropriate.     Review of Systems   Constitutional:  Negative for weight  loss.   Eyes:  Negative for blurred vision and double vision.   Cardiovascular:  Negative for chest pain.   Gastrointestinal:  Negative for nausea and vomiting.   Genitourinary:  Negative for frequency.   Musculoskeletal:  Negative for falls.   Neurological:  Negative for dizziness and weakness.   Endo/Heme/Allergies:  Negative for polydipsia.   Psychiatric/Behavioral:  Negative for depression.    All other systems reviewed and are negative.          Physical Exam  Constitutional:       Appearance: Normal appearance.   HENT:      Head: Normocephalic and atraumatic.   Pulmonary:      Effort: No respiratory distress.   Musculoskeletal:      Cervical back: Normal range of motion.   Neurological:      Mental Status: She is alert and oriented to person, place, and time.   Psychiatric:         Mood and Affect: Mood normal.         Behavior: Behavior normal.      There were no vitals taken for this visit.  Wt Readings from Last 3 Encounters:   12/12/22 58.7 kg (129 lb 6.6 oz)   05/30/22 57.7 kg (127 lb 3.3 oz)   01/26/22 54.9 kg (121 lb 0.5 oz)       LAB REVIEW  Lab Results   Component Value Date     11/01/2022    K 4.7 11/01/2022     11/01/2022    CO2 21 (L) 11/01/2022    BUN 34 (H) 11/01/2022    CREATININE 1.5 (H) 11/01/2022    CALCIUM 8.6 (L) 11/01/2022    ANIONGAP 11 11/01/2022    EGFRNORACEVR 34.8 (A) 11/01/2022     No results found for: CPEPTIDE, GLUTAMICACID, INSLNABS  Hemoglobin A1C   Date Value Ref Range Status   11/01/2022 6.9 (H) 4.0 - 5.6 % Final     Comment:     ADA Screening Guidelines:  5.7-6.4%  Consistent with prediabetes  >or=6.5%  Consistent with diabetes    High levels of fetal hemoglobin interfere with the HbA1C  assay. Heterozygous hemoglobin variants (HbS, HgC, etc)do  not significantly interfere with this assay.   However, presence of multiple variants may affect accuracy.     05/30/2022 6.7 (H) 4.0 - 5.6 % Final     Comment:     ADA Screening Guidelines:  5.7-6.4%  Consistent with  prediabetes  >or=6.5%  Consistent with diabetes    High levels of fetal hemoglobin interfere with the HbA1C  assay. Heterozygous hemoglobin variants (HbS, HgC, etc)do  not significantly interfere with this assay.   However, presence of multiple variants may affect accuracy.     12/30/2021 7.9 (H) 4.0 - 5.6 % Final     Comment:     ADA Screening Guidelines:  5.7-6.4%  Consistent with prediabetes  >or=6.5%  Consistent with diabetes    High levels of fetal hemoglobin interfere with the HbA1C  assay. Heterozygous hemoglobin variants (HbS, HgC, etc)do  not significantly interfere with this assay.   However, presence of multiple variants may affect accuracy.         ASSESSMENT    ICD-10-CM ICD-9-CM   1. Type 2 diabetes mellitus with other specified complication, without long-term current use of insulin  E11.69 250.80   2. Chronic kidney disease (CKD), stage IV (severe)  N18.4 585.4   3. Primary hypertension  I10 401.9   4. Osteoporosis, unspecified osteoporosis type, unspecified pathological fracture presence  M81.0 733.00        PLAN  Diagnoses and all orders for this visit:    Type 2 diabetes mellitus with other specified complication, without long-term current use of insulin  -     Discontinue: glimepiride (AMARYL) 2 MG tablet; Take 1 tablet (2 mg total) by mouth daily with breakfast.  -     glimepiride (AMARYL) 2 MG tablet; Take 1 tablet (2 mg total) by mouth 2 (two) times a day.  -     Basic Metabolic Panel; Future  -     Hemoglobin A1C; Future    Chronic kidney disease (CKD), stage IV (severe)    Primary hypertension    Osteoporosis, unspecified osteoporosis type, unspecified pathological fracture presence        Reviewed pathophysiology of diabetes, complications related to the disease, importance of annual dilated eye exam and daily foot examination. Explained MOA, SE, dosage of medications. Written instructions given and reviewed with patient and patient verbalizes understanding.     PATIENT  INSTRUCTIONS    Decrease glimeperide to 2 mg by mouth twice daily before meals.   Continue Tradjenta 5 mg by mouth daily.   Continue Jardiance 10 mg by mouth daily.   Check blood sugar twice daily. Every morning when you wake up and 1-2 hours after main meal of the day. Keep log and upload to [employer] prior to each visit.   Blood Sugar Goals:       Fastin-130.       1-2 hours after a meal: Less than 180.    Fasting labs to be done 1 week prior to follow up appointment with me.    Schedule eye exam with eye care provider.       Follow up in about 3 months (around 2023) for FASTING LABS 1 WEEK PRIOR TO APPOINTMENT.    Portions of this note were prepared with [employer] voice recognition transcription software. Grammatical errors, including garbled syntax, mangle pronouns, and other bizarre constructions may be attributed to that software system.

## 2023-02-22 ENCOUNTER — TELEPHONE (OUTPATIENT)
Dept: INTERNAL MEDICINE | Facility: CLINIC | Age: 83
End: 2023-02-22
Payer: MEDICARE

## 2023-02-22 NOTE — TELEPHONE ENCOUNTER
----- Message from Pippa Victoria sent at 2/22/2023 12:22 PM CST -----  Contact: Lelandolosheba/daughter  Pt daughter is calling in regards to her mother asthma and needing an appt.Please call back at [phone]          Thanks  LIZETH

## 2023-02-24 ENCOUNTER — OFFICE VISIT (OUTPATIENT)
Dept: INTERNAL MEDICINE | Facility: CLINIC | Age: 83
End: 2023-02-24
Payer: MEDICARE

## 2023-02-24 ENCOUNTER — LAB VISIT (OUTPATIENT)
Dept: LAB | Facility: HOSPITAL | Age: 83
End: 2023-02-24
Attending: FAMILY MEDICINE
Payer: MEDICARE

## 2023-02-24 ENCOUNTER — PATIENT MESSAGE (OUTPATIENT)
Dept: INTERNAL MEDICINE | Facility: CLINIC | Age: 83
End: 2023-02-24

## 2023-02-24 VITALS
BODY MASS INDEX: 25.82 KG/M2 | TEMPERATURE: 98 F | HEART RATE: 80 BPM | OXYGEN SATURATION: 97 % | SYSTOLIC BLOOD PRESSURE: 136 MMHG | RESPIRATION RATE: 18 BRPM | DIASTOLIC BLOOD PRESSURE: 74 MMHG | HEIGHT: 59 IN | WEIGHT: 128.06 LBS

## 2023-02-24 DIAGNOSIS — E11.69 TYPE 2 DIABETES MELLITUS WITH OTHER SPECIFIED COMPLICATION, WITHOUT LONG-TERM CURRENT USE OF INSULIN: ICD-10-CM

## 2023-02-24 DIAGNOSIS — Z87.39 HISTORY OF GOUT: ICD-10-CM

## 2023-02-24 DIAGNOSIS — R63.0 LOSS OF APPETITE: Primary | ICD-10-CM

## 2023-02-24 DIAGNOSIS — J30.9 ALLERGIC RHINITIS, UNSPECIFIED SEASONALITY, UNSPECIFIED TRIGGER: ICD-10-CM

## 2023-02-24 DIAGNOSIS — N18.4 CHRONIC KIDNEY DISEASE, STAGE 4 (SEVERE): ICD-10-CM

## 2023-02-24 DIAGNOSIS — R05.3 CHRONIC COUGH: ICD-10-CM

## 2023-02-24 LAB
ALBUMIN SERPL BCP-MCNC: 4 G/DL (ref 3.5–5.2)
ALP SERPL-CCNC: 85 U/L (ref 55–135)
ALT SERPL W/O P-5'-P-CCNC: 16 U/L (ref 10–44)
ANION GAP SERPL CALC-SCNC: 11 MMOL/L (ref 8–16)
AST SERPL-CCNC: 22 U/L (ref 10–40)
BASOPHILS # BLD AUTO: 0.09 K/UL (ref 0–0.2)
BASOPHILS NFR BLD: 1.6 % (ref 0–1.9)
BILIRUB SERPL-MCNC: 0.2 MG/DL (ref 0.1–1)
BUN SERPL-MCNC: 45 MG/DL (ref 8–23)
CALCIUM SERPL-MCNC: 9.5 MG/DL (ref 8.7–10.5)
CHLORIDE SERPL-SCNC: 105 MMOL/L (ref 95–110)
CO2 SERPL-SCNC: 22 MMOL/L (ref 23–29)
CREAT SERPL-MCNC: 1.7 MG/DL (ref 0.5–1.4)
DIFFERENTIAL METHOD: ABNORMAL
EOSINOPHIL # BLD AUTO: 0.7 K/UL (ref 0–0.5)
EOSINOPHIL NFR BLD: 11.8 % (ref 0–8)
ERYTHROCYTE [DISTWIDTH] IN BLOOD BY AUTOMATED COUNT: 12.7 % (ref 11.5–14.5)
ERYTHROCYTE [SEDIMENTATION RATE] IN BLOOD BY PHOTOMETRIC METHOD: 57 MM/HR (ref 0–36)
EST. GFR  (NO RACE VARIABLE): 29.8 ML/MIN/1.73 M^2
GLUCOSE SERPL-MCNC: 72 MG/DL (ref 70–110)
HCT VFR BLD AUTO: 34.2 % (ref 37–48.5)
HGB BLD-MCNC: 10.8 G/DL (ref 12–16)
IMM GRANULOCYTES # BLD AUTO: 0.03 K/UL (ref 0–0.04)
IMM GRANULOCYTES NFR BLD AUTO: 0.5 % (ref 0–0.5)
LYMPHOCYTES # BLD AUTO: 1.5 K/UL (ref 1–4.8)
LYMPHOCYTES NFR BLD: 27.1 % (ref 18–48)
MCH RBC QN AUTO: 26.8 PG (ref 27–31)
MCHC RBC AUTO-ENTMCNC: 31.6 G/DL (ref 32–36)
MCV RBC AUTO: 85 FL (ref 82–98)
MONOCYTES # BLD AUTO: 0.5 K/UL (ref 0.3–1)
MONOCYTES NFR BLD: 9.2 % (ref 4–15)
NEUTROPHILS # BLD AUTO: 2.8 K/UL (ref 1.8–7.7)
NEUTROPHILS NFR BLD: 49.8 % (ref 38–73)
NRBC BLD-RTO: 0 /100 WBC
PLATELET # BLD AUTO: 240 K/UL (ref 150–450)
PMV BLD AUTO: 10.8 FL (ref 9.2–12.9)
POTASSIUM SERPL-SCNC: 4.7 MMOL/L (ref 3.5–5.1)
PROT SERPL-MCNC: 7.7 G/DL (ref 6–8.4)
RBC # BLD AUTO: 4.03 M/UL (ref 4–5.4)
SODIUM SERPL-SCNC: 138 MMOL/L (ref 136–145)
TSH SERPL DL<=0.005 MIU/L-ACNC: 0.53 UIU/ML (ref 0.4–4)
URATE SERPL-MCNC: 8.8 MG/DL (ref 2.4–5.7)
WBC # BLD AUTO: 5.57 K/UL (ref 3.9–12.7)

## 2023-02-24 PROCEDURE — 99214 PR OFFICE/OUTPT VISIT, EST, LEVL IV, 30-39 MIN: ICD-10-PCS | Mod: HCNC,S$GLB,, | Performed by: FAMILY MEDICINE

## 2023-02-24 PROCEDURE — 1125F AMNT PAIN NOTED PAIN PRSNT: CPT | Mod: HCNC,CPTII,S$GLB, | Performed by: FAMILY MEDICINE

## 2023-02-24 PROCEDURE — 83036 HEMOGLOBIN GLYCOSYLATED A1C: CPT | Mod: HCNC | Performed by: FAMILY MEDICINE

## 2023-02-24 PROCEDURE — 3075F SYST BP GE 130 - 139MM HG: CPT | Mod: HCNC,CPTII,S$GLB, | Performed by: FAMILY MEDICINE

## 2023-02-24 PROCEDURE — 3078F DIAST BP <80 MM HG: CPT | Mod: HCNC,CPTII,S$GLB, | Performed by: FAMILY MEDICINE

## 2023-02-24 PROCEDURE — 80053 COMPREHEN METABOLIC PANEL: CPT | Mod: HCNC | Performed by: FAMILY MEDICINE

## 2023-02-24 PROCEDURE — 99999 PR PBB SHADOW E&M-EST. PATIENT-LVL V: CPT | Mod: PBBFAC,HCNC,, | Performed by: FAMILY MEDICINE

## 2023-02-24 PROCEDURE — 1101F PT FALLS ASSESS-DOCD LE1/YR: CPT | Mod: HCNC,CPTII,S$GLB, | Performed by: FAMILY MEDICINE

## 2023-02-24 PROCEDURE — 85652 RBC SED RATE AUTOMATED: CPT | Mod: HCNC | Performed by: FAMILY MEDICINE

## 2023-02-24 PROCEDURE — 3078F PR MOST RECENT DIASTOLIC BLOOD PRESSURE < 80 MM HG: ICD-10-PCS | Mod: HCNC,CPTII,S$GLB, | Performed by: FAMILY MEDICINE

## 2023-02-24 PROCEDURE — 1125F PR PAIN SEVERITY QUANTIFIED, PAIN PRESENT: ICD-10-PCS | Mod: HCNC,CPTII,S$GLB, | Performed by: FAMILY MEDICINE

## 2023-02-24 PROCEDURE — 84550 ASSAY OF BLOOD/URIC ACID: CPT | Mod: HCNC | Performed by: FAMILY MEDICINE

## 2023-02-24 PROCEDURE — 84443 ASSAY THYROID STIM HORMONE: CPT | Mod: HCNC | Performed by: FAMILY MEDICINE

## 2023-02-24 PROCEDURE — 3288F FALL RISK ASSESSMENT DOCD: CPT | Mod: HCNC,CPTII,S$GLB, | Performed by: FAMILY MEDICINE

## 2023-02-24 PROCEDURE — 1101F PR PT FALLS ASSESS DOC 0-1 FALLS W/OUT INJ PAST YR: ICD-10-PCS | Mod: HCNC,CPTII,S$GLB, | Performed by: FAMILY MEDICINE

## 2023-02-24 PROCEDURE — 1159F PR MEDICATION LIST DOCUMENTED IN MEDICAL RECORD: ICD-10-PCS | Mod: HCNC,CPTII,S$GLB, | Performed by: FAMILY MEDICINE

## 2023-02-24 PROCEDURE — 99999 PR PBB SHADOW E&M-EST. PATIENT-LVL V: ICD-10-PCS | Mod: PBBFAC,HCNC,, | Performed by: FAMILY MEDICINE

## 2023-02-24 PROCEDURE — 3288F PR FALLS RISK ASSESSMENT DOCUMENTED: ICD-10-PCS | Mod: HCNC,CPTII,S$GLB, | Performed by: FAMILY MEDICINE

## 2023-02-24 PROCEDURE — 36415 COLL VENOUS BLD VENIPUNCTURE: CPT | Mod: HCNC | Performed by: FAMILY MEDICINE

## 2023-02-24 PROCEDURE — 99214 OFFICE O/P EST MOD 30 MIN: CPT | Mod: HCNC,S$GLB,, | Performed by: FAMILY MEDICINE

## 2023-02-24 PROCEDURE — 1159F MED LIST DOCD IN RCRD: CPT | Mod: HCNC,CPTII,S$GLB, | Performed by: FAMILY MEDICINE

## 2023-02-24 PROCEDURE — 3075F PR MOST RECENT SYSTOLIC BLOOD PRESS GE 130-139MM HG: ICD-10-PCS | Mod: HCNC,CPTII,S$GLB, | Performed by: FAMILY MEDICINE

## 2023-02-24 PROCEDURE — 85025 COMPLETE CBC W/AUTO DIFF WBC: CPT | Mod: HCNC | Performed by: FAMILY MEDICINE

## 2023-02-24 PROCEDURE — 84145 PROCALCITONIN (PCT): CPT | Mod: HCNC | Performed by: FAMILY MEDICINE

## 2023-02-24 RX ORDER — AZELASTINE 1 MG/ML
1 SPRAY, METERED NASAL 2 TIMES DAILY
Qty: 30 ML | Refills: 0 | Status: SHIPPED | OUTPATIENT
Start: 2023-02-24 | End: 2023-04-11 | Stop reason: SDUPTHER

## 2023-02-24 RX ORDER — MONTELUKAST SODIUM 10 MG/1
10 TABLET ORAL NIGHTLY
Qty: 30 TABLET | Refills: 0 | Status: SHIPPED | OUTPATIENT
Start: 2023-02-24 | End: 2023-03-28 | Stop reason: SDUPTHER

## 2023-02-24 NOTE — PROGRESS NOTES
Subjective:       Patient ID: Odin Oliva is a 83 y.o. female.    Chief Complaint: Cough, Dizziness, and Fatigue (Loss of appetite/)    HPI    Patient Active Problem List   Diagnosis    Diabetes mellitus    Chronic kidney disease (CKD), stage IV (severe)    Other specified hypothyroidism    Acquired hammer toe    Hypertension    Adductovarus rotation of toe, acquired, left    Osteoporosis       Past Medical History:   Diagnosis Date    CKD (chronic kidney disease), stage IV     Diabetes mellitus     HTN (hypertension)     Hyperuricemia     Seasonal allergies     Thyroid disease     Urinary tract infection        Past Surgical History:   Procedure Laterality Date    CATARACT EXTRACTION, BILATERAL      CHOLECYSTECTOMY      EXOSTECTOMY Bilateral 11/8/2019    Procedure: EXOSTECTOMY;  Surgeon: Lissy Comer DPM;  Location: [facility] OR;  Service: Podiatry;  Laterality: Bilateral;  left fourth digit, right fifth digit    FOOT MASS EXCISION Bilateral 11/8/2019    Procedure: HAMMER TOE, DEROTATIONAL ARTHROPLASTY FIFTH DIGIT;  Surgeon: Lissy Comer DPM;  Location: [facility] OR;  Service: Podiatry;  Laterality: Bilateral;  Left 5th toe incision @ 08:51.  Left 4th toe incision @ 08:54.  Right 5th toe incision : 09:24.    TOTAL THYROIDECTOMY      UMBILICAL HERNIA REPAIR      x 2       Family History   Problem Relation Age of Onset    Asthma Mother     No Known Problems Father     Lung cancer Brother 65    Diabetes Brother     Diabetes Brother 80    Heart disease Daughter        Social History     Tobacco Use   Smoking Status Never   Smokeless Tobacco Never       Wt Readings from Last 5 Encounters:   02/24/23 58.1 kg (128 lb 1.4 oz)   12/12/22 58.7 kg (129 lb 6.6 oz)   05/30/22 57.7 kg (127 lb 3.3 oz)   01/26/22 54.9 kg (121 lb 0.5 oz)   01/03/22 58.8 kg (129 lb 10.1 oz)       For further HPI details, see assessment and plan.    Review of Systems    Objective:      Vitals:    02/24/23 1609   BP: 136/74   Pulse: 80   Resp:  18   Temp: 98 °F (36.7 °C)       Physical Exam  Constitutional:       General: She is not in acute distress.     Appearance: Normal appearance. She is well-developed. She is not ill-appearing, toxic-appearing or diaphoretic.   Cardiovascular:      Rate and Rhythm: Normal rate and regular rhythm.   Pulmonary:      Effort: Pulmonary effort is normal. No respiratory distress.      Breath sounds: Normal breath sounds. No stridor. No wheezing.      Comments: Mildly coarse bilaterally  Musculoskeletal:         General: Normal range of motion.   Neurological:      Mental Status: She is alert. She is not disoriented.   Psychiatric:         Mood and Affect: Mood normal.         Speech: Speech normal.       Assessment:       1. Loss of appetite    2. Chronic cough    3. History of gout    4. Chronic kidney disease, stage 4 (severe)    5. Type 2 diabetes mellitus with other specified complication, without long-term current use of insulin    6. Allergic rhinitis, unspecified seasonality, unspecified trigger        Plan:   Loss of appetite  -     Urinalysis; Future; Expected date: 02/24/2023  -     Urinalysis Microscopic; Future; Expected date: 02/24/2023  -     Urine culture; Future; Expected date: 02/24/2023    Chronic cough  -     CBC Auto Differential; Future; Expected date: 02/24/2023  -     Comprehensive Metabolic Panel; Future; Expected date: 02/24/2023  -     Hemoglobin A1C; Future; Expected date: 02/24/2023  -     TSH; Future; Expected date: 02/24/2023  -     URIC ACID; Future; Expected date: 02/24/2023  -     Sedimentation rate; Future; Expected date: 02/24/2023  -     Procalcitonin; Future; Expected date: 02/24/2023  -     Ambulatory referral/consult to Pulmonology; Future; Expected date: 03/03/2023  -     B-TYPE NATRIURETIC PEPTIDE; Future; Expected date: 02/24/2023    History of gout  -     URIC ACID; Future; Expected date: 02/24/2023    Chronic kidney disease, stage 4 (severe)  -     URIC ACID; Future; Expected  date: 02/24/2023    Type 2 diabetes mellitus with other specified complication, without long-term current use of insulin  -     Hemoglobin A1C; Future; Expected date: 02/24/2023  -     TSH; Future; Expected date: 02/24/2023    Allergic rhinitis, unspecified seasonality, unspecified trigger    Other orders  -     azelastine (ASTELIN) 137 mcg (0.1 %) nasal spray; 1 spray (137 mcg total) by Nasal route 2 (two) times daily.  Dispense: 30 mL; Refill: 0  -     montelukast (SINGULAIR) 10 mg tablet; Take 1 tablet (10 mg total) by mouth every evening.  Dispense: 30 tablet; Refill: 0      Patient presents for chronic cough.  She is been evaluated for this before.  She has not had any significant improvement.  When cough present causes wheeze/dizziness. Currently she is using albuterol for as needed wheezing.  Additionally she is using what I suspect to be an oral antihistamine (buys it at [facility] and it says allergy),  She does need a refill on her Astelin.  Will oblige today.      Denies any gastroesophageal reflux disease.    She does have underlying allergy issues.  We will add Singulair and see if it helps.      I would like to get a repeat chest x-ray.      Obtaining labs today as well to assess for any underlying metabolic issues including a procalcitonin, an ESR, and a BNP.      I initially placed a pulmonology consultation and hopes I could get her seen at [facility] soon.  I was told by staff who were unable to schedule such given insurance issues.  Prior to going externally I would like to see the labs myself and discussed with the patient.  If I deem necessary will consult external pulmonologist.  My only concern with doing so is ability to review visit as soon as I can when they are in the [facility] system.    I may order PFT upon review of studies - to be determined.    She does report loss of appetite and family indicated some concern weight loss.  Her weight is actually stable with no substantial loss.    Wt Readings  from Last 10 Encounters:   02/24/23 58.1 kg (128 lb 1.4 oz)   12/12/22 58.7 kg (129 lb 6.6 oz)   05/30/22 57.7 kg (127 lb 3.3 oz)   01/26/22 54.9 kg (121 lb 0.5 oz)   01/03/22 58.8 kg (129 lb 10.1 oz)   12/30/21 58.6 kg (129 lb 3 oz)   10/29/21 58.5 kg (128 lb 15.5 oz)   10/07/21 59.3 kg (130 lb 11.7 oz)   10/01/21 59.3 kg (130 lb 11.7 oz)   07/29/21 57.9 kg (127 lb 10.3 oz)   We will monitor this  Urine studies to ensure no ongoing infection contributing.  We will evaluate her labs.      Updating her A1c to check the status of her diabetes     Updating her uric acid given the history of gout.      She does have concerns about her renal function we will be checking this in the metabolic panel.    Given the chronic nature of this cough and the fact her vitals are stable and her physical exam is not impressive I do feel it is acceptable that we obtain these labs Monday morning as it is now Friday at 5:02 a.m. and the lab and radiology department are likely closing soon.        If declines over weekend present to ER/ seek medical attention - pt/family agreed/understood  This note was verbally dictated, please excuse any type errors.      F/u visit pending results

## 2023-02-25 LAB
ESTIMATED AVG GLUCOSE: 143 MG/DL (ref 68–131)
HBA1C MFR BLD: 6.6 % (ref 4–5.6)
PROCALCITONIN SERPL IA-MCNC: 0.05 NG/ML

## 2023-03-28 ENCOUNTER — HOSPITAL ENCOUNTER (OUTPATIENT)
Dept: RADIOLOGY | Facility: HOSPITAL | Age: 83
Discharge: HOME | End: 2023-03-28
Attending: PHYSICIAN ASSISTANT
Payer: MEDICARE

## 2023-03-28 ENCOUNTER — OFFICE VISIT (OUTPATIENT)
Dept: PULMONOLOGY | Facility: CLINIC | Age: 83
End: 2023-03-28
Payer: MEDICARE

## 2023-03-28 VITALS
DIASTOLIC BLOOD PRESSURE: 62 MMHG | OXYGEN SATURATION: 98 % | SYSTOLIC BLOOD PRESSURE: 124 MMHG | RESPIRATION RATE: 18 BRPM | BODY MASS INDEX: 25.55 KG/M2 | HEART RATE: 66 BPM | WEIGHT: 126.75 LBS | HEIGHT: 59 IN

## 2023-03-28 DIAGNOSIS — R05.3 CHRONIC COUGH: Primary | ICD-10-CM

## 2023-03-28 DIAGNOSIS — E11.69 TYPE 2 DIABETES MELLITUS WITH OTHER SPECIFIED COMPLICATION, WITHOUT LONG-TERM CURRENT USE OF INSULIN: ICD-10-CM

## 2023-03-28 DIAGNOSIS — I10 PRIMARY HYPERTENSION: ICD-10-CM

## 2023-03-28 DIAGNOSIS — N18.4 CHRONIC KIDNEY DISEASE (CKD), STAGE IV (SEVERE): ICD-10-CM

## 2023-03-28 DIAGNOSIS — R05.3 CHRONIC COUGH: ICD-10-CM

## 2023-03-28 PROCEDURE — 3288F PR FALLS RISK ASSESSMENT DOCUMENTED: ICD-10-PCS | Mod: HCNC,CPTII,S$GLB, | Performed by: PHYSICIAN ASSISTANT

## 2023-03-28 PROCEDURE — 1101F PR PT FALLS ASSESS DOC 0-1 FALLS W/OUT INJ PAST YR: ICD-10-PCS | Mod: HCNC,CPTII,S$GLB, | Performed by: PHYSICIAN ASSISTANT

## 2023-03-28 PROCEDURE — 1160F RVW MEDS BY RX/DR IN RCRD: CPT | Mod: HCNC,CPTII,S$GLB, | Performed by: PHYSICIAN ASSISTANT

## 2023-03-28 PROCEDURE — 3288F FALL RISK ASSESSMENT DOCD: CPT | Mod: HCNC,CPTII,S$GLB, | Performed by: PHYSICIAN ASSISTANT

## 2023-03-28 PROCEDURE — 1159F MED LIST DOCD IN RCRD: CPT | Mod: HCNC,CPTII,S$GLB, | Performed by: PHYSICIAN ASSISTANT

## 2023-03-28 PROCEDURE — 3078F DIAST BP <80 MM HG: CPT | Mod: HCNC,CPTII,S$GLB, | Performed by: PHYSICIAN ASSISTANT

## 2023-03-28 PROCEDURE — 70220 X-RAY EXAM OF SINUSES: CPT | Mod: TC,HCNC

## 2023-03-28 PROCEDURE — 1159F PR MEDICATION LIST DOCUMENTED IN MEDICAL RECORD: ICD-10-PCS | Mod: HCNC,CPTII,S$GLB, | Performed by: PHYSICIAN ASSISTANT

## 2023-03-28 PROCEDURE — 99999 PR PBB SHADOW E&M-EST. PATIENT-LVL V: ICD-10-PCS | Mod: PBBFAC,HCNC,, | Performed by: PHYSICIAN ASSISTANT

## 2023-03-28 PROCEDURE — 99204 PR OFFICE/OUTPT VISIT, NEW, LEVL IV, 45-59 MIN: ICD-10-PCS | Mod: HCNC,S$GLB,, | Performed by: PHYSICIAN ASSISTANT

## 2023-03-28 PROCEDURE — 3074F SYST BP LT 130 MM HG: CPT | Mod: HCNC,CPTII,S$GLB, | Performed by: PHYSICIAN ASSISTANT

## 2023-03-28 PROCEDURE — 70220 X-RAY EXAM OF SINUSES: CPT | Mod: 26,HCNC,, | Performed by: RADIOLOGY

## 2023-03-28 PROCEDURE — 70220 XR SINUSES MIN 3 VIEWS: ICD-10-PCS | Mod: 26,HCNC,, | Performed by: RADIOLOGY

## 2023-03-28 PROCEDURE — 1101F PT FALLS ASSESS-DOCD LE1/YR: CPT | Mod: HCNC,CPTII,S$GLB, | Performed by: PHYSICIAN ASSISTANT

## 2023-03-28 PROCEDURE — 99204 OFFICE O/P NEW MOD 45 MIN: CPT | Mod: HCNC,S$GLB,, | Performed by: PHYSICIAN ASSISTANT

## 2023-03-28 PROCEDURE — 3078F PR MOST RECENT DIASTOLIC BLOOD PRESSURE < 80 MM HG: ICD-10-PCS | Mod: HCNC,CPTII,S$GLB, | Performed by: PHYSICIAN ASSISTANT

## 2023-03-28 PROCEDURE — 3074F PR MOST RECENT SYSTOLIC BLOOD PRESSURE < 130 MM HG: ICD-10-PCS | Mod: HCNC,CPTII,S$GLB, | Performed by: PHYSICIAN ASSISTANT

## 2023-03-28 PROCEDURE — 1160F PR REVIEW ALL MEDS BY PRESCRIBER/CLIN PHARMACIST DOCUMENTED: ICD-10-PCS | Mod: HCNC,CPTII,S$GLB, | Performed by: PHYSICIAN ASSISTANT

## 2023-03-28 PROCEDURE — 99999 PR PBB SHADOW E&M-EST. PATIENT-LVL V: CPT | Mod: PBBFAC,HCNC,, | Performed by: PHYSICIAN ASSISTANT

## 2023-03-28 RX ORDER — FLUTICASONE PROPIONATE AND SALMETEROL XINAFOATE 115; 21 UG/1; UG/1
2 AEROSOL, METERED RESPIRATORY (INHALATION) EVERY 12 HOURS
Qty: 12 G | Refills: 0 | Status: SHIPPED | OUTPATIENT
Start: 2023-03-28 | End: 2023-04-11 | Stop reason: ALTCHOICE

## 2023-03-28 RX ORDER — MONTELUKAST SODIUM 10 MG/1
10 TABLET ORAL NIGHTLY
Qty: 30 TABLET | Refills: 2 | Status: SHIPPED | OUTPATIENT
Start: 2023-03-28 | End: 2023-04-11 | Stop reason: SDUPTHER

## 2023-03-28 NOTE — PROGRESS NOTES
"Subjective:       Patient ID: Odin Oliva is a 83 y.o. female.    Chief Complaint: Cough    3/28/23  82yo female referred by Juventino Barbosa MD for chronic cough  3 month cough, chest tightness, "chest itching," wheezing  Coughs all day, nothing brings it on  Reports history of what sounds like ace inhibitor cough but says this resolved and this cough is a new problem  Mother had asthma, patient has no personal history of lung disease  Never smoker  Reports post nasal drip, feels sinus congestion intermittently  Mentions "chest itching" brings the cough on, denies rash  Recently started on Singulair, she thinks this has improved the cough somewhat  She says since this cough started she has had loss of appetite, denies nausea, denies dysphagia or voice changes  Denies hemoptysis  Mentions intermittent bleeding to right nostril, says "blood pours out." Last episode a few days ago    Immunization History   Administered Date(s) Administered    COVID-19, MRNA, LN-S, PF (Pfizer) (Purple Cap) 03/31/2021, 04/21/2021    Td - PF (ADULT) 04/11/2019      Tobacco Use: Low Risk     Smoking Tobacco Use: Never    Smokeless Tobacco Use: Never    Passive Exposure: Not on file      Past Medical History:   Diagnosis Date    CKD (chronic kidney disease), stage IV     Diabetes mellitus     HTN (hypertension)     Hyperuricemia     Seasonal allergies     Thyroid disease     Urinary tract infection       Current Outpatient Medications on File Prior to Visit   Medication Sig Dispense Refill    albuterol (PROVENTIL/VENTOLIN HFA) 90 mcg/actuation inhaler Inhale 2 puffs into the lungs every 4 (four) hours as needed for Wheezing. Rescue 8.5 g 1    albuterol (VENTOLIN HFA) 90 mcg/actuation inhaler Inhale 2 puffs into the lungs every 6 (six) hours as needed for Shortness of Breath. Rescue 18 g 6    albuterol-ipratropium (DUO-NEB) 2.5 mg-0.5 mg/3 mL nebulizer solution Take 3 mLs by nebulization every 6 (six) hours as needed for Wheezing. " Rescue 90 mL 0    atorvastatin (LIPITOR) 20 MG tablet TAKE 1 TABLET(20 MG) BY MOUTH EVERY DAY 90 tablet 1    azelastine (ASTELIN) 137 mcg (0.1 %) nasal spray 1 spray (137 mcg total) by Nasal route 2 (two) times daily. 30 mL 0    blood sugar diagnostic Strp 1 each by Misc.(Non-Drug; Combo Route) route 3 (three) times daily. Insurance preferred 100 strip 11    blood-glucose meter kit Use as instructed. Insurance preferred 1 each 0    diclofenac sodium (VOLTAREN) 1 % Gel Apply 2 grams topically once daily. 100 g 0    empagliflozin (JARDIANCE) 10 mg tablet Take 1 tablet (10 mg total) by mouth once daily. 90 tablet 0    flash glucose sensor (FREESTYLE HAROLDO 14 DAY SENSOR) Kit 1 each by Misc.(Non-Drug; Combo Route) route every 14 (fourteen) days. 2 kit 11    glimepiride (AMARYL) 2 MG tablet Take 1 tablet (2 mg total) by mouth 2 (two) times a day. 180 tablet 0    lancets Misc 1 each by Misc.(Non-Drug; Combo Route) route 3 (three) times daily. 100 each 11    levothyroxine (SYNTHROID) 100 MCG tablet Take 1 tablet (100 mcg total) by mouth before breakfast. 90 tablet 1    linaGLIPtin (TRADJENTA) 5 mg Tab tablet Take 1 tablet (5 mg total) by mouth once daily. 90 tablet 0    mupirocin (BACTROBAN) 2 % ointment Apply topically once daily. 22 g 1    valsartan-hydrochlorothiazide (DIOVAN-HCT) 160-12.5 mg per tablet Take 1 tablet by mouth once daily.        Current Facility-Administered Medications on File Prior to Visit   Medication Dose Route Frequency Provider Last Rate Last Admin    lactated ringers infusion   Intravenous Continuous Geraldine Vaughn MD 10 mL/hr at 11/08/19 0744 New Bag at 11/08/19 0744    lidocaine (PF) 10 mg/ml (1%) injection 10 mg  1 mL Intradermal Once Geraldine Vaughn MD            Review of Systems   Constitutional:  Positive for appetite change and fatigue. Negative for fever, weight loss and weakness.   HENT:  Positive for nosebleeds, postnasal drip, rhinorrhea and congestion. Negative for sinus pressure,  "sore throat, trouble swallowing and voice change.    Respiratory:  Positive for cough, sputum production, wheezing and dyspnea on extertion. Negative for choking, chest tightness and shortness of breath.    Cardiovascular:  Negative for chest pain and leg swelling.   Musculoskeletal:  Negative for gait problem and joint swelling.   Gastrointestinal:  Negative for nausea, vomiting and abdominal pain.   Neurological:  Negative for dizziness, weakness and headaches.   All other systems reviewed and are negative.    Objective:       Vitals:    03/28/23 1548   BP: 124/62   Pulse: 66   Resp: 18   SpO2: 98%   Weight: 57.5 kg (126 lb 12.2 oz)   Height: 4' 11" (1.499 m)       Physical Exam   Constitutional: She is oriented to person, place, and time. She appears well-developed and well-nourished. No distress.   HENT:   Head: Normocephalic.   Nose: Nose normal.   Mouth/Throat: Oropharynx is clear and moist.   Cardiovascular: Normal rate and regular rhythm.   Pulmonary/Chest: Effort normal. No respiratory distress. She has no wheezes. She has no rhonchi. She has no rales.   Musculoskeletal:         General: No edema.      Cervical back: Normal range of motion and neck supple.   Neurological: She is alert and oriented to person, place, and time. Gait normal.   Skin: Skin is warm and dry.   Psychiatric: She has a normal mood and affect.   Vitals reviewed.  Personal Diagnostic Review    X-Ray Sinuses Min 3 Views  Narrative: EXAM:  XR SINUSES MIN 3 VIEWS    CLINICAL HISTORY:  Sinusitis    FINDINGS:  3 views of the sinuses.    Paranasal sinuses are normally aerated. Nasal septum is midline.  Orbits appear intact.  Impression:  Negative sinus series..    Finalized on: 3/28/2023 4:52 PM By:  Roderick Castillo MD  [facility]# [id number]      2023-03-28 16:[id number]    [facility]    EXAMINATION:  XR CHEST PA AND LATERAL     CLINICAL HISTORY:  Chronic cough     TECHNIQUE:  PA and lateral views of the chest were performed.     COMPARISON:  10/18/2019   "   FINDINGS:  The cardiac and mediastinal silhouettes appear within normal limits.   The lungs are clear bilaterally.  No acute osseous findings demonstrated.     Impression:     No acute findings.        Electronically signed by: Rich Marvin MD  Date:                                            2022  Time:                                           12:23      Assessment/Plan:       Problem List Items Addressed This Visit          Pulmonary    Chronic cough - Primary    Relevant Medications    fluticasone propion-salmeterol 115-21 mcg/dose (ADVAIR HFA) 115-21 mcg/actuation HFAA inhaler    montelukast (SINGULAIR) 10 mg tablet    Other Relevant Orders    X-Ray Sinuses Min 3 Views (Completed)    CT Chest Without Contrast    Spirometry with/without bronchodilator    Fraction of  Nitric Oxide    IGE (Completed)       Cardiac/Vascular    Hypertension     Controlled, continue current medications            Renal/    Chronic kidney disease (CKD), stage IV (severe)     Follow up with PCP/nephrology            Endocrine    Diabetes mellitus     continue current medications          Continue singulair nightly, flonase   Trial of advair BID, rinse mouth after use  Sinus xray clear, cxr clear  Mild elevation of IgE  Unable to get FeNO today, will attempt next visit  [place] next visit  Will get Chest CT for cough and loss of appetite    Follow up for sinux xray and lab today; chest ct, cameron, feno f/u pulmonologist.    Discussed diagnosis, its evaluation, treatment and usual course. All questions answered.    Patient verbalized understanding of plan and left in no acute distress    Thank you for the courtesy of participating in the care of this patient    Destiney Ramirez PA-C  [facility]

## 2023-03-29 PROBLEM — R05.3 CHRONIC COUGH: Status: ACTIVE | Noted: 2023-03-29

## 2023-04-11 ENCOUNTER — CLINICAL SUPPORT (OUTPATIENT)
Dept: PULMONOLOGY | Facility: CLINIC | Age: 83
End: 2023-04-11
Payer: MEDICARE

## 2023-04-11 ENCOUNTER — OFFICE VISIT (OUTPATIENT)
Dept: PULMONOLOGY | Facility: CLINIC | Age: 83
End: 2023-04-11
Payer: MEDICARE

## 2023-04-11 VITALS
HEIGHT: 59 IN | RESPIRATION RATE: 18 BRPM | OXYGEN SATURATION: 96 % | DIASTOLIC BLOOD PRESSURE: 80 MMHG | SYSTOLIC BLOOD PRESSURE: 140 MMHG | HEART RATE: 62 BPM | BODY MASS INDEX: 25.52 KG/M2 | WEIGHT: 126.56 LBS

## 2023-04-11 DIAGNOSIS — R05.3 CHRONIC COUGH: ICD-10-CM

## 2023-04-11 DIAGNOSIS — J30.89 SEASONAL ALLERGIC RHINITIS DUE TO OTHER ALLERGIC TRIGGER: ICD-10-CM

## 2023-04-11 DIAGNOSIS — J45.21 MILD INTERMITTENT ASTHMA WITH ACUTE EXACERBATION: Primary | ICD-10-CM

## 2023-04-11 LAB
BRPFT: NORMAL
FEF 25 75 LLN: 0.54
FEF 25 75 PRE REF: 165.9 %
FEF 25 75 REF: 1.33
FEV1 FVC LLN: 62
FEV1 FVC PRE REF: 126.3 %
FEV1 FVC REF: 77
FEV1 LLN: 1.1
FEV1 PRE REF: 87.5 %
FEV1 REF: 1.58
FVC LLN: 1.44
FVC PRE REF: 68.5 %
FVC REF: 2.07
PEF LLN: 2.41
PEF PRE REF: 99.8 %
PEF REF: 3.87
PRE FEF 25 75: 2.21 L/S
PRE FET 100: 1.66 SEC
PRE FEV1 FVC: 97.81 %
PRE FEV1: 1.39 L
PRE FVC: 1.42 L
PRE PEF: 3.86 L/S

## 2023-04-11 PROCEDURE — 1160F RVW MEDS BY RX/DR IN RCRD: CPT | Mod: HCNC,CPTII,S$GLB, | Performed by: INTERNAL MEDICINE

## 2023-04-11 PROCEDURE — 3077F SYST BP >= 140 MM HG: CPT | Mod: HCNC,CPTII,S$GLB, | Performed by: INTERNAL MEDICINE

## 2023-04-11 PROCEDURE — 99215 PR OFFICE/OUTPT VISIT, EST, LEVL V, 40-54 MIN: ICD-10-PCS | Mod: HCNC,S$GLB,, | Performed by: INTERNAL MEDICINE

## 2023-04-11 PROCEDURE — 1159F MED LIST DOCD IN RCRD: CPT | Mod: HCNC,CPTII,S$GLB, | Performed by: INTERNAL MEDICINE

## 2023-04-11 PROCEDURE — 1160F PR REVIEW ALL MEDS BY PRESCRIBER/CLIN PHARMACIST DOCUMENTED: ICD-10-PCS | Mod: HCNC,CPTII,S$GLB, | Performed by: INTERNAL MEDICINE

## 2023-04-11 PROCEDURE — 94010 BREATHING CAPACITY TEST: CPT | Mod: HCNC,S$GLB,, | Performed by: INTERNAL MEDICINE

## 2023-04-11 PROCEDURE — 3288F FALL RISK ASSESSMENT DOCD: CPT | Mod: HCNC,CPTII,S$GLB, | Performed by: INTERNAL MEDICINE

## 2023-04-11 PROCEDURE — 3079F PR MOST RECENT DIASTOLIC BLOOD PRESSURE 80-89 MM HG: ICD-10-PCS | Mod: HCNC,CPTII,S$GLB, | Performed by: INTERNAL MEDICINE

## 2023-04-11 PROCEDURE — 3079F DIAST BP 80-89 MM HG: CPT | Mod: HCNC,CPTII,S$GLB, | Performed by: INTERNAL MEDICINE

## 2023-04-11 PROCEDURE — 99999 PR PBB SHADOW E&M-EST. PATIENT-LVL V: CPT | Mod: PBBFAC,HCNC,, | Performed by: INTERNAL MEDICINE

## 2023-04-11 PROCEDURE — 1126F AMNT PAIN NOTED NONE PRSNT: CPT | Mod: HCNC,CPTII,S$GLB, | Performed by: INTERNAL MEDICINE

## 2023-04-11 PROCEDURE — 94010 BREATHING CAPACITY TEST: ICD-10-PCS | Mod: HCNC,S$GLB,, | Performed by: INTERNAL MEDICINE

## 2023-04-11 PROCEDURE — 99999 PR PBB SHADOW E&M-EST. PATIENT-LVL V: ICD-10-PCS | Mod: PBBFAC,HCNC,, | Performed by: INTERNAL MEDICINE

## 2023-04-11 PROCEDURE — 3288F PR FALLS RISK ASSESSMENT DOCUMENTED: ICD-10-PCS | Mod: HCNC,CPTII,S$GLB, | Performed by: INTERNAL MEDICINE

## 2023-04-11 PROCEDURE — 3077F PR MOST RECENT SYSTOLIC BLOOD PRESSURE >= 140 MM HG: ICD-10-PCS | Mod: HCNC,CPTII,S$GLB, | Performed by: INTERNAL MEDICINE

## 2023-04-11 PROCEDURE — 1101F PR PT FALLS ASSESS DOC 0-1 FALLS W/OUT INJ PAST YR: ICD-10-PCS | Mod: HCNC,CPTII,S$GLB, | Performed by: INTERNAL MEDICINE

## 2023-04-11 PROCEDURE — 1101F PT FALLS ASSESS-DOCD LE1/YR: CPT | Mod: HCNC,CPTII,S$GLB, | Performed by: INTERNAL MEDICINE

## 2023-04-11 PROCEDURE — 99215 OFFICE O/P EST HI 40 MIN: CPT | Mod: HCNC,S$GLB,, | Performed by: INTERNAL MEDICINE

## 2023-04-11 PROCEDURE — 1159F PR MEDICATION LIST DOCUMENTED IN MEDICAL RECORD: ICD-10-PCS | Mod: HCNC,CPTII,S$GLB, | Performed by: INTERNAL MEDICINE

## 2023-04-11 PROCEDURE — 1126F PR PAIN SEVERITY QUANTIFIED, NO PAIN PRESENT: ICD-10-PCS | Mod: HCNC,CPTII,S$GLB, | Performed by: INTERNAL MEDICINE

## 2023-04-11 RX ORDER — FLUTICASONE PROPIONATE AND SALMETEROL 250; 50 UG/1; UG/1
1 POWDER RESPIRATORY (INHALATION) 2 TIMES DAILY
Qty: 60 EACH | Refills: 11 | Status: SHIPPED | OUTPATIENT
Start: 2023-04-11 | End: 2024-04-10

## 2023-04-11 RX ORDER — AZELASTINE 1 MG/ML
1 SPRAY, METERED NASAL 2 TIMES DAILY
Qty: 30 ML | Refills: 11 | Status: SHIPPED | OUTPATIENT
Start: 2023-04-11 | End: 2024-04-10

## 2023-04-11 RX ORDER — DOXYCYCLINE 100 MG/1
100 CAPSULE ORAL EVERY 12 HOURS
Qty: 20 CAPSULE | Refills: 0 | Status: SHIPPED | OUTPATIENT
Start: 2023-04-11

## 2023-04-11 RX ORDER — MONTELUKAST SODIUM 10 MG/1
10 TABLET ORAL NIGHTLY
Qty: 30 TABLET | Refills: 2 | Status: SHIPPED | OUTPATIENT
Start: 2023-04-11 | End: 2024-04-12

## 2023-04-11 RX ORDER — ALBUTEROL SULFATE 0.83 MG/ML
2.5 SOLUTION RESPIRATORY (INHALATION)
Qty: 360 ML | Refills: 11 | Status: SHIPPED | OUTPATIENT
Start: 2023-04-11 | End: 2024-04-10

## 2023-04-11 RX ORDER — PROMETHAZINE HYDROCHLORIDE AND DEXTROMETHORPHAN HYDROBROMIDE 6.25; 15 MG/5ML; MG/5ML
5 SYRUP ORAL 4 TIMES DAILY PRN
Qty: 240 ML | Refills: 5 | Status: SHIPPED | OUTPATIENT
Start: 2023-04-11 | End: 2023-04-23

## 2023-04-11 RX ORDER — AZITHROMYCIN 250 MG/1
TABLET, FILM COATED ORAL
Qty: 6 TABLET | Refills: 0 | Status: SHIPPED | OUTPATIENT
Start: 2023-04-11 | End: 2023-04-11 | Stop reason: ALTCHOICE

## 2023-04-11 RX ORDER — PREDNISONE 20 MG/1
TABLET ORAL
Qty: 20 TABLET | Refills: 0 | Status: SHIPPED | OUTPATIENT
Start: 2023-04-11

## 2023-04-11 RX ORDER — ALBUTEROL SULFATE 90 UG/1
2 AEROSOL, METERED RESPIRATORY (INHALATION) EVERY 4 HOURS PRN
Qty: 8.5 G | Refills: 1 | Status: SHIPPED | OUTPATIENT
Start: 2023-04-11 | End: 2023-06-15 | Stop reason: SDUPTHER

## 2023-04-11 NOTE — PROGRESS NOTES
Subjective:     Patient ID: Odin Oliva is a 83 y.o. female.    Chief Complaint:  chronic cough - allergic season rhinits    HPI    Asthma  She presents for initial evaluation of asthma, chronic breathing problems, and a history of wheezing. The patient has been previously diagnosed with asthma. [unfilled] was diagnosed with asthma at age 41 - 45 y/o . The patient has never been admitted to the hospital for asthma. The patient is currently having symptoms / an exacerbation. Current symptoms include dyspnea, productive cough, and wheezing. Symptoms have been present since several months ago and have been gradually worsening. She denies chest pain. Associated symptoms include chest pain, fatigue, poor exercise tolerance, shortness of breath, and wheezing.  This episode appears to have been triggered by pollens. Treatments tried for the current exacerbation include inhaled corticosteroids and short-acting inhaled beta-adrenergic agonists, which have provided some relief of symptoms. The patient has been having similar episodes for approximately  40   years.    Current Disease Severity  The patient is having daytime symptoms throughout the day. The patient is having daytime symptoms often 7 times per week. The patient is using short-acting beta agonists for symptom control several times per day. She has exacerbations requiring oral systemic corticosteroids 0 times per year. Current limitations in activity from asthma:  unable to walk rapidly  . Number of days of school or work missed in the last month: not applicable. Number of urgent/emergent visit in last year: 0.  The patient is not using a spacer with MDIs. Her best peak flow rate is naq. She is not monitoring peak flow rates at home.    Past Medical History:   Diagnosis Date    CKD (chronic kidney disease), stage IV     Diabetes mellitus     HTN (hypertension)     Hyperuricemia     Seasonal allergies     Thyroid disease     Urinary tract infection      Past Surgical  History:   Procedure Laterality Date    CATARACT EXTRACTION, BILATERAL      CHOLECYSTECTOMY      EXOSTECTOMY Bilateral 11/8/2019    Procedure: EXOSTECTOMY;  Surgeon: Lissy Comer DPM;  Location: [facility] OR;  Service: Podiatry;  Laterality: Bilateral;  left fourth digit, right fifth digit    FOOT MASS EXCISION Bilateral 11/8/2019    Procedure: HAMMER TOE, DEROTATIONAL ARTHROPLASTY FIFTH DIGIT;  Surgeon: Lissy Comer DPM;  Location: [facility] OR;  Service: Podiatry;  Laterality: Bilateral;  Left 5th toe incision @ 08:51.  Left 4th toe incision @ 08:54.  Right 5th toe incision : 09:24.    TOTAL THYROIDECTOMY      UMBILICAL HERNIA REPAIR      x 2     Review of patient's allergies indicates:  No Known Allergies  Current Outpatient Medications on File Prior to Visit   Medication Sig Dispense Refill    atorvastatin (LIPITOR) 20 MG tablet TAKE 1 TABLET(20 MG) BY MOUTH EVERY DAY 90 tablet 1    blood sugar diagnostic Strp 1 each by Misc.(Non-Drug; Combo Route) route 3 (three) times daily. Insurance preferred 100 strip 11    blood-glucose meter kit Use as instructed. Insurance preferred 1 each 0    diclofenac sodium (VOLTAREN) 1 % Gel Apply 2 grams topically once daily. 100 g 0    empagliflozin (JARDIANCE) 10 mg tablet Take 1 tablet (10 mg total) by mouth once daily. 90 tablet 0    flash glucose sensor (FREESTYLE HAROLDO 14 DAY SENSOR) Kit 1 each by Misc.(Non-Drug; Combo Route) route every 14 (fourteen) days. 2 kit 11    glimepiride (AMARYL) 2 MG tablet Take 1 tablet (2 mg total) by mouth 2 (two) times a day. 180 tablet 0    lancets Misc 1 each by Misc.(Non-Drug; Combo Route) route 3 (three) times daily. 100 each 11    levothyroxine (SYNTHROID) 100 MCG tablet Take 1 tablet (100 mcg total) by mouth before breakfast. 90 tablet 1    linaGLIPtin (TRADJENTA) 5 mg Tab tablet Take 1 tablet (5 mg total) by mouth once daily. 90 tablet 0    mupirocin (BACTROBAN) 2 % ointment Apply topically once daily. 22 g 1     valsartan-hydrochlorothiazide (DIOVAN-HCT) 160-12.5 mg per tablet Take 1 tablet by mouth once daily.       [DISCONTINUED] albuterol (PROVENTIL/VENTOLIN HFA) 90 mcg/actuation inhaler Inhale 2 puffs into the lungs every 4 (four) hours as needed for Wheezing. Rescue 8.5 g 1    [DISCONTINUED] albuterol (VENTOLIN HFA) 90 mcg/actuation inhaler Inhale 2 puffs into the lungs every 6 (six) hours as needed for Shortness of Breath. Rescue 18 g 6    [DISCONTINUED] albuterol-ipratropium (DUO-NEB) 2.5 mg-0.5 mg/3 mL nebulizer solution Take 3 mLs by nebulization every 6 (six) hours as needed for Wheezing. Rescue 90 mL 0    [DISCONTINUED] azelastine (ASTELIN) 137 mcg (0.1 %) nasal spray 1 spray (137 mcg total) by Nasal route 2 (two) times daily. 30 mL 0    [DISCONTINUED] fluticasone propion-salmeterol 115-21 mcg/dose (ADVAIR HFA) 115-21 mcg/actuation HFAA inhaler Inhale 2 puffs into the lungs every 12 (twelve) hours. Controller 12 g 0    [DISCONTINUED] montelukast (SINGULAIR) 10 mg tablet Take 1 tablet (10 mg total) by mouth every evening. 30 tablet 2     Current Facility-Administered Medications on File Prior to Visit   Medication Dose Route Frequency Provider Last Rate Last Admin    lactated ringers infusion   Intravenous Continuous Geraldine Vaughn MD 10 mL/hr at 11/08/19 0744 New Bag at 11/08/19 0744    lidocaine (PF) 10 mg/ml (1%) injection 10 mg  1 mL Intradermal Once Geraldine Vaughn MD         Social History     Socioeconomic History    Marital status:    Tobacco Use    Smoking status: Never    Smokeless tobacco: Never   Substance and Sexual Activity    Alcohol use: No    Drug use: No    Sexual activity: Never     Family History   Problem Relation Age of Onset    Asthma Mother     No Known Problems Father     Lung cancer Brother 65    Diabetes Brother     Diabetes Brother 80    Heart disease Daughter        Review of Systems   Constitutional:  Positive for fatigue. Negative for fever.   HENT:  Positive for postnasal  "drip, rhinorrhea and congestion.    Eyes:  Negative for redness and itching.   Respiratory:  Positive for cough, sputum production, shortness of breath, dyspnea on extertion, use of rescue inhaler and Paroxysmal Nocturnal Dyspnea.    Cardiovascular:  Negative for chest pain, palpitations and leg swelling.   Genitourinary:  Negative for difficulty urinating and hematuria.   Endocrine:  Negative for cold intolerance and heat intolerance.    Skin:  Negative for rash.   Gastrointestinal:  Negative for nausea and abdominal pain.   Neurological:  Negative for dizziness, syncope, weakness and light-headedness.   Hematological:  Negative for adenopathy. Does not bruise/bleed easily.   Psychiatric/Behavioral:  Negative for sleep disturbance. The patient is not nervous/anxious.      Objective:      BP (!) 140/80   Pulse 62   Resp 18   Ht 4' 11" (1.499 m)   Wt 57.4 kg (126 lb 8.7 oz)   SpO2 96%   BMI 25.56 kg/m²   Physical Exam  Vitals and nursing note reviewed.   Constitutional:       Appearance: She is well-developed.   HENT:      Head: Normocephalic and atraumatic.      Nose: Nose normal.      Mouth/Throat:      Pharynx: No oropharyngeal exudate.   Eyes:      Conjunctiva/sclera: Conjunctivae normal.      Pupils: Pupils are equal, round, and reactive to light.   Neck:      Thyroid: No thyromegaly.      Vascular: No JVD.      Trachea: No tracheal deviation.   Cardiovascular:      Rate and Rhythm: Normal rate and regular rhythm.      Heart sounds: Normal heart sounds.   Pulmonary:      Effort: Pulmonary effort is normal. No respiratory distress.      Breath sounds: Examination of the right-lower field reveals wheezing. Examination of the left-lower field reveals wheezing. Decreased breath sounds and wheezing present. No rhonchi or rales.   Chest:      Chest wall: No tenderness.   Abdominal:      General: Bowel sounds are normal.      Palpations: Abdomen is soft.   Musculoskeletal:         General: Normal range of motion. "      Cervical back: Neck supple.   Lymphadenopathy:      Cervical: No cervical adenopathy.   Skin:     General: Skin is warm and dry.   Neurological:      Mental Status: She is alert and oriented to person, place, and time.     Personal Diagnostic Review  Chest x-ray: hyperinflation      Pulmonary Studies Review 4/11/2023   SpO2 96   Height 59   Weight 2024.7   BMI (Calculated) 25.5   Predicted Distance 240.82   Predicted Distance Meters (Calculated) 377.88       X-Ray Sinuses Min 3 Views  Narrative: EXAM:  XR SINUSES MIN 3 VIEWS    CLINICAL HISTORY:  Sinusitis    FINDINGS:  3 views of the sinuses.    Paranasal sinuses are normally aerated. Nasal septum is midline.  Orbits appear intact.  Impression:  Negative sinus series..    Finalized on: 3/28/2023 4:52 PM By:  Roderick Castillo MD  [facility]# [id number]      2023-03-28 16:54:16.995    [facility]      Office Spirometry Results:     No flowsheet data found.  Pulmonary Studies Review 4/11/2023   SpO2 96   Height 59   Weight 2024.7   BMI (Calculated) 25.5   Predicted Distance 240.82   Predicted Distance Meters (Calculated) 377.88           CT Sinus/Facial Bones without Contrast    Anatomical Region Laterality Modality   -- -- Computed Tomography     Impression      No acute intracranial abnormality identified.     Age indeterminant traumatic nasal bone fracture.  Narrative    EXAM: ED CT HEAD WO CONTRAST, CT SINUS / FACIAL BONES WO CONTRAST     CLINICAL INDICATION: head trauma ;     COMPARISON: None.     FINDINGS: Multislice axial CT images were obtained through the brain and face without the use of intravenous contrast.  Automated exposure control was used for dose reduction.     Brain: The ventricles are normal in size and midline in position. The gray-white matter differentiation is well preserved. The brain parenchyma shows no evidence of recent hemorrhage, mass effect, or midline shift. No abnormal extra-axial fluid collections seen. No depressed skull fractures.   The mastoid  air cells are clear.       Face: Age indeterminant traumatic nasal bone fracture.  No focal soft tissue edema.  Orbital structures are symmetric. The visualized paranasal sinuses show mucosal membrane thickening right sphenoid sinus. Nasal cavity is clear.  Procedure Note    Latonya Krueger MD - 04/11/2019   Formatting of this note might be different from the original.   EXAM: ED CT HEAD WO CONTRAST, CT SINUS / FACIAL BONES WO CONTRAST     CLINICAL INDICATION: head trauma ;     COMPARISON: None.     FINDINGS: Multislice axial CT images were obtained through the brain and face without the use of intravenous contrast.  Automated exposure control was used for dose reduction.     Brain: The ventricles are normal in size and midline in position. The gray-white matter differentiation is well preserved. The brain parenchyma shows no evidence of recent hemorrhage, mass effect, or midline shift. No abnormal extra-axial fluid collections seen. No depressed skull fractures.   The mastoid air cells are clear.       Face: Age indeterminant traumatic nasal bone fracture.  No focal soft tissue edema.  Orbital structures are symmetric. The visualized paranasal sinuses show mucosal membrane thickening right sphenoid sinus. Nasal cavity is clear.     IMPRESSION:     No acute intracranial abnormality identified.     Age indeterminant traumatic nasal bone fracture.  Exam End: 04/11/19 12:41 Last Resulted: 04/11/19 12:59   Received From: [facility]  Result Received: 02/20/23 11:10    View Encounter       Assessment:            Mild intermittent asthma with acute exacerbation  -     predniSONE (DELTASONE) 20 MG tablet; Prednisone 60 mg/ day for 3 days, 40 mg/day for 3 days,20 mg/ day for 3 days, (1/2 tablet )10 mg a day for 3 days.take in the morning with food  Dispense: 20 tablet; Refill: 0  -     albuterol (PROVENTIL) 2.5 mg /3 mL (0.083 %) nebulizer  solution; Take 3 mLs (2.5 mg total) by nebulization every 4 to 6 hours as needed for Wheezing or Shortness of Breath.  Dispense: 360 mL; Refill: 11  -     NEBULIZER KIT (SUPPLIES) FOR HOME USE  -     NEBULIZER FOR HOME USE  -     fluticasone-salmeterol diskus inhaler 250-50 mcg; Inhale 1 puff into the lungs 2 (two) times daily. Controller  Dispense: 60 each; Refill: 11  -     Discontinue: azithromycin (ZITHROMAX Z-SOTERO) 250 MG tablet; take 2 tablets by mouth on day 1 then 1 tablet daily thereafter  Dispense: 6 tablet; Refill: 0  -     albuterol (PROVENTIL/VENTOLIN HFA) 90 mcg/actuation inhaler; Inhale 2 puffs into the lungs every 4 (four) hours as needed for Wheezing. Rescue  Dispense: 8.5 g; Refill: 1  -     doxycycline (MONODOX) 100 MG capsule; Take 1 capsule (100 mg total) by mouth every 12 (twelve) hours.  Dispense: 20 capsule; Refill: 0  -     Ambulatory referral/consult to Pulmonary Disease Management w/ Respiratory Therapist; Future; Expected date: 04/18/2023  -     Spirometry with/without bronchodilator; Future; Expected date: 10/12/2023    Chronic cough  -     montelukast (SINGULAIR) 10 mg tablet; Take 1 tablet (10 mg total) by mouth every evening.  Dispense: 30 tablet; Refill: 2  -     promethazine-dextromethorphan (PROMETHAZINE-DM) 6.25-15 mg/5 mL Syrp; Take 5 mLs by mouth 4 (four) times daily as needed (cough).  Dispense: 240 mL; Refill: 5    Seasonal allergic rhinitis due to other allergic trigger  -     azelastine (ASTELIN) 137 mcg (0.1 %) nasal spray; 1 spray (137 mcg total) by Nasal route 2 (two) times daily.  Dispense: 30 mL; Refill: 11          Outpatient Encounter Medications as of 4/11/2023   Medication Sig Dispense Refill    atorvastatin (LIPITOR) 20 MG tablet TAKE 1 TABLET(20 MG) BY MOUTH EVERY DAY 90 tablet 1    blood sugar diagnostic Strp 1 each by Misc.(Non-Drug; Combo Route) route 3 (three) times daily. Insurance preferred 100 strip 11    blood-glucose meter kit Use as instructed. Insurance  preferred 1 each 0    diclofenac sodium (VOLTAREN) 1 % Gel Apply 2 grams topically once daily. 100 g 0    empagliflozin (JARDIANCE) 10 mg tablet Take 1 tablet (10 mg total) by mouth once daily. 90 tablet 0    flash glucose sensor (FREESTYLE HAROLDO 14 DAY SENSOR) Kit 1 each by Misc.(Non-Drug; Combo Route) route every 14 (fourteen) days. 2 kit 11    glimepiride (AMARYL) 2 MG tablet Take 1 tablet (2 mg total) by mouth 2 (two) times a day. 180 tablet 0    lancets Misc 1 each by Misc.(Non-Drug; Combo Route) route 3 (three) times daily. 100 each 11    levothyroxine (SYNTHROID) 100 MCG tablet Take 1 tablet (100 mcg total) by mouth before breakfast. 90 tablet 1    linaGLIPtin (TRADJENTA) 5 mg Tab tablet Take 1 tablet (5 mg total) by mouth once daily. 90 tablet 0    mupirocin (BACTROBAN) 2 % ointment Apply topically once daily. 22 g 1    valsartan-hydrochlorothiazide (DIOVAN-HCT) 160-12.5 mg per tablet Take 1 tablet by mouth once daily.       [DISCONTINUED] albuterol (PROVENTIL/VENTOLIN HFA) 90 mcg/actuation inhaler Inhale 2 puffs into the lungs every 4 (four) hours as needed for Wheezing. Rescue 8.5 g 1    [DISCONTINUED] albuterol (VENTOLIN HFA) 90 mcg/actuation inhaler Inhale 2 puffs into the lungs every 6 (six) hours as needed for Shortness of Breath. Rescue 18 g 6    [DISCONTINUED] albuterol-ipratropium (DUO-NEB) 2.5 mg-0.5 mg/3 mL nebulizer solution Take 3 mLs by nebulization every 6 (six) hours as needed for Wheezing. Rescue 90 mL 0    [DISCONTINUED] azelastine (ASTELIN) 137 mcg (0.1 %) nasal spray 1 spray (137 mcg total) by Nasal route 2 (two) times daily. 30 mL 0    [DISCONTINUED] fluticasone propion-salmeterol 115-21 mcg/dose (ADVAIR HFA) 115-21 mcg/actuation HFAA inhaler Inhale 2 puffs into the lungs every 12 (twelve) hours. Controller 12 g 0    [DISCONTINUED] montelukast (SINGULAIR) 10 mg tablet Take 1 tablet (10 mg total) by mouth every evening. 30 tablet 2    albuterol (PROVENTIL) 2.5 mg /3 mL (0.083 %) nebulizer  solution Take 3 mLs (2.5 mg total) by nebulization every 4 to 6 hours as needed for Wheezing or Shortness of Breath. 360 mL 11    albuterol (PROVENTIL/VENTOLIN HFA) 90 mcg/actuation inhaler Inhale 2 puffs into the lungs every 4 (four) hours as needed for Wheezing. Rescue 8.5 g 1    azelastine (ASTELIN) 137 mcg (0.1 %) nasal spray 1 spray (137 mcg total) by Nasal route 2 (two) times daily. 30 mL 11    doxycycline (MONODOX) 100 MG capsule Take 1 capsule (100 mg total) by mouth every 12 (twelve) hours. 20 capsule 0    fluticasone-salmeterol diskus inhaler 250-50 mcg Inhale 1 puff into the lungs 2 (two) times daily. Controller 60 each 11    montelukast (SINGULAIR) 10 mg tablet Take 1 tablet (10 mg total) by mouth every evening. 30 tablet 2    predniSONE (DELTASONE) 20 MG tablet Prednisone 60 mg/ day for 3 days, 40 mg/day for 3 days,20 mg/ day for 3 days, (1/2 tablet )10 mg a day for 3 days.take in the morning with food 20 tablet 0    promethazine-dextromethorphan (PROMETHAZINE-DM) 6.25-15 mg/5 mL Syrp Take 5 mLs by mouth 4 (four) times daily as needed (cough). 240 mL 5    [DISCONTINUED] azithromycin (ZITHROMAX Z-SOTERO) 250 MG tablet take 2 tablets by mouth on day 1 then 1 tablet daily thereafter 6 tablet 0     Facility-Administered Encounter Medications as of 4/11/2023   Medication Dose Route Frequency Provider Last Rate Last Admin    lactated ringers infusion   Intravenous Continuous Geraldine Vaughn MD 10 mL/hr at 11/08/19 0744 New Bag at 11/08/19 0744    lidocaine (PF) 10 mg/ml (1%) injection 10 mg  1 mL Intradermal Once Geraldine Vaughn MD         Plan:       Requested Prescriptions     Signed Prescriptions Disp Refills    predniSONE (DELTASONE) 20 MG tablet 20 tablet 0     Sig: Prednisone 60 mg/ day for 3 days, 40 mg/day for 3 days,20 mg/ day for 3 days, (1/2 tablet )10 mg a day for 3 days.take in the morning with food    albuterol (PROVENTIL) 2.5 mg /3 mL (0.083 %) nebulizer solution 360 mL 11     Sig: Take 3 mLs (2.5  mg total) by nebulization every 4 to 6 hours as needed for Wheezing or Shortness of Breath.    fluticasone-salmeterol diskus inhaler 250-50 mcg 60 each 11     Sig: Inhale 1 puff into the lungs 2 (two) times daily. Controller    azelastine (ASTELIN) 137 mcg (0.1 %) nasal spray 30 mL 11     Si spray (137 mcg total) by Nasal route 2 (two) times daily.    albuterol (PROVENTIL/VENTOLIN HFA) 90 mcg/actuation inhaler 8.5 g 1     Sig: Inhale 2 puffs into the lungs every 4 (four) hours as needed for Wheezing. Rescue    montelukast (SINGULAIR) 10 mg tablet 30 tablet 2     Sig: Take 1 tablet (10 mg total) by mouth every evening.    doxycycline (MONODOX) 100 MG capsule 20 capsule 0     Sig: Take 1 capsule (100 mg total) by mouth every 12 (twelve) hours.    promethazine-dextromethorphan (PROMETHAZINE-DM) 6.25-15 mg/5 mL Syrp 240 mL 5     Sig: Take 5 mLs by mouth 4 (four) times daily as needed (cough).     Problem List Items Addressed This Visit       Chronic cough    Relevant Medications    montelukast (SINGULAIR) 10 mg tablet    promethazine-dextromethorphan (PROMETHAZINE-DM) 6.25-15 mg/5 mL Syrp     Other Visit Diagnoses       Mild intermittent asthma with acute exacerbation    -  Primary    Relevant Medications    predniSONE (DELTASONE) 20 MG tablet    albuterol (PROVENTIL) 2.5 mg /3 mL (0.083 %) nebulizer solution    fluticasone-salmeterol diskus inhaler 250-50 mcg    albuterol (PROVENTIL/VENTOLIN HFA) 90 mcg/actuation inhaler    doxycycline (MONODOX) 100 MG capsule    Other Relevant Orders    NEBULIZER KIT (SUPPLIES) FOR HOME USE    NEBULIZER FOR HOME USE    Ambulatory referral/consult to Pulmonary Disease Management w/ Respiratory Therapist    Spirometry with/without bronchodilator    Seasonal allergic rhinitis due to other allergic trigger        Relevant Medications    azelastine (ASTELIN) 137 mcg (0.1 %) nasal spray               Follow up in about 5 months (around 2023) for Review cameron - on return.    MEDICAL  DECISION MAKING: Moderate to high complexity.  Overall, the multiple problems listed are of moderate to high severity that may impact quality of life and activities of daily living. Side effects of medications, treatment plan as well as options and alternatives reviewed and discussed with patient. There was counseling of patient concerning these issues.    Total time spent in counseling and coordination of care - 45  minutes of total time spent on the encounter, which includes face to face time and non-face to face time preparing to see the patient (eg, review of tests), Obtaining and/or reviewing separately obtained history, Documenting clinical information in the electronic or other health record, Independently interpreting results (not separately reported) and communicating results to the patient/family/caregiver, or Care coordination (not separately reported).    Time was used in discussion of prognosis, risks, benefits of treatment, instructions and compliance with regimen . Discussion with other physicians and/or health care providers - home health or for use of durable medical equipment (oxygen, nebulizers, CPAP, BiPAP) occurred.  45

## 2023-04-19 ENCOUNTER — PATIENT MESSAGE (OUTPATIENT)
Dept: PHARMACY | Facility: CLINIC | Age: 83
End: 2023-04-19
Payer: MEDICARE

## 2023-05-15 ENCOUNTER — LAB VISIT (OUTPATIENT)
Dept: LAB | Facility: HOSPITAL | Age: 83
End: 2023-05-15
Attending: NURSE PRACTITIONER
Payer: MEDICARE

## 2023-05-15 DIAGNOSIS — E11.69 TYPE 2 DIABETES MELLITUS WITH OTHER SPECIFIED COMPLICATION, WITHOUT LONG-TERM CURRENT USE OF INSULIN: ICD-10-CM

## 2023-05-15 LAB
ANION GAP SERPL CALC-SCNC: 12 MMOL/L (ref 8–16)
BUN SERPL-MCNC: 33 MG/DL (ref 8–23)
CALCIUM SERPL-MCNC: 9 MG/DL (ref 8.7–10.5)
CHLORIDE SERPL-SCNC: 108 MMOL/L (ref 95–110)
CO2 SERPL-SCNC: 23 MMOL/L (ref 23–29)
CREAT SERPL-MCNC: 1.5 MG/DL (ref 0.5–1.4)
EST. GFR  (NO RACE VARIABLE): 34.6 ML/MIN/1.73 M^2
ESTIMATED AVG GLUCOSE: 157 MG/DL (ref 68–131)
GLUCOSE SERPL-MCNC: 100 MG/DL (ref 70–110)
HBA1C MFR BLD: 7.1 % (ref 4–5.6)
POTASSIUM SERPL-SCNC: 4.7 MMOL/L (ref 3.5–5.1)
SODIUM SERPL-SCNC: 143 MMOL/L (ref 136–145)

## 2023-05-15 PROCEDURE — 83036 HEMOGLOBIN GLYCOSYLATED A1C: CPT | Performed by: NURSE PRACTITIONER

## 2023-05-15 PROCEDURE — 36415 COLL VENOUS BLD VENIPUNCTURE: CPT | Performed by: NURSE PRACTITIONER

## 2023-05-15 PROCEDURE — 80048 BASIC METABOLIC PNL TOTAL CA: CPT | Performed by: NURSE PRACTITIONER

## 2023-05-31 DIAGNOSIS — E11.69 TYPE 2 DIABETES MELLITUS WITH OTHER SPECIFIED COMPLICATION, WITHOUT LONG-TERM CURRENT USE OF INSULIN: ICD-10-CM

## 2023-05-31 RX ORDER — GLIMEPIRIDE 2 MG/1
2 TABLET ORAL 2 TIMES DAILY
Qty: 180 TABLET | Refills: 0 | Status: SHIPPED | OUTPATIENT
Start: 2023-05-31 | End: 2023-10-27 | Stop reason: SDUPTHER

## 2023-05-31 RX ORDER — LINAGLIPTIN 5 MG/1
5 TABLET, FILM COATED ORAL DAILY
Qty: 90 TABLET | Refills: 0 | Status: SHIPPED | OUTPATIENT
Start: 2023-05-31 | End: 2023-10-27 | Stop reason: SDUPTHER

## 2023-05-31 NOTE — TELEPHONE ENCOUNTER
----- Message from Rosalind Pearson sent at 5/31/2023  2:50 PM CDT -----  Contact: Lemuel- pt daughter  Lemuel Oliva would like a call back at [phone]. She  states the pt  will soon be traveling ,and she would like to know if she can get a prescription for a  90 day supply on all of her diabetic medication, and supplies.

## 2023-06-15 ENCOUNTER — PATIENT MESSAGE (OUTPATIENT)
Dept: PULMONOLOGY | Facility: CLINIC | Age: 83
End: 2023-06-15
Payer: MEDICARE

## 2023-06-15 DIAGNOSIS — J45.21 MILD INTERMITTENT ASTHMA WITH ACUTE EXACERBATION: ICD-10-CM

## 2023-06-15 RX ORDER — ALBUTEROL SULFATE 90 UG/1
2 AEROSOL, METERED RESPIRATORY (INHALATION) EVERY 4 HOURS PRN
Qty: 25.5 G | Refills: 3 | Status: SHIPPED | OUTPATIENT
Start: 2023-06-15

## 2023-07-20 ENCOUNTER — PES CALL (OUTPATIENT)
Dept: ADMINISTRATIVE | Facility: CLINIC | Age: 83
End: 2023-07-20
Payer: MEDICARE

## 2023-09-12 ENCOUNTER — TELEPHONE (OUTPATIENT)
Dept: PULMONOLOGY | Facility: CLINIC | Age: 83
End: 2023-09-12
Payer: MEDICARE

## 2023-10-27 DIAGNOSIS — E11.69 TYPE 2 DIABETES MELLITUS WITH OTHER SPECIFIED COMPLICATION, WITHOUT LONG-TERM CURRENT USE OF INSULIN: ICD-10-CM

## 2023-10-27 NOTE — TELEPHONE ENCOUNTER
Care Due:                  Date            Visit Type   Department     Provider  --------------------------------------------------------------------------------                                SAME DAY -                              ESTABLISHED   [facility]  Last Visit: 02-      PATIENT      MEDICINE       Juventino Barboas  Next Visit: None Scheduled  None         None Found                                                            Last  Test          Frequency    Reason                     Performed    Due Date  --------------------------------------------------------------------------------    Lipid Panel.  12 months..  atorvastatin.............  11-   10-    [facility] Embedded Care Due Messages. Reference number: [id number].   10/27/2023 2:06:35 PM CDT

## 2023-10-30 RX ORDER — LEVOTHYROXINE SODIUM 100 UG/1
100 TABLET ORAL
Qty: 90 TABLET | Refills: 1 | Status: SHIPPED | OUTPATIENT
Start: 2023-10-30 | End: 2024-10-29

## 2023-10-31 RX ORDER — GLIMEPIRIDE 2 MG/1
2 TABLET ORAL 2 TIMES DAILY
Qty: 180 TABLET | Refills: 0 | Status: SHIPPED | OUTPATIENT
Start: 2023-10-31 | End: 2024-02-07 | Stop reason: SDUPTHER

## 2023-10-31 RX ORDER — LINAGLIPTIN 5 MG/1
5 TABLET, FILM COATED ORAL DAILY
Qty: 90 TABLET | Refills: 0 | Status: SHIPPED | OUTPATIENT
Start: 2023-10-31 | End: 2024-02-07 | Stop reason: SDUPTHER

## 2023-11-27 ENCOUNTER — TELEPHONE (OUTPATIENT)
Dept: DIABETES | Facility: CLINIC | Age: 83
End: 2023-11-27

## 2023-11-27 ENCOUNTER — OFFICE VISIT (OUTPATIENT)
Dept: DIABETES | Facility: CLINIC | Age: 83
End: 2023-11-27
Payer: MEDICARE

## 2023-11-27 DIAGNOSIS — N18.4 CHRONIC KIDNEY DISEASE (CKD), STAGE IV (SEVERE): ICD-10-CM

## 2023-11-27 DIAGNOSIS — E11.69 TYPE 2 DIABETES MELLITUS WITH OTHER SPECIFIED COMPLICATION, WITHOUT LONG-TERM CURRENT USE OF INSULIN: Primary | ICD-10-CM

## 2023-11-27 PROCEDURE — 99214 PR OFFICE/OUTPT VISIT, EST, LEVL IV, 30-39 MIN: ICD-10-PCS | Mod: HCNC,95,, | Performed by: NURSE PRACTITIONER

## 2023-11-27 PROCEDURE — 99214 OFFICE O/P EST MOD 30 MIN: CPT | Mod: HCNC,95,, | Performed by: NURSE PRACTITIONER

## 2023-11-27 NOTE — PATIENT INSTRUCTIONS
PATIENT INSTRUCTIONS     Lifestyle modification with well balanced diet and at least 30 minutes of physical activity daily recommended.   Labs ordered and will be scheduled by [facility] staff.   Pro CGM ordered today. Indications for CGMS:  Annual Screening      Continue glimeperide to 2 mg by mouth twice daily before meals.   Continue Tradjenta 5 mg by mouth daily.   Continue Jardiance 10 mg by mouth daily.     Check blood sugar twice daily. Every morning when you wake up and 1-2 hours after main meal of the day. Keep log and upload to [employer] prior to each visit.   Blood Sugar Goals:       Fastin-130.       1-2 hours after a meal: Less than 180.

## 2023-11-27 NOTE — Clinical Note
Virtual, on 12/18.  Schedule fasting labs Prior to f/u Pro-CGM ordered today. Please contact to schedule.

## 2023-11-27 NOTE — PROGRESS NOTES
The patient location is: Home  The chief complaint leading to consultation is: Diabetes    Visit type: audiovisual    Face to Face time with patient: 15  30 minutes of total time spent on the encounter, which includes face to face time and non-face to face time preparing to see the patient (eg, review of tests), Obtaining and/or reviewing separately obtained history, Documenting clinical information in the electronic or other health record, Independently interpreting results (not separately reported) and communicating results to the patient/family/caregiver, or Care coordination (not separately reported).  Each patient to whom he or she provides medical services by telemedicine is:  (1) informed of the relationship between the physician and patient and the respective role of any other health care provider with respect to management of the patient; and (2) notified that he or she may decline to receive medical services by telemedicine and may withdraw from such care at any time.    Notes:    Odin Oliva is a 83 y.o. female who presents for a follow up evaluation of Type 2 diabetes mellitus.     CHIEF COMPLAINT: Diabetes Consultation    PCP: Juventino Barbosa MD      Initial visit with me - 2/2023    The patient was initially diagnosed with diabetes at age 56.   Previously followed at [facility] by Inna Stephen NP, last visit 3/1/2022.      Previous failed treatments include:  Metformin - stopped due to decline in renal function.     Social Documentation:  Patient lives in her home in [place], daughter and son in law live with her.   Occupation: does not work.   Exercise: walking outside/gardening.     Diabetes related complications:   nephropathy.   denies Pancreatitis  denies Gastroparesis  denies DKA  denies Hx/family Hx of MEN2/MTC  denies Frequent UTIs/yeast infections     Cardiovascular Risk Factors: advanced age (>55 for men, >65 for women) and hypertension.    Diabetes Medications                empagliflozin (JARDIANCE) 10 mg tablet Take 1 tablet (10 mg total) by mouth once daily.    glimepiride (AMARYL) 2 MG tablet Take 1 tablet (2 mg total) by mouth 2 (two) times a day.    linaGLIPtin (TRADJENTA) 5 mg Tab tablet Take 1 tablet (5 mg total) by mouth once daily.     Current monitoring regimen: capillary blood glucose monitoring with finger sticks.    Patient currently taking insulin or sulfonylurea?  Yes. Emergency Glucagon Prescription current? no  Recent hypoglycemic episodes: No.     Patient compliant with glucose checks and medication administration? Yes    DIABETES MANAGEMENT STATUS  Statin: Taking  ACE/ARB: Taking  Screening or Prevention Patient's value Goal Complete/Controlled?   HgA1C Testing and Control   Lab Results   Component Value Date    HGBA1C 7.1 (H) 05/15/2023      Annually/Less than 8% Yes   Lipid profile : 11/01/2022 Annually No   LDL control Lab Results   Component Value Date    LDLCALC 117.2 11/01/2022    Annually/Less than 100 mg/dl  No   Nephropathy screening Lab Results   Component Value Date    LABMICR 251.0 11/01/2022     Lab Results   Component Value Date    PROTEINUA Trace (A) 02/24/2023     Lab Results   Component Value Date    UTPCR 0.27 (H) 10/27/2021      Annually Yes   Blood pressure BP Readings from Last 1 Encounters:   04/11/23 (!) 140/80    Less than 140/90 Yes   Dilated retinal exam Most Recent Eye Exam Date: Not Found Annually No   Foot exam   Most Recent Foot Exam Date: Not Found Annually No   Patient's medications, allergies, surgical, social and family histories were reviewed and updated as appropriate.     Review of Systems   Constitutional:  Negative for weight loss.   Eyes:  Negative for blurred vision and double vision.   Cardiovascular:  Negative for chest pain.   Gastrointestinal:  Negative for nausea and vomiting.   Genitourinary:  Negative for frequency.   Musculoskeletal:  Negative for falls.   Neurological:  Negative for dizziness and  "weakness.   Endo/Heme/Allergies:  Negative for polydipsia.   Psychiatric/Behavioral:  Negative for depression.    All other systems reviewed and are negative.       Physical Exam  Constitutional:       Appearance: Normal appearance.   HENT:      Head: Normocephalic and atraumatic.   Pulmonary:      Effort: No respiratory distress.   Musculoskeletal:      Cervical back: Normal range of motion.   Neurological:      Mental Status: She is alert and oriented to person, place, and time.   Psychiatric:         Mood and Affect: Mood normal.         Behavior: Behavior normal.        There were no vitals taken for this visit.  Wt Readings from Last 3 Encounters:   04/11/23 57.4 kg (126 lb 8.7 oz)   03/28/23 57.5 kg (126 lb 12.2 oz)   02/24/23 58.1 kg (128 lb 1.4 oz)       LAB REVIEW  Lab Results   Component Value Date     05/15/2023    K 4.7 05/15/2023     05/15/2023    CO2 23 05/15/2023    BUN 33 (H) 05/15/2023    CREATININE 1.5 (H) 05/15/2023    CALCIUM 9.0 05/15/2023    ANIONGAP 12 05/15/2023    EGFRNORACEVR 34.6 (A) 05/15/2023     No results found for: "CPEPTIDE", "GLUTAMICACID", "INSLNABS"  Hemoglobin A1C   Date Value Ref Range Status   05/15/2023 7.1 (H) 4.0 - 5.6 % Final     Comment:     ADA Screening Guidelines:  5.7-6.4%  Consistent with prediabetes  >or=6.5%  Consistent with diabetes    High levels of fetal hemoglobin interfere with the HbA1C  assay. Heterozygous hemoglobin variants (HbS, HgC, etc)do  not significantly interfere with this assay.   However, presence of multiple variants may affect accuracy.     02/24/2023 6.6 (H) 4.0 - 5.6 % Final     Comment:     ADA Screening Guidelines:  5.7-6.4%  Consistent with prediabetes  >or=6.5%  Consistent with diabetes    High levels of fetal hemoglobin interfere with the HbA1C  assay. Heterozygous hemoglobin variants (HbS, HgC, etc)do  not significantly interfere with this assay.   However, presence of multiple variants may affect accuracy.     11/01/2022 6.9 " (H) 4.0 - 5.6 % Final     Comment:     ADA Screening Guidelines:  5.7-6.4%  Consistent with prediabetes  >or=6.5%  Consistent with diabetes    High levels of fetal hemoglobin interfere with the HbA1C  assay. Heterozygous hemoglobin variants (HbS, HgC, etc)do  not significantly interfere with this assay.   However, presence of multiple variants may affect accuracy.          ASSESSMENT    ICD-10-CM ICD-9-CM   1. Type 2 diabetes mellitus with other specified complication, without long-term current use of insulin  E11.69 250.80   2. Chronic kidney disease (CKD), stage IV (severe)  N18.4 585.4       PLAN  Diagnoses and all orders for this visit:    Type 2 diabetes mellitus with other specified complication, without long-term current use of insulin  -     Basic Metabolic Panel; Future  -     Hemoglobin A1C; Future  -     Lipid Panel; Future  -     Microalbumin/Creatinine Ratio, Urine; Future  -     GLUCOSE MONITORING CONTINUOUS MIN 72 HOURS; Future    Chronic kidney disease (CKD), stage IV (severe)        Reviewed pathophysiology of diabetes, complications related to the disease, importance of annual dilated eye exam and daily foot examination. Explained MOA, SE, dosage of medications. Written instructions given and reviewed with patient and patient verbalizes understanding.     PATIENT INSTRUCTIONS     Lifestyle modification with well balanced diet and at least 30 minutes of physical activity daily recommended.   Labs ordered and will be scheduled by [facility] staff.   Pro CGM ordered today. Indications for CGMS:  Annual Screening      Continue glimeperide to 2 mg by mouth twice daily before meals.   Continue Tradjenta 5 mg by mouth daily.   Continue Jardiance 10 mg by mouth daily.     Check blood sugar twice daily. Every morning when you wake up and 1-2 hours after main meal of the day. Keep log and upload to [employer] prior to each visit.   Blood Sugar Goals:       Fastin-130.       1-2 hours after  a meal: Less than 180.         Follow up in about 3 weeks (around 12/18/2023) for Virtual, Schedule fasting labs, Pro-CGM ordered today. Please contact to schedule..    Portions of this note were prepared with [employer] Naturally [employer] voice recognition transcription software. Grammatical errors, including garbled syntax, mangle pronouns, and other bizarre constructions may be attributed to that software system.

## 2023-11-27 NOTE — TELEPHONE ENCOUNTER
----- Message from Latonya Aragon LPN sent at 11/27/2023  2:10 PM CST -----  Please schedule pro.   ----- Message -----  From: Bertha Paul FNP  Sent: 11/27/2023   1:24 PM CST  To: Latonya Aragon LPN; #    Virtual, on 12/18.   Schedule fasting labs Prior to f/u  Pro-CGM ordered today. Please contact to schedule.

## 2023-11-28 ENCOUNTER — TELEPHONE (OUTPATIENT)
Dept: DIABETES | Facility: CLINIC | Age: 83
End: 2023-11-28
Payer: MEDICARE

## 2023-12-06 ENCOUNTER — LAB VISIT (OUTPATIENT)
Dept: LAB | Facility: HOSPITAL | Age: 83
End: 2023-12-06
Attending: NURSE PRACTITIONER
Payer: MEDICARE

## 2023-12-06 DIAGNOSIS — E11.69 TYPE 2 DIABETES MELLITUS WITH OTHER SPECIFIED COMPLICATION, WITHOUT LONG-TERM CURRENT USE OF INSULIN: ICD-10-CM

## 2023-12-06 LAB
ALBUMIN/CREAT UR: 163.3 UG/MG (ref 0–30)
ANION GAP SERPL CALC-SCNC: 9 MMOL/L (ref 8–16)
BUN SERPL-MCNC: 41 MG/DL (ref 8–23)
CALCIUM SERPL-MCNC: 9.9 MG/DL (ref 8.7–10.5)
CHLORIDE SERPL-SCNC: 110 MMOL/L (ref 95–110)
CHOLEST SERPL-MCNC: 214 MG/DL (ref 120–199)
CHOLEST/HDLC SERPL: 5.9 {RATIO} (ref 2–5)
CO2 SERPL-SCNC: 22 MMOL/L (ref 23–29)
CREAT SERPL-MCNC: 1.7 MG/DL (ref 0.5–1.4)
CREAT UR-MCNC: 90 MG/DL (ref 15–325)
EST. GFR  (NO RACE VARIABLE): 29.8 ML/MIN/1.73 M^2
ESTIMATED AVG GLUCOSE: 163 MG/DL (ref 68–131)
GLUCOSE SERPL-MCNC: 142 MG/DL (ref 70–110)
HBA1C MFR BLD: 7.3 % (ref 4–5.6)
HDLC SERPL-MCNC: 36 MG/DL (ref 40–75)
HDLC SERPL: 16.8 % (ref 20–50)
LDLC SERPL CALC-MCNC: 152 MG/DL (ref 63–159)
MICROALBUMIN UR DL<=1MG/L-MCNC: 147 UG/ML
NONHDLC SERPL-MCNC: 178 MG/DL
POTASSIUM SERPL-SCNC: 4.9 MMOL/L (ref 3.5–5.1)
SODIUM SERPL-SCNC: 141 MMOL/L (ref 136–145)
TRIGL SERPL-MCNC: 130 MG/DL (ref 30–150)

## 2023-12-06 PROCEDURE — 82043 UR ALBUMIN QUANTITATIVE: CPT | Mod: HCNC | Performed by: NURSE PRACTITIONER

## 2023-12-06 PROCEDURE — 36415 COLL VENOUS BLD VENIPUNCTURE: CPT | Mod: HCNC | Performed by: NURSE PRACTITIONER

## 2023-12-06 PROCEDURE — 83036 HEMOGLOBIN GLYCOSYLATED A1C: CPT | Mod: HCNC | Performed by: NURSE PRACTITIONER

## 2023-12-06 PROCEDURE — 80061 LIPID PANEL: CPT | Mod: HCNC | Performed by: NURSE PRACTITIONER

## 2023-12-06 PROCEDURE — 80048 BASIC METABOLIC PNL TOTAL CA: CPT | Mod: HCNC | Performed by: NURSE PRACTITIONER

## 2023-12-07 ENCOUNTER — TELEPHONE (OUTPATIENT)
Dept: INTERNAL MEDICINE | Facility: CLINIC | Age: 83
End: 2023-12-07
Payer: MEDICARE

## 2023-12-13 ENCOUNTER — CLINICAL SUPPORT (OUTPATIENT)
Dept: DIABETES | Facility: CLINIC | Age: 83
End: 2023-12-13
Payer: MEDICARE

## 2023-12-13 DIAGNOSIS — E11.69 TYPE 2 DIABETES MELLITUS WITH OTHER SPECIFIED COMPLICATION, WITHOUT LONG-TERM CURRENT USE OF INSULIN: Primary | ICD-10-CM

## 2023-12-13 PROCEDURE — 99999 PR PBB SHADOW E&M-EST. PATIENT-LVL III: CPT | Mod: PBBFAC,HCNC,, | Performed by: DIETITIAN, REGISTERED

## 2023-12-13 PROCEDURE — 99999 PR PBB SHADOW E&M-EST. PATIENT-LVL III: ICD-10-PCS | Mod: PBBFAC,HCNC,, | Performed by: DIETITIAN, REGISTERED

## 2023-12-13 NOTE — PROGRESS NOTES
"Diabetes Care Specialist Progress Note  Author: Shayy Sorto RD, CDE  Date: 12/13/2023    Program Intake  Reason for Diabetes Program Visit:: Intervention  Type of Intervention:: Individual  Individual: Device Training  Device Training: Professional CGM (Dexcom Pro- ON Visit)  Current diabetes risk level:: moderate  In the last 12 months, have you:: used emergency room services  Was the ER or hospital admission related to diabetes?: No  Permission to speak with others about care:: no    Lab Results   Component Value Date    HGBA1C 7.3 (H) 12/06/2023         PLACEMENT OF DEXCOM G6 PRO SENSOR  CONTINOUS GLUCOSE MONITORING SYSTEM (CGMS)    Patient is here in clinic today for placement of continuous glucose monitoring sensor.                 Patient verified that they were here for CGMS procedure ordered by their provider and that they have a working glucose meter and supplies at home.     Patient provided with a Dexcom G6 Pro Sensor and Transmitter and a copy of the Continuous Glucose Monitoring Patient Food Log to fill out during the study.  A detailed explanation of Continuous Glucose Monitoring was provided. Patient informed that this is a blind procedure and that they will not actually see the blood sugar tracing in real time.  Reviewed with patient the  patient education handout called "Dexcom Blinded CGM Patient Handout" to review self-care during the study to avoid sensor loosening or removal i.e., bathing, swimming, dressing, and exercising. Patient encouraged to contact provider if Dexcom G6 Pro falls off within 72 hours (3 days) of placement.  However, if Dexcom G6 Pro falls off after 72 hours (3 days) of placement, simply place entire Dexcom device in an envelope/bag and bring to scheduled removal appointment.    Instructed patient to check blood sugar using home glucometer as normally would and to record the following on provided patient log sheets. Reminded patient that the clinic/provider " will not get patient's real-time glucose readings in unblinded test.  Glucose readings will be obtained at Dexcom OFF visit and report sent to ordering provider at that time.    Patient was brought to a private location.  Abdomen area for insertion was selected and prepared and allowed to dry. Single use Dexcom G6 sensor placed in patient's abdomen.  Dexcom Sensor Lot # [id number] with expiration of 2/27/2024.  Dexcom G6 Transmitter Serial Number [id number] was inserted into sensor and paired with Dexcom G6 Pro [place].  Patient reminded of time/date of CGM off appointment.                  The following forms were given and reviewed in detail with patient and all questions answered:  Dexcom Blinded CGM Patient Handout    Dexcom Daily Log Sheet        Follow Up Plan     Follow up in about 1 week (around 12/20/2023) for E11.69.    Today's care plan and follow up schedule was discussed with patient.  Fozeh verbalized understanding of the care plan, goals, and agrees to follow up plan.        The patient was encouraged to communicate with his/her health care provider/physician and care team regarding his/her condition(s) and treatment.  I provided the patient with my contact information today and encouraged to contact me via phone or [facility] as needed.     Length of Visit   Total Time: 15 Minutes

## 2023-12-20 ENCOUNTER — CLINICAL SUPPORT (OUTPATIENT)
Dept: DIABETES | Facility: CLINIC | Age: 83
End: 2023-12-20
Payer: MEDICARE

## 2023-12-20 DIAGNOSIS — E11.69 TYPE 2 DIABETES MELLITUS WITH OTHER SPECIFIED COMPLICATION, WITHOUT LONG-TERM CURRENT USE OF INSULIN: Primary | ICD-10-CM

## 2023-12-20 PROCEDURE — 95250 CONT GLUC MNTR PHYS/QHP EQP: CPT | Mod: HCNC,S$GLB,, | Performed by: DIETITIAN, REGISTERED

## 2023-12-20 PROCEDURE — 95250 PR GLUCOSE MONITORING,72 HRS,SUB-Q SENSOR: ICD-10-PCS | Mod: HCNC,S$GLB,, | Performed by: DIETITIAN, REGISTERED

## 2023-12-20 NOTE — PROGRESS NOTES
Diabetes Care Specialist Progress Note  Author: Shayy Sorto RD, CDE  Date: 12/20/2023    Program Intake  Reason for Diabetes Program Visit:: Intervention  Type of Intervention:: Individual  Individual: Device Training  Device Training: Professional CGM (Dexcom Pro- OFF visit)  Current diabetes risk level:: moderate    Lab Results   Component Value Date    HGBA1C 7.3 (H) 12/06/2023     REMOVAL OF DEXCOM G6 PRO SENSOR  CONTINOUS GLUCOSE MONITORING SYSTEM (CGMS)      Patient returned to clinic today to return Dexcom continuous glucose sensor/transmitter and remove device from abdomen.       The CGMS Dexcom G6 Pro Sensor will be scanned and downloaded into the patient's electronic medical record.      Endocrine provider will complete data interpretation and make recommendations; will forward recommendations to the ordering provider for follow up with patient.               Follow Up Plan     Follow up if symptoms worsen or fail to improve, for E11.69.    Today's care plan and follow up schedule was discussed with patient.  Fozeh verbalized understanding of the care plan, goals, and agrees to follow up plan.        The patient was encouraged to communicate with his/her health care provider/physician and care team regarding his/her condition(s) and treatment.  I provided the patient with my contact information today and encouraged to contact me via phone or [facility] as needed.     Length of Visit   Total Time: 15 Minutes

## 2023-12-21 ENCOUNTER — PATIENT MESSAGE (OUTPATIENT)
Dept: ADMINISTRATIVE | Facility: CLINIC | Age: 83
End: 2023-12-21
Payer: MEDICARE

## 2024-02-07 DIAGNOSIS — E11.69 TYPE 2 DIABETES MELLITUS WITH OTHER SPECIFIED COMPLICATION, WITHOUT LONG-TERM CURRENT USE OF INSULIN: ICD-10-CM

## 2024-02-07 RX ORDER — LINAGLIPTIN 5 MG/1
5 TABLET, FILM COATED ORAL DAILY
Qty: 90 TABLET | Refills: 0 | Status: SHIPPED | OUTPATIENT
Start: 2024-02-07 | End: 2024-05-09 | Stop reason: SDUPTHER

## 2024-02-07 RX ORDER — GLIMEPIRIDE 2 MG/1
2 TABLET ORAL 2 TIMES DAILY
Qty: 180 TABLET | Refills: 0 | Status: SHIPPED | OUTPATIENT
Start: 2024-02-07 | End: 2024-05-09 | Stop reason: SDUPTHER

## 2024-03-05 ENCOUNTER — TELEPHONE (OUTPATIENT)
Dept: INTERNAL MEDICINE | Facility: CLINIC | Age: 84
End: 2024-03-05
Payer: MEDICARE

## 2024-03-05 NOTE — TELEPHONE ENCOUNTER
----- Message from Luis F Nieto sent at 3/5/2024  1:27 PM CST -----  Contact: evelyn/daughter  Type:  Sooner Appointment Request    Name of Caller:evelyn  When is the first available appointment?unknown  Symptoms:coughing   Would the patient rather a call back or a response via [facility]?  callback  Best Call Back Number:[phone]    dditional Information:

## 2024-04-17 ENCOUNTER — TELEPHONE (OUTPATIENT)
Dept: DIABETES | Facility: CLINIC | Age: 84
End: 2024-04-17
Payer: MEDICARE

## 2024-04-29 ENCOUNTER — OFFICE VISIT (OUTPATIENT)
Dept: DIABETES | Facility: CLINIC | Age: 84
End: 2024-04-29
Payer: MEDICARE

## 2024-04-29 DIAGNOSIS — E11.69 TYPE 2 DIABETES MELLITUS WITH OTHER SPECIFIED COMPLICATION, WITHOUT LONG-TERM CURRENT USE OF INSULIN: Primary | ICD-10-CM

## 2024-04-29 PROCEDURE — 99214 OFFICE O/P EST MOD 30 MIN: CPT | Mod: HCNC,95,, | Performed by: NURSE PRACTITIONER

## 2024-04-29 NOTE — PATIENT INSTRUCTIONS
PATIENT INSTRUCTIONS     Labs ordered and will be scheduled by [facility] staff.      Continue glimeperide to 2 mg by mouth twice daily before meals.   Continue Tradjenta 5 mg by mouth daily.   Continue Jardiance 10 mg by mouth daily.     Check blood sugar twice daily. Every morning when you wake up and 1-2 hours after main meal of the day. Keep log and upload to [employer] prior to each visit.   Blood Sugar Goals:       Fastin-130.       1-2 hours after a meal: Less than 180.

## 2024-04-29 NOTE — PROGRESS NOTES
The patient location is: Home  The chief complaint leading to consultation is: Diabetes    Visit type: audiovisual    Face to Face time with patient: 15  30 minutes of total time spent on the encounter, which includes face to face time and non-face to face time preparing to see the patient (eg, review of tests), Obtaining and/or reviewing separately obtained history, Documenting clinical information in the electronic or other health record, Independently interpreting results (not separately reported) and communicating results to the patient/family/caregiver, or Care coordination (not separately reported).  Each patient to whom he or she provides medical services by telemedicine is:  (1) informed of the relationship between the physician and patient and the respective role of any other health care provider with respect to management of the patient; and (2) notified that he or she may decline to receive medical services by telemedicine and may withdraw from such care at any time.    Notes:    Odin Oliva is a 84 y.o. female who presents for a follow up evaluation of Type 2 diabetes mellitus.     CHIEF COMPLAINT: Diabetes Consultation    PCP: Juventino Barbosa MD      Initial visit with me - 2/2023    The patient was initially diagnosed with diabetes at age 56.   Previously followed at [facility] by Inna Stephen NP, last visit 3/1/2022.      Previous failed treatments include:  Metformin - stopped due to decline in renal function.     Social Documentation:  Patient lives in her home in [place], daughter and son in law live with her.   Occupation: does not work.   Exercise: walking outside/gardening.     Diabetes related complications:   nephropathy.   denies Pancreatitis  denies Gastroparesis  denies DKA  denies Hx/family Hx of MEN2/MTC  denies Frequent UTIs/yeast infections     Cardiovascular Risk Factors: advanced age (>55 for men, >65 for women) and hypertension.    Diabetes Medications                empagliflozin (JARDIANCE) 10 mg tablet Take 1 tablet (10 mg total) by mouth once daily.    glimepiride (AMARYL) 2 MG tablet Take 1 tablet (2 mg total) by mouth 2 (two) times a day.    linaGLIPtin (TRADJENTA) 5 mg Tab tablet Take 1 tablet (5 mg total) by mouth once daily.       Current monitoring regimen: capillary blood glucose monitoring with finger sticks.    Patient currently taking insulin or sulfonylurea?  Yes. Emergency Glucagon Prescription current? no  Recent hypoglycemic episodes: No.     Patient compliant with glucose checks and medication administration? Yes    DIABETES MANAGEMENT STATUS  Statin: Taking  ACE/ARB: Taking  Screening or Prevention Patient's value Goal Complete/Controlled?   HgA1C Testing and Control   Lab Results   Component Value Date    HGBA1C 7.3 (H) 12/06/2023      Annually/Less than 8% Yes   Lipid profile : 12/06/2023 Annually No   LDL control Lab Results   Component Value Date    LDLCALC 152.0 12/06/2023    Annually/Less than 100 mg/dl  No   Nephropathy screening Lab Results   Component Value Date    LABMICR 147.0 12/06/2023     Lab Results   Component Value Date    PROTEINUA Trace (A) 02/24/2023     Lab Results   Component Value Date    UTPCR 0.27 (H) 10/27/2021      Annually Yes   Blood pressure BP Readings from Last 1 Encounters:   04/11/23 (!) 140/80    Less than 140/90 Yes   Dilated retinal exam Most Recent Eye Exam Date: Not Found Annually No   Foot exam   Most Recent Foot Exam Date: Not Found Annually No   Patient's medications, allergies, surgical, social and family histories were reviewed and updated as appropriate.     Review of Systems   Constitutional:  Negative for weight loss.   Eyes:  Negative for blurred vision and double vision.   Cardiovascular:  Negative for chest pain.   Gastrointestinal:  Negative for nausea and vomiting.   Genitourinary:  Negative for frequency.   Musculoskeletal:  Negative for falls.   Neurological:  Negative for dizziness and  "weakness.   Endo/Heme/Allergies:  Negative for polydipsia.   Psychiatric/Behavioral:  Negative for depression.    All other systems reviewed and are negative.       Physical Exam  Constitutional:       Appearance: Normal appearance.   HENT:      Head: Normocephalic and atraumatic.   Pulmonary:      Effort: No respiratory distress.   Musculoskeletal:      Cervical back: Normal range of motion.   Neurological:      Mental Status: She is alert and oriented to person, place, and time.   Psychiatric:         Mood and Affect: Mood normal.         Behavior: Behavior normal.        There were no vitals taken for this visit.  Wt Readings from Last 3 Encounters:   04/11/23 57.4 kg (126 lb 8.7 oz)   03/28/23 57.5 kg (126 lb 12.2 oz)   02/24/23 58.1 kg (128 lb 1.4 oz)       LAB REVIEW  Lab Results   Component Value Date     12/06/2023    K 4.9 12/06/2023     12/06/2023    CO2 22 (L) 12/06/2023    BUN 41 (H) 12/06/2023    CREATININE 1.7 (H) 12/06/2023    CALCIUM 9.9 12/06/2023    ANIONGAP 9 12/06/2023    EGFRNORACEVR 29.8 (A) 12/06/2023     No results found for: "CPEPTIDE", "GLUTAMICACID", "INSLNABS"  Hemoglobin A1C   Date Value Ref Range Status   12/06/2023 7.3 (H) 4.0 - 5.6 % Final     Comment:     ADA Screening Guidelines:  5.7-6.4%  Consistent with prediabetes  >or=6.5%  Consistent with diabetes    High levels of fetal hemoglobin interfere with the HbA1C  assay. Heterozygous hemoglobin variants (HbS, HgC, etc)do  not significantly interfere with this assay.   However, presence of multiple variants may affect accuracy.     05/15/2023 7.1 (H) 4.0 - 5.6 % Final     Comment:     ADA Screening Guidelines:  5.7-6.4%  Consistent with prediabetes  >or=6.5%  Consistent with diabetes    High levels of fetal hemoglobin interfere with the HbA1C  assay. Heterozygous hemoglobin variants (HbS, HgC, etc)do  not significantly interfere with this assay.   However, presence of multiple variants may affect accuracy.     02/24/2023 " 6.6 (H) 4.0 - 5.6 % Final     Comment:     ADA Screening Guidelines:  5.7-6.4%  Consistent with prediabetes  >or=6.5%  Consistent with diabetes    High levels of fetal hemoglobin interfere with the HbA1C  assay. Heterozygous hemoglobin variants (HbS, HgC, etc)do  not significantly interfere with this assay.   However, presence of multiple variants may affect accuracy.          ASSESSMENT    ICD-10-CM ICD-9-CM   1. Type 2 diabetes mellitus with other specified complication, without long-term current use of insulin  E11.69 250.80         PLAN  Diagnoses and all orders for this visit:    Type 2 diabetes mellitus with other specified complication, without long-term current use of insulin  -     Basic Metabolic Panel; Future  -     Hemoglobin A1C; Future          Reviewed pathophysiology of diabetes, complications related to the disease, importance of annual dilated eye exam and daily foot examination. Explained MOA, SE, dosage of medications. Written instructions given and reviewed with patient and patient verbalizes understanding.     PATIENT INSTRUCTIONS     Labs ordered and will be scheduled by [facility] staff.      Continue glimeperide to 2 mg by mouth twice daily before meals.   Continue Tradjenta 5 mg by mouth daily.   Continue Jardiance 10 mg by mouth daily.     Check blood sugar twice daily. Every morning when you wake up and 1-2 hours after main meal of the day. Keep log and upload to [employer] prior to each visit.   Blood Sugar Goals:       Fastin-130.       1-2 hours after a meal: Less than 180.         Follow up in about 3 months (around 2024) for Virtual, Schedule fasting labs.    Portions of this note were prepared with [employer] voice recognition transcription software. Grammatical errors, including garbled syntax, mangle pronouns, and other bizarre constructions may be attributed to that software system.

## 2024-05-02 ENCOUNTER — PATIENT MESSAGE (OUTPATIENT)
Dept: ADMINISTRATIVE | Facility: CLINIC | Age: 84
End: 2024-05-02
Payer: MEDICARE

## 2024-05-07 ENCOUNTER — OFFICE VISIT (OUTPATIENT)
Dept: INTERNAL MEDICINE | Facility: CLINIC | Age: 84
End: 2024-05-07
Payer: MEDICARE

## 2024-05-07 ENCOUNTER — LAB VISIT (OUTPATIENT)
Dept: LAB | Facility: HOSPITAL | Age: 84
End: 2024-05-07
Attending: FAMILY MEDICINE
Payer: MEDICARE

## 2024-05-07 VITALS
DIASTOLIC BLOOD PRESSURE: 60 MMHG | BODY MASS INDEX: 25.53 KG/M2 | HEART RATE: 64 BPM | WEIGHT: 126.63 LBS | SYSTOLIC BLOOD PRESSURE: 146 MMHG | OXYGEN SATURATION: 96 % | HEIGHT: 59 IN

## 2024-05-07 DIAGNOSIS — E11.69 TYPE 2 DIABETES MELLITUS WITH OTHER SPECIFIED COMPLICATION, WITHOUT LONG-TERM CURRENT USE OF INSULIN: ICD-10-CM

## 2024-05-07 DIAGNOSIS — E03.9 HYPOTHYROIDISM, UNSPECIFIED TYPE: ICD-10-CM

## 2024-05-07 DIAGNOSIS — J45.20 MILD INTERMITTENT ASTHMA WITHOUT COMPLICATION: ICD-10-CM

## 2024-05-07 DIAGNOSIS — N18.4 CHRONIC KIDNEY DISEASE, STAGE 4 (SEVERE): ICD-10-CM

## 2024-05-07 DIAGNOSIS — Z87.39 HISTORY OF GOUT: ICD-10-CM

## 2024-05-07 DIAGNOSIS — I10 HYPERTENSION, UNSPECIFIED TYPE: ICD-10-CM

## 2024-05-07 DIAGNOSIS — Z79.899 ENCOUNTER FOR LONG-TERM (CURRENT) USE OF MEDICATIONS: ICD-10-CM

## 2024-05-07 DIAGNOSIS — Z87.39 HISTORY OF GOUT: Primary | ICD-10-CM

## 2024-05-07 LAB
ALBUMIN SERPL BCP-MCNC: 3.6 G/DL (ref 3.5–5.2)
ALP SERPL-CCNC: 116 U/L (ref 55–135)
ALT SERPL W/O P-5'-P-CCNC: 15 U/L (ref 10–44)
ANION GAP SERPL CALC-SCNC: 11 MMOL/L (ref 8–16)
AST SERPL-CCNC: 20 U/L (ref 10–40)
BASOPHILS # BLD AUTO: 0.05 K/UL (ref 0–0.2)
BASOPHILS NFR BLD: 0.9 % (ref 0–1.9)
BILIRUB SERPL-MCNC: 0.3 MG/DL (ref 0.1–1)
BUN SERPL-MCNC: 37 MG/DL (ref 8–23)
CALCIUM SERPL-MCNC: 9.4 MG/DL (ref 8.7–10.5)
CHLORIDE SERPL-SCNC: 108 MMOL/L (ref 95–110)
CHOLEST SERPL-MCNC: 161 MG/DL (ref 120–199)
CHOLEST/HDLC SERPL: 5.2 {RATIO} (ref 2–5)
CO2 SERPL-SCNC: 21 MMOL/L (ref 23–29)
CREAT SERPL-MCNC: 1.5 MG/DL (ref 0.5–1.4)
DIFFERENTIAL METHOD BLD: ABNORMAL
EOSINOPHIL # BLD AUTO: 0.3 K/UL (ref 0–0.5)
EOSINOPHIL NFR BLD: 4.6 % (ref 0–8)
ERYTHROCYTE [DISTWIDTH] IN BLOOD BY AUTOMATED COUNT: 12.8 % (ref 11.5–14.5)
EST. GFR  (NO RACE VARIABLE): 34.4 ML/MIN/1.73 M^2
ESTIMATED AVG GLUCOSE: 157 MG/DL (ref 68–131)
GLUCOSE SERPL-MCNC: 150 MG/DL (ref 70–110)
HBA1C MFR BLD: 7.1 % (ref 4–5.6)
HCT VFR BLD AUTO: 33.2 % (ref 37–48.5)
HDLC SERPL-MCNC: 31 MG/DL (ref 40–75)
HDLC SERPL: 19.3 % (ref 20–50)
HGB BLD-MCNC: 10.6 G/DL (ref 12–16)
IMM GRANULOCYTES # BLD AUTO: 0.02 K/UL (ref 0–0.04)
IMM GRANULOCYTES NFR BLD AUTO: 0.4 % (ref 0–0.5)
LDLC SERPL CALC-MCNC: 99.2 MG/DL (ref 63–159)
LYMPHOCYTES # BLD AUTO: 1 K/UL (ref 1–4.8)
LYMPHOCYTES NFR BLD: 18.4 % (ref 18–48)
MCH RBC QN AUTO: 26.9 PG (ref 27–31)
MCHC RBC AUTO-ENTMCNC: 31.9 G/DL (ref 32–36)
MCV RBC AUTO: 84 FL (ref 82–98)
MONOCYTES # BLD AUTO: 0.5 K/UL (ref 0.3–1)
MONOCYTES NFR BLD: 8.7 % (ref 4–15)
NEUTROPHILS # BLD AUTO: 3.6 K/UL (ref 1.8–7.7)
NEUTROPHILS NFR BLD: 67 % (ref 38–73)
NONHDLC SERPL-MCNC: 130 MG/DL
NRBC BLD-RTO: 0 /100 WBC
PLATELET # BLD AUTO: 281 K/UL (ref 150–450)
PMV BLD AUTO: 10.7 FL (ref 9.2–12.9)
POTASSIUM SERPL-SCNC: 4.8 MMOL/L (ref 3.5–5.1)
PROT SERPL-MCNC: 7.1 G/DL (ref 6–8.4)
RBC # BLD AUTO: 3.94 M/UL (ref 4–5.4)
SODIUM SERPL-SCNC: 140 MMOL/L (ref 136–145)
T4 FREE SERPL-MCNC: 1.49 NG/DL (ref 0.71–1.51)
TRIGL SERPL-MCNC: 154 MG/DL (ref 30–150)
TSH SERPL DL<=0.005 MIU/L-ACNC: 0.02 UIU/ML (ref 0.4–4)
URATE SERPL-MCNC: 7 MG/DL (ref 2.4–5.7)
WBC # BLD AUTO: 5.38 K/UL (ref 3.9–12.7)

## 2024-05-07 PROCEDURE — 80053 COMPREHEN METABOLIC PANEL: CPT | Mod: HCNC | Performed by: FAMILY MEDICINE

## 2024-05-07 PROCEDURE — 83036 HEMOGLOBIN GLYCOSYLATED A1C: CPT | Mod: HCNC | Performed by: FAMILY MEDICINE

## 2024-05-07 PROCEDURE — 3078F DIAST BP <80 MM HG: CPT | Mod: HCNC,CPTII,S$GLB, | Performed by: FAMILY MEDICINE

## 2024-05-07 PROCEDURE — G2211 COMPLEX E/M VISIT ADD ON: HCPCS | Mod: HCNC,S$GLB,, | Performed by: FAMILY MEDICINE

## 2024-05-07 PROCEDURE — 99999 PR PBB SHADOW E&M-EST. PATIENT-LVL IV: CPT | Mod: PBBFAC,HCNC,, | Performed by: FAMILY MEDICINE

## 2024-05-07 PROCEDURE — 80061 LIPID PANEL: CPT | Mod: HCNC | Performed by: FAMILY MEDICINE

## 2024-05-07 PROCEDURE — 85025 COMPLETE CBC W/AUTO DIFF WBC: CPT | Mod: HCNC | Performed by: FAMILY MEDICINE

## 2024-05-07 PROCEDURE — 84443 ASSAY THYROID STIM HORMONE: CPT | Mod: HCNC | Performed by: FAMILY MEDICINE

## 2024-05-07 PROCEDURE — 36415 COLL VENOUS BLD VENIPUNCTURE: CPT | Mod: HCNC | Performed by: FAMILY MEDICINE

## 2024-05-07 PROCEDURE — 1101F PT FALLS ASSESS-DOCD LE1/YR: CPT | Mod: HCNC,CPTII,S$GLB, | Performed by: FAMILY MEDICINE

## 2024-05-07 PROCEDURE — 3288F FALL RISK ASSESSMENT DOCD: CPT | Mod: HCNC,CPTII,S$GLB, | Performed by: FAMILY MEDICINE

## 2024-05-07 PROCEDURE — 99214 OFFICE O/P EST MOD 30 MIN: CPT | Mod: HCNC,S$GLB,, | Performed by: FAMILY MEDICINE

## 2024-05-07 PROCEDURE — 3077F SYST BP >= 140 MM HG: CPT | Mod: HCNC,CPTII,S$GLB, | Performed by: FAMILY MEDICINE

## 2024-05-07 PROCEDURE — 84439 ASSAY OF FREE THYROXINE: CPT | Mod: HCNC | Performed by: FAMILY MEDICINE

## 2024-05-07 PROCEDURE — 84550 ASSAY OF BLOOD/URIC ACID: CPT | Mod: HCNC | Performed by: FAMILY MEDICINE

## 2024-05-07 NOTE — PROGRESS NOTES
Subjective:       Patient ID: Odin Oliva is a 84 y.o. female.    Chief Complaint: Annual Exam    HPI    Patient Active Problem List   Diagnosis    Diabetes mellitus    Chronic kidney disease (CKD), stage IV (severe)    Other specified hypothyroidism    Acquired hammer toe    Hypertension    Adductovarus rotation of toe, acquired, left    Osteoporosis    Chronic cough       Past Medical History:   Diagnosis Date    CKD (chronic kidney disease), stage IV     Diabetes mellitus     HTN (hypertension)     Hyperuricemia     Seasonal allergies     Thyroid disease     Urinary tract infection        Past Surgical History:   Procedure Laterality Date    CATARACT EXTRACTION, BILATERAL      CHOLECYSTECTOMY      EXOSTECTOMY Bilateral 11/8/2019    Procedure: EXOSTECTOMY;  Surgeon: Lissy Comer DPM;  Location: [facility] OR;  Service: Podiatry;  Laterality: Bilateral;  left fourth digit, right fifth digit    FOOT MASS EXCISION Bilateral 11/8/2019    Procedure: HAMMER TOE, DEROTATIONAL ARTHROPLASTY FIFTH DIGIT;  Surgeon: Lissy Comer DPM;  Location: [facility] OR;  Service: Podiatry;  Laterality: Bilateral;  Left 5th toe incision @ 08:51.  Left 4th toe incision @ 08:54.  Right 5th toe incision : 09:24.    TOTAL THYROIDECTOMY      UMBILICAL HERNIA REPAIR      x 2       Family History   Problem Relation Name Age of Onset    Asthma Mother      No Known Problems Father      Lung cancer Brother  65    Diabetes Brother      Diabetes Brother  80    Heart disease Daughter         Social History     Tobacco Use   Smoking Status Never   Smokeless Tobacco Never       Wt Readings from Last 5 Encounters:   05/07/24 57.5 kg (126 lb 10.5 oz)   04/11/23 57.4 kg (126 lb 8.7 oz)   03/28/23 57.5 kg (126 lb 12.2 oz)   02/24/23 58.1 kg (128 lb 1.4 oz)   12/12/22 58.7 kg (129 lb 6.6 oz)       For further HPI details, see assessment and plan.    Review of Systems    Objective:      Vitals:    05/07/24 0837   BP: (!) 146/60   Pulse: 64       Physical  Exam  Constitutional:       General: She is not in acute distress.     Appearance: She is not ill-appearing.   Pulmonary:      Effort: Pulmonary effort is normal. No respiratory distress.   Neurological:      General: No focal deficit present.      Mental Status: She is alert.   Psychiatric:         Mood and Affect: Mood normal.         Behavior: Behavior normal.         Assessment:       1. History of gout    2. Chronic kidney disease, stage 4 (severe)    3. Type 2 diabetes mellitus with other specified complication, without long-term current use of insulin    4. Hypothyroidism, unspecified type    5. Hypertension, unspecified type    6. Encounter for long-term (current) use of medications        Plan:   For follow-up encounter    Asthma  Allergic rhinitis   Patient has listed albuterol inhaler, Advair inhaler for her asthma.  In regards to allergy she has Astelin listed.  I see no oral antihistamine or Singulair listed.  I do suspect she should take some of that.  There is a bit of uncertainty in regards to the home medication list.  Plan to obtain lab work in a very close follow-up with the intention to review labs in person and all of her medications.  We will address and adjust medications for her allergy and asthma then    Hypertension   Her blood pressure is mildly elevated   Her drug list has her listed as taking valsartan with hydrochlorothiazide at 160/12.5 mg.  She forgot to take her medication yesterday.  I would like to see what her blood pressure looks like while on medication.  I do ask that they check her blood pressure at home and bring in the log and the machine to our next encounter.      Type 2 diabetes  Working w/ DM mngt  Patient was listed as taking Jardiance 10 and Amaryl 2 and Tradjenta 5.  Again we are updating labs and we will review and adjust medications at next encounter   Patient was due for eye exam.  We will arrange for that    History of gout   On no specific treatment for  such.  Patient does have some chronic knee pain but does not necessarily sound like gout.  We will update uric acid and evaluate further at follow-up     Hypothyroidism  Patient was on Synthroid 100 mcg   Plan to update TSH and arrange follow-up and adjust accordingly     Patient declines vaccinations    This note was verbally dictated, please excuse any type errors.

## 2024-05-08 ENCOUNTER — OFFICE VISIT (OUTPATIENT)
Dept: OPHTHALMOLOGY | Facility: CLINIC | Age: 84
End: 2024-05-08
Payer: MEDICARE

## 2024-05-08 DIAGNOSIS — H52.7 REFRACTIVE ERRORS: ICD-10-CM

## 2024-05-08 DIAGNOSIS — E11.3312 MODERATE NONPROLIFERATIVE DIABETIC RETINOPATHY OF LEFT EYE WITH MACULAR EDEMA ASSOCIATED WITH TYPE 2 DIABETES MELLITUS: ICD-10-CM

## 2024-05-08 DIAGNOSIS — E11.69 TYPE 2 DIABETES MELLITUS WITH OTHER SPECIFIED COMPLICATION, WITHOUT LONG-TERM CURRENT USE OF INSULIN: Primary | ICD-10-CM

## 2024-05-08 DIAGNOSIS — H35.353 CME (CYSTOID MACULAR EDEMA), BILATERAL: ICD-10-CM

## 2024-05-08 PROCEDURE — 92015 DETERMINE REFRACTIVE STATE: CPT | Mod: HCNC,S$GLB,, | Performed by: OPTOMETRIST

## 2024-05-08 PROCEDURE — 99999 PR PBB SHADOW E&M-EST. PATIENT-LVL III: CPT | Mod: PBBFAC,HCNC,, | Performed by: OPTOMETRIST

## 2024-05-08 PROCEDURE — 2022F DILAT RTA XM EVC RTNOPTHY: CPT | Mod: HCNC,CPTII,S$GLB, | Performed by: OPTOMETRIST

## 2024-05-08 PROCEDURE — 92134 CPTRZ OPH DX IMG PST SGM RTA: CPT | Mod: HCNC,S$GLB,, | Performed by: OPTOMETRIST

## 2024-05-08 PROCEDURE — 1159F MED LIST DOCD IN RCRD: CPT | Mod: HCNC,CPTII,S$GLB, | Performed by: OPTOMETRIST

## 2024-05-08 PROCEDURE — 92004 COMPRE OPH EXAM NEW PT 1/>: CPT | Mod: HCNC,S$GLB,, | Performed by: OPTOMETRIST

## 2024-05-08 NOTE — PROGRESS NOTES
SUBJECTIVE  Fozegigi Oliva is 84 y.o. female  Uncorrected distance visual acuity was 20/200 in the right eye and 20/200 in the left eye. Uncorrected near visual acuity was J6 in the right eye and J6 in the left eye.   Chief Complaint   Patient presents with    Diabetic Eye Exam     Diagnosed with diabetes in 1996.  Lab Results       Component                Value               Date                       HGBA1C                   7.1 (H)             05/07/2024                    HPI     Diabetic Eye Exam     Additional comments: Diagnosed with diabetes in 1996.  Lab Results       Component                Value               Date                       HGBA1C                   7.1 (H)             05/07/2024                     Comments    New Patient   Last eye exam in 2022.  PCIOL OU 8 years ago.    Patient states decrease with overall vision.  Trouble with pain, itching, burning, and matting in the mornings.   Using Refresh Tears TID OU  Does not wear any type of eyewear.           Last edited by Sandra Tate on 5/8/2024  8:55 AM.         Assessment /Plan :  1. Type 2 diabetes mellitus with other specified complication, without long-term current use of insulin    2. CME (cystoid macular edema), bilateral    CME OU photos in harmony  Consult  for retinal evaluation

## 2024-05-09 ENCOUNTER — OFFICE VISIT (OUTPATIENT)
Dept: INTERNAL MEDICINE | Facility: CLINIC | Age: 84
End: 2024-05-09
Payer: MEDICARE

## 2024-05-09 VITALS
WEIGHT: 123.81 LBS | HEIGHT: 59 IN | DIASTOLIC BLOOD PRESSURE: 60 MMHG | SYSTOLIC BLOOD PRESSURE: 154 MMHG | BODY MASS INDEX: 24.96 KG/M2 | HEART RATE: 65 BPM | OXYGEN SATURATION: 96 %

## 2024-05-09 DIAGNOSIS — J30.9 ALLERGIC RHINITIS, UNSPECIFIED SEASONALITY, UNSPECIFIED TRIGGER: ICD-10-CM

## 2024-05-09 DIAGNOSIS — E79.0 HYPERURICEMIA: ICD-10-CM

## 2024-05-09 DIAGNOSIS — I10 HYPERTENSION, UNSPECIFIED TYPE: ICD-10-CM

## 2024-05-09 DIAGNOSIS — E11.69 TYPE 2 DIABETES MELLITUS WITH OTHER SPECIFIED COMPLICATION, WITHOUT LONG-TERM CURRENT USE OF INSULIN: ICD-10-CM

## 2024-05-09 DIAGNOSIS — N18.4 CHRONIC KIDNEY DISEASE, STAGE 4 (SEVERE): ICD-10-CM

## 2024-05-09 DIAGNOSIS — E03.9 HYPOTHYROIDISM, UNSPECIFIED TYPE: ICD-10-CM

## 2024-05-09 DIAGNOSIS — J45.20 MILD INTERMITTENT ASTHMA WITHOUT COMPLICATION: ICD-10-CM

## 2024-05-09 DIAGNOSIS — Z79.899 ENCOUNTER FOR LONG-TERM (CURRENT) USE OF MEDICATIONS: ICD-10-CM

## 2024-05-09 DIAGNOSIS — D64.9 CHRONIC ANEMIA: Primary | ICD-10-CM

## 2024-05-09 DIAGNOSIS — Z79.899 POLYPHARMACY: ICD-10-CM

## 2024-05-09 PROCEDURE — 3288F FALL RISK ASSESSMENT DOCD: CPT | Mod: HCNC,CPTII,S$GLB, | Performed by: FAMILY MEDICINE

## 2024-05-09 PROCEDURE — 1101F PT FALLS ASSESS-DOCD LE1/YR: CPT | Mod: HCNC,CPTII,S$GLB, | Performed by: FAMILY MEDICINE

## 2024-05-09 PROCEDURE — 1159F MED LIST DOCD IN RCRD: CPT | Mod: HCNC,CPTII,S$GLB, | Performed by: FAMILY MEDICINE

## 2024-05-09 PROCEDURE — 3077F SYST BP >= 140 MM HG: CPT | Mod: HCNC,CPTII,S$GLB, | Performed by: FAMILY MEDICINE

## 2024-05-09 PROCEDURE — 3078F DIAST BP <80 MM HG: CPT | Mod: HCNC,CPTII,S$GLB, | Performed by: FAMILY MEDICINE

## 2024-05-09 PROCEDURE — 99214 OFFICE O/P EST MOD 30 MIN: CPT | Mod: HCNC,S$GLB,, | Performed by: FAMILY MEDICINE

## 2024-05-09 PROCEDURE — 99999 PR PBB SHADOW E&M-EST. PATIENT-LVL III: CPT | Mod: PBBFAC,HCNC,, | Performed by: FAMILY MEDICINE

## 2024-05-09 RX ORDER — CETIRIZINE HYDROCHLORIDE 10 MG/1
10 TABLET ORAL DAILY
Qty: 90 TABLET | Refills: 1 | Status: SHIPPED | OUTPATIENT
Start: 2024-05-09 | End: 2025-05-09

## 2024-05-09 RX ORDER — LEVOTHYROXINE SODIUM 75 UG/1
75 TABLET ORAL
Qty: 90 TABLET | Refills: 1 | Status: SHIPPED | OUTPATIENT
Start: 2024-05-09 | End: 2025-05-09

## 2024-05-09 RX ORDER — MONTELUKAST SODIUM 10 MG/1
10 TABLET ORAL NIGHTLY
Qty: 90 TABLET | Refills: 1 | Status: SHIPPED | OUTPATIENT
Start: 2024-05-09 | End: 2024-08-07

## 2024-05-09 RX ORDER — VALSARTAN AND HYDROCHLOROTHIAZIDE 160; 12.5 MG/1; MG/1
1 TABLET, FILM COATED ORAL DAILY
Qty: 90 TABLET | Refills: 1 | Status: SHIPPED | OUTPATIENT
Start: 2024-05-09

## 2024-05-09 RX ORDER — ATORVASTATIN CALCIUM 20 MG/1
20 TABLET, FILM COATED ORAL DAILY
Qty: 90 TABLET | Refills: 1 | Status: SHIPPED | OUTPATIENT
Start: 2024-05-09

## 2024-05-09 RX ORDER — GLIMEPIRIDE 2 MG/1
2 TABLET ORAL 2 TIMES DAILY
Qty: 180 TABLET | Refills: 1 | Status: SHIPPED | OUTPATIENT
Start: 2024-05-09 | End: 2024-08-07

## 2024-05-09 RX ORDER — LINAGLIPTIN 5 MG/1
5 TABLET, FILM COATED ORAL DAILY
Qty: 90 TABLET | Refills: 1 | Status: SHIPPED | OUTPATIENT
Start: 2024-05-09 | End: 2025-05-09

## 2024-05-09 NOTE — PROGRESS NOTES
Subjective:       Patient ID: Odin Oliva is a 84 y.o. female.    Chief Complaint: Follow-up (lab)    Follow-up        Patient Active Problem List   Diagnosis    Diabetes mellitus    Chronic kidney disease (CKD), stage IV (severe)    Other specified hypothyroidism    Acquired hammer toe    Hypertension    Adductovarus rotation of toe, acquired, left    Osteoporosis    Chronic cough       Past Medical History:   Diagnosis Date    CKD (chronic kidney disease), stage IV     Diabetes mellitus     HTN (hypertension)     Hyperuricemia     Seasonal allergies     Thyroid disease     Urinary tract infection        Past Surgical History:   Procedure Laterality Date    CATARACT EXTRACTION, BILATERAL      CHOLECYSTECTOMY      EXOSTECTOMY Bilateral 11/8/2019    Procedure: EXOSTECTOMY;  Surgeon: Lissy Comer DPM;  Location: [facility] OR;  Service: Podiatry;  Laterality: Bilateral;  left fourth digit, right fifth digit    FOOT MASS EXCISION Bilateral 11/8/2019    Procedure: HAMMER TOE, DEROTATIONAL ARTHROPLASTY FIFTH DIGIT;  Surgeon: Lissy Comer DPM;  Location: [facility] OR;  Service: Podiatry;  Laterality: Bilateral;  Left 5th toe incision @ 08:51.  Left 4th toe incision @ 08:54.  Right 5th toe incision : 09:24.    TOTAL THYROIDECTOMY      UMBILICAL HERNIA REPAIR      x 2       Family History   Problem Relation Name Age of Onset    Asthma Mother      No Known Problems Father      Lung cancer Brother  65    Diabetes Brother      Diabetes Brother  80    Heart disease Daughter         Social History     Tobacco Use   Smoking Status Never   Smokeless Tobacco Never       Wt Readings from Last 5 Encounters:   05/09/24 56.1 kg (123 lb 12.6 oz)   05/07/24 57.5 kg (126 lb 10.5 oz)   04/11/23 57.4 kg (126 lb 8.7 oz)   03/28/23 57.5 kg (126 lb 12.2 oz)   02/24/23 58.1 kg (128 lb 1.4 oz)       For further HPI details, see assessment and plan.    Review of Systems    Objective:      Vitals:    05/09/24 0952   BP: (!) 154/60   Pulse: 65        Physical Exam  Constitutional:       General: She is not in acute distress.     Appearance: She is not ill-appearing.   Pulmonary:      Effort: Pulmonary effort is normal. No respiratory distress.   Neurological:      General: No focal deficit present.      Mental Status: She is alert.   Psychiatric:         Mood and Affect: Mood normal.         Behavior: Behavior normal.         Assessment:       1. Chronic anemia    2. Type 2 diabetes mellitus with other specified complication, without long-term current use of insulin    3. Chronic kidney disease, stage 4 (severe)    4. Hypothyroidism, unspecified type    5. Hypertension, unspecified type    6. Mild intermittent asthma without complication    7. Allergic rhinitis, unspecified seasonality, unspecified trigger    8. Encounter for long-term (current) use of medications    9. Hyperuricemia    10. Polypharmacy        Plan:       Patient presents for close follow up for lab review and a drug list confirmation visit.    Asthma  Allergic rhinitis   Patient has brought in all her medications.    She has   Singulair 10 mg   Advair   Astelin nasal spray   Albuterol inhalers  Continue these medications  Patient was still has some ongoing allergy issues.  I will add Zyrtec to her medications    HTN  Patient did take her medication last night.  She is on valsartan/hydrochlorothiazide at 160/12.5 mg  Patient tells me when she takes her medication every day she finds that her blood pressure drops too low.  When this occurs she only takes half of a tablet.  This is a complicated situation.  I would recommend she tried taking the medication every day.  I want her to write down her blood pressure readings.  I would like to see her again in about a month with her log and her machine.  I feel if we have a months worth of data points we can make better decisions in regards to whether or not her blood pressure medication needs dose adjustments    Type 2 diabetes  Lab Results    Component Value Date    HGBA1C 7.1 (H) 05/07/2024   Patient was listed as taking Jardiance, glimepiride 2 mg twice daily, Tradjenta.  She brought in her medicine and she is only taking glimepiride 2 mg twice daily and Tradjenta 5.  She does tell me she also has Jardiance at home but she forgot to bring it.  I am comfortable with her blood sugars the way they are and we will continue her current medications    Patient has been on a statin but it had not been filled for some time.  Complicated decision on whether Or not to restart it given her age but given family history of cardiovascular disease and she seems to tolerate the medication we will continue if she develops any muscle aches or joint aches or any problems with statin tolerance we will stop the medication    Hypothyroidism  Patient is on Synthroid 100 mcg  Low.  I would like to make a dose adjustment.  Plan to send in 75 mcg of Synthroid and recheck her thyroid in 6 weeks    Hyperuricemia  There has been improvement.  We will continue to monitor but I am hoping to avoid having to pharmaceutical only intervene.  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  We need consistent glucose control.  I want her to avoid anti-inflammatory like ibuprofen    Chronic anemia  When we check her blood work in about 6 weeks for her thyroid we will include anemia evaluation    Diffuse arthralgias.  Can consider duloxetine but I would like to discuss this at our next visit     Polypharmacy  We very carefully need to monitor her drug list as it seems to be quite confusing and I want to make sure we do not have any errors.  At present time her drug list should be up-to-date on refills in 100% accurate    Lab work in 6 weeks.  Follow up with me soon after.  Emphasis on remembering to bring the blood pressure log in his blood pressure machine  This note was verbally dictated, please excuse  any type errors.

## 2024-05-27 ENCOUNTER — HOSPITAL ENCOUNTER (EMERGENCY)
Facility: HOSPITAL | Age: 84
Discharge: HOME | End: 2024-05-27
Attending: EMERGENCY MEDICINE
Payer: MEDICARE

## 2024-05-27 VITALS
OXYGEN SATURATION: 100 % | WEIGHT: 127.63 LBS | RESPIRATION RATE: 16 BRPM | TEMPERATURE: 98 F | DIASTOLIC BLOOD PRESSURE: 75 MMHG | BODY MASS INDEX: 25.77 KG/M2 | HEART RATE: 64 BPM | SYSTOLIC BLOOD PRESSURE: 167 MMHG

## 2024-05-27 DIAGNOSIS — R07.9 CHEST PAIN: ICD-10-CM

## 2024-05-27 DIAGNOSIS — R07.81 RIB PAIN ON RIGHT SIDE: ICD-10-CM
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
## 2024-05-27 PROCEDURE — 25000003 PHARM REV CODE 250: Mod: HCNC | Performed by: NURSE PRACTITIONER

## 2024-05-27 PROCEDURE — 99283 EMERGENCY DEPT VISIT LOW MDM: CPT | Mod: 25,HCNC

## 2024-05-27 RX ORDER — HYDROCODONE BITARTRATE AND ACETAMINOPHEN 5; 325 MG/1; MG/1
1 TABLET ORAL
Status: COMPLETED | OUTPATIENT
Start: 2024-05-27 | End: 2024-05-27

## 2024-05-27 RX ORDER — HYDROCODONE BITARTRATE AND ACETAMINOPHEN 5; 325 MG/1; MG/1
1 TABLET ORAL EVERY 6 HOURS PRN
Qty: 12 TABLET | Refills: 0 | Status: SHIPPED | OUTPATIENT
Start: 2024-05-27

## 2024-05-27 RX ADMIN — HYDROCODONE BITARTRATE AND ACETAMINOPHEN 1 TABLET: 5; 325 TABLET ORAL at 06:05

## 2024-05-27 NOTE — ED NOTES
Pt states had a fall 1.5 months ago, had rib pain x 1 week then it went away. Started having same right sided rib pain yesterday, worse with inspiration.

## 2024-05-28 NOTE — ED PROVIDER NOTES
Encounter Date: 5/27/2024       History     Chief Complaint   Patient presents with    Rib Injury     C/O pain to right rib area x 2 days, pain increases with inspiration. Fell 1.5 month ago, had gotten better, but pain returned.      84-year-old female presents the ER for right-sided rib pain after a trip and fall several weeks ago patient reports continued pain to the right rib area.  Patient denies any new or recent injury. Patient denies any fever, chills, shortness of breath, back pain, abdominal pain, nausea, vomiting, and all other concerns at this time.    The history is provided by the patient. No  was used.     Review of patient's allergies indicates:  No Known Allergies  Past Medical History:   Diagnosis Date    CKD (chronic kidney disease), stage IV     Diabetes mellitus     HTN (hypertension)     Hyperuricemia     Seasonal allergies     Thyroid disease     Urinary tract infection      Past Surgical History:   Procedure Laterality Date    CATARACT EXTRACTION, BILATERAL      CHOLECYSTECTOMY      EXOSTECTOMY Bilateral 11/8/2019    Procedure: EXOSTECTOMY;  Surgeon: Lissy Comer DPM;  Location: [facility] OR;  Service: Podiatry;  Laterality: Bilateral;  left fourth digit, right fifth digit    FOOT MASS EXCISION Bilateral 11/8/2019    Procedure: HAMMER TOE, DEROTATIONAL ARTHROPLASTY FIFTH DIGIT;  Surgeon: Lissy Comer DPM;  Location: [facility] OR;  Service: Podiatry;  Laterality: Bilateral;  Left 5th toe incision @ 08:51.  Left 4th toe incision @ 08:54.  Right 5th toe incision : 09:24.    TOTAL THYROIDECTOMY      UMBILICAL HERNIA REPAIR      x 2     Family History   Problem Relation Name Age of Onset    Asthma Mother      No Known Problems Father      Lung cancer Brother  65    Diabetes Brother      Diabetes Brother  80    Heart disease Daughter       Social History     Tobacco Use    Smoking status: Never    Smokeless tobacco: Never   Substance Use Topics    Alcohol use: No    Drug  use: No     Review of Systems   Constitutional:  Negative for fever.   HENT:  Negative for sore throat.    Respiratory:  Negative for shortness of breath.          +right rib tenderness   Cardiovascular:  Negative for chest pain.   Gastrointestinal:  Negative for abdominal pain, nausea and vomiting.   Genitourinary:  Negative for dysuria.   Musculoskeletal:  Negative for back pain.   Skin:  Negative for rash.   Neurological:  Negative for weakness.   Hematological:  Does not bruise/bleed easily.       Physical Exam     Initial Vitals [05/27/24 1646]   BP Pulse Resp Temp SpO2   (!) 142/65 65 18 97.6 °F (36.4 °C) 99 %      MAP       --         Physical Exam    Nursing note and vitals reviewed.  Constitutional: She appears well-developed and well-nourished. She is not diaphoretic. No distress.   HENT:   Head: Normocephalic and atraumatic.   Eyes: Right eye exhibits no discharge. Left eye exhibits no discharge.   Neck: Neck supple.   Normal range of motion.  Cardiovascular:  Normal rate.           Pulmonary/Chest: Breath sounds normal. No respiratory distress. She has no wheezes. She has no rhonchi. She has no rales. She exhibits tenderness.   Abdominal: She exhibits no distension.   Musculoskeletal:         General: Normal range of motion.      Cervical back: Normal range of motion and neck supple.     Neurological: She is alert and oriented to person, place, and time. She has normal strength.   Skin: Skin is warm and dry.   Psychiatric: She has a normal mood and affect. Her behavior is normal. Thought content normal.         ED Course   Procedures  Labs Reviewed - No data to display       Imaging Results              X-Ray Ribs 2 View Right (Final result)  Result time 05/27/24 18:32:03      Final result by Violetta Montes MD (05/27/24 18:32:03)                   Impression:      No overt acute fracture or aggressive bony finding      Electronically signed by: Violetta Montes  Date:    05/27/2024  Time:    18:32                Narrative:    EXAMINATION:  XR RIBS 2 VIEW RIGHT    CLINICAL HISTORY:  Pleurodynia    TECHNIQUE:  Two views of the right ribs were performed.    COMPARISON:  Chest radiographic correlation    FINDINGS:  Three-view exam.  Bones appear demineralized with no overt fracture seen                                       X-Ray Chest 1 View (Final result)  Result time 05/27/24 18:13:45      Final result by Violetta Montes MD (05/27/24 18:13:45)                   Impression:      No active or adverse pulmonary finding      Electronically signed by: Violetta Montes  Date:    05/27/2024  Time:    18:13               Narrative:    EXAMINATION:  XR CHEST 1 VIEW    CLINICAL HISTORY:  Chest pain, unspecified    TECHNIQUE:  Single frontal portable view of the chest was performed.    COMPARISON:  December 2022    FINDINGS:  No pulmonary consolidation pleural effusion or convincing pneumothorax                                       Medications   HYDROcodone-acetaminophen 5-325 mg per tablet 1 tablet (1 tablet Oral Given 5/27/24 1852)     Medical Decision Making  Amount and/or Complexity of Data Reviewed  Radiology: ordered.    Risk  Prescription drug management.                                      Clinical Impression:  Final diagnoses:  [R07.9] Chest pain  [R07.81] Rib pain on right side  [W19.XXXA] Fall, initial encounter (Primary)          ED Disposition Condition    Discharge Stable          ED Prescriptions       Medication Sig Dispense Start Date End Date Auth. Provider    HYDROcodone-acetaminophen (NORCO) 5-325 mg per tablet Take 1 tablet by mouth every 6 (six) hours as needed for Pain. 12 tablet 5/27/2024 -- Madhu Richter, NAVID          Follow-up Information       Follow up With Specialties Details Why Contact Info    pcp of choice   As needed     [facility] Emergency Dept. Emergency Medicine  If symptoms worsen [id number] [facility] [id number] [phone]              Madhu Richter, NP  05/27/24 2148

## 2024-05-29 ENCOUNTER — PATIENT OUTREACH (OUTPATIENT)
Dept: EMERGENCY MEDICINE | Facility: HOSPITAL | Age: 84
End: 2024-05-29
Payer: MEDICARE

## 2024-06-04 NOTE — PROGRESS NOTES
Pt visited the ED on 5/27/24. I made 2 attempts to reach patient to assist with scheduling a post ED 7-day follow up with PCP. Unable to reach. Closing encounter.    Jocelyne Moore

## 2024-06-18 ENCOUNTER — LAB VISIT (OUTPATIENT)
Dept: LAB | Facility: HOSPITAL | Age: 84
End: 2024-06-18
Attending: FAMILY MEDICINE
Payer: MEDICARE

## 2024-06-18 DIAGNOSIS — D64.9 CHRONIC ANEMIA: ICD-10-CM

## 2024-06-18 DIAGNOSIS — Z79.899 ENCOUNTER FOR LONG-TERM (CURRENT) USE OF MEDICATIONS: ICD-10-CM

## 2024-06-18 DIAGNOSIS — E03.9 HYPOTHYROIDISM, UNSPECIFIED TYPE: ICD-10-CM

## 2024-06-18 LAB
FERRITIN SERPL-MCNC: 19 NG/ML (ref 20–300)
FOLATE SERPL-MCNC: 10.6 NG/ML (ref 4–24)
IRON SERPL-MCNC: 35 UG/DL (ref 30–160)
SATURATED IRON: 7 % (ref 20–50)
TOTAL IRON BINDING CAPACITY: 480 UG/DL (ref 250–450)
TRANSFERRIN SERPL-MCNC: 324 MG/DL (ref 200–375)
TSH SERPL DL<=0.005 MIU/L-ACNC: 2.83 UIU/ML (ref 0.4–4)
VIT B12 SERPL-MCNC: 391 PG/ML (ref 210–950)

## 2024-06-18 PROCEDURE — 84443 ASSAY THYROID STIM HORMONE: CPT | Mod: HCNC | Performed by: FAMILY MEDICINE

## 2024-06-18 PROCEDURE — 82746 ASSAY OF FOLIC ACID SERUM: CPT | Mod: HCNC | Performed by: FAMILY MEDICINE

## 2024-06-18 PROCEDURE — 82728 ASSAY OF FERRITIN: CPT | Mod: HCNC | Performed by: FAMILY MEDICINE

## 2024-06-18 PROCEDURE — 36415 COLL VENOUS BLD VENIPUNCTURE: CPT | Mod: HCNC | Performed by: FAMILY MEDICINE

## 2024-06-18 PROCEDURE — 83540 ASSAY OF IRON: CPT | Mod: HCNC | Performed by: FAMILY MEDICINE

## 2024-06-18 PROCEDURE — 82607 VITAMIN B-12: CPT | Mod: HCNC | Performed by: FAMILY MEDICINE

## 2024-06-25 ENCOUNTER — OFFICE VISIT (OUTPATIENT)
Dept: INTERNAL MEDICINE | Facility: CLINIC | Age: 84
End: 2024-06-25
Payer: MEDICARE

## 2024-06-25 VITALS
WEIGHT: 127.19 LBS | HEIGHT: 59 IN | TEMPERATURE: 97 F | SYSTOLIC BLOOD PRESSURE: 144 MMHG | DIASTOLIC BLOOD PRESSURE: 60 MMHG | OXYGEN SATURATION: 98 % | BODY MASS INDEX: 25.64 KG/M2 | HEART RATE: 64 BPM

## 2024-06-25 DIAGNOSIS — E03.9 HYPOTHYROIDISM, UNSPECIFIED TYPE: ICD-10-CM

## 2024-06-25 DIAGNOSIS — I95.9 HYPOTENSION, UNSPECIFIED HYPOTENSION TYPE: ICD-10-CM

## 2024-06-25 DIAGNOSIS — R53.83 FATIGUE, UNSPECIFIED TYPE: ICD-10-CM

## 2024-06-25 DIAGNOSIS — N18.32 STAGE 3B CHRONIC KIDNEY DISEASE: ICD-10-CM

## 2024-06-25 DIAGNOSIS — D64.9 CHRONIC ANEMIA: ICD-10-CM

## 2024-06-25 DIAGNOSIS — D50.0 IRON DEFICIENCY ANEMIA DUE TO CHRONIC BLOOD LOSS: Primary | ICD-10-CM

## 2024-06-25 DIAGNOSIS — I10 HYPERTENSION, UNSPECIFIED TYPE: ICD-10-CM

## 2024-06-25 DIAGNOSIS — Z78.9 STATIN INTOLERANCE: ICD-10-CM

## 2024-06-25 DIAGNOSIS — R04.0 EPISTAXIS: ICD-10-CM

## 2024-06-25 DIAGNOSIS — R01.1 HEART MURMUR: ICD-10-CM

## 2024-06-25 PROCEDURE — 99999 PR PBB SHADOW E&M-EST. PATIENT-LVL V: CPT | Mod: PBBFAC,HCNC,, | Performed by: FAMILY MEDICINE

## 2024-06-25 NOTE — PROGRESS NOTES
Subjective:       Patient ID: Odin Oliva is a 84 y.o. female.    Chief Complaint: Follow-up (lab)    HPI    Patient Active Problem List   Diagnosis    Diabetes mellitus    Chronic kidney disease (CKD), stage IV (severe)    Other specified hypothyroidism    Acquired hammer toe    Hypertension    Adductovarus rotation of toe, acquired, left    Osteoporosis    Chronic cough    Statin intolerance    Heart murmur    Fatigue    Chronic anemia    Iron deficiency anemia due to chronic blood loss       Past Medical History:   Diagnosis Date    CKD (chronic kidney disease), stage IV     Diabetes mellitus     HTN (hypertension)     Hyperuricemia     Seasonal allergies     Thyroid disease     Urinary tract infection        Past Surgical History:   Procedure Laterality Date    CATARACT EXTRACTION, BILATERAL      CHOLECYSTECTOMY      EXOSTECTOMY Bilateral 11/8/2019    Procedure: EXOSTECTOMY;  Surgeon: Lissy Comer DPM;  Location: [facility] OR;  Service: Podiatry;  Laterality: Bilateral;  left fourth digit, right fifth digit    FOOT MASS EXCISION Bilateral 11/8/2019    Procedure: HAMMER TOE, DEROTATIONAL ARTHROPLASTY FIFTH DIGIT;  Surgeon: Lissy Comer DPM;  Location: [facility] OR;  Service: Podiatry;  Laterality: Bilateral;  Left 5th toe incision @ 08:51.  Left 4th toe incision @ 08:54.  Right 5th toe incision : 09:24.    TOTAL THYROIDECTOMY      UMBILICAL HERNIA REPAIR      x 2       Family History   Problem Relation Name Age of Onset    Asthma Mother      No Known Problems Father      Lung cancer Brother  65    Diabetes Brother      Diabetes Brother  80    Heart disease Daughter         Social History     Tobacco Use   Smoking Status Never   Smokeless Tobacco Never       Wt Readings from Last 5 Encounters:   06/25/24 57.7 kg (127 lb 3.3 oz)   05/27/24 57.9 kg (127 lb 9.6 oz)   05/09/24 56.1 kg (123 lb 12.6 oz)   05/07/24 57.5 kg (126 lb 10.5 oz)   04/11/23 57.4 kg (126 lb 8.7 oz)       For further HPI details, see  assessment and plan.    Review of Systems    Objective:      Vitals:    06/25/24 1332   BP: (!) 144/60   Pulse: 64   Temp: 96.6 °F (35.9 °C)       Physical Exam  Constitutional:       General: She is not in acute distress.     Appearance: She is not ill-appearing.   Pulmonary:      Effort: Pulmonary effort is normal. No respiratory distress.   Neurological:      General: No focal deficit present.      Mental Status: She is alert.   Psychiatric:         Mood and Affect: Mood normal.         Behavior: Behavior normal.         Assessment:       1. Iron deficiency anemia due to chronic blood loss    2. Epistaxis    3. Chronic anemia    4. Fatigue, unspecified type    5. Hypothyroidism, unspecified type    6. Heart murmur    7. Statin intolerance    8. Stage 3b chronic kidney disease    9. Hypertension, unspecified type    10. Hypotension, unspecified hypotension type        Plan:   Epistaxis    Patient has a mild chronic anemia over the past couple of years.  Recently ferritin was identified to be low.  I do have concerns this is an iron-deficiency anemia.  I am recommending ferrous sulfate over-the-counter oral supplementation.  There is no blood in her stool and no reports of black stool.  Patient does struggle with chronic nose bleeds dating back a few years.  The sound quite variable in frequency ranging from a couple of times a day to once every 2 weeks.  I would recommend revisiting with the Ear Nose and Throat Department to see if there is anything we can do to stop this recurrence.  I plan to continue to monitor her blood counts and if we are not appreciating a rise we may need to pursue endoscopy and Hematology consultation.    Hypothyroidism   Patient's Synthroid dose is now 75 mcg   We had adjust dose in early may.  TSH now appropriate  Of not some increased fatigue. Will monitor this closely.    Hyperuricemia   Mild elevation in uric acid.  Given her chronic kidney disease and extensive drug list I am  apprehensive to add anymore medication at present time but we will continue to monitor this lab    Type 2 diabetes   A1c 7.1 roughly a month ago.  This is considered at goal given her age.  Continue current treatment   Curtis Fernando    Lab Results   Component Value Date    LDLCALC 99.2 05/07/2024     Statin intolerant.  Patient found increased achiness with Lipitor.  Discontinuing medication.    Cardiac murmur   Increased fatigue   Given advanced age and comorbidities I do think it would be prudent to arrange for cardiology consultation    Chronic kidney disease stage IIIB   Relatively stable   Continue to monitor   Avoiding NSAIDs.  Continue good pressure and glucose control    Hypertension with recent hypotension.  Over the past several days she has not been feeling well and she is noted her blood pressure has been lower.  She stopped taking her valsartan with hydrochlorothiazide for several days.  She did take it today however.  Her pressure is a little elevated today.  I am apprehensive to make any changes.  I do worry about hypotension especially in light of the fact that she has been feeling very well.  I am not sure how to best address this information given lack of consistent data.  I am once again asking that they log her blood pressure on a daily basis.  Preferably twice daily.  I am asking they return in a couple of weeks with her log and her machine.  We will make adjustments based off of consistent readings    Lab in 4 weeks.  Follow up me soon after  This note was verbally dictated, please excuse any type errors.

## 2024-06-27 PROBLEM — D50.0 IRON DEFICIENCY ANEMIA DUE TO CHRONIC BLOOD LOSS: Status: ACTIVE | Noted: 2024-06-27

## 2024-06-27 PROBLEM — Z78.9 STATIN INTOLERANCE: Status: ACTIVE | Noted: 2024-06-27

## 2024-06-27 PROBLEM — R01.1 HEART MURMUR: Status: ACTIVE | Noted: 2024-06-27

## 2024-06-27 PROBLEM — D64.9 CHRONIC ANEMIA: Status: ACTIVE | Noted: 2024-06-27

## 2024-06-27 PROBLEM — R53.83 FATIGUE: Status: ACTIVE | Noted: 2024-06-27

## 2024-06-28 DIAGNOSIS — I10 PRIMARY HYPERTENSION: Primary | ICD-10-CM

## 2024-06-28 DIAGNOSIS — Z00.00 ROUTINE HEALTH MAINTENANCE: ICD-10-CM

## 2024-07-05 ENCOUNTER — HOSPITAL ENCOUNTER (OUTPATIENT)
Dept: CARDIOLOGY | Facility: HOSPITAL | Age: 84
Discharge: HOME | End: 2024-07-05
Attending: INTERNAL MEDICINE
Payer: MEDICARE

## 2024-07-05 ENCOUNTER — OFFICE VISIT (OUTPATIENT)
Dept: CARDIOLOGY | Facility: CLINIC | Age: 84
End: 2024-07-05
Payer: MEDICARE

## 2024-07-05 VITALS
BODY MASS INDEX: 25.43 KG/M2 | OXYGEN SATURATION: 97 % | DIASTOLIC BLOOD PRESSURE: 74 MMHG | WEIGHT: 126.13 LBS | HEIGHT: 59 IN | HEART RATE: 63 BPM | SYSTOLIC BLOOD PRESSURE: 142 MMHG

## 2024-07-05 DIAGNOSIS — Z00.00 ROUTINE HEALTH MAINTENANCE: ICD-10-CM

## 2024-07-05 DIAGNOSIS — R06.02 SOB (SHORTNESS OF BREATH): Primary | ICD-10-CM

## 2024-07-05 DIAGNOSIS — I10 PRIMARY HYPERTENSION: ICD-10-CM

## 2024-07-05 DIAGNOSIS — Z78.9 STATIN INTOLERANCE: ICD-10-CM

## 2024-07-05 DIAGNOSIS — R94.31 EKG ABNORMALITIES: ICD-10-CM

## 2024-07-05 DIAGNOSIS — R01.1 HEART MURMUR: ICD-10-CM

## 2024-07-05 DIAGNOSIS — N18.4 CHRONIC KIDNEY DISEASE (CKD), STAGE IV (SEVERE): ICD-10-CM

## 2024-07-05 LAB
OHS QRS DURATION: 122 MS
OHS QTC CALCULATION: 458 MS

## 2024-07-05 PROCEDURE — 93005 ELECTROCARDIOGRAM TRACING: CPT | Mod: HCNC

## 2024-07-05 PROCEDURE — 93010 ELECTROCARDIOGRAM REPORT: CPT | Mod: HCNC,,, | Performed by: INTERNAL MEDICINE

## 2024-07-05 PROCEDURE — 99999 PR PBB SHADOW E&M-EST. PATIENT-LVL IV: CPT | Mod: PBBFAC,HCNC,, | Performed by: INTERNAL MEDICINE

## 2024-07-05 NOTE — PROGRESS NOTES
Subjective:   Patient ID:  Odin Oliva is a 84 y.o. female who presents for evaluation of No chief complaint on file.      83 yo female, referred for heart murmur by Dr. Barbosa  [facility] HTN HLD DM > 30yrs CKD IV, no h/o MI CVA and Ca  EKG reviewed by myself today reveals NSR RBBB  Fell once after standing up.   Yard work. Some SOB, palpitation. No orthopnea.never smoking and drinking  BP LDL and A1C controlled          No results found for this or any previous visit.     No results found for this or any previous visit.       Past Medical History:   Diagnosis Date    CKD (chronic kidney disease), stage IV     Diabetes mellitus     HTN (hypertension)     Hyperuricemia     Seasonal allergies     Thyroid disease     Urinary tract infection        Past Surgical History:   Procedure Laterality Date    CATARACT EXTRACTION, BILATERAL      CHOLECYSTECTOMY      EXOSTECTOMY Bilateral 11/8/2019    Procedure: EXOSTECTOMY;  Surgeon: Lissy Comer DPM;  Location: [facility];  Service: Podiatry;  Laterality: Bilateral;  left fourth digit, right fifth digit    FOOT MASS EXCISION Bilateral 11/8/2019    Procedure: HAMMER TOE, DEROTATIONAL ARTHROPLASTY FIFTH DIGIT;  Surgeon: Lissy Comer DPM;  Location: [facility];  Service: Podiatry;  Laterality: Bilateral;  Left 5th toe incision @ 08:51.  Left 4th toe incision @ 08:54.  Right 5th toe incision : 09:24.    TOTAL THYROIDECTOMY      UMBILICAL HERNIA REPAIR      x 2       Social History     Tobacco Use    Smoking status: Never    Smokeless tobacco: Never   Substance Use Topics    Alcohol use: No    Drug use: No       Family History   Problem Relation Name Age of Onset    Asthma Mother      No Known Problems Father      Lung cancer Brother  65    Diabetes Brother      Diabetes Brother  80    Heart disease Daughter         ROS    Objective:   Physical Exam  HENT:      Head: Normocephalic.   Eyes:      Pupils: Pupils are equal, round, and reactive to light.   Neck:      Thyroid: No  thyromegaly.      Vascular: Normal carotid pulses. No carotid bruit or JVD.   Cardiovascular:      Rate and Rhythm: Normal rate and regular rhythm. No extrasystoles are present.     Chest Wall: PMI is not displaced.      Pulses: Normal pulses.      Heart sounds: Normal heart sounds. No murmur heard.     No gallop. No S3 sounds.   Pulmonary:      Effort: No respiratory distress.      Breath sounds: Normal breath sounds. No stridor.   Abdominal:      General: Bowel sounds are normal.      Palpations: Abdomen is soft.      Tenderness: There is no abdominal tenderness. There is no rebound.   Musculoskeletal:         General: Normal range of motion.   Skin:     General: Skin is warm and dry.      Findings: No rash.   Neurological:      Mental Status: She is alert and oriented to person, place, and time.   Psychiatric:         Behavior: Behavior normal.         Lab Results   Component Value Date    CHOL 161 05/07/2024    CHOL 214 (H) 12/06/2023    CHOL 189 11/01/2022     Lab Results   Component Value Date    HDL 31 (L) 05/07/2024    HDL 36 (L) 12/06/2023    HDL 31 (L) 11/01/2022     Lab Results   Component Value Date    LDLCALC 99.2 05/07/2024    LDLCALC 152.0 12/06/2023    LDLCALC 117.2 11/01/2022     Lab Results   Component Value Date    TRIG 154 (H) 05/07/2024    TRIG 130 12/06/2023    TRIG 204 (H) 11/01/2022     Lab Results   Component Value Date    CHOLHDL 19.3 (L) 05/07/2024    CHOLHDL 16.8 (L) 12/06/2023    CHOLHDL 16.4 (L) 11/01/2022       Chemistry        Component Value Date/Time     05/07/2024 1012    K 4.8 05/07/2024 1012     05/07/2024 1012    CO2 21 (L) 05/07/2024 1012    BUN 37 (H) 05/07/2024 1012    CREATININE 1.5 (H) 05/07/2024 1012     (H) 05/07/2024 1012        Component Value Date/Time    CALCIUM 9.4 05/07/2024 1012    ALKPHOS 116 05/07/2024 1012    AST 20 05/07/2024 1012    ALT 15 05/07/2024 1012    BILITOT 0.3 05/07/2024 1012    ESTGFRAFRICA 32.1 (A) 05/30/2022 1603    EGFRNONAA  "27.9 (A) 05/30/2022 1603          Lab Results   Component Value Date    HGBA1C 7.1 (H) 05/07/2024     Lab Results   Component Value Date    TSH 2.829 06/18/2024     No results found for: "INR", "PROTIME"  Lab Results   Component Value Date    WBC 5.38 05/07/2024    HGB 10.6 (L) 05/07/2024    HCT 33.2 (L) 05/07/2024    MCV 84 05/07/2024     05/07/2024     BNP  @LABRCNTIP(BNP,BNPTRIAGEBLO)@  CrCl cannot be calculated (Patient's most recent lab result is older than the maximum 7 days allowed.).  No results found in the last 24 hours.  No results found in the last 24 hours.  No results found in the last 24 hours.    Assessment:      1. SOB (shortness of breath)    2. Heart murmur    3. Primary hypertension    4. Statin intolerance    5. Chronic kidney disease (CKD), stage IV (severe)    6. EKG abnormalities        Plan:   Echo and Phar MPI for SOB prolonged h/o DM and abn\l EKG  Phone review      Continue valsartan and HCTZ  DM Rx per PCP  Counseled DASH  Check Lipid profile with PCP in 6 months  Recommend heart-healthy diet, weight control and regular exercise.  Beth. Risk modification.   I have reviewed all pertinent labs and cardiac studies independently. Plans and recommendations have been formulated under my direct supervision. All questions answered and patient voiced understanding.   If symptoms persist go to the ED                "

## 2024-07-07 LAB
OHS QRS DURATION: 122 MS
OHS QTC CALCULATION: 458 MS

## 2024-07-22 ENCOUNTER — LAB VISIT (OUTPATIENT)
Dept: LAB | Facility: HOSPITAL | Age: 84
End: 2024-07-22
Attending: FAMILY MEDICINE
Payer: MEDICARE

## 2024-07-22 ENCOUNTER — OFFICE VISIT (OUTPATIENT)
Dept: OTOLARYNGOLOGY | Facility: CLINIC | Age: 84
End: 2024-07-22
Payer: MEDICARE

## 2024-07-22 VITALS — BODY MASS INDEX: 29.19 KG/M2 | WEIGHT: 126.13 LBS | HEIGHT: 55 IN

## 2024-07-22 DIAGNOSIS — E03.9 HYPOTHYROIDISM, UNSPECIFIED TYPE: ICD-10-CM

## 2024-07-22 DIAGNOSIS — D64.9 CHRONIC ANEMIA: ICD-10-CM

## 2024-07-22 DIAGNOSIS — R04.0 RECURRENT EPISTAXIS: ICD-10-CM

## 2024-07-22 DIAGNOSIS — D64.9 CHRONIC ANEMIA: Primary | ICD-10-CM

## 2024-07-22 LAB
BASOPHILS # BLD AUTO: 0.05 K/UL (ref 0–0.2)
BASOPHILS NFR BLD: 1 % (ref 0–1.9)
DIFFERENTIAL METHOD BLD: ABNORMAL
EOSINOPHIL # BLD AUTO: 0.4 K/UL (ref 0–0.5)
EOSINOPHIL NFR BLD: 7.4 % (ref 0–8)
ERYTHROCYTE [DISTWIDTH] IN BLOOD BY AUTOMATED COUNT: 14.6 % (ref 11.5–14.5)
HCT VFR BLD AUTO: 34.5 % (ref 37–48.5)
HGB BLD-MCNC: 10.6 G/DL (ref 12–16)
IMM GRANULOCYTES # BLD AUTO: 0.01 K/UL (ref 0–0.04)
IMM GRANULOCYTES NFR BLD AUTO: 0.2 % (ref 0–0.5)
LYMPHOCYTES # BLD AUTO: 1.1 K/UL (ref 1–4.8)
LYMPHOCYTES NFR BLD: 21.6 % (ref 18–48)
MCH RBC QN AUTO: 26.3 PG (ref 27–31)
MCHC RBC AUTO-ENTMCNC: 30.7 G/DL (ref 32–36)
MCV RBC AUTO: 86 FL (ref 82–98)
MONOCYTES # BLD AUTO: 0.4 K/UL (ref 0.3–1)
MONOCYTES NFR BLD: 8.2 % (ref 4–15)
NEUTROPHILS # BLD AUTO: 3.2 K/UL (ref 1.8–7.7)
NEUTROPHILS NFR BLD: 61.6 % (ref 38–73)
NRBC BLD-RTO: 0 /100 WBC
PLATELET # BLD AUTO: 237 K/UL (ref 150–450)
PMV BLD AUTO: 10.8 FL (ref 9.2–12.9)
RBC # BLD AUTO: 4.03 M/UL (ref 4–5.4)
T4 FREE SERPL-MCNC: 0.92 NG/DL (ref 0.71–1.51)
TSH SERPL DL<=0.005 MIU/L-ACNC: 5.4 UIU/ML (ref 0.4–4)
WBC # BLD AUTO: 5.15 K/UL (ref 3.9–12.7)

## 2024-07-22 PROCEDURE — 84443 ASSAY THYROID STIM HORMONE: CPT | Mod: HCNC | Performed by: FAMILY MEDICINE

## 2024-07-22 PROCEDURE — 1126F AMNT PAIN NOTED NONE PRSNT: CPT | Mod: HCNC,CPTII,S$GLB, | Performed by: STUDENT IN AN ORGANIZED HEALTH CARE EDUCATION/TRAINING PROGRAM

## 2024-07-22 PROCEDURE — 99213 OFFICE O/P EST LOW 20 MIN: CPT | Mod: 25,HCNC,S$GLB, | Performed by: STUDENT IN AN ORGANIZED HEALTH CARE EDUCATION/TRAINING PROGRAM

## 2024-07-22 PROCEDURE — 85025 COMPLETE CBC W/AUTO DIFF WBC: CPT | Mod: HCNC | Performed by: FAMILY MEDICINE

## 2024-07-22 PROCEDURE — 30901 CONTROL OF NOSEBLEED: CPT | Mod: HCNC,RT,S$GLB, | Performed by: STUDENT IN AN ORGANIZED HEALTH CARE EDUCATION/TRAINING PROGRAM

## 2024-07-22 PROCEDURE — 36415 COLL VENOUS BLD VENIPUNCTURE: CPT | Mod: HCNC | Performed by: FAMILY MEDICINE

## 2024-07-22 PROCEDURE — 84439 ASSAY OF FREE THYROXINE: CPT | Mod: HCNC | Performed by: FAMILY MEDICINE

## 2024-07-22 PROCEDURE — 1159F MED LIST DOCD IN RCRD: CPT | Mod: HCNC,CPTII,S$GLB, | Performed by: STUDENT IN AN ORGANIZED HEALTH CARE EDUCATION/TRAINING PROGRAM

## 2024-07-22 PROCEDURE — 3288F FALL RISK ASSESSMENT DOCD: CPT | Mod: HCNC,CPTII,S$GLB, | Performed by: STUDENT IN AN ORGANIZED HEALTH CARE EDUCATION/TRAINING PROGRAM

## 2024-07-22 PROCEDURE — 1101F PT FALLS ASSESS-DOCD LE1/YR: CPT | Mod: HCNC,CPTII,S$GLB, | Performed by: STUDENT IN AN ORGANIZED HEALTH CARE EDUCATION/TRAINING PROGRAM

## 2024-07-22 PROCEDURE — 99999 PR PBB SHADOW E&M-EST. PATIENT-LVL III: CPT | Mod: PBBFAC,HCNC,, | Performed by: STUDENT IN AN ORGANIZED HEALTH CARE EDUCATION/TRAINING PROGRAM

## 2024-07-22 NOTE — PROGRESS NOTES
Chief complaint:    Chief Complaint   Patient presents with    Epistaxis     Pt is coming for recurrent nose bleeds.           Referring Provider:  Juventino Barbosa Md  [id number] [place] [address]      History of present illness:     Ms. Oliva is a 84 y.o. presenting for evaluation of epistaxis.     Onset of bleeding: many years, comes and goes  Frequency of nose bleeds: a few times a month for years, last was a few days   Laterality:  right  Volume of bleeding: mild, drips for a minute   Ever required a blood transfusion or ER visit due to nose bleeds:  NO  Primarily Posterior Bleeding:  No  History of coagulation disorders/bleeding when having teeth cleaning:  No  Currently on anticoagulant medications:   NO  History of HTN: yes  Blood pressure:   BP Readings from Last 3 Encounters:   07/05/24 (!) 142/74   06/25/24 (!) 144/60   05/27/24 (!) 167/75     Exacerbating factors: no, usually random   Treatments have included: holding pressure       History      Past Medical History:   Past Medical History:   Diagnosis Date    CKD (chronic kidney disease), stage IV     Diabetes mellitus     HTN (hypertension)     Hyperuricemia     Seasonal allergies     Thyroid disease     Urinary tract infection          Past Surgical History:  Past Surgical History:   Procedure Laterality Date    CATARACT EXTRACTION, BILATERAL      CHOLECYSTECTOMY      EXOSTECTOMY Bilateral 11/8/2019    Procedure: EXOSTECTOMY;  Surgeon: Lissy Comer DPM;  Location: [facility] OR;  Service: Podiatry;  Laterality: Bilateral;  left fourth digit, right fifth digit    FOOT MASS EXCISION Bilateral 11/8/2019    Procedure: HAMMER TOE, DEROTATIONAL ARTHROPLASTY FIFTH DIGIT;  Surgeon: Lissy Comer DPM;  Location: [facility] OR;  Service: Podiatry;  Laterality: Bilateral;  Left 5th toe incision @ 08:51.  Left 4th toe incision @ 08:54.  Right 5th toe incision : 09:24.    TOTAL THYROIDECTOMY      UMBILICAL HERNIA REPAIR      x 2          "Medications: Medication list reviewed. She  has a current medication list which includes the following prescription(s): albuterol, blood sugar diagnostic, blood-glucose meter, cetirizine, diclofenac sodium, empagliflozin, freestyle nawaf 14 day sensor, glimepiride, hydrocodone-acetaminophen, lancets, levothyroxine, tradjenta, montelukast, valsartan-hydrochlorothiazide, azelastine, and fluticasone-salmeterol 250-50 mcg/dose.     Allergies: Review of patient's allergies indicates:  No Known Allergies      Family history: family history includes Asthma in her mother; Diabetes in her brother; Diabetes (age of onset: 80) in her brother; Heart disease in her daughter; Lung cancer (age of onset: 65) in her brother; No Known Problems in her father.         Social History          Alcohol use:  reports no history of alcohol use.            Tobacco:  reports that she has never smoked. She has never used smokeless tobacco.         Physical Examination      Vitals: Height 4' 1.32" (1.253 m), weight 57.2 kg (126 lb 1.7 oz).      General: Well developed, well nourished, well hydrated.     Voice: no hoarseness, no dysarthria      Head/Face: Normocephalic, atraumatic. No scars or lesions. Facial musculature equal.     Eyes: No scleral icterus or conjunctival hemorrhage. EOMI. PERRLA.     Ears:     Right ear: No gross deformity. EAC is clear of debris and erythema. TM are intact with a pneumatized middle ear. No signs of retraction, fluid or infection.      Left ear: No gross deformity. EAC is clear of debris and erythema. TM are intact with a pneumatized middle ear. No signs of retraction, fluid or infection.      Nose: No gross deformity or lesions. No purulent discharge. No significant NSD.      Mouth/Oropharynx: Lips without any lesions. No mucosal lesions within the oropharynx. No tonsillar exudate or lesions. Pharyngeal walls symmetrical. Uvula midline. Tongue midline without lesions.     Neck: Trachea midline. No masses. No " thyromegaly or nodules palpated.     Lymphatic: No lymphadenopathy in the neck.     Extremities: No cyanosis. Warm and well-perfused.     Skin: No scars or lesions on face or neck.      Neurologic: Moving all extremities without gross abnormality.CN II-XII grossly intact. House-Brackmann 1/6. No signs of nystagmus.      Procedure - control of epistaxis, anterior, simple    Description: Risks, benefits, and alternatives of the procedure were discussed with the patient, and the patient consented to the control of epistaxis.  The nasal cavity was sprayed with a topical decongestant and topical anesthetic. After adequate anesthesia was obtained, control of epistaxis was performed for the right side(s).  The concerning area(s) for bleeding were addressed with limited silver nitrate applied topically to the area(s) and at the end of the procedure there was no further bleeding from the nose and sinuses.  The patient tolerated the procedure well with no complications.    Nasal Findings  -     The area(s) causing the epistaxis (and cauterized today) were found to be arising from prominent blood vessels located at the caudal septum in Kiesselbach's plexus (Little's area) on the right side.      Data reviewed      Review of records:      I reviewed records from the referring provider's office visits, describing the history, workup, and/or treatment of this problem thus far.    Laboratory:               Component Ref Range & Units 2 mo ago  (5/7/24) 1 yr ago  (2/24/23) 1 yr ago  (11/1/22) 2 yr ago  (5/30/22) 2 yr ago  (10/27/21) 3 yr ago  (7/16/21) 3 yr ago  (5/31/21)   WBC 3.90 - 12.70 K/uL 5.38 5.57 5.93 4.91 3.94 4.66 5.74   RBC 4.00 - 5.40 M/uL 3.94 Low  4.03 3.97 Low  4.10 3.78 Low  3.83 Low  4.16   Hemoglobin 12.0 - 16.0 g/dL 10.6 Low  10.8 Low  10.9 Low  10.8 Low  10.4 Low  10.2 Low  10.9 Low    Hematocrit 37.0 - 48.5 % 33.2 Low  34.2 Low  33.8 Low  35.3 Low  32.0 Low  31.7 Low  34.3 Low    MCV 82 - 98 fL 84 85 85 86 85  83 83   MCH 27.0 - 31.0 pg 26.9 Low  26.8 Low  27.5 26.3 Low  27.5 26.6 Low  26.2 Low    MCHC 32.0 - 36.0 g/dL 31.9 Low  31.6 Low  32.2 30.6 Low  32.5 32.2 31.8 Low    RDW 11.5 - 14.5 % 12.8 12.7 12.7 13.4 12.9 13.0 13.4   Platelets 150 - 450 K/uL 281 240 304 254 203 228 235   MPV 9.2 - 12.9 fL 10.7 10.8 10.4 10.4 11.0 10.7 10.6   Immature Granulocytes 0.0 - 0.5 % 0.4 0.5 0.7 High  0.2 0.3 0.2 0.3   Gran # (ANC) 1.8 - 7.7 K/uL 3.6 2.8 3.9 3.1 2.4 2.8 3.7   Immature Grans (Abs) 0.00 - 0.04 K/uL 0.02 0.03 CM 0.04 CM 0.01 CM 0.01 CM 0.01 CM 0.02             Assessment/Plan:    1. Chronic anemia    2. Recurrent epistaxis         Fozegigi has right sided epistaxis. Cauterization was deemed necessary at the current clinic visit.    We discussed preventive measures such as the application of moisturizing gel to the nose (ie. AYR nasal gel) at night and saline spray in the daytime.  We discussed other issues which can cause recurrence of nosebleeds, such as NSAIDs, aggressive nose blowing/wiping, etc.  If there is further bleeding she should return to the clinic for re-evaluation.  Conservative measures for nosebleeds include pinching nose, ice to area, and Afrin.  We will see Fozegigi back if there are further issues.        Ronald Hathaway MD  [facility] Department of Otol[facility]ology   [facility] - [address]  [place] [address]  P: [phone]  F: [phone]

## 2024-07-29 ENCOUNTER — OFFICE VISIT (OUTPATIENT)
Dept: INTERNAL MEDICINE | Facility: CLINIC | Age: 84
End: 2024-07-29
Payer: MEDICARE

## 2024-07-29 VITALS
BODY MASS INDEX: 29.13 KG/M2 | WEIGHT: 125.88 LBS | HEIGHT: 55 IN | OXYGEN SATURATION: 99 % | HEART RATE: 67 BPM | TEMPERATURE: 96 F | DIASTOLIC BLOOD PRESSURE: 60 MMHG | SYSTOLIC BLOOD PRESSURE: 130 MMHG

## 2024-07-29 DIAGNOSIS — E66.01 SEVERE OBESITY (BMI 35.0-39.9) WITH COMORBIDITY: ICD-10-CM

## 2024-07-29 DIAGNOSIS — E03.9 HYPOTHYROIDISM, UNSPECIFIED TYPE: ICD-10-CM

## 2024-07-29 DIAGNOSIS — R04.0 EPISTAXIS: ICD-10-CM

## 2024-07-29 DIAGNOSIS — E11.3312 TYPE 2 DIABETES MELLITUS WITH LEFT EYE AFFECTED BY MODERATE NONPROLIFERATIVE RETINOPATHY AND MACULAR EDEMA, WITHOUT LONG-TERM CURRENT USE OF INSULIN: ICD-10-CM

## 2024-07-29 DIAGNOSIS — D50.0 IRON DEFICIENCY ANEMIA DUE TO CHRONIC BLOOD LOSS: Primary | ICD-10-CM

## 2024-07-29 DIAGNOSIS — D64.9 CHRONIC ANEMIA: ICD-10-CM

## 2024-07-29 PROCEDURE — 1159F MED LIST DOCD IN RCRD: CPT | Mod: HCNC,CPTII,S$GLB, | Performed by: FAMILY MEDICINE

## 2024-07-29 PROCEDURE — 1101F PT FALLS ASSESS-DOCD LE1/YR: CPT | Mod: HCNC,CPTII,S$GLB, | Performed by: FAMILY MEDICINE

## 2024-07-29 PROCEDURE — 3288F FALL RISK ASSESSMENT DOCD: CPT | Mod: HCNC,CPTII,S$GLB, | Performed by: FAMILY MEDICINE

## 2024-07-29 PROCEDURE — 3078F DIAST BP <80 MM HG: CPT | Mod: HCNC,CPTII,S$GLB, | Performed by: FAMILY MEDICINE

## 2024-07-29 PROCEDURE — G2211 COMPLEX E/M VISIT ADD ON: HCPCS | Mod: HCNC,S$GLB,, | Performed by: FAMILY MEDICINE

## 2024-07-29 PROCEDURE — 3075F SYST BP GE 130 - 139MM HG: CPT | Mod: HCNC,CPTII,S$GLB, | Performed by: FAMILY MEDICINE

## 2024-07-29 PROCEDURE — 1126F AMNT PAIN NOTED NONE PRSNT: CPT | Mod: HCNC,CPTII,S$GLB, | Performed by: FAMILY MEDICINE

## 2024-07-29 PROCEDURE — 99999 PR PBB SHADOW E&M-EST. PATIENT-LVL IV: CPT | Mod: PBBFAC,HCNC,, | Performed by: FAMILY MEDICINE

## 2024-07-29 PROCEDURE — 99214 OFFICE O/P EST MOD 30 MIN: CPT | Mod: HCNC,S$GLB,, | Performed by: FAMILY MEDICINE

## 2024-07-29 NOTE — PROGRESS NOTES
Subjective:       Patient ID: Odin Oliva is a 84 y.o. female.    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, without long-term current use of insulin       Past Medical History:   Diagnosis Date    CKD (chronic kidney disease), stage IV     Diabetes mellitus     HTN (hypertension)     Hyperuricemia     Seasonal allergies     Thyroid disease     Urinary tract infection        Past Surgical History:   Procedure Laterality Date    CATARACT EXTRACTION, BILATERAL      CHOLECYSTECTOMY      EXOSTECTOMY Bilateral 11/8/2019    Procedure: EXOSTECTOMY;  Surgeon: Lissy Comer DPM;  Location: [facility] OR;  Service: Podiatry;  Laterality: Bilateral;  left fourth digit, right fifth digit    FOOT MASS EXCISION Bilateral 11/8/2019    Procedure: HAMMER TOE, DEROTATIONAL ARTHROPLASTY FIFTH DIGIT;  Surgeon: Lissy Comer DPM;  Location: [facility] OR;  Service: Podiatry;  Laterality: Bilateral;  Left 5th toe incision @ 08:51.  Left 4th toe incision @ 08:54.  Right 5th toe incision : 09:24.    TOTAL THYROIDECTOMY      UMBILICAL HERNIA REPAIR      x 2       Family History   Problem Relation Name Age of Onset    Asthma Mother      No Known Problems Father      Lung cancer Brother  65    Diabetes Brother      Diabetes Brother  80    Heart disease Daughter         Social History     Tobacco Use   Smoking Status Never   Smokeless Tobacco Never        Wt Readings from Last 5 Encounters:   07/29/24 57.1 kg (125 lb 14.1 oz)   07/22/24 57.2 kg (126 lb 1.7 oz)   07/05/24 57.2 kg (126 lb 1.7 oz)   06/25/24 57.7 kg (127 lb 3.3 oz)   05/27/24 57.9 kg (127 lb 9.6 oz)       For further HPI details, see assessment and plan.    Review of Systems  seems to be stable and doing well no acute issues reported  Objective:      Vitals:    07/29/24 1324   BP: 130/60   Pulse: 67   Temp: 96.2 °F (35.7 °C)       Physical Exam  Constitutional:       General: She is not in acute distress.     Appearance: She is not ill-appearing.   Pulmonary:      Effort: Pulmonary effort is normal. No respiratory distress.   Neurological:      General: No focal deficit present.      Mental Status: She is alert.   Psychiatric:         Mood and Affect: Mood normal.         Behavior: Behavior normal.         Assessment:       1. Iron deficiency anemia due to chronic blood loss    2. Epistaxis    3. Chronic anemia    4. Hypothyroidism, unspecified type    5. Severe obesity (BMI 35.0-39.9) with comorbidity    6. 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  Cauterization occurred.  I am very pleased to hear no bleeding since  Plan to monitor her blood counts.  Chronic anemia persists.   Plan to check her blood count again in 6 weeks.Advising oral iron supplementation continuation.  If anemia persists/ fails to improve planned Hematology consultation. She is somewhat hesitant to pursue endoscopy at [facility] but if fails to resolve will need to consider.    Hypothyroidism   TSH is very mildly elevated.  We had recently reduced her Synthroid dose as she was running and a hyperthyroid state.  At present time she is on 75 mcg   When we do blood work in 6  weeks we will also check her thyroid    Heart murmur   Hypertension   Cardiology was consulted   Evaluation with echo and stress test pending presently.    Hypertension   Blood pressure is decently controlled.  Her home machine is deemed accurate.  We will leave her with current meds   Valsartan with hydrochlorothiazide 160/12.5    Type 2 diabetes /obesity  Blood glucose Reasonably well controlled with an A1c of 7.1 as of May 7th.  We will continue current approach and we will check her A1c again when we do our next round of labs in 6 weeks.  Jardiance 10   Amaryl 2 b.i.d.   Tradjenta 5  Statin intolerant  In addition to medications continue efforts at appropriate body mass index/appropriate diet/exercise as tolerated      Lab work in 6 weeks.  Plan to see each other soon after to discuss findings        This note was verbally dictated, please excuse any type errors.

## 2024-09-03 ENCOUNTER — HOSPITAL ENCOUNTER (OUTPATIENT)
Dept: CARDIOLOGY | Facility: HOSPITAL | Age: 84
Discharge: HOME | End: 2024-09-03
Attending: INTERNAL MEDICINE
Payer: MEDICARE

## 2024-09-03 ENCOUNTER — HOSPITAL ENCOUNTER (OUTPATIENT)
Dept: RADIOLOGY | Facility: HOSPITAL | Age: 84
Discharge: HOME | End: 2024-09-03
Attending: INTERNAL MEDICINE
Payer: MEDICARE

## 2024-09-03 VITALS
BODY MASS INDEX: 28.93 KG/M2 | HEART RATE: 65 BPM | SYSTOLIC BLOOD PRESSURE: 130 MMHG | HEIGHT: 55 IN | DIASTOLIC BLOOD PRESSURE: 60 MMHG | WEIGHT: 125 LBS

## 2024-09-03 DIAGNOSIS — R94.31 EKG ABNORMALITIES: ICD-10-CM

## 2024-09-03 DIAGNOSIS — R06.02 SOB (SHORTNESS OF BREATH): ICD-10-CM

## 2024-09-03 LAB
AORTIC ROOT ANNULUS: 3.15 CM
ASCENDING AORTA: 2.1 CM
AV INDEX (PROSTH): 0.99
AV MEAN GRADIENT: 5 MMHG
AV PEAK GRADIENT: 9 MMHG
AV VALVE AREA BY VELOCITY RATIO: 3.04 CM²
AV VALVE AREA: 2.96 CM²
AV VELOCITY RATIO: 1.02
BSA FOR ECHO PROCEDURE: 1.4 M2
CV ECHO LV RWT: 0.55 CM
CV STRESS BASE HR: 56 BPM
DIASTOLIC BLOOD PRESSURE: 68 MMHG
DOP CALC AO PEAK VEL: 1.52 M/S
DOP CALC AO VTI: 37.6 CM
DOP CALC LVOT AREA: 3 CM2
DOP CALC LVOT DIAMETER: 1.95 CM
DOP CALC LVOT PEAK VEL: 1.55 M/S
DOP CALC LVOT STROKE VOLUME: 111.34 CM3
DOP CALC RVOT PEAK VEL: 0.98 M/S
DOP CALC RVOT VTI: 23.7 CM
DOP CALCLVOT PEAK VEL VTI: 37.3 CM
E WAVE DECELERATION TIME: 278.28 MSEC
E/A RATIO: 0.71
E/E' RATIO: 12.46 M/S
ECHO LV POSTERIOR WALL: 0.99 CM (ref 0.6–1.1)
FRACTIONAL SHORTENING: 31 % (ref 28–44)
INTERVENTRICULAR SEPTUM: 1.06 CM (ref 0.6–1.1)
IVC DIAMETER: 0.99 CM
IVRT: 104.66 MSEC
LA MAJOR: 5.22 CM
LA MINOR: 4.56 CM
LA WIDTH: 3.5 CM
LEFT ATRIUM AREA SYSTOLIC (APICAL 2 CHAMBER): 14.93 CM2
LEFT ATRIUM AREA SYSTOLIC (APICAL 4 CHAMBER): 17.42 CM2
LEFT ATRIUM SIZE: 4.03 CM
LEFT ATRIUM VOLUME INDEX MOD: 32 ML/M2
LEFT ATRIUM VOLUME INDEX: 44.2 ML/M2
LEFT ATRIUM VOLUME MOD: 42.22 CM3
LEFT ATRIUM VOLUME: 58.36 CM3
LEFT INTERNAL DIMENSION IN SYSTOLE: 2.5 CM (ref 2.1–4)
LEFT VENTRICLE DIASTOLIC VOLUME INDEX: 41.3 ML/M2
LEFT VENTRICLE DIASTOLIC VOLUME: 54.51 ML
LEFT VENTRICLE END SYSTOLIC VOLUME APICAL 2 CHAMBER: 36.75 ML
LEFT VENTRICLE END SYSTOLIC VOLUME APICAL 4 CHAMBER: 44.74 ML
LEFT VENTRICLE MASS INDEX: 85 G/M2
LEFT VENTRICLE SYSTOLIC VOLUME INDEX: 16.9 ML/M2
LEFT VENTRICLE SYSTOLIC VOLUME: 22.34 ML
LEFT VENTRICULAR INTERNAL DIMENSION IN DIASTOLE: 3.6 CM (ref 3.5–6)
LEFT VENTRICULAR MASS: 111.84 G
LV LATERAL E/E' RATIO: 10.13 M/S
LV SEPTAL E/E' RATIO: 16.2 M/S
LVED V (TEICH): 54.51 ML
LVES V (TEICH): 22.34 ML
LVOT MG: 4.72 MMHG
LVOT MV: 1.02 CM/S
MV PEAK A VEL: 1.14 M/S
MV PEAK E VEL: 0.81 M/S
NUC STRESS EJECTION FRACTION: 74 %
OHS CV CPX 85 PERCENT MAX PREDICTED HEART RATE MALE: 116
OHS CV CPX MAX PREDICTED HEART RATE: 137
OHS CV CPX PATIENT IS FEMALE: 1
OHS CV CPX PATIENT IS MALE: 0
OHS CV CPX PEAK DIASTOLIC BLOOD PRESSURE: 75 MMHG
OHS CV CPX PEAK HEAR RATE: 78 BPM
OHS CV CPX PEAK RATE PRESSURE PRODUCT: NORMAL
OHS CV CPX PEAK SYSTOLIC BLOOD PRESSURE: 174 MMHG
OHS CV CPX PERCENT MAX PREDICTED HEART RATE ACHIEVED: 59
OHS CV CPX RATE PRESSURE PRODUCT PRESENTING: 8400
OHS CV RV/LV RATIO: 0.77 CM
PISA TR MAX VEL: 2.8 M/S
PULM VEIN S/D RATIO: 1.44
PV MEAN GRADIENT: 2 MMHG
PV PEAK D VEL: 0.45 M/S
PV PEAK GRADIENT: 5 MMHG
PV PEAK S VEL: 0.65 M/S
PV PEAK VELOCITY: 1.11 M/S
RA MAJOR: 4.15 CM
RA PRESSURE ESTIMATED: 3 MMHG
RA WIDTH: 3.05 CM
RIGHT VENTRICULAR END-DIASTOLIC DIMENSION: 2.78 CM
RV TB RVSP: 6 MMHG
SINUS: 2.65 CM
STJ: 2.47 CM
SYSTOLIC BLOOD PRESSURE: 150 MMHG
TDI LATERAL: 0.08 M/S
TDI SEPTAL: 0.05 M/S
TDI: 0.07 M/S
TR MAX PG: 31 MMHG
TRICUSPID ANNULAR PLANE SYSTOLIC EXCURSION: 1.97 CM
TV REST PULMONARY ARTERY PRESSURE: 34 MMHG
Z-SCORE OF LEFT VENTRICULAR DIMENSION IN END DIASTOLE: -1.75
Z-SCORE OF LEFT VENTRICULAR DIMENSION IN END SYSTOLE: -0.5

## 2024-09-03 PROCEDURE — 93306 TTE W/DOPPLER COMPLETE: CPT | Mod: 26,HCNC,, | Performed by: INTERNAL MEDICINE

## 2024-09-03 PROCEDURE — 93016 CV STRESS TEST SUPVJ ONLY: CPT | Mod: HCNC,,, | Performed by: INTERNAL MEDICINE

## 2024-09-03 PROCEDURE — 78452 HT MUSCLE IMAGE SPECT MULT: CPT | Mod: 26,HCNC,, | Performed by: INTERNAL MEDICINE

## 2024-09-03 PROCEDURE — 93306 TTE W/DOPPLER COMPLETE: CPT | Mod: HCNC

## 2024-09-03 PROCEDURE — A9502 TC99M TETROFOSMIN: HCPCS | Mod: HCNC | Performed by: INTERNAL MEDICINE

## 2024-09-03 PROCEDURE — 63600175 PHARM REV CODE 636 W HCPCS: Mod: HCNC | Performed by: INTERNAL MEDICINE

## 2024-09-03 PROCEDURE — 78452 HT MUSCLE IMAGE SPECT MULT: CPT | Mod: HCNC

## 2024-09-03 PROCEDURE — 93018 CV STRESS TEST I&R ONLY: CPT | Mod: HCNC,,, | Performed by: INTERNAL MEDICINE

## 2024-09-03 PROCEDURE — 93017 CV STRESS TEST TRACING ONLY: CPT | Mod: HCNC

## 2024-09-03 RX ORDER — REGADENOSON 0.08 MG/ML
0.4 INJECTION, SOLUTION INTRAVENOUS ONCE
Status: COMPLETED | OUTPATIENT
Start: 2024-09-03 | End: 2024-09-03

## 2024-09-03 RX ADMIN — REGADENOSON 0.4 MG: 0.08 INJECTION, SOLUTION INTRAVENOUS at 01:09

## 2024-09-03 RX ADMIN — TETROFOSMIN 9.5 MILLICURIE: 1.38 INJECTION, POWDER, LYOPHILIZED, FOR SOLUTION INTRAVENOUS at 11:09

## 2024-09-04 ENCOUNTER — PATIENT MESSAGE (OUTPATIENT)
Dept: CARDIOLOGY | Facility: CLINIC | Age: 84
End: 2024-09-04
Payer: MEDICARE

## 2024-09-04 ENCOUNTER — TELEPHONE (OUTPATIENT)
Dept: CARDIOLOGY | Facility: CLINIC | Age: 84
End: 2024-09-04
Payer: MEDICARE

## 2024-09-04 NOTE — TELEPHONE ENCOUNTER
Spoke with pt in regards to test results. Pt verbalized understanding information  without any concerns.             ----- Message from Nathan Ibrahim MD sent at 9/4/2024  3:49 PM CDT -----  The nuclear stress test is normal. No ischemia  Continue current Rx.

## 2024-09-09 ENCOUNTER — LAB VISIT (OUTPATIENT)
Dept: LAB | Facility: HOSPITAL | Age: 84
End: 2024-09-09
Attending: FAMILY MEDICINE
Payer: MEDICARE

## 2024-09-09 DIAGNOSIS — E11.3312 TYPE 2 DIABETES MELLITUS WITH LEFT EYE AFFECTED BY MODERATE NONPROLIFERATIVE RETINOPATHY AND MACULAR EDEMA, WITHOUT LONG-TERM CURRENT USE OF INSULIN: ICD-10-CM

## 2024-09-09 DIAGNOSIS — E03.9 HYPOTHYROIDISM, UNSPECIFIED TYPE: ICD-10-CM

## 2024-09-09 DIAGNOSIS — D64.9 CHRONIC ANEMIA: ICD-10-CM

## 2024-09-09 LAB
BASOPHILS # BLD AUTO: 0.05 K/UL (ref 0–0.2)
BASOPHILS NFR BLD: 1.2 % (ref 0–1.9)
DIFFERENTIAL METHOD BLD: ABNORMAL
EOSINOPHIL # BLD AUTO: 0.3 K/UL (ref 0–0.5)
EOSINOPHIL NFR BLD: 7.4 % (ref 0–8)
ERYTHROCYTE [DISTWIDTH] IN BLOOD BY AUTOMATED COUNT: 13.6 % (ref 11.5–14.5)
ESTIMATED AVG GLUCOSE: 143 MG/DL (ref 68–131)
HBA1C MFR BLD: 6.6 % (ref 4–5.6)
HCT VFR BLD AUTO: 33.4 % (ref 37–48.5)
HGB BLD-MCNC: 10.9 G/DL (ref 12–16)
IMM GRANULOCYTES # BLD AUTO: 0.01 K/UL (ref 0–0.04)
IMM GRANULOCYTES NFR BLD AUTO: 0.2 % (ref 0–0.5)
LYMPHOCYTES # BLD AUTO: 0.9 K/UL (ref 1–4.8)
LYMPHOCYTES NFR BLD: 23 % (ref 18–48)
MCH RBC QN AUTO: 27.5 PG (ref 27–31)
MCHC RBC AUTO-ENTMCNC: 32.6 G/DL (ref 32–36)
MCV RBC AUTO: 84 FL (ref 82–98)
MONOCYTES # BLD AUTO: 0.3 K/UL (ref 0.3–1)
MONOCYTES NFR BLD: 7.8 % (ref 4–15)
NEUTROPHILS # BLD AUTO: 2.5 K/UL (ref 1.8–7.7)
NEUTROPHILS NFR BLD: 60.4 % (ref 38–73)
NRBC BLD-RTO: 0 /100 WBC
PLATELET # BLD AUTO: 224 K/UL (ref 150–450)
PMV BLD AUTO: 10.5 FL (ref 9.2–12.9)
RBC # BLD AUTO: 3.96 M/UL (ref 4–5.4)
WBC # BLD AUTO: 4.08 K/UL (ref 3.9–12.7)

## 2024-09-09 PROCEDURE — 84443 ASSAY THYROID STIM HORMONE: CPT | Mod: HCNC | Performed by: FAMILY MEDICINE

## 2024-09-09 PROCEDURE — 85025 COMPLETE CBC W/AUTO DIFF WBC: CPT | Mod: HCNC | Performed by: FAMILY MEDICINE

## 2024-09-09 PROCEDURE — 83036 HEMOGLOBIN GLYCOSYLATED A1C: CPT | Mod: HCNC | Performed by: FAMILY MEDICINE

## 2024-09-09 PROCEDURE — 84439 ASSAY OF FREE THYROXINE: CPT | Mod: HCNC | Performed by: FAMILY MEDICINE

## 2024-09-10 LAB
T4 FREE SERPL-MCNC: 1.19 NG/DL (ref 0.71–1.51)
TSH SERPL DL<=0.005 MIU/L-ACNC: 0.07 UIU/ML (ref 0.4–4)

## 2024-09-17 ENCOUNTER — OFFICE VISIT (OUTPATIENT)
Dept: INTERNAL MEDICINE | Facility: CLINIC | Age: 84
End: 2024-09-17
Payer: MEDICARE

## 2024-09-17 VITALS
SYSTOLIC BLOOD PRESSURE: 136 MMHG | OXYGEN SATURATION: 97 % | TEMPERATURE: 96 F | BODY MASS INDEX: 29.03 KG/M2 | DIASTOLIC BLOOD PRESSURE: 76 MMHG | HEIGHT: 55 IN | WEIGHT: 125.44 LBS | HEART RATE: 63 BPM

## 2024-09-17 DIAGNOSIS — D64.9 CHRONIC ANEMIA: ICD-10-CM

## 2024-09-17 DIAGNOSIS — E11.8 CONTROLLED TYPE 2 DIABETES MELLITUS WITH COMPLICATION, WITH LONG-TERM CURRENT USE OF INSULIN: ICD-10-CM

## 2024-09-17 DIAGNOSIS — E03.9 HYPOTHYROIDISM, UNSPECIFIED TYPE: Primary | ICD-10-CM

## 2024-09-17 DIAGNOSIS — Z79.4 CONTROLLED TYPE 2 DIABETES MELLITUS WITH COMPLICATION, WITH LONG-TERM CURRENT USE OF INSULIN: ICD-10-CM

## 2024-09-17 DIAGNOSIS — N18.32 STAGE 3B CHRONIC KIDNEY DISEASE: ICD-10-CM

## 2024-09-17 PROCEDURE — 99214 OFFICE O/P EST MOD 30 MIN: CPT | Mod: HCNC,S$GLB,, | Performed by: FAMILY MEDICINE

## 2024-09-17 PROCEDURE — 3075F SYST BP GE 130 - 139MM HG: CPT | Mod: HCNC,CPTII,S$GLB, | Performed by: FAMILY MEDICINE

## 2024-09-17 PROCEDURE — 3078F DIAST BP <80 MM HG: CPT | Mod: HCNC,CPTII,S$GLB, | Performed by: FAMILY MEDICINE

## 2024-09-17 PROCEDURE — G2211 COMPLEX E/M VISIT ADD ON: HCPCS | Mod: HCNC,S$GLB,, | Performed by: FAMILY MEDICINE

## 2024-09-17 PROCEDURE — 1159F MED LIST DOCD IN RCRD: CPT | Mod: HCNC,CPTII,S$GLB, | Performed by: FAMILY MEDICINE

## 2024-09-17 PROCEDURE — 99999 PR PBB SHADOW E&M-EST. PATIENT-LVL IV: CPT | Mod: PBBFAC,HCNC,, | Performed by: FAMILY MEDICINE

## 2024-09-17 PROCEDURE — 1126F AMNT PAIN NOTED NONE PRSNT: CPT | Mod: HCNC,CPTII,S$GLB, | Performed by: FAMILY MEDICINE

## 2024-09-17 RX ORDER — MONTELUKAST SODIUM 10 MG/1
10 TABLET ORAL NIGHTLY
Qty: 90 TABLET | Refills: 1 | Status: SHIPPED | OUTPATIENT
Start: 2024-09-17 | End: 2024-10-17

## 2024-09-17 RX ORDER — VALSARTAN AND HYDROCHLOROTHIAZIDE 160; 12.5 MG/1; MG/1
1 TABLET, FILM COATED ORAL DAILY
Qty: 90 TABLET | Refills: 1 | Status: SHIPPED | OUTPATIENT
Start: 2024-09-17

## 2024-09-17 RX ORDER — LEVOTHYROXINE SODIUM 50 UG/1
50 TABLET ORAL
Qty: 90 TABLET | Refills: 1 | Status: SHIPPED | OUTPATIENT
Start: 2024-09-17 | End: 2025-09-17

## 2024-09-17 NOTE — PROGRESS NOTES
Subjective:       Patient ID: Odin Oliva is a 84 y.o. female.    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, without long-term current use of insulin       Past Medical History:   Diagnosis Date    CKD (chronic kidney disease), stage IV     Diabetes mellitus     HTN (hypertension)     Hyperuricemia     Seasonal allergies     Thyroid disease     Urinary tract infection        Past Surgical History:   Procedure Laterality Date    CATARACT EXTRACTION, BILATERAL      CHOLECYSTECTOMY      EXOSTECTOMY Bilateral 11/8/2019    Procedure: EXOSTECTOMY;  Surgeon: Lissy Comer DPM;  Location: [facility] OR;  Service: Podiatry;  Laterality: Bilateral;  left fourth digit, right fifth digit    FOOT MASS EXCISION Bilateral 11/8/2019    Procedure: HAMMER TOE, DEROTATIONAL ARTHROPLASTY FIFTH DIGIT;  Surgeon: Lissy Comer DPM;  Location: [facility] OR;  Service: Podiatry;  Laterality: Bilateral;  Left 5th toe incision @ 08:51.  Left 4th toe incision @ 08:54.  Right 5th toe incision : 09:24.    TOTAL THYROIDECTOMY      UMBILICAL HERNIA REPAIR      x 2       Family History   Problem Relation Name Age of Onset    Asthma Mother      No Known Problems Father      Lung cancer Brother  65    Diabetes Brother      Diabetes Brother  80    Heart disease Daughter         Social History     Tobacco Use   Smoking Status Never   Smokeless Tobacco Never       Wt Readings from Last 5  Encounters:   09/17/24 56.9 kg (125 lb 7.1 oz)   09/03/24 56.7 kg (125 lb)   07/29/24 57.1 kg (125 lb 14.1 oz)   07/22/24 57.2 kg (126 lb 1.7 oz)   07/05/24 57.2 kg (126 lb 1.7 oz)         CHIEF COMPLAINT:  Patient presents today for follow up.    HYPERTENSION:  She reports home blood pressure readings consistently in the 130s-140s/60s-70s range. She is currently taking valsartan with hydrochlorothiazide 160/12.5 mg for management.    DIABETES:  She reports a recent A1c of 6.6. She is currently taking amaryl, Jardiance 10 mg and Tradjenta 5 mg for management. She denies any problems with urination but reports experiencing occasional low blood sugar episodes. Glipizide has been discontinued due to concerns about hypoglycemia risk.    THYROID DISORDER:   fluctuating TSH levels over the past several weeks. Her TSH was normal in June, indicated a hypothyroid state in July, and was low eight days ago, suggesting a hyperthyroid state. She is currently experiencing symptoms consistent with mild hyperthyroidism due to excessive thyroid hormone replacement.    CHRONIC ANEMIA:  She has a history of chronic anemia with hemoglobin consistently in the 10s for the past two years. She denies any current nosebleeds or GI bleeding and reports experiencing less dizziness recently.    CHRONIC KIDNEY DISEASE:  She has chronic kidney disease, currently between stage 3B and stage IV. Her kidney function has been diminished and is being monitored through regular labs.    HYPERLIPIDEMIA:  She has a history of statin intolerance, limiting treatment options for cholesterol management. She reports avoiding greasy foods and limiting meat consumption as part of her efforts to manage her cholesterol levels. Her most recent cholesterol check from 4 months ago showed levels that, while not optimal, are considered tolerable for her age and given her inability to take statins. She denies interest in adding more medications for cholesterol management  at this time.    ALLERGIES AND ASTHMA:  She reports taking Singulair (Montelukast) nightly and Zyrtec for allergies and asthma management.            Objective:      Vitals:    09/17/24 0902   BP: 136/76   Pulse:    Temp:      BP Readings from Last 3 Encounters:   09/17/24 136/76   09/03/24 130/60   07/29/24 130/60         Physical Exam  Constitutional:       General: She is not in acute distress.     Appearance: She is not ill-appearing.   Pulmonary:      Effort: Pulmonary effort is normal. No respiratory distress.   Neurological:      General: No focal deficit present.      Mental Status: She is alert.   Psychiatric:         Mood and Affect: Mood normal.         Behavior: Behavior normal.         Assessment:       1. Hypothyroidism, unspecified type    2. Controlled type 2 diabetes mellitus with complication, with long-term current use of insulin    3. Chronic anemia    4. Stage 3b chronic kidney disease        Plan:       Assessment & Plan    Assessed blood pressure as well-controlled , improved from previous readings of 170-180/60  Evaluated diabetes management with recent HbA1c of 6.6%, considered well-controlled  Discontinued glipizide due to concerns of potential hypoglycemia in elderly patient  Adjusted levothyroxine dose from 75 mcg to 50 mcg due to recent TSH indicating hyperthyroid state  Monitored chronic anemia with stable hemoglobin levels around 10 g/dL for past 2 years  Considered watchful waiting approach for anemia given patient's age and stability, deferring invasive investigations. She and family hesitant to pursue endoscopy  Noted chronic kidney disease bordering between stage 3B and stage 4  Reviewed cholesterol levels, deemed tolerable for 84-year-old with statin intolerance    DIABETES:  - Continued Jardiance 10 mg daily, Tradjenta 5 mg daily.  - Discontinued glipizide   - Ordered HbA1c in 2.5 months.  - Explained risks of urinary tract infections with Jardiance use and importance of good  hygiene practices.  - Patient to maintain good hygiene practices, including daily showering and proper cleaning to prevent urinary tract infections.    HYPERTENSION:  - continue valsartan with hydrochlorothiazide 160/12.5 mg.    THYROID DISORDER:  - Clarified difference between hyperthyroid and hypothyroid states.  - Discussed potential risks of hyperthyroid state, including effects on bones and heart.  - Decreased levothyroxine from 75 mcg to 50 mcg daily.  - Ordered TSH in 2.5 months.    KIDNEY FUNCTION:  - Patient to avoid anti-inflammatory medications due to kidney function concerns.  Continue to monitor    DIET:  - Patient to continue current diet low in greasy foods and meat.        LABS:  - Ordered CBC in 2.5 months.    FOLLOW UP:  - Follow up in 2.5 months after labs to review results.  - Contact office immediately if experiencing increased shortness of breath, fatigue, dizziness, weakness, or any decline in condition.  - Bring all current medications to next appointment for review.           This note was verbally dictated, please excuse any type errors.

## 2024-09-26 DIAGNOSIS — Z00.00 ENCOUNTER FOR MEDICARE ANNUAL WELLNESS EXAM: ICD-10-CM

## 2024-10-02 ENCOUNTER — PATIENT OUTREACH (OUTPATIENT)
Dept: ADMINISTRATIVE | Facility: CLINIC | Age: 84
End: 2024-10-02
Payer: MEDICARE

## 2024-10-02 DIAGNOSIS — R74.01 TRANSAMINITIS: Primary | ICD-10-CM

## 2024-10-02 NOTE — PROGRESS NOTES
C3 nurse spoke with C3 nurse spoke with Odin Oliva's daughter for a TCC post hospital discharge follow up call. The patient does not have a scheduled HOSFU appointment with Juventino Barbosa MD within 5-7 days post hospital discharge date 09/29/24. C3 nurse was unable to schedule HOSFU appointment in [facility]. Message sent to PCP staff requesting they contact patient and schedule follow up appointment. D/T transportation issues the pt.s daughter requested a Home NP visit.  C3 nurse was unable to schedule HOSPFU with Home NP.  Referral placed for Home NP visit.  She is aware and stated understanding.

## 2024-11-21 ENCOUNTER — OFFICE VISIT (OUTPATIENT)
Dept: HOME HEALTH SERVICES | Facility: CLINIC | Age: 84
End: 2024-11-21
Payer: MEDICARE

## 2024-11-21 VITALS
OXYGEN SATURATION: 99 % | WEIGHT: 125 LBS | HEIGHT: 55 IN | TEMPERATURE: 98 F | SYSTOLIC BLOOD PRESSURE: 150 MMHG | HEART RATE: 68 BPM | DIASTOLIC BLOOD PRESSURE: 78 MMHG | BODY MASS INDEX: 28.93 KG/M2

## 2024-11-21 DIAGNOSIS — E03.8 OTHER SPECIFIED HYPOTHYROIDISM: ICD-10-CM

## 2024-11-21 DIAGNOSIS — M20.40 ACQUIRED HAMMER TOE: ICD-10-CM

## 2024-11-21 DIAGNOSIS — E66.01 SEVERE OBESITY (BMI 35.0-39.9) WITH COMORBIDITY: ICD-10-CM

## 2024-11-21 DIAGNOSIS — N18.4 CHRONIC KIDNEY DISEASE (CKD), STAGE IV (SEVERE): ICD-10-CM

## 2024-11-21 DIAGNOSIS — M81.0 OSTEOPOROSIS, UNSPECIFIED OSTEOPOROSIS TYPE, UNSPECIFIED PATHOLOGICAL FRACTURE PRESENCE: ICD-10-CM

## 2024-11-21 DIAGNOSIS — E11.69 TYPE 2 DIABETES MELLITUS WITH OTHER SPECIFIED COMPLICATION, WITHOUT LONG-TERM CURRENT USE OF INSULIN: ICD-10-CM

## 2024-11-21 DIAGNOSIS — R53.83 FATIGUE, UNSPECIFIED TYPE: ICD-10-CM

## 2024-11-21 DIAGNOSIS — Z00.00 ENCOUNTER FOR MEDICARE ANNUAL WELLNESS EXAM: ICD-10-CM

## 2024-11-21 DIAGNOSIS — Z00.00 ENCOUNTER FOR PREVENTIVE HEALTH EXAMINATION: Primary | ICD-10-CM

## 2024-11-21 DIAGNOSIS — E11.3312 TYPE 2 DIABETES MELLITUS WITH LEFT EYE AFFECTED BY MODERATE NONPROLIFERATIVE RETINOPATHY AND MACULAR EDEMA, WITHOUT LONG-TERM CURRENT USE OF INSULIN: ICD-10-CM

## 2024-11-21 DIAGNOSIS — I10 PRIMARY HYPERTENSION: ICD-10-CM

## 2024-11-21 DIAGNOSIS — Z78.9 STATIN INTOLERANCE: ICD-10-CM

## 2024-11-21 DIAGNOSIS — D50.0 IRON DEFICIENCY ANEMIA DUE TO CHRONIC BLOOD LOSS: ICD-10-CM

## 2024-11-21 DIAGNOSIS — J45.21 MILD INTERMITTENT ASTHMA WITH ACUTE EXACERBATION: ICD-10-CM

## 2024-11-21 RX ORDER — ALBUTEROL SULFATE 90 UG/1
2 INHALANT RESPIRATORY (INHALATION) EVERY 4 HOURS PRN
Qty: 25.5 G | Refills: 3 | Status: SHIPPED | OUTPATIENT
Start: 2024-11-21

## 2024-11-21 RX ORDER — LANOLIN ALCOHOL/MO/W.PET/CERES
1 CREAM (GRAM) TOPICAL EVERY OTHER DAY
COMMUNITY

## 2024-11-21 NOTE — PROGRESS NOTES
"Odin Oliva presented for an initial Medicare AWV today. The following components were reviewed and updated:    Medical history  Family History  Social history  Allergies and Current Medications  Health Risk Assessment  Health Maintenance  Care Team    **See Completed Assessments for Annual Wellness visit with in the encounter summary    The following assessments were completed:  Depression Screening  Cognitive function Screening    Timed Get Up Test  Whisper Test      Opioid documentation:      Patient does not have a current opioid prescription.          Vitals:    11/21/24 0951   BP: (!) 150/78   Pulse: 68   Temp: 98.1 °F (36.7 °C)   TempSrc: Temporal   SpO2: 99%   Weight: 56.7 kg (125 lb)   Height: 4' 1" (1.245 m)     Body mass index is 36.6 kg/m².       Physical Exam  HENT:      Ears:      Comments: Decreased hearing on the left     Mouth/Throat:      Mouth: Mucous membranes are moist.   Cardiovascular:      Rate and Rhythm: Normal rate and regular rhythm.      Pulses: Normal pulses.      Heart sounds: Normal heart sounds.   Pulmonary:      Effort: Pulmonary effort is normal.      Breath sounds: Normal breath sounds.   Skin:     General: Skin is warm and dry.   Neurological:      General: No focal deficit present.      Mental Status: She is alert and oriented to person, place, and time.   Psychiatric:         Mood and Affect: Mood normal.         Behavior: Behavior normal.           Diagnoses and health risks identified today and associated recommendations/orders:  1. Encounter for preventive health examination  2. Encounter for Medicare annual wellness exam  Medicare awv complete. Health maintenance:  pneumonia, shingles, rsv vaccines due-encouraged pt to obtain at a pharmacy.  Diabetes urine screening ordered.   - Ambulatory Referral/Consult to Enhanced Annual Wellness Visit (eAWV)    3. Type 2 diabetes mellitus with other specified complication, without long-term current use of insulin  Type 2 diabetes " mellitus with left eye affected by moderate nonproliferative retinopathy and macular edema, without long-term current use of insulin   Latest Reference Range & Units 04/03/19 16:30 10/23/19 12:20 11/09/20 14:01 02/04/21 15:37 05/31/21 14:10 10/01/21 14:59 12/30/21 15:19 05/30/22 16:03 11/01/22 12:11 02/24/23 16:51 05/15/23 09:11 12/06/23 10:27 05/07/24 10:12 09/09/24 15:46   Hemoglobin A1C External 4.0 - 5.6 % 6.5 (H) 7.0 (H) 7.6 (H) 7.1 (H) 7.7 (H) 7.4 (H) 7.9 (H) 6.7 (H) 6.9 (H) 6.6 (H) 7.1 (H) 7.3 (H) 7.1 (H) 6.6 (H)   (H): Data is abnormally high  Controlled. Continue current management. See med list. Patient states BS ranges 120-160s.  Reviewed diabetic diet, diabetic foot care, preferred BS, and HgbA1C levels. Follow up with your PCP as instructed, podiatry and ophthalmology yearly.    - Ambulatory referral/consult to Ophthalmology; Future  - MICROALBUMIN / CREATININE RATIO URINE; Future    4. Chronic kidney disease (CKD), stage IV (severe)  Chronic and stable. Continue current management. Recommend avoid nsaids and other nephrotoxic medications. Follow up with PCP/nephrologist.      5. Severe obesity (BMI 35.0-39.9) with comorbidity  Chronic. Recommend diet and exercise to lose weight. Follow up with your PCP as planned to discuss adjustments to your treatment plan.      7. Primary hypertension  Chronic and stable on BP medication.  Continue current management.  Diovan hctz.  BP elevated today. Patient is asymptomatic. Instructed patient to recheck bp in 2 hours and check BP daily, keep BP log, and call doctor if BP greater than 140/90.  Recommend low sodium diet. Follow up with PCP.       8. Mild intermittent asthma with acute exacerbation  Chronic and stable. No acute issues. Continue current management. Pt requests refill on albuterol. Refills sent. Follow up with pulmonology.    - albuterol (PROVENTIL/VENTOLIN HFA) 90 mcg/actuation inhaler; Inhale 2 puffs into the lungs every 4 (four) hours as needed for  Wheezing or Shortness of Breath. Rescue  Dispense: 25.5 g; Refill: 3    9. Iron deficiency anemia due to chronic blood loss  Chronic and stable. Continue current management. On iron supplement Follow up with PCP.     10. Osteoporosis, unspecified osteoporosis type, unspecified pathological fracture presence  Chronic and stable on current management. Pt declined prolia. Continue vitamin d, calcium in the diet, and weight bearing exercise. Avoid heavy etoh use and smoking. See med list above. Follow up with PCP.       11. Other specified hypothyroidism  Lab Results   Component Value Date    TSH 0.075 (L) 09/09/2024    Chronic. Continue current management. Synthroid.  Follow up with PCP.     12. Acquired hammer toe  Stable. Wound precautions. F/u with pcp.       14. Statin intolerance  *Chronic and stable. Continue current management. Not on statin. Follow up with PCP.     15. Transaminitis   sept hospitalized at [facility]. Glimiperide should be on hold but pt states she is taking it. Discussed reason and pt states she will not take it. Needs f/u with pcp and gi dr. Rehman.         Provided queenie with a 5-10 year written screening schedule and personal prevention plan. Recommendations were developed using the USPSTF age appropriate recommendations. Education, counseling, and referrals were provided as needed.  After Visit Summary printed and given to patient which includes a list of additional screenings\tests needed.    Follow up in about 1 year (around 11/21/2025) for annual wellness visit.      ALFONSO Coronado      I offered to discuss advanced care planning, including how to pick a person who would make decisions for you if you were unable to make them for yourself, called a health care power of , and what kind of decisions you might make such as use of life sustaining treatments such as ventilators and tube feeding when faced with a life limiting illness recorded on a living will that they will need  to know. (How you want to be cared for as you near the end of your natural life)     X  Patient is unwilling to engage in a discussion regarding advance directives at this time.

## 2024-11-21 NOTE — Clinical Note
Medicare awv complete. Health maintenance:  pneumonia, shingles, rsv vaccines due-encouraged pt to obtain at a pharmacy.  Diabetes urine screening ordered.   Transaminitis  sept hospitalized at [facility]. Glimiperide should be on hold but pt states she is taking it. Discussed reason and pt states she will not take it. Needs f/u with pcp and gi dr. Rehman. Pcp follow up scheduled and coming soon.

## 2024-11-21 NOTE — PATIENT INSTRUCTIONS
Counseling and Referral of Other Preventative  (Italic type indicates deductible and co-insurance are waived)    Patient Name: Odin Oliva  Today's Date: 11/21/2024    Health Maintenance       Date Due Completion Date    Pneumococcal Vaccines (Age 65+) (1 of 2 - PCV) Never done ---    Shingles Vaccine (1 of 2) Never done ---    RSV Vaccine (Age 60+ and Pregnant patients) (1 - 1-dose 75+ series) Never done ---    Diabetes Urine Screening 12/06/2024 12/6/2023    DEXA Scan 01/03/2025 1/3/2022    Influenza Vaccine (1) 06/30/2025 (Originally 9/1/2024) ---    COVID-19 Vaccine (3 - 2024-25 season) 11/21/2025 (Originally 9/1/2024) 4/21/2021    Hemoglobin A1c 03/09/2025 9/9/2024    Lipid Panel 05/07/2025 5/7/2024    Eye Exam 05/08/2025 5/8/2024    TETANUS VACCINE 03/06/2032 3/6/2022        Orders Placed This Encounter   Procedures    MICROALBUMIN / CREATININE RATIO URINE    Ambulatory referral/consult to Ophthalmology     The following information is provided to all patients.  This information is to help you find resources for any of the problems found today that may be affecting your health:                  Living healthy guide: www.[facility].[place].gov      Understanding Diabetes: www.diabetes.org      Eating healthy: www.cdc.gov/healthyweight      CDC home safety checklist: www.cdc.gov/steadi/patient.html      Agency on Aging: www.goea.[place].[facility]      Alcoholics anonymous (AA): www.aa.org      Physical Activity: www.wayne.nih.gov/za4gmrc      Tobacco use: www.quitwithusla.org

## 2024-11-22 ENCOUNTER — LAB VISIT (OUTPATIENT)
Dept: LAB | Facility: HOSPITAL | Age: 84
End: 2024-11-22
Attending: FAMILY MEDICINE
Payer: MEDICARE

## 2024-11-22 DIAGNOSIS — E11.3312 TYPE 2 DIABETES MELLITUS WITH LEFT EYE AFFECTED BY MODERATE NONPROLIFERATIVE RETINOPATHY AND MACULAR EDEMA, WITHOUT LONG-TERM CURRENT USE OF INSULIN: ICD-10-CM

## 2024-11-22 DIAGNOSIS — Z79.4 CONTROLLED TYPE 2 DIABETES MELLITUS WITH COMPLICATION, WITH LONG-TERM CURRENT USE OF INSULIN: ICD-10-CM

## 2024-11-22 DIAGNOSIS — D64.9 CHRONIC ANEMIA: ICD-10-CM

## 2024-11-22 DIAGNOSIS — N18.32 STAGE 3B CHRONIC KIDNEY DISEASE: ICD-10-CM

## 2024-11-22 DIAGNOSIS — E11.8 CONTROLLED TYPE 2 DIABETES MELLITUS WITH COMPLICATION, WITH LONG-TERM CURRENT USE OF INSULIN: ICD-10-CM

## 2024-11-22 DIAGNOSIS — E03.9 HYPOTHYROIDISM, UNSPECIFIED TYPE: ICD-10-CM

## 2024-11-22 LAB
ALBUMIN SERPL BCP-MCNC: 4.2 G/DL (ref 3.5–5.2)
ALBUMIN/CREAT UR: 474.4 UG/MG (ref 0–30)
ALP SERPL-CCNC: 142 U/L (ref 40–150)
ALT SERPL W/O P-5'-P-CCNC: 20 U/L (ref 10–44)
ANION GAP SERPL CALC-SCNC: 12 MMOL/L (ref 8–16)
AST SERPL-CCNC: 27 U/L (ref 10–40)
BASOPHILS # BLD AUTO: 0.07 K/UL (ref 0–0.2)
BASOPHILS NFR BLD: 1.5 % (ref 0–1.9)
BILIRUB SERPL-MCNC: 0.5 MG/DL (ref 0.1–1)
BUN SERPL-MCNC: 32 MG/DL (ref 8–23)
CALCIUM SERPL-MCNC: 9.8 MG/DL (ref 8.7–10.5)
CHLORIDE SERPL-SCNC: 107 MMOL/L (ref 95–110)
CO2 SERPL-SCNC: 21 MMOL/L (ref 23–29)
CREAT SERPL-MCNC: 1.6 MG/DL (ref 0.5–1.4)
CREAT UR-MCNC: 86 MG/DL (ref 15–325)
DIFFERENTIAL METHOD BLD: ABNORMAL
EOSINOPHIL # BLD AUTO: 0.4 K/UL (ref 0–0.5)
EOSINOPHIL NFR BLD: 7.7 % (ref 0–8)
ERYTHROCYTE [DISTWIDTH] IN BLOOD BY AUTOMATED COUNT: 14 % (ref 11.5–14.5)
EST. GFR  (NO RACE VARIABLE): 31.8 ML/MIN/1.73 M^2
ESTIMATED AVG GLUCOSE: 137 MG/DL (ref 68–131)
GLUCOSE SERPL-MCNC: 135 MG/DL (ref 70–110)
HBA1C MFR BLD: 6.4 % (ref 4–5.6)
HCT VFR BLD AUTO: 38.2 % (ref 37–48.5)
HGB BLD-MCNC: 12 G/DL (ref 12–16)
IMM GRANULOCYTES # BLD AUTO: 0.03 K/UL (ref 0–0.04)
IMM GRANULOCYTES NFR BLD AUTO: 0.6 % (ref 0–0.5)
LYMPHOCYTES # BLD AUTO: 1.2 K/UL (ref 1–4.8)
LYMPHOCYTES NFR BLD: 24.8 % (ref 18–48)
MCH RBC QN AUTO: 27.9 PG (ref 27–31)
MCHC RBC AUTO-ENTMCNC: 31.4 G/DL (ref 32–36)
MCV RBC AUTO: 89 FL (ref 82–98)
MICROALBUMIN UR DL<=1MG/L-MCNC: 408 UG/ML
MONOCYTES # BLD AUTO: 0.3 K/UL (ref 0.3–1)
MONOCYTES NFR BLD: 6.7 % (ref 4–15)
NEUTROPHILS # BLD AUTO: 2.8 K/UL (ref 1.8–7.7)
NEUTROPHILS NFR BLD: 58.7 % (ref 38–73)
NRBC BLD-RTO: 0 /100 WBC
PLATELET # BLD AUTO: 263 K/UL (ref 150–450)
PMV BLD AUTO: 10.6 FL (ref 9.2–12.9)
POTASSIUM SERPL-SCNC: 4.7 MMOL/L (ref 3.5–5.1)
PROT SERPL-MCNC: 8.2 G/DL (ref 6–8.4)
RBC # BLD AUTO: 4.3 M/UL (ref 4–5.4)
SODIUM SERPL-SCNC: 140 MMOL/L (ref 136–145)
T4 FREE SERPL-MCNC: 0.73 NG/DL (ref 0.71–1.51)
TSH SERPL DL<=0.005 MIU/L-ACNC: 67.05 UIU/ML (ref 0.4–4)
WBC # BLD AUTO: 4.79 K/UL (ref 3.9–12.7)

## 2024-11-22 PROCEDURE — 84443 ASSAY THYROID STIM HORMONE: CPT | Mod: HCNC | Performed by: FAMILY MEDICINE

## 2024-11-22 PROCEDURE — 36415 COLL VENOUS BLD VENIPUNCTURE: CPT | Mod: HCNC | Performed by: FAMILY MEDICINE

## 2024-11-22 PROCEDURE — 82570 ASSAY OF URINE CREATININE: CPT | Mod: HCNC | Performed by: NURSE PRACTITIONER

## 2024-11-22 PROCEDURE — 85025 COMPLETE CBC W/AUTO DIFF WBC: CPT | Mod: HCNC | Performed by: FAMILY MEDICINE

## 2024-11-22 PROCEDURE — 80053 COMPREHEN METABOLIC PANEL: CPT | Mod: HCNC | Performed by: FAMILY MEDICINE

## 2024-11-22 PROCEDURE — 84439 ASSAY OF FREE THYROXINE: CPT | Mod: HCNC | Performed by: FAMILY MEDICINE

## 2024-11-22 PROCEDURE — 83036 HEMOGLOBIN GLYCOSYLATED A1C: CPT | Mod: HCNC | Performed by: FAMILY MEDICINE

## 2024-11-25 ENCOUNTER — PATIENT MESSAGE (OUTPATIENT)
Dept: INTERNAL MEDICINE | Facility: CLINIC | Age: 84
End: 2024-11-25
Payer: MEDICARE

## 2024-12-03 ENCOUNTER — TELEPHONE (OUTPATIENT)
Dept: INTERNAL MEDICINE | Facility: CLINIC | Age: 84
End: 2024-12-03
Payer: MEDICARE

## 2024-12-03 NOTE — TELEPHONE ENCOUNTER
----- Message from Richa sent at 12/3/2024 10:47 AM CST -----  Contact: Odin  .Patient is calling to speak with the nurse regarding appt. Reports needing to reschedule appt . Please give patient a call back at   [phone].

## 2024-12-10 ENCOUNTER — OFFICE VISIT (OUTPATIENT)
Dept: INTERNAL MEDICINE | Facility: CLINIC | Age: 84
End: 2024-12-10
Payer: MEDICARE

## 2024-12-10 VITALS
HEART RATE: 68 BPM | WEIGHT: 125 LBS | DIASTOLIC BLOOD PRESSURE: 60 MMHG | OXYGEN SATURATION: 97 % | TEMPERATURE: 97 F | SYSTOLIC BLOOD PRESSURE: 136 MMHG | BODY MASS INDEX: 36.6 KG/M2 | RESPIRATION RATE: 18 BRPM

## 2024-12-10 DIAGNOSIS — E03.9 HYPOTHYROIDISM, UNSPECIFIED TYPE: ICD-10-CM

## 2024-12-10 DIAGNOSIS — E11.8 CONTROLLED TYPE 2 DIABETES MELLITUS WITH COMPLICATION, WITH LONG-TERM CURRENT USE OF INSULIN: Primary | ICD-10-CM

## 2024-12-10 DIAGNOSIS — M81.0 OSTEOPOROSIS, UNSPECIFIED OSTEOPOROSIS TYPE, UNSPECIFIED PATHOLOGICAL FRACTURE PRESENCE: ICD-10-CM

## 2024-12-10 DIAGNOSIS — D64.9 MILD ANEMIA: ICD-10-CM

## 2024-12-10 DIAGNOSIS — J30.89 SEASONAL ALLERGIC RHINITIS DUE TO OTHER ALLERGIC TRIGGER: ICD-10-CM

## 2024-12-10 DIAGNOSIS — I10 HYPERTENSION, UNSPECIFIED TYPE: ICD-10-CM

## 2024-12-10 DIAGNOSIS — J45.20 MILD INTERMITTENT ASTHMA WITHOUT COMPLICATION: ICD-10-CM

## 2024-12-10 DIAGNOSIS — Z79.4 CONTROLLED TYPE 2 DIABETES MELLITUS WITH COMPLICATION, WITH LONG-TERM CURRENT USE OF INSULIN: Primary | ICD-10-CM

## 2024-12-10 DIAGNOSIS — E11.69 TYPE 2 DIABETES MELLITUS WITH OTHER SPECIFIED COMPLICATION, WITHOUT LONG-TERM CURRENT USE OF INSULIN: ICD-10-CM

## 2024-12-10 DIAGNOSIS — N18.32 STAGE 3B CHRONIC KIDNEY DISEASE: ICD-10-CM

## 2024-12-10 PROCEDURE — 1101F PT FALLS ASSESS-DOCD LE1/YR: CPT | Mod: HCNC,CPTII,S$GLB, | Performed by: FAMILY MEDICINE

## 2024-12-10 PROCEDURE — 3075F SYST BP GE 130 - 139MM HG: CPT | Mod: HCNC,CPTII,S$GLB, | Performed by: FAMILY MEDICINE

## 2024-12-10 PROCEDURE — 3078F DIAST BP <80 MM HG: CPT | Mod: HCNC,CPTII,S$GLB, | Performed by: FAMILY MEDICINE

## 2024-12-10 PROCEDURE — G2211 COMPLEX E/M VISIT ADD ON: HCPCS | Mod: HCNC,S$GLB,, | Performed by: FAMILY MEDICINE

## 2024-12-10 PROCEDURE — 3288F FALL RISK ASSESSMENT DOCD: CPT | Mod: HCNC,CPTII,S$GLB, | Performed by: FAMILY MEDICINE

## 2024-12-10 PROCEDURE — 99999 PR PBB SHADOW E&M-EST. PATIENT-LVL IV: CPT | Mod: PBBFAC,HCNC,, | Performed by: FAMILY MEDICINE

## 2024-12-10 PROCEDURE — 99214 OFFICE O/P EST MOD 30 MIN: CPT | Mod: HCNC,S$GLB,, | Performed by: FAMILY MEDICINE

## 2024-12-10 PROCEDURE — 1126F AMNT PAIN NOTED NONE PRSNT: CPT | Mod: HCNC,CPTII,S$GLB, | Performed by: FAMILY MEDICINE

## 2024-12-10 PROCEDURE — 1159F MED LIST DOCD IN RCRD: CPT | Mod: HCNC,CPTII,S$GLB, | Performed by: FAMILY MEDICINE

## 2024-12-10 RX ORDER — HYDROXYZINE HYDROCHLORIDE 10 MG/1
10 TABLET, FILM COATED ORAL NIGHTLY
Qty: 90 TABLET | Refills: 1 | Status: SHIPPED | OUTPATIENT
Start: 2024-12-10

## 2024-12-10 RX ORDER — AZELASTINE 1 MG/ML
1 SPRAY, METERED NASAL 2 TIMES DAILY
Qty: 30 ML | Refills: 11 | Status: SHIPPED | OUTPATIENT
Start: 2024-12-10 | End: 2025-12-10

## 2024-12-10 RX ORDER — LEVOTHYROXINE SODIUM 75 UG/1
75 TABLET ORAL
Qty: 30 TABLET | Refills: 11 | Status: SHIPPED | OUTPATIENT
Start: 2024-12-10 | End: 2025-12-10

## 2024-12-10 RX ORDER — LINAGLIPTIN 5 MG/1
5 TABLET, FILM COATED ORAL DAILY
Qty: 90 TABLET | Refills: 1 | Status: SHIPPED | OUTPATIENT
Start: 2024-12-10 | End: 2025-12-10

## 2024-12-10 RX ORDER — MONTELUKAST SODIUM 10 MG/1
10 TABLET ORAL NIGHTLY
Qty: 90 TABLET | Refills: 1 | Status: SHIPPED | OUTPATIENT
Start: 2024-12-10 | End: 2025-01-09

## 2024-12-10 NOTE — PROGRESS NOTES
Subjective:       Patient ID: Odin Oliva is a 84 y.o. female.    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, without long-term current use of insulin       Past Medical History:   Diagnosis Date    CKD (chronic kidney disease), stage IV     Diabetes mellitus     HTN (hypertension)     Hyperuricemia     Seasonal allergies     Thyroid disease     Urinary tract infection        Past Surgical History:   Procedure Laterality Date    CATARACT EXTRACTION, BILATERAL      CHOLECYSTECTOMY      EXOSTECTOMY Bilateral 11/8/2019    Procedure: EXOSTECTOMY;  Surgeon: Lissy Comer DPM;  Location: [facility] OR;  Service: Podiatry;  Laterality: Bilateral;  left fourth digit, right fifth digit    FOOT MASS EXCISION Bilateral 11/8/2019    Procedure: HAMMER TOE, DEROTATIONAL ARTHROPLASTY FIFTH DIGIT;  Surgeon: Lissy Comer DPM;  Location: [facility] OR;  Service: Podiatry;  Laterality: Bilateral;  Left 5th toe incision @ 08:51.  Left 4th toe incision @ 08:54.  Right 5th toe incision : 09:24.    TOTAL THYROIDECTOMY      UMBILICAL HERNIA REPAIR      x 2       Family History   Problem Relation Name Age of Onset    Asthma Mother      No Known Problems Father      Lung cancer Brother  65    Diabetes Brother      Diabetes Brother  80    Heart disease Daughter         Social History     Tobacco Use   Smoking Status Never    Passive exposure: Never   Smokeless Tobacco Never       Wt Readings  from Last 5 Encounters:   12/10/24 56.7 kg (125 lb)   11/21/24 56.7 kg (125 lb)   09/17/24 56.9 kg (125 lb 7.1 oz)   09/03/24 56.7 kg (125 lb)   07/29/24 57.1 kg (125 lb 14.1 oz)     History of Present Illness    CHIEF COMPLAINT:  Patient presents today for follow-up and to request a check-up.    RECENT HOSPITALIZATION AND CURRENT SYMPTOMS:  She reports a recent hospitalization due to an unidentified infection with associated side pain. Both the infection symptoms and side pain have resolved since discharge. She is experiencing whole body itching for one week, severe enough to affect sleep, with no resolution of symptoms. She also reports coughing and difficulty breathing, using nasal spray and albuterol inhaler as needed for symptom management.    WEIGHT AND ENERGY:  She reports weight loss over the past two months but states she is feeling fine overall. However, she reports feeling tired, which may be related to her hypothyroidism.    CHRONIC CONDITIONS:  She has chronic kidney disease stage 3B, with the last kidney function check performed two weeks ago on November 22nd. She reports blood sugar readings of 240. Her anemia has resolved, and she expresses satisfaction with the improvement in her blood counts. Nosebleeds have mostly resolved, with only one episode two weeks ago and no recurrences since.    MEDICATIONS:  She takes hypertension medication nightly and Synthroid 50 mcg for hypothyroidism.    PAST MEDICAL HISTORY:  She has a history of osteoporosis and saw a rheumatologist in January 2022 for management.         Objective:      Vitals:    12/10/24 1135   BP: 136/60   Pulse: 68   Resp: 18   Temp: 96.5 °F (35.8 °C)       Physical Exam  Constitutional:       General: She is not in acute distress.     Appearance: She is not ill-appearing.   Pulmonary:      Effort: Pulmonary effort is normal. No respiratory distress.   Neurological:      General: No focal deficit present.      Mental Status: She is alert.    Psychiatric:         Mood and Affect: Mood normal.         Behavior: Behavior normal.         Assessment:       1. Controlled type 2 diabetes mellitus with complication, with long-term current use of insulin    2. Hypothyroidism, unspecified type    3. Hypertension, unspecified type    4. Stage 3b chronic kidney disease        Plan:       Assessment & Plan    PLAN SUMMARY:  Initiated low-dose hydroxyzine (10 mg at bedtime) for generalized pruritus and aid sleep  Refilled Astelin nasal spray with multiple refills  Continued Trajenta 5 mg daily  Discontinued iron supplementation  Continued valsartan with hydrochlorothiazide 160/12.5 mg daily  Discontinued Amaryl  Continued albuterol inhaler as needed  Continued Jardiance 10 mg daily  Refilled Singulair  Increased Synthroid from 50 mcg to 75 mcg daily  Continued Advair inhaler as prescribed by pulmonologist  Ordered TSH level, comprehensive metabolic panel, CBC, HbA1C, and urinalysis with microalbumin/creatinine ratio in 6 weeks  Referred to Rheumatology for osteoporosis management and consideration of Prolia treatment  Follow up in 6 weeks after completion of ordered lab work    HYPERTENSION:  Continued valsartan with hydrochlorothiazide 160/12.5 mg for well-controlled hypertension.  Continued valsartan with hydrochlorothiazide 160/12.5 mg daily.    HYPOTHYROIDISM:  Increased Synthroid to 75 mcg due to markedly elevated TSH.  Explained TSH levels and their relationship to hypothyroidism.  Increased Synthroid from 50 mcg to 75 mcg daily.    TYPE 2 DIABETES:  Continued Jardiance 10 mg and Trajenta 5 mg for diabetes management; A1C at 6.4% deemed acceptable for 84-year-old patient.  Discontinued Amaryl due to risk of hypoglycemia in elderly patient.  Discussed risks of hypoglycemia with Amaryl in elderly patients.  Educated on potential urinary tract infection risk with Jardiance use.  Continued Jardiance 10 mg daily.  Continued Trajenta 5 mg daily.  Discontinued  Amaryl.  Contact the office if experiencing any urinary symptoms while on Jardiance.    CHRONIC KIDNEY DISEASE:  Noted chronic kidney disease stage 3B; avoiding anti-inflammatory medications.    OSTEOPOROSIS:  Evaluated bone health; patient due for follow-up with rheumatologist for osteoporosis management.  Provided information on osteoporosis and the importance of bone health management.  Referred to Rheumatology for osteoporosis management and consideration of Prolia treatment.    ASTHMA:  Reviewed pulmonologist's recommendations for asthma management.  Continued albuterol inhaler as needed.  Continued Advair inhaler as prescribed by pulmonologist.  Refilled Singulair.    ALLERGIC RHINITIS:  Refilled Astelin nasal spray with multiple refills.    PRURITUS:  Initiated low-dose hydroxyzine for generalized pruritus.  Started hydroxyzine 10 mg at bedtime for itching.    OTHER:  Evaluated resolved anemia; hemoglobin improved from 10 to 12.  Discontinued iron supplementation due to improved blood counts.  Ordered TSH level, comprehensive metabolic panel, complete blood count, hemoglobin A1C, and urinalysis with microalbumin/creatinine ratio in 6 weeks.  Follow up in 6 weeks after completion of ordered lab work.

## 2025-01-15 ENCOUNTER — LAB VISIT (OUTPATIENT)
Dept: LAB | Facility: HOSPITAL | Age: 85
End: 2025-01-15
Attending: FAMILY MEDICINE
Payer: MEDICARE

## 2025-01-15 DIAGNOSIS — D64.9 MILD ANEMIA: ICD-10-CM

## 2025-01-15 DIAGNOSIS — N18.32 STAGE 3B CHRONIC KIDNEY DISEASE: ICD-10-CM

## 2025-01-15 DIAGNOSIS — Z79.4 CONTROLLED TYPE 2 DIABETES MELLITUS WITH COMPLICATION, WITH LONG-TERM CURRENT USE OF INSULIN: ICD-10-CM

## 2025-01-15 DIAGNOSIS — E11.8 CONTROLLED TYPE 2 DIABETES MELLITUS WITH COMPLICATION, WITH LONG-TERM CURRENT USE OF INSULIN: ICD-10-CM

## 2025-01-15 DIAGNOSIS — E03.9 HYPOTHYROIDISM, UNSPECIFIED TYPE: ICD-10-CM

## 2025-01-15 LAB
ALBUMIN SERPL BCP-MCNC: 4 G/DL (ref 3.5–5.2)
ALP SERPL-CCNC: 137 U/L (ref 40–150)
ALT SERPL W/O P-5'-P-CCNC: 15 U/L (ref 10–44)
ANION GAP SERPL CALC-SCNC: 12 MMOL/L (ref 8–16)
AST SERPL-CCNC: 18 U/L (ref 10–40)
BASOPHILS # BLD AUTO: 0.06 K/UL (ref 0–0.2)
BASOPHILS NFR BLD: 1.2 % (ref 0–1.9)
BILIRUB SERPL-MCNC: 0.3 MG/DL (ref 0.1–1)
BUN SERPL-MCNC: 49 MG/DL (ref 8–23)
CALCIUM SERPL-MCNC: 9.4 MG/DL (ref 8.7–10.5)
CHLORIDE SERPL-SCNC: 106 MMOL/L (ref 95–110)
CO2 SERPL-SCNC: 20 MMOL/L (ref 23–29)
CREAT SERPL-MCNC: 1.7 MG/DL (ref 0.5–1.4)
DIFFERENTIAL METHOD BLD: ABNORMAL
EOSINOPHIL # BLD AUTO: 0.4 K/UL (ref 0–0.5)
EOSINOPHIL NFR BLD: 8.3 % (ref 0–8)
ERYTHROCYTE [DISTWIDTH] IN BLOOD BY AUTOMATED COUNT: 12.9 % (ref 11.5–14.5)
EST. GFR  (NO RACE VARIABLE): 29.6 ML/MIN/1.73 M^2
ESTIMATED AVG GLUCOSE: 177 MG/DL (ref 68–131)
GLUCOSE SERPL-MCNC: 234 MG/DL (ref 70–110)
HBA1C MFR BLD: 7.8 % (ref 4–5.6)
HCT VFR BLD AUTO: 35.8 % (ref 37–48.5)
HGB BLD-MCNC: 11.8 G/DL (ref 12–16)
IMM GRANULOCYTES # BLD AUTO: 0.02 K/UL (ref 0–0.04)
IMM GRANULOCYTES NFR BLD AUTO: 0.4 % (ref 0–0.5)
LYMPHOCYTES # BLD AUTO: 1.3 K/UL (ref 1–4.8)
LYMPHOCYTES NFR BLD: 26.6 % (ref 18–48)
MCH RBC QN AUTO: 29 PG (ref 27–31)
MCHC RBC AUTO-ENTMCNC: 33 G/DL (ref 32–36)
MCV RBC AUTO: 88 FL (ref 82–98)
MONOCYTES # BLD AUTO: 0.4 K/UL (ref 0.3–1)
MONOCYTES NFR BLD: 9.1 % (ref 4–15)
NEUTROPHILS # BLD AUTO: 2.6 K/UL (ref 1.8–7.7)
NEUTROPHILS NFR BLD: 54.4 % (ref 38–73)
NRBC BLD-RTO: 0 /100 WBC
PLATELET # BLD AUTO: 212 K/UL (ref 150–450)
PMV BLD AUTO: 11.1 FL (ref 9.2–12.9)
POTASSIUM SERPL-SCNC: 5.1 MMOL/L (ref 3.5–5.1)
PROT SERPL-MCNC: 7.9 G/DL (ref 6–8.4)
RBC # BLD AUTO: 4.07 M/UL (ref 4–5.4)
SODIUM SERPL-SCNC: 138 MMOL/L (ref 136–145)
TSH SERPL DL<=0.005 MIU/L-ACNC: 18.21 UIU/ML (ref 0.4–4)
WBC # BLD AUTO: 4.81 K/UL (ref 3.9–12.7)

## 2025-01-15 PROCEDURE — 83036 HEMOGLOBIN GLYCOSYLATED A1C: CPT | Mod: HCNC | Performed by: FAMILY MEDICINE

## 2025-01-15 PROCEDURE — 85025 COMPLETE CBC W/AUTO DIFF WBC: CPT | Mod: HCNC | Performed by: FAMILY MEDICINE

## 2025-01-15 PROCEDURE — 80053 COMPREHEN METABOLIC PANEL: CPT | Mod: HCNC | Performed by: FAMILY MEDICINE

## 2025-01-15 PROCEDURE — 84443 ASSAY THYROID STIM HORMONE: CPT | Mod: HCNC | Performed by: FAMILY MEDICINE

## 2025-01-15 PROCEDURE — 36415 COLL VENOUS BLD VENIPUNCTURE: CPT | Mod: HCNC | Performed by: FAMILY MEDICINE

## 2025-01-15 PROCEDURE — 84439 ASSAY OF FREE THYROXINE: CPT | Mod: HCNC | Performed by: FAMILY MEDICINE

## 2025-01-16 LAB — T4 FREE SERPL-MCNC: 0.99 NG/DL (ref 0.71–1.51)

## 2025-01-17 ENCOUNTER — OFFICE VISIT (OUTPATIENT)
Dept: INTERNAL MEDICINE | Facility: CLINIC | Age: 85
End: 2025-01-17
Payer: MEDICARE

## 2025-01-17 VITALS
HEIGHT: 55 IN | SYSTOLIC BLOOD PRESSURE: 106 MMHG | HEART RATE: 63 BPM | BODY MASS INDEX: 29.09 KG/M2 | OXYGEN SATURATION: 98 % | WEIGHT: 125.69 LBS | DIASTOLIC BLOOD PRESSURE: 60 MMHG | TEMPERATURE: 96 F

## 2025-01-17 DIAGNOSIS — J45.21 MILD INTERMITTENT ASTHMA WITH ACUTE EXACERBATION: ICD-10-CM

## 2025-01-17 DIAGNOSIS — D64.9 ANEMIA, UNSPECIFIED TYPE: ICD-10-CM

## 2025-01-17 DIAGNOSIS — E66.01 SEVERE OBESITY (BMI 35.0-39.9) WITH COMORBIDITY: ICD-10-CM

## 2025-01-17 DIAGNOSIS — E11.3312 TYPE 2 DIABETES MELLITUS WITH LEFT EYE AFFECTED BY MODERATE NONPROLIFERATIVE RETINOPATHY AND MACULAR EDEMA, WITHOUT LONG-TERM CURRENT USE OF INSULIN: ICD-10-CM

## 2025-01-17 DIAGNOSIS — N18.4 CKD (CHRONIC KIDNEY DISEASE), STAGE IV: ICD-10-CM

## 2025-01-17 DIAGNOSIS — E03.9 HYPOTHYROIDISM, UNSPECIFIED TYPE: Primary | ICD-10-CM

## 2025-01-17 DIAGNOSIS — E11.69 TYPE 2 DIABETES MELLITUS WITH OTHER SPECIFIED COMPLICATION, WITHOUT LONG-TERM CURRENT USE OF INSULIN: ICD-10-CM

## 2025-01-17 PROCEDURE — 3074F SYST BP LT 130 MM HG: CPT | Mod: HCNC,CPTII,S$GLB, | Performed by: FAMILY MEDICINE

## 2025-01-17 PROCEDURE — 3078F DIAST BP <80 MM HG: CPT | Mod: HCNC,CPTII,S$GLB, | Performed by: FAMILY MEDICINE

## 2025-01-17 PROCEDURE — 99214 OFFICE O/P EST MOD 30 MIN: CPT | Mod: HCNC,S$GLB,, | Performed by: FAMILY MEDICINE

## 2025-01-17 PROCEDURE — G2211 COMPLEX E/M VISIT ADD ON: HCPCS | Mod: HCNC,S$GLB,, | Performed by: FAMILY MEDICINE

## 2025-01-17 PROCEDURE — 99999 PR PBB SHADOW E&M-EST. PATIENT-LVL III: CPT | Mod: PBBFAC,HCNC,, | Performed by: FAMILY MEDICINE

## 2025-01-17 PROCEDURE — 1159F MED LIST DOCD IN RCRD: CPT | Mod: HCNC,CPTII,S$GLB, | Performed by: FAMILY MEDICINE

## 2025-01-17 RX ORDER — VALSARTAN 160 MG/1
160 TABLET ORAL DAILY
Qty: 90 TABLET | Refills: 3 | Status: SHIPPED | OUTPATIENT
Start: 2025-01-17 | End: 2026-01-17

## 2025-01-17 RX ORDER — LINAGLIPTIN 5 MG/1
5 TABLET, FILM COATED ORAL DAILY
Qty: 90 TABLET | Refills: 1 | Status: SHIPPED | OUTPATIENT
Start: 2025-01-17 | End: 2026-01-17

## 2025-01-17 RX ORDER — CETIRIZINE HYDROCHLORIDE 10 MG/1
10 TABLET ORAL DAILY
Qty: 90 TABLET | Refills: 1 | Status: SHIPPED | OUTPATIENT
Start: 2025-01-17 | End: 2026-01-17

## 2025-01-17 RX ORDER — MONTELUKAST SODIUM 10 MG/1
10 TABLET ORAL NIGHTLY
Qty: 90 TABLET | Refills: 1 | Status: SHIPPED | OUTPATIENT
Start: 2025-01-17 | End: 2025-02-16

## 2025-01-17 RX ORDER — ALBUTEROL SULFATE 90 UG/1
2 INHALANT RESPIRATORY (INHALATION) EVERY 4 HOURS PRN
Qty: 25.5 G | Refills: 3 | Status: SHIPPED | OUTPATIENT
Start: 2025-01-17

## 2025-01-17 RX ORDER — LEVOTHYROXINE SODIUM 75 UG/1
75 TABLET ORAL
Qty: 30 TABLET | Refills: 11 | Status: SHIPPED | OUTPATIENT
Start: 2025-01-17 | End: 2026-01-17

## 2025-01-17 NOTE — PROGRESS NOTES
Subjective:       Patient ID: Odin Oliva is a 85 y.o. female.    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, without long-term current use of insulin       Past Medical History:   Diagnosis Date    CKD (chronic kidney disease), stage IV     Diabetes mellitus     HTN (hypertension)     Hyperuricemia     Seasonal allergies     Thyroid disease     Urinary tract infection        Past Surgical History:   Procedure Laterality Date    CATARACT EXTRACTION, BILATERAL      CHOLECYSTECTOMY      EXOSTECTOMY Bilateral 11/8/2019    Procedure: EXOSTECTOMY;  Surgeon: Lissy Comer DPM;  Location: [facility] OR;  Service: Podiatry;  Laterality: Bilateral;  left fourth digit, right fifth digit    FOOT MASS EXCISION Bilateral 11/8/2019    Procedure: HAMMER TOE, DEROTATIONAL ARTHROPLASTY FIFTH DIGIT;  Surgeon: Lissy Comer DPM;  Location: [facility] OR;  Service: Podiatry;  Laterality: Bilateral;  Left 5th toe incision @ 08:51.  Left 4th toe incision @ 08:54.  Right 5th toe incision : 09:24.    TOTAL THYROIDECTOMY      UMBILICAL HERNIA REPAIR      x 2       Family History   Problem Relation Name Age of Onset    Asthma Mother      No Known Problems Father      Lung cancer Brother  65    Diabetes Brother      Diabetes Brother  80    Heart disease Daughter         Social History     Tobacco Use   Smoking Status Never    Passive exposure: Never   Smokeless Tobacco Never       Wt  Readings from Last 5 Encounters:   01/17/25 57 kg (125 lb 10.6 oz)   12/10/24 56.7 kg (125 lb)   11/21/24 56.7 kg (125 lb)   09/17/24 56.9 kg (125 lb 7.1 oz)   09/03/24 56.7 kg (125 lb)     BP Readings from Last 3 Encounters:   01/17/25 106/60   12/10/24 136/60   11/21/24 (!) 150/78     History of Present Illness    CHIEF COMPLAINT:  85-year-old female presents today for a one-month follow-up.    CHRONIC KIDNEY DISEASE:  Her chronic kidney disease has progressed from stage 3B to stage 4, with recent renal function tests showing a slight decrease. Her kidney function has fluctuated in the past.    HYPERTENSION:  She continues Valsartan with hydrochlorothiazide for hypertension management. She reports occasional dizziness and performs blood pressure monitoring at home.    ENDOCRINE:  She continues Synthroid 75 mcg for hypothyroidism. TSH levels decreased from 67 to 18 over one month period. For Type 2 Diabetes management, she continues Jardiance 10 and Trajenta 5. Amaryll has been discontinued.    HEMATOLOGIC:  She has chronic mild anemia with fluctuating hemoglobin levels.    MUSCULOSKELETAL:  She follows with rheumatology for osteoporosis management.    RESPIRATORY:  She uses Albuterol inhaler and Advair Discus inhaler for respiratory management. For allergies, she uses Astiline Nasal Spray, Zyrtec, and Singular (Montelukast).             Objective:      Vitals:    01/17/25 1129   BP: 106/60   Pulse: 63   Temp: 96 °F (35.6 °C)       Physical Exam  Constitutional:       General: She is not in acute distress.     Appearance: She is not ill-appearing.   Pulmonary:      Effort: Pulmonary effort is normal. No respiratory distress.   Neurological:      General: No focal deficit present.      Mental Status: She is alert.   Psychiatric:         Mood and Affect: Mood normal.         Behavior: Behavior normal.         Assessment:       1. Hypothyroidism, unspecified type    2. CKD (chronic kidney disease), stage IV    3.  Anemia, unspecified type    4. Mild intermittent asthma with acute exacerbation    5. Type 2 diabetes mellitus with other specified complication, without long-term current use of insulin    6. Severe obesity (BMI 35.0-39.9) with comorbidity    7. 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, without long-term current use of insulin        Plan:             Assessment & Plan    PLAN SUMMARY:  Order CBC in 4 weeks  Order TSH test in 4 weeks  Refill Zyrtec prescription  Refill Albuterol inhaler  Refill Montelukast (Singulair)  Discontinue hydrochlorothiazide, continue Valsartan 160mg  Continue fluticasone/salmeterol (Advair Diskus) inhaler  Continue Jardiance 10mg and Trajenta 5mg for diabetes  Discontinue Amaryl  Continue Synthroid 75 mcg  Refer to nephrology for consultation  Follow up with rheumatology for osteoporosis  Follow-up in 4 weeks for thyroid function reassessment and blood count review    TYPE 2 DIABETES MELLITUS WITH OTHER SPECIFIED COMPLICATION, WITHOUT LONG-TERM CURRENT USE OF INSULIN:  Monitored patient's diabetes with most recent A1c of 7.8, which is assessed as tolerable for an 85-year-old patient.  Continued Jardiance 10mg and Trajenta 5mg for diabetes management, while discontinuing Amaryl.  Will continue monitoring diabetes going forward.    CHRONIC KIDNEY DISEASE (CKD), STAGE IV (SEVERE):  Assessed chronic kidney disease progression to stage 4, noting renal function has been fluctuating in the past.  Explained importance of adequate hydration for kidney health and advised patient to avoid ibuprofen for kidney protection.  Referred patient to nephrology for consultation due to stage 4 chronic kidney disease and will set up an appointment with a nephrologist.    HYPOTENSION:  Patient's blood pressure is currently 106/60, which is low for an 85-year-old woman and could cause dizziness and lightheadedness, increasing fall risk.  Adjusted  blood pressure medication: discontinued hydrochlorothiazide component, continued Valsartan 160mg alone.  Instructed patient to monitor blood pressure at home.    THYROID DISORDER: hypothryoidism  Thyroid function shows significant TSH improvement from 67 to 18 in one month after restarting Synthroid 75 mcg.  Although TSH is still out of range , it has improved significantly.  Continued Synthroid 75 mcg. No adjustment at present given wide variability. I'd like to see status  Ordered thyroid stimulating hormone (TSH) test in 4 weeks.  Scheduled follow up in 4 weeks for thyroid function reassessment.    OSTEOPOROSIS:  Patient is following up with rheumatology for osteoporosis.    ANEMIA:  Monitoring mild, chronic anemia without intervention, noting blood counts are relatively stable with fluctuations.  Ordered CBC in 4 weeks.  Scheduled follow up in 4 weeks for blood count review.    ALLERGIES:  Refilled Zyrtec prescription.  Patient is using Astiline Nasal Spray for allergies.    ASTHMA:  Refilled Albuterol inhaler and Montelukast (Singulair).  Continued fluticasone/salmeterol (Advair Diskus) inhaler for daily asthma control.    OTHER INSTRUCTIONS:  Advised patient to increase water intake, especially when working in the yard.  Patient to continue yard work activities with proper hydration.  Discussed trip to [place], ensuring patient has sufficient medication supply.       Labs in 4-6 week. F/u me in 2 months  Politely ask arrive on time as they were late and her complexity necessitates full time we have available.

## 2025-01-29 ENCOUNTER — TELEPHONE (OUTPATIENT)
Dept: INTERNAL MEDICINE | Facility: CLINIC | Age: 85
End: 2025-01-29
Payer: MEDICARE

## 2025-01-29 NOTE — TELEPHONE ENCOUNTER
----- Message from Earline sent at 1/29/2025 10:41 AM CST -----  Contact: Daughter  ..Type:  Needs Medical Advice    Who Called: Pt daughter  Symptoms (please be specific): Broken leg   How long has patient had these symptoms: Yesterday  Would the patient rather a call back or a response via [facility]? Call back  Best Call Back Number: [phone]  Additional Information: Pt daughter is calling in regards to pt breaking her leg on yesterday while [place] of the country and wants to speak to  in regards to how to fly home.

## 2025-01-31 ENCOUNTER — TELEPHONE (OUTPATIENT)
Dept: INTERNAL MEDICINE | Facility: CLINIC | Age: 85
End: 2025-01-31
Payer: MEDICARE

## 2025-01-31 NOTE — TELEPHONE ENCOUNTER
----- Message from Ozzy sent at 1/31/2025 11:14 AM CST -----  Contact: Shamir/ Daughter  .Type:  Needs Medical Advice    Who Called:  Shamir     Would the patient rather a call back or a response via [facility]? Call back   Best Call Back Number:  [phone]  Additional Information:  Shamir would like to get a call back from Dr Barbosa pertaining to pt situation           Thanks

## 2025-01-31 NOTE — TELEPHONE ENCOUNTER
Daughter has called several times and insists on talking to Dr. Barbosa. We have let her know that we cannot doctor across borders and that they will need to allow the doctors in the hospital to go from here. Follow up when they return.

## 2025-02-04 ENCOUNTER — HOSPITAL ENCOUNTER (INPATIENT)
Facility: HOSPITAL | Age: 85
LOS: 6 days | Discharge: NURSING, REHAB OR LONG-TERM CARE | DRG: 481 | End: 2025-02-11
Attending: EMERGENCY MEDICINE | Admitting: HOSPITALIST
Payer: MEDICARE

## 2025-02-04 DIAGNOSIS — S72.91XA CLOSED FRACTURE OF RIGHT FEMUR: ICD-10-CM

## 2025-02-04 DIAGNOSIS — E11.69 TYPE 2 DIABETES MELLITUS WITH OTHER SPECIFIED COMPLICATION, WITHOUT LONG-TERM CURRENT USE OF INSULIN: ICD-10-CM

## 2025-02-04 DIAGNOSIS — R01.1 HEART MURMUR: ICD-10-CM

## 2025-02-04 DIAGNOSIS — S72.451A CLOSED DISPLACED SUPRACONDYLAR FRACTURE OF DISTAL END OF RIGHT FEMUR WITHOUT INTRACONDYLAR EXTENSION, INITIAL ENCOUNTER: Primary | ICD-10-CM

## 2025-02-04 DIAGNOSIS — R07.9 CHEST PAIN: ICD-10-CM

## 2025-02-04 DIAGNOSIS — S72.401D CLOSED FRACTURE OF DISTAL END OF RIGHT FEMUR WITH ROUTINE HEALING, UNSPECIFIED FRACTURE MORPHOLOGY, SUBSEQUENT ENCOUNTER: ICD-10-CM

## 2025-02-04 DIAGNOSIS — Z01.818 PREOPERATIVE CLEARANCE: ICD-10-CM
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
## 2025-02-04 RX ORDER — MORPHINE SULFATE 4 MG/ML
2 INJECTION, SOLUTION INTRAMUSCULAR; INTRAVENOUS
Status: COMPLETED | OUTPATIENT
Start: 2025-02-04 | End: 2025-02-05

## 2025-02-04 RX ORDER — ONDANSETRON HYDROCHLORIDE 2 MG/ML
4 INJECTION, SOLUTION INTRAVENOUS
Status: COMPLETED | OUTPATIENT
Start: 2025-02-04 | End: 2025-02-05

## 2025-02-05 DIAGNOSIS — Z78.0 MENOPAUSE: ICD-10-CM

## 2025-02-05 PROBLEM — S72.91XA CLOSED FRACTURE OF RIGHT FEMUR: Status: ACTIVE | Noted: 2025-02-05

## 2025-02-05 LAB
ALBUMIN SERPL BCP-MCNC: 2.8 G/DL (ref 3.5–5.2)
ALP SERPL-CCNC: 96 U/L (ref 40–150)
ALT SERPL W/O P-5'-P-CCNC: 16 U/L (ref 10–44)
ANION GAP SERPL CALC-SCNC: 11 MMOL/L (ref 8–16)
APTT PPP: 26.3 SEC (ref 21–32)
AST SERPL-CCNC: 23 U/L (ref 10–40)
BASOPHILS # BLD AUTO: 0.04 K/UL (ref 0–0.2)
BASOPHILS NFR BLD: 0.7 % (ref 0–1.9)
BILIRUB SERPL-MCNC: 0.7 MG/DL (ref 0.1–1)
BUN SERPL-MCNC: 42 MG/DL (ref 8–23)
CALCIUM SERPL-MCNC: 8.6 MG/DL (ref 8.7–10.5)
CHLORIDE SERPL-SCNC: 104 MMOL/L (ref 95–110)
CO2 SERPL-SCNC: 18 MMOL/L (ref 23–29)
CREAT SERPL-MCNC: 1.6 MG/DL (ref 0.5–1.4)
DIFFERENTIAL METHOD BLD: ABNORMAL
EOSINOPHIL # BLD AUTO: 0.3 K/UL (ref 0–0.5)
EOSINOPHIL NFR BLD: 5 % (ref 0–8)
ERYTHROCYTE [DISTWIDTH] IN BLOOD BY AUTOMATED COUNT: 13.5 % (ref 11.5–14.5)
EST. GFR  (NO RACE VARIABLE): 32 ML/MIN/1.73 M^2
GLUCOSE SERPL-MCNC: 338 MG/DL (ref 70–110)
HCT VFR BLD AUTO: 29.1 % (ref 37–48.5)
HCV AB SERPL QL IA: NEGATIVE
HEP C VIRUS HOLD SPECIMEN: NORMAL
HGB BLD-MCNC: 9.4 G/DL (ref 12–16)
HIV 1+2 AB+HIV1 P24 AG SERPL QL IA: NEGATIVE
IMM GRANULOCYTES # BLD AUTO: 0.23 K/UL (ref 0–0.04)
IMM GRANULOCYTES NFR BLD AUTO: 4 % (ref 0–0.5)
INR PPP: 0.9 (ref 0.8–1.2)
LYMPHOCYTES # BLD AUTO: 0.7 K/UL (ref 1–4.8)
LYMPHOCYTES NFR BLD: 12.2 % (ref 18–48)
MCH RBC QN AUTO: 28.9 PG (ref 27–31)
MCHC RBC AUTO-ENTMCNC: 32.3 G/DL (ref 32–36)
MCV RBC AUTO: 90 FL (ref 82–98)
MONOCYTES # BLD AUTO: 0.5 K/UL (ref 0.3–1)
MONOCYTES NFR BLD: 8.9 % (ref 4–15)
NEUTROPHILS # BLD AUTO: 4 K/UL (ref 1.8–7.7)
NEUTROPHILS NFR BLD: 69.2 % (ref 38–73)
NRBC BLD-RTO: 0 /100 WBC
OHS QRS DURATION: 104 MS
OHS QTC CALCULATION: 468 MS
PLATELET # BLD AUTO: 207 K/UL (ref 150–450)
PLATELET BLD QL SMEAR: ABNORMAL
PMV BLD AUTO: 10.4 FL (ref 9.2–12.9)
POCT GLUCOSE: 243 MG/DL (ref 70–110)
POCT GLUCOSE: 296 MG/DL (ref 70–110)
POTASSIUM SERPL-SCNC: 4.7 MMOL/L (ref 3.5–5.1)
PROT SERPL-MCNC: 6.7 G/DL (ref 6–8.4)
PROTHROMBIN TIME: 10.3 SEC (ref 9–12.5)
RBC # BLD AUTO: 3.25 M/UL (ref 4–5.4)
SODIUM SERPL-SCNC: 133 MMOL/L (ref 136–145)
WBC # BLD AUTO: 5.76 K/UL (ref 3.9–12.7)

## 2025-02-05 PROCEDURE — 11000001 HC ACUTE MED/SURG PRIVATE ROOM: Mod: HCNC

## 2025-02-05 PROCEDURE — 80053 COMPREHEN METABOLIC PANEL: CPT | Mod: HCNC | Performed by: EMERGENCY MEDICINE

## 2025-02-05 PROCEDURE — 85610 PROTHROMBIN TIME: CPT | Mod: HCNC | Performed by: NURSE PRACTITIONER

## 2025-02-05 PROCEDURE — 25000003 PHARM REV CODE 250: Mod: HCNC | Performed by: EMERGENCY MEDICINE

## 2025-02-05 PROCEDURE — 25000003 PHARM REV CODE 250: Mod: HCNC | Performed by: NURSE PRACTITIONER

## 2025-02-05 PROCEDURE — 93005 ELECTROCARDIOGRAM TRACING: CPT | Mod: HCNC

## 2025-02-05 PROCEDURE — 85730 THROMBOPLASTIN TIME PARTIAL: CPT | Mod: HCNC | Performed by: NURSE PRACTITIONER

## 2025-02-05 PROCEDURE — 85025 COMPLETE CBC W/AUTO DIFF WBC: CPT | Mod: HCNC | Performed by: EMERGENCY MEDICINE

## 2025-02-05 PROCEDURE — 63600175 PHARM REV CODE 636 W HCPCS: Mod: HCNC | Performed by: NURSE PRACTITIONER

## 2025-02-05 PROCEDURE — 87389 HIV-1 AG W/HIV-1&-2 AB AG IA: CPT | Mod: HCNC | Performed by: EMERGENCY MEDICINE

## 2025-02-05 PROCEDURE — 63600175 PHARM REV CODE 636 W HCPCS: Mod: HCNC | Performed by: EMERGENCY MEDICINE

## 2025-02-05 PROCEDURE — 63600175 PHARM REV CODE 636 W HCPCS: Mod: HCNC | Performed by: HOSPITALIST

## 2025-02-05 PROCEDURE — 86803 HEPATITIS C AB TEST: CPT | Mod: HCNC | Performed by: EMERGENCY MEDICINE

## 2025-02-05 RX ORDER — HYDROCODONE BITARTRATE AND ACETAMINOPHEN 5; 325 MG/1; MG/1
1 TABLET ORAL EVERY 6 HOURS PRN
Status: DISCONTINUED | OUTPATIENT
Start: 2025-02-05 | End: 2025-02-06 | Stop reason: SDUPTHER

## 2025-02-05 RX ORDER — NALOXONE HCL 0.4 MG/ML
0.02 VIAL (ML) INJECTION
Status: DISCONTINUED | OUTPATIENT
Start: 2025-02-05 | End: 2025-02-11 | Stop reason: HOSPADM

## 2025-02-05 RX ORDER — PROMETHAZINE HYDROCHLORIDE 25 MG/1
25 TABLET ORAL EVERY 6 HOURS PRN
Status: DISCONTINUED | OUTPATIENT
Start: 2025-02-05 | End: 2025-02-11 | Stop reason: HOSPADM

## 2025-02-05 RX ORDER — AMLODIPINE BESYLATE 5 MG/1
5 TABLET ORAL DAILY
Status: DISCONTINUED | OUTPATIENT
Start: 2025-02-05 | End: 2025-02-11 | Stop reason: HOSPADM

## 2025-02-05 RX ORDER — INSULIN ASPART 100 [IU]/ML
0-10 INJECTION, SOLUTION INTRAVENOUS; SUBCUTANEOUS EVERY 6 HOURS PRN
Status: DISCONTINUED | OUTPATIENT
Start: 2025-02-05 | End: 2025-02-11 | Stop reason: HOSPADM

## 2025-02-05 RX ORDER — HYDRALAZINE HYDROCHLORIDE 10 MG/1
10 TABLET, FILM COATED ORAL EVERY 6 HOURS PRN
Status: DISCONTINUED | OUTPATIENT
Start: 2025-02-05 | End: 2025-02-11 | Stop reason: HOSPADM

## 2025-02-05 RX ORDER — MORPHINE SULFATE 4 MG/ML
2 INJECTION, SOLUTION INTRAMUSCULAR; INTRAVENOUS EVERY 4 HOURS PRN
Status: DISCONTINUED | OUTPATIENT
Start: 2025-02-05 | End: 2025-02-06 | Stop reason: SDUPTHER

## 2025-02-05 RX ORDER — SODIUM CHLORIDE 0.9 % (FLUSH) 0.9 %
3 SYRINGE (ML) INJECTION EVERY 12 HOURS PRN
Status: DISCONTINUED | OUTPATIENT
Start: 2025-02-05 | End: 2025-02-11 | Stop reason: HOSPADM

## 2025-02-05 RX ORDER — IBUPROFEN 200 MG
24 TABLET ORAL
Status: DISCONTINUED | OUTPATIENT
Start: 2025-02-05 | End: 2025-02-11 | Stop reason: HOSPADM

## 2025-02-05 RX ORDER — SIMETHICONE 80 MG
1 TABLET,CHEWABLE ORAL 4 TIMES DAILY PRN
Status: DISCONTINUED | OUTPATIENT
Start: 2025-02-05 | End: 2025-02-11 | Stop reason: HOSPADM

## 2025-02-05 RX ORDER — ONDANSETRON HYDROCHLORIDE 2 MG/ML
4 INJECTION, SOLUTION INTRAVENOUS EVERY 8 HOURS PRN
Status: DISCONTINUED | OUTPATIENT
Start: 2025-02-05 | End: 2025-02-11 | Stop reason: HOSPADM

## 2025-02-05 RX ORDER — IBUPROFEN 200 MG
16 TABLET ORAL
Status: DISCONTINUED | OUTPATIENT
Start: 2025-02-05 | End: 2025-02-11 | Stop reason: HOSPADM

## 2025-02-05 RX ORDER — POLYETHYLENE GLYCOL 3350 17 G/17G
17 POWDER, FOR SOLUTION ORAL DAILY PRN
Status: DISCONTINUED | OUTPATIENT
Start: 2025-02-05 | End: 2025-02-11 | Stop reason: HOSPADM

## 2025-02-05 RX ORDER — ALUMINUM HYDROXIDE, MAGNESIUM HYDROXIDE, AND SIMETHICONE 1200; 120; 1200 MG/30ML; MG/30ML; MG/30ML
30 SUSPENSION ORAL 4 TIMES DAILY PRN
Status: DISCONTINUED | OUTPATIENT
Start: 2025-02-05 | End: 2025-02-11 | Stop reason: HOSPADM

## 2025-02-05 RX ORDER — GLUCAGON 1 MG
1 KIT INJECTION
Status: DISCONTINUED | OUTPATIENT
Start: 2025-02-05 | End: 2025-02-11 | Stop reason: HOSPADM

## 2025-02-05 RX ORDER — TALC
6 POWDER (GRAM) TOPICAL NIGHTLY PRN
Status: DISCONTINUED | OUTPATIENT
Start: 2025-02-05 | End: 2025-02-11 | Stop reason: HOSPADM

## 2025-02-05 RX ORDER — ACETAMINOPHEN 650 MG/1
650 SUPPOSITORY RECTAL EVERY 6 HOURS PRN
Status: DISCONTINUED | OUTPATIENT
Start: 2025-02-05 | End: 2025-02-11 | Stop reason: HOSPADM

## 2025-02-05 RX ORDER — GLUCAGON 1 MG
1 KIT INJECTION
Status: DISCONTINUED | OUTPATIENT
Start: 2025-02-05 | End: 2025-02-06 | Stop reason: SDUPTHER

## 2025-02-05 RX ORDER — SODIUM CHLORIDE, SODIUM LACTATE, POTASSIUM CHLORIDE, CALCIUM CHLORIDE 600; 310; 30; 20 MG/100ML; MG/100ML; MG/100ML; MG/100ML
INJECTION, SOLUTION INTRAVENOUS CONTINUOUS
Status: DISCONTINUED | OUTPATIENT
Start: 2025-02-05 | End: 2025-02-07

## 2025-02-05 RX ORDER — BISACODYL 10 MG/1
10 SUPPOSITORY RECTAL ONCE
Status: COMPLETED | OUTPATIENT
Start: 2025-02-05 | End: 2025-02-05

## 2025-02-05 RX ORDER — ACETAMINOPHEN 325 MG/1
650 TABLET ORAL EVERY 6 HOURS PRN
Status: DISCONTINUED | OUTPATIENT
Start: 2025-02-05 | End: 2025-02-11 | Stop reason: HOSPADM

## 2025-02-05 RX ADMIN — SODIUM CHLORIDE, POTASSIUM CHLORIDE, SODIUM LACTATE AND CALCIUM CHLORIDE: 600; 310; 30; 20 INJECTION, SOLUTION INTRAVENOUS at 03:02

## 2025-02-05 RX ADMIN — MORPHINE SULFATE 2 MG: 4 INJECTION INTRAVENOUS at 12:02

## 2025-02-05 RX ADMIN — HYDROCODONE BITARTRATE AND ACETAMINOPHEN 1 TABLET: 5; 325 TABLET ORAL at 03:02

## 2025-02-05 RX ADMIN — AMLODIPINE BESYLATE 5 MG: 5 TABLET ORAL at 04:02

## 2025-02-05 RX ADMIN — HYDROCODONE BITARTRATE AND ACETAMINOPHEN 1 TABLET: 5; 325 TABLET ORAL at 09:02

## 2025-02-05 RX ADMIN — ONDANSETRON 4 MG: 2 INJECTION INTRAMUSCULAR; INTRAVENOUS at 12:02

## 2025-02-05 RX ADMIN — INSULIN ASPART 2 UNITS: 100 INJECTION, SOLUTION INTRAVENOUS; SUBCUTANEOUS at 10:02

## 2025-02-05 RX ADMIN — BISACODYL 10 MG: 10 SUPPOSITORY RECTAL at 09:02

## 2025-02-05 NOTE — ED PROVIDER NOTES
SCRIBE #1 NOTE: IStephen, am scribing for, and in the presence of, Toby Burnham DO. I have scribed the entire note.       History     Chief Complaint   Patient presents with    Leg Pain     Pt had leg surgery 1/29 in [place] and was sent here for pain relief and follow up; external fixator in place      Review of patient's allergies indicates:  No Known Allergies      History of Present Illness     HPI    2/4/2025, 10:59 PM  History obtained from the patient      History of Present Illness: Odin Oliva is a 85 y.o. female patient with a PMHx of UTI, T2DM, HTN, CKD stage 4, and thyroid disease who presents to the Emergency Department for evaluation of R leg pain. Pt fell on 1/27/25. Pt states she had surgery on her femur in [place] on 1/29/25 and was sent here for pain relief and f/u. External fixator is in place. Symptoms are constant and moderate in severity. Patient denies all other sxs at this time. Pt was prescribed Cefuroxime 500mg BID for 5 days and Paracetamol 1g TID for 5 days. Prior Tx includes a Paracetamol around 10:00 PM tonight. No further complaints or concerns at this time.       Arrival mode: Personal vehicle    PCP: Juventino Barbosa MD        Past Medical History:  Past Medical History:   Diagnosis Date    CKD (chronic kidney disease), stage IV     Diabetes mellitus     HTN (hypertension)     Hyperuricemia     Seasonal allergies     Thyroid disease     Urinary tract infection        Past Surgical History:  Past Surgical History:   Procedure Laterality Date    CATARACT EXTRACTION, BILATERAL      CHOLECYSTECTOMY      EXOSTECTOMY Bilateral 11/8/2019    Procedure: EXOSTECTOMY;  Surgeon: Lissy Comer DPM;  Location: [facility] OR;  Service: Podiatry;  Laterality: Bilateral;  left fourth digit, right fifth digit    FOOT MASS EXCISION Bilateral 11/8/2019    Procedure: HAMMER TOE, DEROTATIONAL ARTHROPLASTY FIFTH DIGIT;  Surgeon: Lissy Comer DPM;  Location: [facility] OR;  Service:  Podiatry;  Laterality: Bilateral;  Left 5th toe incision @ 08:51.  Left 4th toe incision @ 08:54.  Right 5th toe incision : 09:24.    TOTAL THYROIDECTOMY      UMBILICAL HERNIA REPAIR      x 2         Family History:  Family History   Problem Relation Name Age of Onset    Asthma Mother      No Known Problems Father      Lung cancer Brother  65    Diabetes Brother      Diabetes Brother  80    Heart disease Daughter         Social History:  Social History     Tobacco Use    Smoking status: Never     Passive exposure: Never    Smokeless tobacco: Never   Substance and Sexual Activity    Alcohol use: No    Drug use: No    Sexual activity: Never        Review of Systems     Review of Systems   Musculoskeletal:         (+) R leg pain   Neurological:  Positive for dizziness.        Physical Exam     Initial Vitals [02/04/25 2038]   BP Pulse Resp Temp SpO2   129/64 77 16 97.8 °F (36.6 °C) 97 %      MAP       --          Physical Exam  Vitals reviewed.   Constitutional:       General: She is in acute distress.      Appearance: Normal appearance.   Cardiovascular:      Rate and Rhythm: Normal rate and regular rhythm.      Heart sounds: No murmur heard.  Pulmonary:      Breath sounds: Normal breath sounds. No wheezing.   Musculoskeletal:         General: Normal range of motion.   Skin:     General: Skin is warm and dry.      Findings: No rash.   Neurological:      General: No focal deficit present.      Mental Status: She is alert and oriented to person, place, and time.                          ED Course   Procedures  ED Vital Signs:  Vitals:    02/04/25 2038 02/05/25 0001 02/05/25 0008 02/05/25 0009   BP: 129/64  (!) 145/67    Pulse: 77   76   Resp: 16 16     Temp: 97.8 °F (36.6 °C)      TempSrc: Oral      SpO2: 97%   99%    02/05/25 0053   BP: (!) 147/88   Pulse: 73   Resp: 16   Temp:    TempSrc:    SpO2: 97%       Abnormal Lab Results:  Labs Reviewed   CBC W/ AUTO DIFFERENTIAL - Abnormal       Result Value    WBC 5.76       RBC 3.25 (*)     Hemoglobin 9.4 (*)     Hematocrit 29.1 (*)     MCV 90      MCH 28.9      MCHC 32.3      RDW 13.5      Platelets 207      MPV 10.4      Immature Granulocytes 4.0 (*)     Gran # (ANC) 4.0      Immature Grans (Abs) 0.23 (*)     Lymph # 0.7 (*)     Mono # 0.5      Eos # 0.3      Baso # 0.04      nRBC 0      Gran % 69.2      Lymph % 12.2 (*)     Mono % 8.9      Eosinophil % 5.0      Basophil % 0.7      Platelet Estimate Appears normal      Differential Method Automated     COMPREHENSIVE METABOLIC PANEL - Abnormal    Sodium 133 (*)     Potassium 4.7      Chloride 104      CO2 18 (*)     Glucose 338 (*)     BUN 42 (*)     Creatinine 1.6 (*)     Calcium 8.6 (*)     Total Protein 6.7      Albumin 2.8 (*)     Total Bilirubin 0.7      Alkaline Phosphatase 96      AST 23      ALT 16      eGFR 32 (*)     Anion Gap 11     HEPATITIS C ANTIBODY    Hepatitis C Ab Negative      Narrative:     Release to patient->Immediate   HEP C VIRUS HOLD SPECIMEN    HEP C Virus Hold Specimen Hold for HCV sendout      Narrative:     Release to patient->Immediate   HIV 1 / 2 ANTIBODY    HIV 1/2 Ag/Ab Negative      Narrative:     Release to patient->Immediate   PROTIME-INR    Prothrombin Time 10.3      INR 0.9     APTT    aPTT 26.3          All Lab Results:  Results for orders placed or performed during the hospital encounter of 02/04/25   CBC auto differential    Collection Time: 02/05/25 12:00 AM   Result Value Ref Range    WBC 5.76 3.90 - 12.70 K/uL    RBC 3.25 (L) 4.00 - 5.40 M/uL    Hemoglobin 9.4 (L) 12.0 - 16.0 g/dL    Hematocrit 29.1 (L) 37.0 - 48.5 %    MCV 90 82 - 98 fL    MCH 28.9 27.0 - 31.0 pg    MCHC 32.3 32.0 - 36.0 g/dL    RDW 13.5 11.5 - 14.5 %    Platelets 207 150 - 450 K/uL    MPV 10.4 9.2 - 12.9 fL    Immature Granulocytes 4.0 (H) 0.0 - 0.5 %    Gran # (ANC) 4.0 1.8 - 7.7 K/uL    Immature Grans (Abs) 0.23 (H) 0.00 - 0.04 K/uL    Lymph # 0.7 (L) 1.0 - 4.8 K/uL    Mono # 0.5 0.3 - 1.0 K/uL    Eos # 0.3 0.0 - 0.5  K/uL    Baso # 0.04 0.00 - 0.20 K/uL    nRBC 0 0 /100 WBC    Gran % 69.2 38.0 - 73.0 %    Lymph % 12.2 (L) 18.0 - 48.0 %    Mono % 8.9 4.0 - 15.0 %    Eosinophil % 5.0 0.0 - 8.0 %    Basophil % 0.7 0.0 - 1.9 %    Platelet Estimate Appears normal     Differential Method Automated    Comprehensive metabolic panel    Collection Time: 02/05/25 12:00 AM   Result Value Ref Range    Sodium 133 (L) 136 - 145 mmol/L    Potassium 4.7 3.5 - 5.1 mmol/L    Chloride 104 95 - 110 mmol/L    CO2 18 (L) 23 - 29 mmol/L    Glucose 338 (H) 70 - 110 mg/dL    BUN 42 (H) 8 - 23 mg/dL    Creatinine 1.6 (H) 0.5 - 1.4 mg/dL    Calcium 8.6 (L) 8.7 - 10.5 mg/dL    Total Protein 6.7 6.0 - 8.4 g/dL    Albumin 2.8 (L) 3.5 - 5.2 g/dL    Total Bilirubin 0.7 0.1 - 1.0 mg/dL    Alkaline Phosphatase 96 40 - 150 U/L    AST 23 10 - 40 U/L    ALT 16 10 - 44 U/L    eGFR 32 (A) >60 mL/min/1.73 m^2    Anion Gap 11 8 - 16 mmol/L   Hepatitis C Antibody    Collection Time: 02/05/25 12:52 AM   Result Value Ref Range    Hepatitis C Ab Negative Negative   HCV Virus Hold Specimen    Collection Time: 02/05/25 12:52 AM   Result Value Ref Range    HEP C Virus Hold Specimen Hold for HCV sendout    HIV 1/2 Ag/Ab (4th Gen)    Collection Time: 02/05/25 12:52 AM   Result Value Ref Range    HIV 1/2 Ag/Ab Negative Negative   Protime-INR    Collection Time: 02/05/25 12:52 AM   Result Value Ref Range    Prothrombin Time 10.3 9.0 - 12.5 sec    INR 0.9 0.8 - 1.2   APTT    Collection Time: 02/05/25 12:52 AM   Result Value Ref Range    aPTT 26.3 21.0 - 32.0 sec        Imaging Results:  Imaging Results              X-Ray Chest 1 View (Final result)  Result time 02/05/25 00:45:20      Final result by Rich Acosta MD (02/05/25 00:45:20)                   Impression:      As above.      Electronically signed by: Rich Acosta MD  Date:    02/05/2025  Time:    00:45               Narrative:    EXAMINATION:  XR CHEST 1 VIEW    CLINICAL HISTORY:  Preop  clearance;    TECHNIQUE:  Single frontal view of the chest was performed.    COMPARISON:  05/27/2024    FINDINGS:  The patient is rotated limiting assessment.  There is borderline enlargement of the cardiac silhouette.  There is mild tortuosity of the thoracic aorta.  Lungs are symmetrically expanded without evidence of large confluent airspace consolidation.  There is mild chronic coarse interstitial attenuation.  No significant volume of pleural fluid or definitive pneumothorax identified.  Osseous structures demonstrate degenerative changes.  Mild gaseous distention of visualized bowel within the upper abdomen.                                       X-Ray Tibia Fibula 2 View Right (Final result)  Result time 02/04/25 23:37:35      Final result by Rich Acosta MD (02/04/25 23:37:35)                   Impression:      Complex, comminuted intra-articular fracture of the distal right femur with external fixation device in place.  Correlation with any prior outside imaging advised.      Electronically signed by: Rich Acosta MD  Date:    02/04/2025  Time:    23:37               Narrative:    EXAMINATION:  XR FEMUR 2 VIEW RIGHT; XR TIBIA FIBULA 2 VIEW RIGHT    CLINICAL HISTORY:  Unspecified fall, initial encounter    TECHNIQUE:  AP and lateral views of the right femur were performed.  AP and lateral views of the right tibia and fibula were performed.    COMPARISON:  None    FINDINGS:  There is an external fixation device in place with percutaneous pins spanning the right femur as well as the right tibia.    There is a complex, comminuted, moderately displaced fracture of the distal right femur.  There is intra-articular involvement of the patellofemoral articulation.  There is an associated complex joint effusion present.  There is diffuse soft tissue edema present about the right knee.    The right femoral head remains appropriately seated within the acetabulum.  The right tibia and fibula appear grossly  intact.  The ankle mortise appears maintained.                                       X-Ray Femur Ap/Lat Right (Final result)  Result time 02/04/25 23:37:35      Final result by Rich Acosta MD (02/04/25 23:37:35)                   Impression:      Complex, comminuted intra-articular fracture of the distal right femur with external fixation device in place.  Correlation with any prior outside imaging advised.      Electronically signed by: Rich Acosta MD  Date:    02/04/2025  Time:    23:37               Narrative:    EXAMINATION:  XR FEMUR 2 VIEW RIGHT; XR TIBIA FIBULA 2 VIEW RIGHT    CLINICAL HISTORY:  Unspecified fall, initial encounter    TECHNIQUE:  AP and lateral views of the right femur were performed.  AP and lateral views of the right tibia and fibula were performed.    COMPARISON:  None    FINDINGS:  There is an external fixation device in place with percutaneous pins spanning the right femur as well as the right tibia.    There is a complex, comminuted, moderately displaced fracture of the distal right femur.  There is intra-articular involvement of the patellofemoral articulation.  There is an associated complex joint effusion present.  There is diffuse soft tissue edema present about the right knee.    The right femoral head remains appropriately seated within the acetabulum.  The right tibia and fibula appear grossly intact.  The ankle mortise appears maintained.                                         The Emergency Provider reviewed the vital signs and test results, which are outlined above.     ED Discussion     11:41 PM: Discussed pt's case with Dr. Jean (Orthopedics) who states pt will require ORIF and recommends admiting too [facility] medicine. Dr. Jean also states he will see the pt on his rounds in the morning.     12:06 AM: Discussed case with Dr. Lock (Hospital Medicine). Dr. Lock agrees with current care and management of pt and accepts admission.   Admitting Service:  [facility]  Admitting Physician: Dr. Lock  Admit to: obs    12:06 AM: Re-evaluated pt. I have discussed test results, shared treatment plan, and the need for admission with patient and family at bedside. Pt and family express understanding at this time and agree with all information. All questions answered. Pt and family have no further questions or concerns at this time. Pt is ready for admit.       ED Course as of 02/05/25 0232   Tue Feb 04, 2025   2344 X-Ray Femur Ap/Lat Right  Complex, comminuted intra-articular fracture of the distal right femur with external fixation device in place.  Correlation with any prior outside imaging advised. [CD]   2345 X-Ray Tibia Fibula 2 View Right  Complex, comminuted intra-articular fracture of the distal right femur with external fixation device in place.  Correlation with any prior outside imaging advised.      [CD]   Wed Feb 05, 2025 0057 CBC auto differential(!)  Normocytic anemia [CD]   0057 Comprehensive metabolic panel(!)  Chronic kidney disease with hyperglycemia [CD]      ED Course User Index  [CD] Toby Burnham, DO     Medical Decision Making  Amount and/or Complexity of Data Reviewed  Labs: ordered. Decision-making details documented in ED Course.  Radiology: ordered. Decision-making details documented in ED Course.    Risk  Prescription drug management.  Decision regarding hospitalization.  Risk Details: Differential diagnosis includes but is not limited to: Fracture, dislocation                ED Medication(s):  Medications   sodium chloride 0.9% flush 3 mL (has no administration in time range)   lactated ringers infusion (has no administration in time range)   melatonin tablet 6 mg (has no administration in time range)   ondansetron injection 4 mg (has no administration in time range)   promethazine tablet 25 mg (has no administration in time range)   polyethylene glycol packet 17 g (has no administration in time range)   acetaminophen tablet  650 mg (has no administration in time range)   simethicone chewable tablet 80 mg (has no administration in time range)   aluminum-magnesium hydroxide-simethicone 200-200-20 mg/5 mL suspension 30 mL (has no administration in time range)   acetaminophen suppository 650 mg (has no administration in time range)   HYDROcodone-acetaminophen 5-325 mg per tablet 1 tablet (has no administration in time range)   morphine injection 2 mg (has no administration in time range)   naloxone 0.4 mg/mL injection 0.02 mg (has no administration in time range)   glucose chewable tablet 16 g (has no administration in time range)   glucose chewable tablet 24 g (has no administration in time range)   dextrose 50% injection 12.5 g (has no administration in time range)   dextrose 50% injection 25 g (has no administration in time range)   glucagon (human recombinant) injection 1 mg (has no administration in time range)   morphine injection 2 mg (2 mg Intravenous Given 2/5/25 0001)   ondansetron injection 4 mg (4 mg Intravenous Given 2/5/25 0001)       Current Discharge Medication List                  Scribe Attestation:   Scribe #1: I performed the above scribed service and the documentation accurately describes the services I performed. I attest to the accuracy of the note.     Attending:   Physician Attestation Statement for Scribe #1: I, Toby Burnham DO, personally performed the services described in this documentation, as scribed by Stephen Landaverde, in my presence, and it is both accurate and complete.           Clinical Impression       ICD-10-CM ICD-9-CM   1. Closed fracture of distal end of right femur with routine healing, unspecified fracture morphology, subsequent encounter  S72.401D V54.15   2. Fall  W19.XXXA E888.9   3. Preoperative clearance  Z01.818 V72.84   4. Chest pain  R07.9 786.50       Disposition:   Disposition: Placed in Observation  Condition: Stable        Toby Burnham DO  02/05/25 0233

## 2025-02-05 NOTE — ASSESSMENT & PLAN NOTE
Creatine stable for now. BMP reviewed- noted Estimated Creatinine Clearance: 17.8 mL/min (A) (based on SCr of 1.6 mg/dL (H)). according to latest data. Based on current GFR, CKD stage is stage 4 - GFR 15-29.  Monitor UOP and serial BMP and adjust therapy as needed. Renally dose meds. Avoid nephrotoxic medications and procedures.

## 2025-02-05 NOTE — ASSESSMENT & PLAN NOTE
"Patient's FSGs are uncontrolled due to hyperglycemia on current medication regimen.  Last A1c reviewed-   Lab Results   Component Value Date    HGBA1C 7.8 (H) 01/15/2025     Most recent fingerstick glucose reviewed- No results for input(s): "POCTGLUCOSE" in the last 24 hours.  Current correctional scale  Low  Maintain anti-hyperglycemic dose as follows-   Antihyperglycemics (From admission, onward)      None          Hold Oral hypoglycemics while patient is in the hospital.  SSI  Accuchecks    "

## 2025-02-05 NOTE — ASSESSMENT & PLAN NOTE
Patient has chronic hypothyroidism. TFTs reviewed-   Lab Results   Component Value Date    TSH 18.214 (H) 01/15/2025   . Will continue chronic levothyroxine and adjust for and clinical changes.

## 2025-02-05 NOTE — CONSULTS
[facility] Surg  Orthopedics  Consult Note    Patient Name: Odin Oliva  MRN: [id number]  Admission Date: 2/4/2025  Hospital Length of Stay: 0 days  Attending Provider: Nava George MD  Primary Care Provider: Juventino Barbosa MD    Patient information was obtained from patient, relative(s), and ER records.     Inpatient consult to Orthopedic Surgery  Consult performed by: Damian Jean Jr., MD  Consult ordered by: Toby Burnham DO  Reason for consult: fracture      Subjective:     Principal Problem:Closed fracture of right femur    Chief Complaint:   Chief Complaint   Patient presents with    Leg Pain     Pt had leg surgery 1/29 in [place] and was sent here for pain relief and follow up; external fixator in place         HPI: Odin Oliva is a 85 y.o. female with past medical history significant for falling while she was in [place] 9 days ago.  She was treated locally with placement of an external fixator    Past Medical History:   Diagnosis Date    CKD (chronic kidney disease), stage IV     Diabetes mellitus     HTN (hypertension)     Hyperuricemia     Seasonal allergies     Thyroid disease     Urinary tract infection        Past Surgical History:   Procedure Laterality Date    CATARACT EXTRACTION, BILATERAL      CHOLECYSTECTOMY      EXOSTECTOMY Bilateral 11/8/2019    Procedure: EXOSTECTOMY;  Surgeon: Lissy Comer DPM;  Location: [facility] OR;  Service: Podiatry;  Laterality: Bilateral;  left fourth digit, right fifth digit    FOOT MASS EXCISION Bilateral 11/8/2019    Procedure: HAMMER TOE, DEROTATIONAL ARTHROPLASTY FIFTH DIGIT;  Surgeon: Lissy Comer DPM;  Location: [facility] OR;  Service: Podiatry;  Laterality: Bilateral;  Left 5th toe incision @ 08:51.  Left 4th toe incision @ 08:54.  Right 5th toe incision : 09:24.    TOTAL THYROIDECTOMY      UMBILICAL HERNIA REPAIR      x 2       Review of patient's allergies indicates:  No Known Allergies    Current Facility-Administered  Medications   Medication    acetaminophen suppository 650 mg    acetaminophen tablet 650 mg    aluminum-magnesium hydroxide-simethicone 200-200-20 mg/5 mL suspension 30 mL    dextrose 50% injection 12.5 g    dextrose 50% injection 25 g    glucagon (human recombinant) injection 1 mg    glucose chewable tablet 16 g    glucose chewable tablet 24 g    HYDROcodone-acetaminophen 5-325 mg per tablet 1 tablet    lactated ringers infusion    melatonin tablet 6 mg    morphine injection 2 mg    naloxone 0.4 mg/mL injection 0.02 mg    ondansetron injection 4 mg    polyethylene glycol packet 17 g    promethazine tablet 25 mg    simethicone chewable tablet 80 mg    sodium chloride 0.9% flush 3 mL     Family History       Problem Relation (Age of Onset)    Asthma Mother    Diabetes Brother, Brother (80)    Heart disease Daughter    Lung cancer Brother (65)    No Known Problems Father          Tobacco Use    Smoking status: Never     Passive exposure: Never    Smokeless tobacco: Never   Substance and Sexual Activity    Alcohol use: No    Drug use: No    Sexual activity: Never     Review of Systems   Constitutional: Negative for chills and decreased appetite.   HENT:  Negative for ear discharge.    Eyes:  Negative for blurred vision.   Cardiovascular:  Negative for chest pain.   Respiratory:  Negative for shortness of breath.    Endocrine: Negative for cold intolerance and heat intolerance.   Skin:  Negative for dry skin and itching.   Gastrointestinal:  Negative for abdominal pain.   Genitourinary:  Negative for dysuria.   Neurological:  Negative for focal weakness.   Psychiatric/Behavioral:  Negative for altered mental status.    Allergic/Immunologic: Negative for HIV exposure.     Objective:     Vital Signs (Most Recent):  Temp: 98 °F (36.7 °C) (02/05/25 1136)  Pulse: 75 (02/05/25 1136)  Resp: 18 (02/05/25 1136)  BP: (!) 154/70 (02/05/25 1136)  SpO2: (!) 93 % (02/05/25 1136) Vital Signs (24h Range):  Temp:   "[97.8 °F (36.6 °C)-98.7 °F (37.1 °C)] 98 °F (36.7 °C)  Pulse:  [72-77] 75  Resp:  [15-18] 18  SpO2:  [93 %-99 %] 93 %  BP: (129-154)/(63-88) 154/70     Weight: 57 kg (125 lb 10.6 oz)  Height: 4' 1" (124.5 cm)  Body mass index is 36.8 kg/m².      Intake/Output Summary (Last 24 hours) at 2/5/2025 1220  Last data filed at 2/5/2025 0513  Gross per 24 hour   Intake 182.95 ml   Output --   Net 182.95 ml       Ortho/SPM Exam  Patient is awake and alert  External fixator looks good  Swelling in the lower extremity is moderate  She has intact sensation on the dorsum and plantar aspect of her foot  She can wiggle her toes  She has significant ecchymosis throughout the posterior thigh but there is no evidence of sores or fracture blisters.    GEN: Well developed, well nourished female. AAOX3. No acute distress.   Head: Normocephalic, atraumatic.   Eyes: SHAHBAZ  Neck: Trachea is midline, no adenopathy  Resp: Breathing unlabored.  Neuro: Motor function normal, Cranial nerves intact  Psych: Mood and affect appropriate.      Significant Labs: CBC:   Recent Labs   Lab 02/05/25  0000   WBC 5.76   HGB 9.4*   HCT 29.1*        CMP:   Recent Labs   Lab 02/05/25  0000   *   K 4.7      CO2 18*   *   BUN 42*   CREATININE 1.6*   CALCIUM 8.6*   PROT 6.7   ALBUMIN 2.8*   BILITOT 0.7   ALKPHOS 96   AST 23   ALT 16   ANIONGAP 11     Coagulation:   Recent Labs   Lab 02/05/25  0052   LABPROT 10.3   INR 0.9   APTT 26.3     All pertinent labs within the past 24 hours have been reviewed.    Significant Imaging: X-Ray: I have reviewed all pertinent results/findings and my personal findings are:  Comminuted supracondylar femur fracture with large posterior butterfly fragment    Assessment/Plan:     Active Diagnoses:    Diagnosis Date Noted POA    PRINCIPAL PROBLEM:  Closed fracture of right femur [S72.91XA] 02/05/2025 Unknown    Hypertension [I10] 11/04/2019 Yes    Other specified hypothyroidism [E03.8] 10/28/2019 Yes    " Chronic kidney disease (CKD), stage IV (severe) [N18.4] 10/23/2019 Yes    Diabetes mellitus [E11.9] 04/03/2019 Yes      Problems Resolved During this Admission:       Assessment:   Right supracondylar femur fracture with intra-articular extension    Plan:   We will plan to OR in a.m. for external fixator removal and ORIF distal femur fracture.  We went over the risks and benefits with both the patient and her daughter who was acting as secondary historian.  Risk include infection, nonunion, injury to blood vessels or nerves, need for further surgery, anesthesia problems as being some but not all the risks of surgery.  They understood this well and consented to proceed.    Damian Jean MD, MD  Orthopedics  [facility]

## 2025-02-05 NOTE — ASSESSMENT & PLAN NOTE
RCRI revealed 3.9% risk for major cardiac event. Ortho consulted with plans to take to OR in am.  Plan:  -NPO  -Continue current pain regimen, titrate as needed  -Bowel regimen  -Bedrest  -Wound care   -None weightbearing RLE  -Continue splint per ortho recs  -IVFs prn  -Antiemetics prn  -Tylenol as needed for fever   -PT/OT   -Ortho consulted

## 2025-02-05 NOTE — FIRST PROVIDER EVALUATION
Medical screening examination initiated.  I have conducted a focused provider triage encounter, findings are as follows:    Brief history of present illness:  85-year-old female with a past medical history of hypertension, diabetes, and chronic kidney disease presenting to the emergency department for follow up after sustaining a fall in [place]. Patient lives in the [place] and is a patient of Dr. Barbosa. She was visiting [place] when she sustained a fall and reports fracturing her right femur. She has a disc and forms from when she was seen in [place]; this is all in Slovenian. Her plane arrived and [place], [place] earlier today and family drove her from [place] to the emergency department. Patient arrives with the external fixator in place.     Vitals:    02/04/25 2038   BP: 129/64   BP Location: Right arm   Pulse: 77   Resp: 16   Temp: 97.8 °F (36.6 °C)   TempSrc: Oral   SpO2: 97%       Pertinent physical exam:  There is moderate swelling and bruising. She has range of motion of her ankle and toes. Compartments are soft. Distal pulses intact.     Brief workup plan:  Consult with orthopedic surgery; repeat XRs.     Preliminary workup initiated; this workup will be continued and followed by the physician or advanced practice provider that is assigned to the patient when roomed.

## 2025-02-05 NOTE — PLAN OF CARE
[facility]  Initial Discharge Assessment       Primary Care Provider: Juventino Barbosa MD    Admission Diagnosis: Preoperative clearance [Z01.818]  Fall [W19.XXXA]  Chest pain [R07.9]  Closed fracture of distal end of right femur with routine healing, unspecified fracture morphology, subsequent encounter [S72.401D]    Admission Date: 2/4/2025  Expected Discharge Date: 2/7/2025         Payor: [employer] MEDICARE / Plan: [employer] HMO PPO SPECIAL NEEDS / Product Type: Medicare Advantage /     Extended Emergency Contact Information  Primary Emergency Contact: geoffrey oliva  Mobile Phone: [phone]  Relation: Daughter  Preferred language: English   needed? No  Secondary Emergency Contact: Yanet Oliva  Mobile Phone: [phone]  Relation: Son  Preferred language: English   needed? No    Discharge Plan A: Home with family         [facility] [address]  [facility] [address]  Phone: [phone] Fax: [phone]    [id number] [place], [place] - [address] AT SEC OF [facility] [address]  [address]  [place] [facility] [address]  Phone: [phone] Fax: [phone]    [facility]  [address] [place] [address]  Phone: [phone] Fax: [phone]      Initial Assessment (most recent)       Adult Discharge Assessment - 02/05/25 1111          Discharge Assessment    Assessment Type Discharge Planning Assessment     Confirmed/corrected address, phone number and insurance Yes     Confirmed Demographics Correct on Facesheet     Source of Information family     When was your last doctors appointment? --   n/a    Communicated BRIAN with patient/caregiver Date not available/Unable to determine     Reason For Admission preoperative     People in Home child(rosendo), adult     Do you expect to return to your current living situation? Yes     Do you have help at home or someone to help you  manage your care at home? Yes     Who are your caregiver(s) and their phone number(s)? son     Prior to hospitilization cognitive status: Alert/Oriented     Current cognitive status: Alert/Oriented     Walking or Climbing Stairs Difficulty no     Dressing/Bathing Difficulty no     Equipment Currently Used at Home none     Readmission within 30 days? No     Patient currently being followed by outpatient case management? No     Do you currently have service(s) that help you manage your care at home? No     Do you take prescription medications? Yes     Do you have prescription coverage? Yes     Coverage [employer]     Do you have any problems affording any of your prescribed medications? No     Is the patient taking medications as prescribed? yes     Who is going to help you get home at discharge? son     How do you get to doctors appointments? family or friend will provide     Are you on dialysis? No     Do you take coumadin? No     Discharge Plan A Home with family

## 2025-02-05 NOTE — H&P
[facility]  History & Physical    Patient Name: Odin Oliva  MRN: [id number]  Patient Class: IP- Inpatient  Admission Date: 2/4/2025  Attending Physician: Juan Pablo Lock MD   Primary Care Provider: Juventino Barbosa MD         Patient information was obtained from patient, past medical records, and ER records.     Subjective:     Principal Problem:Closed fracture of right femur    Chief Complaint:   Chief Complaint   Patient presents with    Leg Pain     Pt had leg surgery 1/29 in [place] and was sent here for pain relief and follow up; external fixator in place         HPI: Odin Oliva is a 85 y.o. female with a PMH  has a past medical history of CKD (chronic kidney disease), stage IV, Diabetes mellitus, HTN (hypertension), Hyperuricemia, Seasonal allergies, Thyroid disease, and Urinary tract infection. presented to the Emergency Department for evaluation of R leg pain. Pt fell on 1/27/25. Pt states she had surgery on her femur in [place] on 1/29/25 and was sent here for pain relief and f/u. External fixator is in place. Symptoms are constant and moderate in severity. Patient denies all other sxs at this time. Pt was prescribed Cefuroxime 500mg BID for 5 days and Paracetamol 1g TID for 5 days. Prior Tx includes a Paracetamol around 10:00 PM tonight. No further complaints or concerns at this time.     ER workup revealed CBC with H/H of 9.4/29.1 (previously 11.8/35.8 on 01/15/2025).  CMP revealed BUN/creatinine of 42/1.6 (at baseline),  mg/dL.  Plain film imaging of right femur revealed:[Complex, comminuted intra-articular fracture of the distal right femur with external fixation device in place].  On-call orthopedic surgeon consulted.  Plans to take to OR in a.m..  Patient received 2 mg of morphine, 4 mg Zofran in ER.  Hospital Medicine consulted with.  Patient in agreement with treatment plan.  Patient admitted under inpatient status.    PCP: Juventino Barbosa      Past Medical  History:   Diagnosis Date    CKD (chronic kidney disease), stage IV     Diabetes mellitus     HTN (hypertension)     Hyperuricemia     Seasonal allergies     Thyroid disease     Urinary tract infection        Past Surgical History:   Procedure Laterality Date    CATARACT EXTRACTION, BILATERAL      CHOLECYSTECTOMY      EXOSTECTOMY Bilateral 11/8/2019    Procedure: EXOSTECTOMY;  Surgeon: Lissy Comer DPM;  Location: [facility] OR;  Service: Podiatry;  Laterality: Bilateral;  left fourth digit, right fifth digit    FOOT MASS EXCISION Bilateral 11/8/2019    Procedure: HAMMER TOE, DEROTATIONAL ARTHROPLASTY FIFTH DIGIT;  Surgeon: Lissy Comer DPM;  Location: [facility] OR;  Service: Podiatry;  Laterality: Bilateral;  Left 5th toe incision @ 08:51.  Left 4th toe incision @ 08:54.  Right 5th toe incision : 09:24.    TOTAL THYROIDECTOMY      UMBILICAL HERNIA REPAIR      x 2       Review of patient's allergies indicates:  No Known Allergies    No current facility-administered medications on file prior to encounter.     Current Outpatient Medications on File Prior to Encounter   Medication Sig    azelastine (ASTELIN) 137 mcg (0.1 %) nasal spray 1 spray (137 mcg total) by Nasal route 2 (two) times daily.    cetirizine (ZYRTEC) 10 MG tablet Take 1 tablet (10 mg total) by mouth once daily.    levothyroxine (SYNTHROID) 75 MCG tablet Take 1 tablet (75 mcg total) by mouth before breakfast.    montelukast (SINGULAIR) 10 mg tablet Take 1 tablet (10 mg total) by mouth every evening.    albuterol (PROVENTIL/VENTOLIN HFA) 90 mcg/actuation inhaler Inhale 2 puffs into the lungs every 4 (four) hours as needed for Wheezing or Shortness of Breath. Rescue    aspirin-acetaminophen-caffeine 250-250-65 mg (EXCEDRIN MIGRAINE) 250-250-65 mg per tablet Take 1 tablet by mouth every 6 (six) hours as needed for Pain.    blood sugar diagnostic Strp 1 each by Misc.(Non-Drug; Combo Route) route 3 (three) times daily. Insurance preferred    blood-glucose  meter kit Use as instructed. Insurance preferred    empagliflozin (JARDIANCE) 10 mg tablet Take 1 tablet (10 mg total) by mouth once daily.    flash glucose sensor (FREESTYLE HAROLDO 14 DAY SENSOR) Kit 1 each by Misc.(Non-Drug; Combo Route) route every 14 (fourteen) days.    fluticasone-salmeterol diskus inhaler 250-50 mcg Inhale 1 puff into the lungs 2 (two) times daily. Controller    hydrOXYzine HCL (ATARAX) 10 MG Tab Take 1 tablet (10 mg total) by mouth every evening.    lancets Misc 1 each by Misc.(Non-Drug; Combo Route) route 3 (three) times daily.    linaGLIPtin (TRADJENTA) 5 mg Tab tablet Take 1 tablet (5 mg total) by mouth once daily.    valsartan (DIOVAN) 160 MG tablet Take 1 tablet (160 mg total) by mouth once daily.     Family History       Problem Relation (Age of Onset)    Asthma Mother    Diabetes Brother, Brother (80)    Heart disease Daughter    Lung cancer Brother (65)    No Known Problems Father          Tobacco Use    Smoking status: Never     Passive exposure: Never    Smokeless tobacco: Never   Substance and Sexual Activity    Alcohol use: No    Drug use: No    Sexual activity: Never     Review of Systems   Constitutional:  Negative for chills, diaphoresis, fatigue and fever.   Respiratory:  Negative for cough and shortness of breath.    Cardiovascular:  Negative for chest pain and palpitations.   Gastrointestinal:  Negative for diarrhea, nausea and vomiting.   Musculoskeletal:  Positive for arthralgias, gait problem and myalgias.   All other systems reviewed and are negative.    Objective:     Vital Signs (Most Recent):  Temp: 97.9 °F (36.6 °C) (02/05/25 0235)  Pulse: 72 (02/05/25 0235)  Resp: 18 (02/05/25 0235)  BP: (!) 147/68 (02/05/25 0235)  SpO2: 97 % (02/05/25 0235) Vital Signs (24h Range):  Temp:  [97.8 °F (36.6 °C)-97.9 °F (36.6 °C)] 97.9 °F (36.6 °C)  Pulse:  [72-77] 72  Resp:  [16-18] 18  SpO2:  [97 %-99 %] 97 %  BP: (129-147)/(64-88) 147/68     Weight: 57 kg (125 lb 10.6 oz)  Body mass  index is 36.8 kg/m².     Physical Exam  Vitals and nursing note reviewed.   Constitutional:       General: She is awake. She is not in acute distress.     Appearance: Normal appearance. She is well-developed and well-groomed. She is not ill-appearing, toxic-appearing or diaphoretic.   HENT:      Head: Normocephalic and atraumatic.      Mouth/Throat:      Lips: Pink.      Mouth: Mucous membranes are moist.      Pharynx: Oropharynx is clear. Uvula midline.   Eyes:      Extraocular Movements: Extraocular movements intact.      Conjunctiva/sclera: Conjunctivae normal.      Pupils: Pupils are equal, round, and reactive to light.   Cardiovascular:      Rate and Rhythm: Normal rate and regular rhythm.      Heart sounds: Normal heart sounds. No murmur heard.  Pulmonary:      Effort: Pulmonary effort is normal.      Breath sounds: Normal breath sounds.   Abdominal:      General: Bowel sounds are normal.      Palpations: Abdomen is soft.      Tenderness: There is no abdominal tenderness.   Musculoskeletal:      Cervical back: Normal range of motion and neck supple.      Comments: Ex fix device noted to right lower extremity. NVS grossly intact.   Skin:     General: Skin is warm and dry.      Capillary Refill: Capillary refill takes less than 2 seconds.   Neurological:      Mental Status: She is alert and oriented to person, place, and time. Mental status is at baseline.      GCS: GCS eye subscore is 4. GCS verbal subscore is 5. GCS motor subscore is 6.      Cranial Nerves: Cranial nerves 2-12 are intact.      Sensory: Sensation is intact.   Psychiatric:         Mood and Affect: Mood normal.         Speech: Speech normal.         Behavior: Behavior normal. Behavior is cooperative.              CRANIAL NERVES     CN III, IV, VI   Pupils are equal, round, and reactive to light.     LABS:  Recent Results (from the past 24 hours)   CBC auto differential    Collection Time: 02/05/25 12:00 AM   Result Value Ref Range    WBC 5.76  3.90 - 12.70 K/uL    RBC 3.25 (L) 4.00 - 5.40 M/uL    Hemoglobin 9.4 (L) 12.0 - 16.0 g/dL    Hematocrit 29.1 (L) 37.0 - 48.5 %    MCV 90 82 - 98 fL    MCH 28.9 27.0 - 31.0 pg    MCHC 32.3 32.0 - 36.0 g/dL    RDW 13.5 11.5 - 14.5 %    Platelets 207 150 - 450 K/uL    MPV 10.4 9.2 - 12.9 fL    Immature Granulocytes 4.0 (H) 0.0 - 0.5 %    Gran # (ANC) 4.0 1.8 - 7.7 K/uL    Immature Grans (Abs) 0.23 (H) 0.00 - 0.04 K/uL    Lymph # 0.7 (L) 1.0 - 4.8 K/uL    Mono # 0.5 0.3 - 1.0 K/uL    Eos # 0.3 0.0 - 0.5 K/uL    Baso # 0.04 0.00 - 0.20 K/uL    nRBC 0 0 /100 WBC    Gran % 69.2 38.0 - 73.0 %    Lymph % 12.2 (L) 18.0 - 48.0 %    Mono % 8.9 4.0 - 15.0 %    Eosinophil % 5.0 0.0 - 8.0 %    Basophil % 0.7 0.0 - 1.9 %    Platelet Estimate Appears normal     Differential Method Automated    Comprehensive metabolic panel    Collection Time: 02/05/25 12:00 AM   Result Value Ref Range    Sodium 133 (L) 136 - 145 mmol/L    Potassium 4.7 3.5 - 5.1 mmol/L    Chloride 104 95 - 110 mmol/L    CO2 18 (L) 23 - 29 mmol/L    Glucose 338 (H) 70 - 110 mg/dL    BUN 42 (H) 8 - 23 mg/dL    Creatinine 1.6 (H) 0.5 - 1.4 mg/dL    Calcium 8.6 (L) 8.7 - 10.5 mg/dL    Total Protein 6.7 6.0 - 8.4 g/dL    Albumin 2.8 (L) 3.5 - 5.2 g/dL    Total Bilirubin 0.7 0.1 - 1.0 mg/dL    Alkaline Phosphatase 96 40 - 150 U/L    AST 23 10 - 40 U/L    ALT 16 10 - 44 U/L    eGFR 32 (A) >60 mL/min/1.73 m^2    Anion Gap 11 8 - 16 mmol/L   Hepatitis C Antibody    Collection Time: 02/05/25 12:52 AM   Result Value Ref Range    Hepatitis C Ab Negative Negative   HCV Virus Hold Specimen    Collection Time: 02/05/25 12:52 AM   Result Value Ref Range    HEP C Virus Hold Specimen Hold for HCV sendout    HIV 1/2 Ag/Ab (4th Gen)    Collection Time: 02/05/25 12:52 AM   Result Value Ref Range    HIV 1/2 Ag/Ab Negative Negative   Protime-INR    Collection Time: 02/05/25 12:52 AM   Result Value Ref Range    Prothrombin Time 10.3 9.0 - 12.5 sec    INR 0.9 0.8 - 1.2   APTT    Collection  Time: 02/05/25 12:52 AM   Result Value Ref Range    aPTT 26.3 21.0 - 32.0 sec       RADIOLOGY  X-Ray Chest 1 View    Result Date: 2/5/2025  EXAMINATION: XR CHEST 1 VIEW CLINICAL HISTORY: Preop clearance; TECHNIQUE: Single frontal view of the chest was performed. COMPARISON: 05/27/2024 FINDINGS: The patient is rotated limiting assessment.  There is borderline enlargement of the cardiac silhouette.  There is mild tortuosity of the thoracic aorta.  Lungs are symmetrically expanded without evidence of large confluent airspace consolidation.  There is mild chronic coarse interstitial attenuation.  No significant volume of pleural fluid or definitive pneumothorax identified.  Osseous structures demonstrate degenerative changes.  Mild gaseous distention of visualized bowel within the upper abdomen.     As above. Electronically signed by: Rich Acosta MD Date:    02/05/2025 Time:    00:45    X-Ray Femur Ap/Lat Right    Result Date: 2/4/2025  EXAMINATION: XR FEMUR 2 VIEW RIGHT; XR TIBIA FIBULA 2 VIEW RIGHT CLINICAL HISTORY: Unspecified fall, initial encounter TECHNIQUE: AP and lateral views of the right femur were performed.  AP and lateral views of the right tibia and fibula were performed. COMPARISON: None FINDINGS: There is an external fixation device in place with percutaneous pins spanning the right femur as well as the right tibia. There is a complex, comminuted, moderately displaced fracture of the distal right femur.  There is intra-articular involvement of the patellofemoral articulation.  There is an associated complex joint effusion present.  There is diffuse soft tissue edema present about the right knee. The right femoral head remains appropriately seated within the acetabulum.  The right tibia and fibula appear grossly intact.  The ankle mortise appears maintained.     Complex, comminuted intra-articular fracture of the distal right femur with external fixation device in place.  Correlation with any prior  outside imaging advised. Electronically signed by: Rich Acosta MD Date:    02/04/2025 Time:    23:37    X-Ray Tibia Fibula 2 View Right    Result Date: 2/4/2025  EXAMINATION: XR FEMUR 2 VIEW RIGHT; XR TIBIA FIBULA 2 VIEW RIGHT CLINICAL HISTORY: Unspecified fall, initial encounter TECHNIQUE: AP and lateral views of the right femur were performed.  AP and lateral views of the right tibia and fibula were performed. COMPARISON: None FINDINGS: There is an external fixation device in place with percutaneous pins spanning the right femur as well as the right tibia. There is a complex, comminuted, moderately displaced fracture of the distal right femur.  There is intra-articular involvement of the patellofemoral articulation.  There is an associated complex joint effusion present.  There is diffuse soft tissue edema present about the right knee. The right femoral head remains appropriately seated within the acetabulum.  The right tibia and fibula appear grossly intact.  The ankle mortise appears maintained.     Complex, comminuted intra-articular fracture of the distal right femur with external fixation device in place.  Correlation with any prior outside imaging advised. Electronically signed by: Rich Acosta MD Date:    02/04/2025 Time:    23:37      EKG    MICROBIOLOGY    MDM     Amount and/or Complexity of Data Reviewed  Clinical lab tests: reviewed  Tests in the radiology section of CPT®: reviewed  Tests in the medicine section of CPT®: reviewed  Discussion of test results with the performing providers: yes  Decide to obtain previous medical records or to obtain history from someone other than the patient: yes  Obtain history from someone other than the patient: yes  Review and summarize past medical records: yes  Discuss the patient with other providers: yes  Independent visualization of images, tracings, or specimens: yes        Assessment/Plan:     * Closed fracture of right femur  RCRI revealed 3.9% risk for  "major cardiac event. Ortho consulted with plans to take to OR in am.  Plan:  -NPO  -Continue current pain regimen, titrate as needed  -Bowel regimen  -Bedrest  -Wound care   -None weightbearing RLE  -Continue splint per ortho recs  -IVFs prn  -Antiemetics prn  -Tylenol as needed for fever   -PT/OT   -Ortho consulted      Hypertension  Chronic, controlled.  Latest blood pressure and vitals reviewed-   Temp:  [97.8 °F (36.6 °C)-97.9 °F (36.6 °C)]   Pulse:  [72-77]   Resp:  [16-18]   BP: (129-147)/(64-88)   SpO2:  [97 %-99 %] .   Home meds for hypertension were reviewed and noted below.   Hypertension Medications               valsartan (DIOVAN) 160 MG tablet Take 1 tablet (160 mg total) by mouth once daily.     While in the hospital, will manage blood pressure as follows; Continue home antihypertensive regimen    Will utilize p.r.n. blood pressure medication only if patient's blood pressure greater than  160/90 and she develops symptoms such as worsening chest pain or shortness of breath.      Diabetes mellitus  Patient's FSGs are uncontrolled due to hyperglycemia on current medication regimen.  Last A1c reviewed-   Lab Results   Component Value Date    HGBA1C 7.8 (H) 01/15/2025     Most recent fingerstick glucose reviewed- No results for input(s): "POCTGLUCOSE" in the last 24 hours.  Current correctional scale  Low  Maintain anti-hyperglycemic dose as follows-   Antihyperglycemics (From admission, onward)     Hold Oral hypoglycemics while patient is in the hospital.  SSI  Accuchecks      Chronic kidney disease (CKD), stage IV (severe)  Creatine stable for now. BMP reviewed- noted Estimated Creatinine Clearance: 17.8 mL/min (A) (based on SCr of 1.6 mg/dL (H)). according to latest data. Based on current GFR, CKD stage is stage 4 - GFR 15-29.  Monitor UOP and serial BMP and adjust therapy as needed. Renally dose meds. Avoid nephrotoxic medications and procedures.      Other specified hypothyroidism  Patient has chronic " hypothyroidism. TFTs reviewed-   Lab Results   Component Value Date    TSH 18.214 (H) 01/15/2025   . Will continue chronic levothyroxine and adjust for and clinical changes.          VTE Risk Mitigation (From admission, onward)           Ordered     Reason for No Pharmacological VTE Prophylaxis  Once        Comments: Planned surgical procedure   Question:  Reasons:  Answer:  Physician Provided (leave comment)    02/05/25 0005     IP VTE HIGH RISK PATIENT  Once         02/05/25 0005     Place sequential compression device  Until discontinued         02/05/25 0005                  //Core Measures   -DVT proph: SCDs, withholding anticoagulation pending surgical intervention   -Code status Full    -Surrogate:none present    Components of this note were documented using a voice recognition system and are subject to errors not corrected at the time the document was proof read. Please contact the author for any clarifications.     Bryant Lock NP  Department of Hospital Medicine  [facility]

## 2025-02-05 NOTE — HPI
Odin Oliva is a 85 y.o. female with a PMH  has a past medical history of CKD (chronic kidney disease), stage IV, Diabetes mellitus, HTN (hypertension), Hyperuricemia, Seasonal allergies, Thyroid disease, and Urinary tract infection. presented to the Emergency Department for evaluation of R leg pain. Pt fell on 1/27/25. Pt states she had surgery on her femur in [place] on 1/29/25 and was sent here for pain relief and f/u. External fixator is in place. Symptoms are constant and moderate in severity. Patient denies all other sxs at this time. Pt was prescribed Cefuroxime 500mg BID for 5 days and Paracetamol 1g TID for 5 days. Prior Tx includes a Paracetamol around 10:00 PM tonight. No further complaints or concerns at this time.     ER workup revealed CBC with H/H of 9.4/29.1 (previously 11.8/35.8 on 01/15/2025).  CMP revealed BUN/creatinine of 42/1.6 (at baseline),  mg/dL.  Plain film imaging of right femur revealed:[Complex, comminuted intra-articular fracture of the distal right femur with external fixation device in place].  On-call orthopedic surgeon consulted.  Plans to take to OR in a.m..  Patient received 2 mg of morphine, 4 mg Zofran in ER.  Hospital Medicine consulted with.  Patient in agreement with treatment plan.  Patient admitted under inpatient status.    PCP: Juventino Barbosa

## 2025-02-05 NOTE — ASSESSMENT & PLAN NOTE
Chronic, controlled.  Latest blood pressure and vitals reviewed-   Temp:  [97.8 °F (36.6 °C)-97.9 °F (36.6 °C)]   Pulse:  [72-77]   Resp:  [16-18]   BP: (129-147)/(64-88)   SpO2:  [97 %-99 %] .   Home meds for hypertension were reviewed and noted below.   Hypertension Medications               valsartan (DIOVAN) 160 MG tablet Take 1 tablet (160 mg total) by mouth once daily.            While in the hospital, will manage blood pressure as follows; Continue home antihypertensive regimen    Will utilize p.r.n. blood pressure medication only if patient's blood pressure greater than  160/90 and she develops symptoms such as worsening chest pain or shortness of breath.

## 2025-02-05 NOTE — SUBJECTIVE & OBJECTIVE
Past Medical History:   Diagnosis Date    CKD (chronic kidney disease), stage IV     Diabetes mellitus     HTN (hypertension)     Hyperuricemia     Seasonal allergies     Thyroid disease     Urinary tract infection        Past Surgical History:   Procedure Laterality Date    CATARACT EXTRACTION, BILATERAL      CHOLECYSTECTOMY      EXOSTECTOMY Bilateral 11/8/2019    Procedure: EXOSTECTOMY;  Surgeon: Lissy Comer DPM;  Location: [facility] OR;  Service: Podiatry;  Laterality: Bilateral;  left fourth digit, right fifth digit    FOOT MASS EXCISION Bilateral 11/8/2019    Procedure: HAMMER TOE, DEROTATIONAL ARTHROPLASTY FIFTH DIGIT;  Surgeon: Lissy Comer DPM;  Location: [facility] OR;  Service: Podiatry;  Laterality: Bilateral;  Left 5th toe incision @ 08:51.  Left 4th toe incision @ 08:54.  Right 5th toe incision : 09:24.    TOTAL THYROIDECTOMY      UMBILICAL HERNIA REPAIR      x 2       Review of patient's allergies indicates:  No Known Allergies    No current facility-administered medications on file prior to encounter.     Current Outpatient Medications on File Prior to Encounter   Medication Sig    azelastine (ASTELIN) 137 mcg (0.1 %) nasal spray 1 spray (137 mcg total) by Nasal route 2 (two) times daily.    cetirizine (ZYRTEC) 10 MG tablet Take 1 tablet (10 mg total) by mouth once daily.    levothyroxine (SYNTHROID) 75 MCG tablet Take 1 tablet (75 mcg total) by mouth before breakfast.    montelukast (SINGULAIR) 10 mg tablet Take 1 tablet (10 mg total) by mouth every evening.    albuterol (PROVENTIL/VENTOLIN HFA) 90 mcg/actuation inhaler Inhale 2 puffs into the lungs every 4 (four) hours as needed for Wheezing or Shortness of Breath. Rescue    aspirin-acetaminophen-caffeine 250-250-65 mg (EXCEDRIN MIGRAINE) 250-250-65 mg per tablet Take 1 tablet by mouth every 6 (six) hours as needed for Pain.    blood sugar diagnostic Strp 1 each by Misc.(Non-Drug; Combo Route) route 3 (three) times daily. Insurance preferred     blood-glucose meter kit Use as instructed. Insurance preferred    empagliflozin (JARDIANCE) 10 mg tablet Take 1 tablet (10 mg total) by mouth once daily.    flash glucose sensor (FREESTYLE HAROLDO 14 DAY SENSOR) Kit 1 each by Misc.(Non-Drug; Combo Route) route every 14 (fourteen) days.    fluticasone-salmeterol diskus inhaler 250-50 mcg Inhale 1 puff into the lungs 2 (two) times daily. Controller    hydrOXYzine HCL (ATARAX) 10 MG Tab Take 1 tablet (10 mg total) by mouth every evening.    lancets Misc 1 each by Misc.(Non-Drug; Combo Route) route 3 (three) times daily.    linaGLIPtin (TRADJENTA) 5 mg Tab tablet Take 1 tablet (5 mg total) by mouth once daily.    valsartan (DIOVAN) 160 MG tablet Take 1 tablet (160 mg total) by mouth once daily.     Family History       Problem Relation (Age of Onset)    Asthma Mother    Diabetes Brother, Brother (80)    Heart disease Daughter    Lung cancer Brother (65)    No Known Problems Father          Tobacco Use    Smoking status: Never     Passive exposure: Never    Smokeless tobacco: Never   Substance and Sexual Activity    Alcohol use: No    Drug use: No    Sexual activity: Never     Review of Systems   Constitutional:  Negative for chills, diaphoresis, fatigue and fever.   Respiratory:  Negative for cough and shortness of breath.    Cardiovascular:  Negative for chest pain and palpitations.   Gastrointestinal:  Negative for diarrhea, nausea and vomiting.   Musculoskeletal:  Positive for arthralgias, gait problem and myalgias.   All other systems reviewed and are negative.    Objective:     Vital Signs (Most Recent):  Temp: 97.9 °F (36.6 °C) (02/05/25 0235)  Pulse: 72 (02/05/25 0235)  Resp: 18 (02/05/25 0235)  BP: (!) 147/68 (02/05/25 0235)  SpO2: 97 % (02/05/25 0235) Vital Signs (24h Range):  Temp:  [97.8 °F (36.6 °C)-97.9 °F (36.6 °C)] 97.9 °F (36.6 °C)  Pulse:  [72-77] 72  Resp:  [16-18] 18  SpO2:  [97 %-99 %] 97 %  BP: (129-147)/(64-88) 147/68     Weight: 57 kg (125 lb 10.6  oz)  Body mass index is 36.8 kg/m².     Physical Exam  Vitals and nursing note reviewed.   Constitutional:       General: She is awake. She is not in acute distress.     Appearance: Normal appearance. She is well-developed and well-groomed. She is not ill-appearing, toxic-appearing or diaphoretic.   HENT:      Head: Normocephalic and atraumatic.      Mouth/Throat:      Lips: Pink.      Mouth: Mucous membranes are moist.      Pharynx: Oropharynx is clear. Uvula midline.   Eyes:      Extraocular Movements: Extraocular movements intact.      Conjunctiva/sclera: Conjunctivae normal.      Pupils: Pupils are equal, round, and reactive to light.   Cardiovascular:      Rate and Rhythm: Normal rate and regular rhythm.      Heart sounds: Normal heart sounds. No murmur heard.  Pulmonary:      Effort: Pulmonary effort is normal.      Breath sounds: Normal breath sounds.   Abdominal:      General: Bowel sounds are normal.      Palpations: Abdomen is soft.      Tenderness: There is no abdominal tenderness.   Musculoskeletal:      Cervical back: Normal range of motion and neck supple.      Comments: Ex fix device noted to right lower extremity. NVS grossly intact.   Skin:     General: Skin is warm and dry.      Capillary Refill: Capillary refill takes less than 2 seconds.   Neurological:      Mental Status: She is alert and oriented to person, place, and time. Mental status is at baseline.      GCS: GCS eye subscore is 4. GCS verbal subscore is 5. GCS motor subscore is 6.      Cranial Nerves: Cranial nerves 2-12 are intact.      Sensory: Sensation is intact.   Psychiatric:         Mood and Affect: Mood normal.         Speech: Speech normal.         Behavior: Behavior normal. Behavior is cooperative.              CRANIAL NERVES     CN III, IV, VI   Pupils are equal, round, and reactive to light.     LABS:  Recent Results (from the past 24 hours)   CBC auto differential    Collection Time: 02/05/25 12:00 AM   Result Value Ref Range     WBC 5.76 3.90 - 12.70 K/uL    RBC 3.25 (L) 4.00 - 5.40 M/uL    Hemoglobin 9.4 (L) 12.0 - 16.0 g/dL    Hematocrit 29.1 (L) 37.0 - 48.5 %    MCV 90 82 - 98 fL    MCH 28.9 27.0 - 31.0 pg    MCHC 32.3 32.0 - 36.0 g/dL    RDW 13.5 11.5 - 14.5 %    Platelets 207 150 - 450 K/uL    MPV 10.4 9.2 - 12.9 fL    Immature Granulocytes 4.0 (H) 0.0 - 0.5 %    Gran # (ANC) 4.0 1.8 - 7.7 K/uL    Immature Grans (Abs) 0.23 (H) 0.00 - 0.04 K/uL    Lymph # 0.7 (L) 1.0 - 4.8 K/uL    Mono # 0.5 0.3 - 1.0 K/uL    Eos # 0.3 0.0 - 0.5 K/uL    Baso # 0.04 0.00 - 0.20 K/uL    nRBC 0 0 /100 WBC    Gran % 69.2 38.0 - 73.0 %    Lymph % 12.2 (L) 18.0 - 48.0 %    Mono % 8.9 4.0 - 15.0 %    Eosinophil % 5.0 0.0 - 8.0 %    Basophil % 0.7 0.0 - 1.9 %    Platelet Estimate Appears normal     Differential Method Automated    Comprehensive metabolic panel    Collection Time: 02/05/25 12:00 AM   Result Value Ref Range    Sodium 133 (L) 136 - 145 mmol/L    Potassium 4.7 3.5 - 5.1 mmol/L    Chloride 104 95 - 110 mmol/L    CO2 18 (L) 23 - 29 mmol/L    Glucose 338 (H) 70 - 110 mg/dL    BUN 42 (H) 8 - 23 mg/dL    Creatinine 1.6 (H) 0.5 - 1.4 mg/dL    Calcium 8.6 (L) 8.7 - 10.5 mg/dL    Total Protein 6.7 6.0 - 8.4 g/dL    Albumin 2.8 (L) 3.5 - 5.2 g/dL    Total Bilirubin 0.7 0.1 - 1.0 mg/dL    Alkaline Phosphatase 96 40 - 150 U/L    AST 23 10 - 40 U/L    ALT 16 10 - 44 U/L    eGFR 32 (A) >60 mL/min/1.73 m^2    Anion Gap 11 8 - 16 mmol/L   Hepatitis C Antibody    Collection Time: 02/05/25 12:52 AM   Result Value Ref Range    Hepatitis C Ab Negative Negative   HCV Virus Hold Specimen    Collection Time: 02/05/25 12:52 AM   Result Value Ref Range    HEP C Virus Hold Specimen Hold for HCV sendout    HIV 1/2 Ag/Ab (4th Gen)    Collection Time: 02/05/25 12:52 AM   Result Value Ref Range    HIV 1/2 Ag/Ab Negative Negative   Protime-INR    Collection Time: 02/05/25 12:52 AM   Result Value Ref Range    Prothrombin Time 10.3 9.0 - 12.5 sec    INR 0.9 0.8 - 1.2   APTT     Collection Time: 02/05/25 12:52 AM   Result Value Ref Range    aPTT 26.3 21.0 - 32.0 sec       RADIOLOGY  X-Ray Chest 1 View    Result Date: 2/5/2025  EXAMINATION: XR CHEST 1 VIEW CLINICAL HISTORY: Preop clearance; TECHNIQUE: Single frontal view of the chest was performed. COMPARISON: 05/27/2024 FINDINGS: The patient is rotated limiting assessment.  There is borderline enlargement of the cardiac silhouette.  There is mild tortuosity of the thoracic aorta.  Lungs are symmetrically expanded without evidence of large confluent airspace consolidation.  There is mild chronic coarse interstitial attenuation.  No significant volume of pleural fluid or definitive pneumothorax identified.  Osseous structures demonstrate degenerative changes.  Mild gaseous distention of visualized bowel within the upper abdomen.     As above. Electronically signed by: Rich Acosta MD Date:    02/05/2025 Time:    00:45    X-Ray Femur Ap/Lat Right    Result Date: 2/4/2025  EXAMINATION: XR FEMUR 2 VIEW RIGHT; XR TIBIA FIBULA 2 VIEW RIGHT CLINICAL HISTORY: Unspecified fall, initial encounter TECHNIQUE: AP and lateral views of the right femur were performed.  AP and lateral views of the right tibia and fibula were performed. COMPARISON: None FINDINGS: There is an external fixation device in place with percutaneous pins spanning the right femur as well as the right tibia. There is a complex, comminuted, moderately displaced fracture of the distal right femur.  There is intra-articular involvement of the patellofemoral articulation.  There is an associated complex joint effusion present.  There is diffuse soft tissue edema present about the right knee. The right femoral head remains appropriately seated within the acetabulum.  The right tibia and fibula appear grossly intact.  The ankle mortise appears maintained.     Complex, comminuted intra-articular fracture of the distal right femur with external fixation device in place.  Correlation with  any prior outside imaging advised. Electronically signed by: Rich Acosta MD Date:    02/04/2025 Time:    23:37    X-Ray Tibia Fibula 2 View Right    Result Date: 2/4/2025  EXAMINATION: XR FEMUR 2 VIEW RIGHT; XR TIBIA FIBULA 2 VIEW RIGHT CLINICAL HISTORY: Unspecified fall, initial encounter TECHNIQUE: AP and lateral views of the right femur were performed.  AP and lateral views of the right tibia and fibula were performed. COMPARISON: None FINDINGS: There is an external fixation device in place with percutaneous pins spanning the right femur as well as the right tibia. There is a complex, comminuted, moderately displaced fracture of the distal right femur.  There is intra-articular involvement of the patellofemoral articulation.  There is an associated complex joint effusion present.  There is diffuse soft tissue edema present about the right knee. The right femoral head remains appropriately seated within the acetabulum.  The right tibia and fibula appear grossly intact.  The ankle mortise appears maintained.     Complex, comminuted intra-articular fracture of the distal right femur with external fixation device in place.  Correlation with any prior outside imaging advised. Electronically signed by: Rich Acosta MD Date:    02/04/2025 Time:    23:37      EKG    MICROBIOLOGY    MDM     Amount and/or Complexity of Data Reviewed  Clinical lab tests: reviewed  Tests in the radiology section of CPT®: reviewed  Tests in the medicine section of CPT®: reviewed  Discussion of test results with the performing providers: yes  Decide to obtain previous medical records or to obtain history from someone other than the patient: yes  Obtain history from someone other than the patient: yes  Review and summarize past medical records: yes  Discuss the patient with other providers: yes  Independent visualization of images, tracings, or specimens: yes

## 2025-02-05 NOTE — PLAN OF CARE
Discussed poc with pt, pt verbalized understanding    Purposeful rounding every 2hours    VS wnl    Fall precautions in place, remains injury free  Pt c/o pain/nausea  Pain and nausea under control with PRN meds    IVFs infusing  Accurate I&Os    Bed locked at lowest position  Call light within reach    Chart check complete  Will cont with POC

## 2025-02-06 ENCOUNTER — ANESTHESIA (OUTPATIENT)
Dept: SURGERY | Facility: HOSPITAL | Age: 85
DRG: 481 | End: 2025-02-06
Payer: MEDICARE

## 2025-02-06 ENCOUNTER — ANESTHESIA EVENT (OUTPATIENT)
Dept: SURGERY | Facility: HOSPITAL | Age: 85
DRG: 481 | End: 2025-02-06
Payer: MEDICARE

## 2025-02-06 LAB
POCT GLUCOSE: 210 MG/DL (ref 70–110)
POCT GLUCOSE: 399 MG/DL (ref 70–110)
POCT GLUCOSE: 411 MG/DL (ref 70–110)

## 2025-02-06 PROCEDURE — 63600175 PHARM REV CODE 636 W HCPCS: Mod: HCNC | Performed by: NURSE PRACTITIONER

## 2025-02-06 PROCEDURE — 71000039 HC RECOVERY, EACH ADD'L HOUR: Mod: HCNC | Performed by: ORTHOPAEDIC SURGERY

## 2025-02-06 PROCEDURE — 63600175 PHARM REV CODE 636 W HCPCS: Mod: HCNC | Performed by: EMERGENCY MEDICINE

## 2025-02-06 PROCEDURE — C1713 ANCHOR/SCREW BN/BN,TIS/BN: HCPCS | Mod: HCNC | Performed by: ORTHOPAEDIC SURGERY

## 2025-02-06 PROCEDURE — 11000001 HC ACUTE MED/SURG PRIVATE ROOM: Mod: HCNC

## 2025-02-06 PROCEDURE — 71000033 HC RECOVERY, INTIAL HOUR: Mod: HCNC | Performed by: ORTHOPAEDIC SURGERY

## 2025-02-06 PROCEDURE — 25000003 PHARM REV CODE 250: Mod: HCNC | Performed by: ORTHOPAEDIC SURGERY

## 2025-02-06 PROCEDURE — 37000008 HC ANESTHESIA 1ST 15 MINUTES: Mod: HCNC | Performed by: ORTHOPAEDIC SURGERY

## 2025-02-06 PROCEDURE — 63600175 PHARM REV CODE 636 W HCPCS: Mod: HCNC | Performed by: ANESTHESIOLOGY

## 2025-02-06 PROCEDURE — 63600175 PHARM REV CODE 636 W HCPCS: Mod: HCNC | Performed by: STUDENT IN AN ORGANIZED HEALTH CARE EDUCATION/TRAINING PROGRAM

## 2025-02-06 PROCEDURE — 63600175 PHARM REV CODE 636 W HCPCS: Mod: HCNC | Performed by: FAMILY MEDICINE

## 2025-02-06 PROCEDURE — 27201423 OPTIME MED/SURG SUP & DEVICES STERILE SUPPLY: Mod: HCNC | Performed by: ORTHOPAEDIC SURGERY

## 2025-02-06 PROCEDURE — 36000710: Mod: HCNC | Performed by: ORTHOPAEDIC SURGERY

## 2025-02-06 PROCEDURE — 36000711: Mod: HCNC | Performed by: ORTHOPAEDIC SURGERY

## 2025-02-06 PROCEDURE — 25000003 PHARM REV CODE 250: Mod: HCNC | Performed by: FAMILY MEDICINE

## 2025-02-06 PROCEDURE — 25000003 PHARM REV CODE 250: Mod: HCNC | Performed by: NURSE PRACTITIONER

## 2025-02-06 PROCEDURE — C1769 GUIDE WIRE: HCPCS | Mod: HCNC | Performed by: ORTHOPAEDIC SURGERY

## 2025-02-06 PROCEDURE — 63600175 PHARM REV CODE 636 W HCPCS: Mod: HCNC | Performed by: ORTHOPAEDIC SURGERY

## 2025-02-06 PROCEDURE — 37000009 HC ANESTHESIA EA ADD 15 MINS: Mod: HCNC | Performed by: ORTHOPAEDIC SURGERY

## 2025-02-06 PROCEDURE — 25000003 PHARM REV CODE 250: Mod: HCNC | Performed by: EMERGENCY MEDICINE

## 2025-02-06 DEVICE — SCREW CANN VA LOK 5X60MM: Type: IMPLANTABLE DEVICE | Site: LEG | Status: FUNCTIONAL

## 2025-02-06 DEVICE — SCREW BONE LOCK VA 5X42MM: Type: IMPLANTABLE DEVICE | Site: LEG | Status: FUNCTIONAL

## 2025-02-06 DEVICE — SCREW BONE VA LK 5.0X38MM: Type: IMPLANTABLE DEVICE | Site: LEG | Status: FUNCTIONAL

## 2025-02-06 DEVICE — SCREW STRDRV VA LOK 5X34MM: Type: IMPLANTABLE DEVICE | Site: LEG | Status: FUNCTIONAL

## 2025-02-06 DEVICE — SCREW CANN VA LOK 5X70MM: Type: IMPLANTABLE DEVICE | Site: LEG | Status: FUNCTIONAL

## 2025-02-06 DEVICE — SCREW STRDRV VA LOK 5X36MM: Type: IMPLANTABLE DEVICE | Site: LEG | Status: FUNCTIONAL

## 2025-02-06 RX ORDER — FENTANYL CITRATE 50 UG/ML
25 INJECTION, SOLUTION INTRAMUSCULAR; INTRAVENOUS EVERY 5 MIN PRN
Status: DISCONTINUED | OUTPATIENT
Start: 2025-02-06 | End: 2025-02-06 | Stop reason: HOSPADM

## 2025-02-06 RX ORDER — ONDANSETRON HYDROCHLORIDE 2 MG/ML
INJECTION, SOLUTION INTRAVENOUS
Status: DISCONTINUED | OUTPATIENT
Start: 2025-02-06 | End: 2025-02-06

## 2025-02-06 RX ORDER — HYDROMORPHONE HYDROCHLORIDE 1 MG/ML
0.2 INJECTION, SOLUTION INTRAMUSCULAR; INTRAVENOUS; SUBCUTANEOUS EVERY 5 MIN PRN
Status: COMPLETED | OUTPATIENT
Start: 2025-02-06 | End: 2025-02-06

## 2025-02-06 RX ORDER — HYDROCODONE BITARTRATE AND ACETAMINOPHEN 5; 325 MG/1; MG/1
1 TABLET ORAL EVERY 6 HOURS PRN
Status: DISCONTINUED | OUTPATIENT
Start: 2025-02-06 | End: 2025-02-11 | Stop reason: HOSPADM

## 2025-02-06 RX ORDER — ONDANSETRON HYDROCHLORIDE 2 MG/ML
4 INJECTION, SOLUTION INTRAVENOUS DAILY PRN
Status: DISCONTINUED | OUTPATIENT
Start: 2025-02-06 | End: 2025-02-06 | Stop reason: HOSPADM

## 2025-02-06 RX ORDER — ONDANSETRON HYDROCHLORIDE 2 MG/ML
4 INJECTION, SOLUTION INTRAVENOUS ONCE
Status: COMPLETED | OUTPATIENT
Start: 2025-02-06 | End: 2025-02-06

## 2025-02-06 RX ORDER — ROCURONIUM BROMIDE 10 MG/ML
INJECTION, SOLUTION INTRAVENOUS
Status: DISCONTINUED | OUTPATIENT
Start: 2025-02-06 | End: 2025-02-06

## 2025-02-06 RX ORDER — OXYCODONE AND ACETAMINOPHEN 5; 325 MG/1; MG/1
1 TABLET ORAL
Status: DISCONTINUED | OUTPATIENT
Start: 2025-02-06 | End: 2025-02-06 | Stop reason: HOSPADM

## 2025-02-06 RX ORDER — PROPOFOL 10 MG/ML
VIAL (ML) INTRAVENOUS
Status: DISCONTINUED | OUTPATIENT
Start: 2025-02-06 | End: 2025-02-06

## 2025-02-06 RX ORDER — ACETAMINOPHEN 10 MG/ML
1000 INJECTION, SOLUTION INTRAVENOUS ONCE
Status: COMPLETED | OUTPATIENT
Start: 2025-02-06 | End: 2025-02-06

## 2025-02-06 RX ORDER — LIDOCAINE HYDROCHLORIDE 20 MG/ML
INJECTION, SOLUTION EPIDURAL; INFILTRATION; INTRACAUDAL; PERINEURAL
Status: DISCONTINUED | OUTPATIENT
Start: 2025-02-06 | End: 2025-02-06

## 2025-02-06 RX ORDER — DEXAMETHASONE SODIUM PHOSPHATE 4 MG/ML
INJECTION, SOLUTION INTRA-ARTICULAR; INTRALESIONAL; INTRAMUSCULAR; INTRAVENOUS; SOFT TISSUE
Status: DISCONTINUED | OUTPATIENT
Start: 2025-02-06 | End: 2025-02-06

## 2025-02-06 RX ORDER — ACETAMINOPHEN 500 MG
5000 TABLET ORAL DAILY
Status: DISCONTINUED | OUTPATIENT
Start: 2025-02-06 | End: 2025-02-11 | Stop reason: HOSPADM

## 2025-02-06 RX ORDER — THIAMINE HCL 100 MG
100 TABLET ORAL DAILY
Status: DISCONTINUED | OUTPATIENT
Start: 2025-02-06 | End: 2025-02-11 | Stop reason: HOSPADM

## 2025-02-06 RX ORDER — BUPIVACAINE HYDROCHLORIDE AND EPINEPHRINE 2.5; 5 MG/ML; UG/ML
INJECTION, SOLUTION EPIDURAL; INFILTRATION; INTRACAUDAL; PERINEURAL
Status: DISCONTINUED | OUTPATIENT
Start: 2025-02-06 | End: 2025-02-06 | Stop reason: HOSPADM

## 2025-02-06 RX ORDER — HYDRALAZINE HYDROCHLORIDE 20 MG/ML
10 INJECTION INTRAMUSCULAR; INTRAVENOUS ONCE
Status: COMPLETED | OUTPATIENT
Start: 2025-02-06 | End: 2025-02-06

## 2025-02-06 RX ORDER — CEFAZOLIN SODIUM 1 G/3ML
1 INJECTION, POWDER, FOR SOLUTION INTRAMUSCULAR; INTRAVENOUS
Status: DISCONTINUED | OUTPATIENT
Start: 2025-02-06 | End: 2025-02-06 | Stop reason: SDUPTHER

## 2025-02-06 RX ORDER — METOCLOPRAMIDE HYDROCHLORIDE 5 MG/ML
10 INJECTION INTRAMUSCULAR; INTRAVENOUS ONCE
Status: COMPLETED | OUTPATIENT
Start: 2025-02-06 | End: 2025-02-06

## 2025-02-06 RX ORDER — CYANOCOBALAMIN 1000 UG/ML
1000 INJECTION, SOLUTION INTRAMUSCULAR; SUBCUTANEOUS DAILY
Status: COMPLETED | OUTPATIENT
Start: 2025-02-06 | End: 2025-02-07

## 2025-02-06 RX ORDER — CEFAZOLIN SODIUM 1 G/3ML
1 INJECTION, POWDER, FOR SOLUTION INTRAMUSCULAR; INTRAVENOUS
Status: COMPLETED | OUTPATIENT
Start: 2025-02-06 | End: 2025-02-07

## 2025-02-06 RX ORDER — FENTANYL CITRATE 50 UG/ML
INJECTION, SOLUTION INTRAMUSCULAR; INTRAVENOUS
Status: DISCONTINUED | OUTPATIENT
Start: 2025-02-06 | End: 2025-02-06

## 2025-02-06 RX ORDER — MORPHINE SULFATE 4 MG/ML
2 INJECTION, SOLUTION INTRAMUSCULAR; INTRAVENOUS
Status: DISCONTINUED | OUTPATIENT
Start: 2025-02-06 | End: 2025-02-11 | Stop reason: HOSPADM

## 2025-02-06 RX ORDER — ONDANSETRON HYDROCHLORIDE 2 MG/ML
4 INJECTION, SOLUTION INTRAVENOUS ONCE AS NEEDED
Status: DISCONTINUED | OUTPATIENT
Start: 2025-02-06 | End: 2025-02-06 | Stop reason: HOSPADM

## 2025-02-06 RX ADMIN — HYDROMORPHONE HYDROCHLORIDE 0.2 MG: 1 INJECTION, SOLUTION INTRAMUSCULAR; INTRAVENOUS; SUBCUTANEOUS at 11:02

## 2025-02-06 RX ADMIN — PROPOFOL 150 MG: 10 INJECTION, EMULSION INTRAVENOUS at 09:02

## 2025-02-06 RX ADMIN — METOCLOPRAMIDE 10 MG: 5 INJECTION, SOLUTION INTRAMUSCULAR; INTRAVENOUS at 11:02

## 2025-02-06 RX ADMIN — LIDOCAINE HYDROCHLORIDE 50 MG: 20 INJECTION, SOLUTION EPIDURAL; INFILTRATION; INTRACAUDAL; PERINEURAL at 09:02

## 2025-02-06 RX ADMIN — HYDRALAZINE HYDROCHLORIDE 10 MG: 20 INJECTION INTRAMUSCULAR; INTRAVENOUS at 11:02

## 2025-02-06 RX ADMIN — INSULIN ASPART 10 UNITS: 100 INJECTION, SOLUTION INTRAVENOUS; SUBCUTANEOUS at 04:02

## 2025-02-06 RX ADMIN — INSULIN ASPART 4 UNITS: 100 INJECTION, SOLUTION INTRAVENOUS; SUBCUTANEOUS at 06:02

## 2025-02-06 RX ADMIN — SODIUM CHLORIDE, POTASSIUM CHLORIDE, SODIUM LACTATE AND CALCIUM CHLORIDE: 600; 310; 30; 20 INJECTION, SOLUTION INTRAVENOUS at 05:02

## 2025-02-06 RX ADMIN — HYDROMORPHONE HYDROCHLORIDE 0.2 MG: 1 INJECTION, SOLUTION INTRAMUSCULAR; INTRAVENOUS; SUBCUTANEOUS at 12:02

## 2025-02-06 RX ADMIN — HYDROCODONE BITARTRATE AND ACETAMINOPHEN 1 TABLET: 5; 325 TABLET ORAL at 06:02

## 2025-02-06 RX ADMIN — ONDANSETRON 4 MG: 2 INJECTION INTRAMUSCULAR; INTRAVENOUS at 10:02

## 2025-02-06 RX ADMIN — SUGAMMADEX 200 MG: 100 INJECTION, SOLUTION INTRAVENOUS at 11:02

## 2025-02-06 RX ADMIN — THERA TABS 1 TABLET: TAB at 06:02

## 2025-02-06 RX ADMIN — Medication 1 TABLET: at 06:02

## 2025-02-06 RX ADMIN — CYANOCOBALAMIN 1000 MCG: 1000 INJECTION INTRAMUSCULAR; SUBCUTANEOUS at 06:02

## 2025-02-06 RX ADMIN — ROCURONIUM BROMIDE 50 MG: 10 INJECTION, SOLUTION INTRAVENOUS at 09:02

## 2025-02-06 RX ADMIN — SIMETHICONE 80 MG: 80 TABLET, CHEWABLE ORAL at 11:02

## 2025-02-06 RX ADMIN — DEXTROSE 1 G: 50 INJECTION, SOLUTION INTRAVENOUS at 09:02

## 2025-02-06 RX ADMIN — ACETAMINOPHEN 1000 MG: 10 INJECTION, SOLUTION INTRAVENOUS at 11:02

## 2025-02-06 RX ADMIN — HYDROCODONE BITARTRATE AND ACETAMINOPHEN 1 TABLET: 5; 325 TABLET ORAL at 01:02

## 2025-02-06 RX ADMIN — Medication 100 MG: at 06:02

## 2025-02-06 RX ADMIN — Medication 6 MG: at 11:02

## 2025-02-06 RX ADMIN — SODIUM CHLORIDE, SODIUM LACTATE, POTASSIUM CHLORIDE, AND CALCIUM CHLORIDE: .6; .31; .03; .02 INJECTION, SOLUTION INTRAVENOUS at 09:02

## 2025-02-06 RX ADMIN — FENTANYL CITRATE 25 MCG: 50 INJECTION, SOLUTION INTRAMUSCULAR; INTRAVENOUS at 10:02

## 2025-02-06 RX ADMIN — CHOLECALCIFEROL TAB 125 MCG (5000 UNIT) 5000 UNITS: 125 TAB at 06:02

## 2025-02-06 RX ADMIN — ONDANSETRON 4 MG: 2 INJECTION INTRAMUSCULAR; INTRAVENOUS at 11:02

## 2025-02-06 RX ADMIN — CEFAZOLIN 1 G: 330 INJECTION, POWDER, FOR SOLUTION INTRAMUSCULAR; INTRAVENOUS at 04:02

## 2025-02-06 RX ADMIN — DEXAMETHASONE SODIUM PHOSPHATE 4 MG: 4 INJECTION, SOLUTION INTRA-ARTICULAR; INTRALESIONAL; INTRAMUSCULAR; INTRAVENOUS; SOFT TISSUE at 10:02

## 2025-02-06 RX ADMIN — HYDROCODONE BITARTRATE AND ACETAMINOPHEN 1 TABLET: 5; 325 TABLET ORAL at 09:02

## 2025-02-06 NOTE — PLAN OF CARE
Discussed poc with pt, pt verbalized understanding    Purposeful rounding every 2hours    VS wnl  Blood glucose monitoring   Fall precautions in place, remains injury free  Pt denies c/o pain and nausea at this time  Pain and nausea under control with PRN meds    IVFs  Accurate I&Os  Abx given as prescribed  Bed locked at lowest position  Call light within reach    Chart check complete  Will cont with POC

## 2025-02-06 NOTE — PLAN OF CARE
Discussed poc with pt, pt verbalized understanding    Purposeful rounding every 2hours    VS wnl  Blood glucose monitoring   Fall precautions in place, remains injury free  Pain and nausea under control with PRN meds    IVFs  Accurate I&Os  Abx given as prescribed  Bed locked at lowest position  Call light within reach    Chart check complete  Will cont with POC   Patient calling looking for results from 12/08 ultrasound

## 2025-02-06 NOTE — ANESTHESIA PROCEDURE NOTES
Intubation    Date/Time: 2/6/2025 9:11 AM    Performed by: Quinton Santizo CRNA  Authorized by: Sj Osorio MD    Intubation:     Induction:  Intravenous    Intubated:  Postinduction    Mask Ventilation:  Easy mask    Attempts:  1    Attempted By:  CRNA    Method of Intubation:  Video laryngoscopy    Blade:  Dobson 3    Laryngeal View Grade: Grade IIA - cords partially seen      Difficult Airway Encountered?: No      Complications:  None    Airway Device:  Oral endotracheal tube    Airway Device Size:  6.5    Style/Cuff Inflation:  Cuffed (inflated to minimal occlusive pressure)    Tube secured:  18    Secured at:  The lips    Placement Verified By:  Capnometry    Complicating Factors:  None    Findings Post-Intubation:  BS equal bilateral

## 2025-02-06 NOTE — ANESTHESIA PREPROCEDURE EVALUATION
02/06/2025  Odin Oliva is a 85 y.o., female.      Pre-op Assessment    I have reviewed the Patient Summary Reports.     I have reviewed the Nursing Notes. I have reviewed the NPO Status.      Review of Systems  Anesthesia Hx:  No problems with previous Anesthesia              Personal Hx of Anesthesia complications, Post-Operative Nausea/Vomiting                    Hematology/Oncology:  Hematology Normal   Oncology Normal                                   Cardiovascular:     Hypertension                                          Renal/:  Chronic Renal Disease                Hepatic/GI:  Hepatic/GI Normal                    Neurological:  Neurology Normal                                      Endocrine:  Diabetes Hypothyroidism          Dermatological:  Skin Normal    Psych:  Psychiatric Normal                    Physical Exam  General: Well nourished, Cooperative, Alert and Oriented    Airway:  Mallampati: II / II  Mouth Opening: Normal  TM Distance: Normal  Tongue: Normal  Neck ROM: Normal ROM    Dental:  Intact      Patient Active Problem List   Diagnosis    Diabetes mellitus    Chronic kidney disease (CKD), stage IV (severe)    Other specified hypothyroidism    Acquired hammer toe    Hypertension    Adductovarus rotation of toe, acquired, left    Osteoporosis    Chronic cough    Statin intolerance    Heart murmur    Fatigue    Chronic anemia    Iron deficiency anemia due to chronic blood loss    EKG abnormalities    Severe obesity (BMI 35.0-39.9) with comorbidity    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  All questions answered.   ASA Score: 3  Day of Surgery Review of History & Physical: H&P Update referred to the surgeon/provider.I have interviewed and examined the patient. I have reviewed the patient's H&P dated: There are no significant changes. H&P completed by Anesthesiologist.    Ready For Surgery From Anesthesia Perspective.     .

## 2025-02-06 NOTE — PROGRESS NOTES
Pt seen and examined, chart, imaging reviewed. Briefly, 85 y.o. female with hx of DM2 w CKD 4, HTN, Hypothyroidism, UTI, who presented to the ER for evaluation of R leg pain. Pt fell on 1/27/25 and had surgery on her femur in [place] on 1/29/25 and was sent here for pain relief and f/u. External fixator is in place. Pt was prescribed Cefuroxime 500mg BID for 5 days and Paracetamol 1g TID for 5 days.       ER workup revealed H/H of 9.4/29, (previously 12/36 on 01/15/2025), Bun/Cr 42/1.6 (at baseline),  mg/dL. Plain film imaging of right femur revealed:[Complex, comminuted intra-articular fracture of the distal right femur with external fixation device in place].  Ortho consulted and plans to take to OR in a.m..Patient received 2 mg of morphine, 4 mg Zofran in ER and admitted to [facility].     Appreciate ortho input. Pt comfortable with pain controlled, getting IVF, BS controlled with SSI. Ortho plans surgery tomorrow. Diabetic Diet ordered for today.

## 2025-02-06 NOTE — TRANSFER OF CARE
"Anesthesia Transfer of Care Note    Patient: Odin Oliva    Procedure(s) Performed: Procedure(s) (LRB):  REMOVAL, EXTERNAL FIXATION DEVICE (Right)  ORIF, FRACTURE, FEMUR, TRANSCONDYLAR WITH INTERCONDYLAR EXTENSION (Right)    Patient location: PACU    Anesthesia Type: general    Transport from OR: Transported from OR on room air with adequate spontaneous ventilation    Post pain: adequate analgesia    Post assessment: no apparent anesthetic complications    Post vital signs: stable    Level of consciousness: awake and responds to stimulation    Nausea/Vomiting: no nausea/vomiting    Complications: none    Transfer of care protocol was followed      Last vitals: Visit Vitals  BP (!) 152/67 (BP Location: Left arm, Patient Position: Lying)   Pulse 69   Temp 36.8 °C (98.2 °F) (Temporal)   Resp 20   Ht 4' 1" (1.245 m)   Wt 57 kg (125 lb 10.6 oz)   SpO2 96%   Breastfeeding No   BMI 36.80 kg/m²     "

## 2025-02-07 LAB
ALBUMIN SERPL BCP-MCNC: 2.6 G/DL (ref 3.5–5.2)
ALP SERPL-CCNC: 89 U/L (ref 40–150)
ALT SERPL W/O P-5'-P-CCNC: 8 U/L (ref 10–44)
ANION GAP SERPL CALC-SCNC: 6 MMOL/L (ref 8–16)
AST SERPL-CCNC: 18 U/L (ref 10–40)
BASOPHILS # BLD AUTO: 0.01 K/UL (ref 0–0.2)
BASOPHILS NFR BLD: 0.2 % (ref 0–1.9)
BILIRUB SERPL-MCNC: 0.6 MG/DL (ref 0.1–1)
BUN SERPL-MCNC: 30 MG/DL (ref 8–23)
CALCIUM SERPL-MCNC: 8.5 MG/DL (ref 8.7–10.5)
CHLORIDE SERPL-SCNC: 103 MMOL/L (ref 95–110)
CO2 SERPL-SCNC: 25 MMOL/L (ref 23–29)
CREAT SERPL-MCNC: 1.6 MG/DL (ref 0.5–1.4)
DIFFERENTIAL METHOD BLD: ABNORMAL
EOSINOPHIL # BLD AUTO: 0.1 K/UL (ref 0–0.5)
EOSINOPHIL NFR BLD: 1.5 % (ref 0–8)
ERYTHROCYTE [DISTWIDTH] IN BLOOD BY AUTOMATED COUNT: 14 % (ref 11.5–14.5)
EST. GFR  (NO RACE VARIABLE): 32 ML/MIN/1.73 M^2
GLUCOSE SERPL-MCNC: 230 MG/DL (ref 70–110)
HCT VFR BLD AUTO: 24.5 % (ref 37–48.5)
HGB BLD-MCNC: 7.8 G/DL (ref 12–16)
IMM GRANULOCYTES # BLD AUTO: 0.11 K/UL (ref 0–0.04)
IMM GRANULOCYTES NFR BLD AUTO: 1.8 % (ref 0–0.5)
LYMPHOCYTES # BLD AUTO: 0.9 K/UL (ref 1–4.8)
LYMPHOCYTES NFR BLD: 13.8 % (ref 18–48)
MCH RBC QN AUTO: 28.8 PG (ref 27–31)
MCHC RBC AUTO-ENTMCNC: 31.8 G/DL (ref 32–36)
MCV RBC AUTO: 90 FL (ref 82–98)
MONOCYTES # BLD AUTO: 0.7 K/UL (ref 0.3–1)
MONOCYTES NFR BLD: 10.7 % (ref 4–15)
NEUTROPHILS # BLD AUTO: 4.4 K/UL (ref 1.8–7.7)
NEUTROPHILS NFR BLD: 72 % (ref 38–73)
NRBC BLD-RTO: 0 /100 WBC
PLATELET # BLD AUTO: 371 K/UL (ref 150–450)
PLATELET BLD QL SMEAR: ABNORMAL
PMV BLD AUTO: 9.2 FL (ref 9.2–12.9)
POCT GLUCOSE: 276 MG/DL (ref 70–110)
POCT GLUCOSE: 360 MG/DL (ref 70–110)
POTASSIUM SERPL-SCNC: 4.6 MMOL/L (ref 3.5–5.1)
PROT SERPL-MCNC: 6 G/DL (ref 6–8.4)
RBC # BLD AUTO: 2.71 M/UL (ref 4–5.4)
SODIUM SERPL-SCNC: 134 MMOL/L (ref 136–145)
WBC # BLD AUTO: 6.16 K/UL (ref 3.9–12.7)

## 2025-02-07 PROCEDURE — 63600175 PHARM REV CODE 636 W HCPCS: Mod: HCNC | Performed by: HOSPITALIST

## 2025-02-07 PROCEDURE — 63600175 PHARM REV CODE 636 W HCPCS: Mod: HCNC | Performed by: ORTHOPAEDIC SURGERY

## 2025-02-07 PROCEDURE — 97530 THERAPEUTIC ACTIVITIES: CPT | Mod: HCNC

## 2025-02-07 PROCEDURE — 0QSB04Z REPOSITION RIGHT LOWER FEMUR WITH INTERNAL FIXATION DEVICE, OPEN APPROACH: ICD-10-PCS | Performed by: ORTHOPAEDIC SURGERY

## 2025-02-07 PROCEDURE — 25000003 PHARM REV CODE 250: Mod: HCNC | Performed by: NURSE PRACTITIONER

## 2025-02-07 PROCEDURE — 25000003 PHARM REV CODE 250: Mod: HCNC | Performed by: EMERGENCY MEDICINE

## 2025-02-07 PROCEDURE — 0QPBX5Z REMOVAL OF EXTERNAL FIXATION DEVICE FROM RIGHT LOWER FEMUR, EXTERNAL APPROACH: ICD-10-PCS | Performed by: ORTHOPAEDIC SURGERY

## 2025-02-07 PROCEDURE — 97162 PT EVAL MOD COMPLEX 30 MIN: CPT | Mod: HCNC

## 2025-02-07 PROCEDURE — 85025 COMPLETE CBC W/AUTO DIFF WBC: CPT | Mod: HCNC | Performed by: ORTHOPAEDIC SURGERY

## 2025-02-07 PROCEDURE — 63600175 PHARM REV CODE 636 W HCPCS: Mod: HCNC | Performed by: EMERGENCY MEDICINE

## 2025-02-07 PROCEDURE — 25000003 PHARM REV CODE 250: Mod: HCNC | Performed by: ORTHOPAEDIC SURGERY

## 2025-02-07 PROCEDURE — 80053 COMPREHEN METABOLIC PANEL: CPT | Mod: HCNC | Performed by: EMERGENCY MEDICINE

## 2025-02-07 PROCEDURE — 97166 OT EVAL MOD COMPLEX 45 MIN: CPT | Mod: HCNC

## 2025-02-07 PROCEDURE — 11000001 HC ACUTE MED/SURG PRIVATE ROOM: Mod: HCNC

## 2025-02-07 RX ORDER — INSULIN GLARGINE 100 [IU]/ML
10 INJECTION, SOLUTION SUBCUTANEOUS 2 TIMES DAILY
Status: DISCONTINUED | OUTPATIENT
Start: 2025-02-07 | End: 2025-02-07

## 2025-02-07 RX ORDER — IBUPROFEN 200 MG
24 TABLET ORAL
Status: DISCONTINUED | OUTPATIENT
Start: 2025-02-07 | End: 2025-02-11 | Stop reason: HOSPADM

## 2025-02-07 RX ORDER — IBUPROFEN 200 MG
16 TABLET ORAL
Status: DISCONTINUED | OUTPATIENT
Start: 2025-02-07 | End: 2025-02-11 | Stop reason: HOSPADM

## 2025-02-07 RX ORDER — GLUCAGON 1 MG
1 KIT INJECTION
Status: DISCONTINUED | OUTPATIENT
Start: 2025-02-07 | End: 2025-02-11 | Stop reason: HOSPADM

## 2025-02-07 RX ORDER — ASPIRIN 325 MG
650 TABLET ORAL DAILY
Status: DISCONTINUED | OUTPATIENT
Start: 2025-02-07 | End: 2025-02-08

## 2025-02-07 RX ORDER — INSULIN GLARGINE 100 [IU]/ML
5 INJECTION, SOLUTION SUBCUTANEOUS 2 TIMES DAILY
Status: DISCONTINUED | OUTPATIENT
Start: 2025-02-07 | End: 2025-02-08

## 2025-02-07 RX ADMIN — SODIUM CHLORIDE, POTASSIUM CHLORIDE, SODIUM LACTATE AND CALCIUM CHLORIDE: 600; 310; 30; 20 INJECTION, SOLUTION INTRAVENOUS at 07:02

## 2025-02-07 RX ADMIN — Medication 1 TABLET: at 08:02

## 2025-02-07 RX ADMIN — HYDROCODONE BITARTRATE AND ACETAMINOPHEN 1 TABLET: 5; 325 TABLET ORAL at 07:02

## 2025-02-07 RX ADMIN — MORPHINE SULFATE 2 MG: 4 INJECTION INTRAVENOUS at 12:02

## 2025-02-07 RX ADMIN — INSULIN ASPART 6 UNITS: 100 INJECTION, SOLUTION INTRAVENOUS; SUBCUTANEOUS at 04:02

## 2025-02-07 RX ADMIN — CEFAZOLIN 1 G: 330 INJECTION, POWDER, FOR SOLUTION INTRAMUSCULAR; INTRAVENOUS at 12:02

## 2025-02-07 RX ADMIN — CYANOCOBALAMIN 1000 MCG: 1000 INJECTION INTRAMUSCULAR; SUBCUTANEOUS at 08:02

## 2025-02-07 RX ADMIN — AMLODIPINE BESYLATE 5 MG: 5 TABLET ORAL at 08:02

## 2025-02-07 RX ADMIN — Medication 100 MG: at 08:02

## 2025-02-07 RX ADMIN — ACETAMINOPHEN 650 MG: 325 TABLET ORAL at 03:02

## 2025-02-07 RX ADMIN — HYDROCODONE BITARTRATE AND ACETAMINOPHEN 1 TABLET: 5; 325 TABLET ORAL at 03:02

## 2025-02-07 RX ADMIN — INSULIN ASPART 10 UNITS: 100 INJECTION, SOLUTION INTRAVENOUS; SUBCUTANEOUS at 11:02

## 2025-02-07 RX ADMIN — HYDROCODONE BITARTRATE AND ACETAMINOPHEN 1 TABLET: 5; 325 TABLET ORAL at 08:02

## 2025-02-07 RX ADMIN — THERA TABS 1 TABLET: TAB at 08:02

## 2025-02-07 RX ADMIN — CHOLECALCIFEROL TAB 125 MCG (5000 UNIT) 5000 UNITS: 125 TAB at 08:02

## 2025-02-07 RX ADMIN — ASPIRIN 650 MG: 325 TABLET ORAL at 08:02

## 2025-02-07 NOTE — PROGRESS NOTES
[facility]  Orthopedics  Progress Note    Patient Name: Odin Oliva  MRN: [id number]  Admission Date: 2/4/2025  Hospital Length of Stay: 2 days  Attending Provider: Nava George MD  Primary Care Provider: Juventino Barbosa MD  Follow-up For: Procedure(s) (LRB):  REMOVAL, EXTERNAL FIXATION DEVICE (Right)  ORIF, FRACTURE, FEMUR, TRANSCONDYLAR WITH INTERCONDYLAR EXTENSION (Right)    Post-Operative Day: 1 Day Post-Op  Subjective:     Principal Problem:Closed fracture of right femur    Principal Orthopedic Problem:  Right supracondylar femur fracture    Interval History: Odin Oliva is a 85 y.o. female postop day 1    Review of patient's allergies indicates:  No Known Allergies    Current Facility-Administered Medications   Medication    acetaminophen suppository 650 mg    acetaminophen tablet 650 mg    aluminum-magnesium hydroxide-simethicone 200-200-20 mg/5 mL suspension 30 mL    amLODIPine tablet 5 mg    cholecalciferol (vitamin D3) 125 mcg (5,000 unit) tablet 5,000 Units    cyanocobalamin injection 1,000 mcg    dextrose 50% injection 12.5 g    dextrose 50% injection 25 g    folic acid-vit B6-vit B12 2.5-25-2 mg tablet 1 tablet    glucagon (human recombinant) injection 1 mg    glucose chewable tablet 16 g    glucose chewable tablet 24 g    hydrALAZINE tablet 10 mg    HYDROcodone-acetaminophen 5-325 mg per tablet 1 tablet    insulin aspart U-100 pen 0-10 Units    lactated ringers infusion    melatonin tablet 6 mg    morphine injection 2 mg    multivitamin tablet    naloxone 0.4 mg/mL injection 0.02 mg    ondansetron injection 4 mg    polyethylene glycol packet 17 g    promethazine tablet 25 mg    simethicone chewable tablet 80 mg    sodium chloride 0.9% flush 3 mL    thiamine tablet 100 mg     Objective:     Vital Signs (Most Recent):  Temp: 96.4 °F (35.8 °C) (02/07/25 0720)  Pulse: 78 (02/07/25 0720)  Resp: 18 (02/07/25 0720)  BP: (!) 164/72 (02/07/25 0720)  SpO2: (!) 94 % (02/07/25 0720) Vital Signs  "(24h Range):  Temp:  [96.4 °F (35.8 °C)-98.9 °F (37.2 °C)] 96.4 °F (35.8 °C)  Pulse:  [69-85] 78  Resp:  [12-28] 18  SpO2:  [93 %-98 %] 94 %  BP: (134-208)/(60-84) 164/72     Weight: 57 kg (125 lb 10.6 oz)  Height: 4' 1" (124.5 cm)  Body mass index is 36.8 kg/m².      Intake/Output Summary (Last 24 hours) at 2/7/2025 0700  Last data filed at 2/6/2025 2145  Gross per 24 hour   Intake 290 ml   Output 650 ml   Net -360 ml       Ortho/SPM Exam  Dressing is dry and intact  Intact motor and sensory function in the foot  Brisk capillary refill time of the toes    GEN: Well developed, well nourished female. AAOX3. No acute distress.   Head: Normocephalic, atraumatic.   Eyes: SHAHBAZ  Neck: Trachea is midline, no adenopathy  Resp: Breathing unlabored.  Neuro: Motor function normal, Cranial nerves intact  Psych: Mood and affect awake and alert.      Significant Labs: CBC: No results for input(s): "WBC", "HGB", "HCT", "PLT" in the last 48 hours.  All pertinent labs within the past 24 hours have been reviewed.    Significant Imaging: X-Ray: I have reviewed all pertinent results/findings and my personal findings are:  Intraoperative x-rays look good    Assessment/Plan:     Active Diagnoses:    Diagnosis Date Noted POA    PRINCIPAL PROBLEM:  Closed fracture of right femur [S72.91XA] 02/05/2025 Unknown    Hypertension [I10] 11/04/2019 Yes    Other specified hypothyroidism [E03.8] 10/28/2019 Yes    Chronic kidney disease (CKD), stage IV (severe) [N18.4] 10/23/2019 Yes    Diabetes mellitus [E11.9] 04/03/2019 Yes      Problems Resolved During this Admission:       Assessment:  85 y.o. female doing well postop day 1    Plan:  Family is requesting ECF placement for rehab  We will check CBC today  Up to chair    Damian Jean MD, MD  Orthopedics  [facility]   "

## 2025-02-07 NOTE — ASSESSMENT & PLAN NOTE
Chronic, controlled.  Latest blood pressure and vitals reviewed-   Temp:  [96.4 °F (35.8 °C)-98.9 °F (37.2 °C)]   Pulse:  [72-82]   Resp:  [15-20]   BP: (122-181)/(56-81)   SpO2:  [94 %-97 %] .   Home meds for hypertension were reviewed and noted below.   Hypertension Medications               valsartan (DIOVAN) 160 MG tablet Take 1 tablet (160 mg total) by mouth once daily.            While in the hospital, will manage blood pressure as follows; Continue home antihypertensive regimen    Will utilize p.r.n. blood pressure medication only if patient's blood pressure greater than  160/90 and she develops symptoms such as worsening chest pain or shortness of breath.

## 2025-02-07 NOTE — ASSESSMENT & PLAN NOTE
Patient's FSGs are uncontrolled due to hyperglycemia on current medication regimen.  Last A1c reviewed-   Lab Results   Component Value Date    HGBA1C 7.8 (H) 01/15/2025     Most recent fingerstick glucose reviewed-   Recent Labs   Lab 02/06/25  1641 02/06/25  1642 02/07/25  1134   POCTGLUCOSE 411* 399* 360*       Current correctional scale  Low  Maintain anti-hyperglycemic dose as follows-   Antihyperglycemics (From admission, onward)      Start     Stop Route Frequency Ordered    02/07/25 1630  insulin glargine U-100 (Lantus) pen 10 Units         -- SubQ 2 times daily 02/07/25 1623    02/05/25 2200  insulin aspart U-100 pen 0-10 Units         -- SubQ Every 6 hours PRN 02/05/25 2101          Hold Oral hypoglycemics while patient is in the hospital.  SSI  Accuchecks    Cont current Tx    Uncontrolled- start Lantus plus SSI

## 2025-02-07 NOTE — PLAN OF CARE
02/07/25 1151   Post-Acute Status   Post-Acute Authorization Placement   Post-Acute Placement Status Referrals Sent   Coverage [employer]   Discharge Plan   Discharge Plan A Skilled Nursing Facility     SW spoke with pt and daughter regarding snf placement. Pt and daughter are in agreement with mass referrals being sent. SW sent mass snf referrals. Pending accepting snf and 142. [facility] was called in.

## 2025-02-07 NOTE — PROGRESS NOTES
[facility] Medicine  Progress Note    Patient Name: Odin Oliva  MRN: [id number]  Patient Class: IP- Inpatient   Admission Date: 2/4/2025  Length of Stay: 2 days  Attending Physician: Nava George MD  Primary Care Provider: Juventino Barbosa MD        Subjective     Principal Problem:Closed fracture of right femur        HPI:  Odin Oliva is a 85 y.o. female with a PMH  has a past medical history of CKD (chronic kidney disease), stage IV, Diabetes mellitus, HTN (hypertension), Hyperuricemia, Seasonal allergies, Thyroid disease, and Urinary tract infection. presented to the Emergency Department for evaluation of R leg pain. Pt fell on 1/27/25. Pt states she had surgery on her femur in [place] on 1/29/25 and was sent here for pain relief and f/u. External fixator is in place. Symptoms are constant and moderate in severity. Patient denies all other sxs at this time. Pt was prescribed Cefuroxime 500mg BID for 5 days and Paracetamol 1g TID for 5 days. Prior Tx includes a Paracetamol around 10:00 PM tonight. No further complaints or concerns at this time.     ER workup revealed CBC with H/H of 9.4/29.1 (previously 11.8/35.8 on 01/15/2025).  CMP revealed BUN/creatinine of 42/1.6 (at baseline),  mg/dL.  Plain film imaging of right femur revealed:[Complex, comminuted intra-articular fracture of the distal right femur with external fixation device in place].  On-call orthopedic surgeon consulted.  Plans to take to OR in a.m..  Patient received 2 mg of morphine, 4 mg Zofran in ER.  Hospital Medicine consulted with.  Patient in agreement with treatment plan.  Patient admitted under inpatient status.    PCP: Juventino Barbosa      Overview/Hospital Course:  85 y.o. female with hx of DM2 w CKD 4, HTN, Hypothyroidism, UTI, who presented to the ER for evaluation of R leg pain. Pt fell on 1/27/25 and had surgery on her femur in [place] on 1/29/25 and was sent here for pain relief and f/u. External fixator is  in place. Pt was prescribed Cefuroxime 500mg BID for 5 days and Paracetamol 1g TID for 5 days.       ER workup revealed H/H of 9.4/29, (previously 12/36 on 01/15/2025), Bun/Cr 42/1.6 (at baseline),  mg/dL. Plain film imaging of right femur revealed:[Complex, comminuted intra-articular fracture of the distal right femur with external fixation device in place].  Ortho consulted and plans to take to OR in a.m..Patient received 2 mg of morphine, 4 mg Zofran in ER and admitted to [facility].      Appreciate ortho input. Pt comfortable with pain controlled, getting IVF, BS controlled with SSI. Ortho plans surgery tomorrow. Diabetic Diet ordered for today.     2/6- seen post op- doing well, VSS afeb, AAOx4, speech clear, moving all 4 ext, NV intact- distal pulses 2 + BLE, dressing R thing clean and dry. BS under control. Pain under control. Cont post op orders.     2/7- looks and feels better, sitting up in chair comfortably on RA, pain RLE under control, VSS Afeb, did well with PT/OT, eating drinking better now- labs stable, Bun/Cr improving, so IVF d/linda, BS higher- started on lantus plus SSI. Will give inj B12 plus triple vitamins. Await SNF placement.     Interval History: looks and feels better, sitting up in chair comfortably on RA, pain RLE under control, VSS Afeb, did well with PT/OT, eating drinking better now- labs stable, Bun/Cr improving, so IVF d/linda, BS higher- started on lantus plus SSI. Will give inj B12 plus triple vitamins. Await SNF placement.     Review of Systems   Constitutional:  Positive for activity change and fatigue. Negative for appetite change, chills, diaphoresis and fever.   Respiratory:  Negative for cough and shortness of breath.    Cardiovascular:  Negative for chest pain and palpitations.   Gastrointestinal:  Negative for diarrhea, nausea and vomiting.   Musculoskeletal:  Positive for arthralgias, gait problem and myalgias.   Neurological:  Positive for weakness.   All other systems  reviewed and are negative.    Objective:     Vital Signs (Most Recent):  Temp: 97.9 °F (36.6 °C) (02/07/25 1131)  Pulse: 72 (02/07/25 1131)  Resp: 18 (02/07/25 1537)  BP: (!) 122/56 (02/07/25 1131)  SpO2: 95 % (02/07/25 1131) Vital Signs (24h Range):  Temp:  [96.4 °F (35.8 °C)-98.9 °F (37.2 °C)] 97.9 °F (36.6 °C)  Pulse:  [72-82] 72  Resp:  [16-20] 18  SpO2:  [94 %-97 %] 95 %  BP: (122-181)/(56-81) 122/56     Weight: 57 kg (125 lb 10.6 oz)  Body mass index is 36.8 kg/m².    Intake/Output Summary (Last 24 hours) at 2/7/2025 1627  Last data filed at 2/6/2025 2145  Gross per 24 hour   Intake 240 ml   Output --   Net 240 ml         Physical Exam  Vitals and nursing note reviewed.   Constitutional:       General: She is awake. She is not in acute distress.     Appearance: Normal appearance. She is well-developed and well-groomed. She is not ill-appearing, toxic-appearing or diaphoretic.   HENT:      Head: Normocephalic and atraumatic.      Right Ear: External ear normal.      Left Ear: External ear normal.      Nose: Nose normal.      Mouth/Throat:      Lips: Pink.      Mouth: Mucous membranes are moist.      Pharynx: Oropharynx is clear. Uvula midline.   Eyes:      Extraocular Movements: Extraocular movements intact.      Conjunctiva/sclera: Conjunctivae normal.      Pupils: Pupils are equal, round, and reactive to light.   Cardiovascular:      Rate and Rhythm: Normal rate and regular rhythm.      Heart sounds: Normal heart sounds. No murmur heard.  Pulmonary:      Effort: Pulmonary effort is normal.      Breath sounds: Normal breath sounds.   Abdominal:      General: Bowel sounds are normal.      Palpations: Abdomen is soft.      Tenderness: There is no abdominal tenderness.   Musculoskeletal:         General: Swelling and tenderness present.      Cervical back: Normal range of motion and neck supple.      Comments: Ex fix device noted to right lower extremity. NVS grossly intact.    R thigh dressing C/D/I- distal  pulses 2 + B. Moving all 4 ext well.    Skin:     General: Skin is warm and dry.      Capillary Refill: Capillary refill takes less than 2 seconds.   Neurological:      General: No focal deficit present.      Mental Status: She is alert and oriented to person, place, and time. Mental status is at baseline.      GCS: GCS eye subscore is 4. GCS verbal subscore is 5. GCS motor subscore is 6.      Cranial Nerves: Cranial nerves 2-12 are intact.      Sensory: Sensation is intact.      Motor: Weakness present.      Gait: Gait abnormal.   Psychiatric:         Mood and Affect: Mood normal.         Speech: Speech normal.         Behavior: Behavior normal. Behavior is cooperative.         Thought Content: Thought content normal.         Judgment: Judgment normal.             Significant Labs: All pertinent labs within the past 24 hours have been reviewed.  A1C:   Recent Labs   Lab 09/09/24  1546 11/22/24  1308 01/15/25  1110   HGBA1C 6.6* 6.4* 7.8*     BMP:   Recent Labs   Lab 02/07/25  0901   *   *   K 4.6      CO2 25   BUN 30*   CREATININE 1.6*   CALCIUM 8.5*     CBC:   Recent Labs   Lab 02/07/25  0901   WBC 6.16   HGB 7.8*   HCT 24.5*        POCT Glucose:   Recent Labs   Lab 02/06/25  1641 02/06/25  1642 02/07/25  1134   POCTGLUCOSE 411* 399* 360*       Significant Imaging: I have reviewed all pertinent imaging results/findings within the past 24 hours.    Assessment and Plan     * Closed fracture of right femur  RCRI revealed 3.9% risk for major cardiac event. Ortho consulted with plans to take to OR in am.  Plan:  -NPO  -Continue current pain regimen, titrate as needed  -Bowel regimen  -Bedrest  -Wound care   -None weightbearing RLE  -Continue splint per ortho recs  -IVFs prn  -Antiemetics prn  -Tylenol as needed for fever   -PT/OT   -Ortho consulted    S/p ORIF today- cont post op care    POD 1- doing better, H/H stable    Hypertension  Chronic, controlled.  Latest blood pressure and vitals  reviewed-   Temp:  [96.4 °F (35.8 °C)-98.9 °F (37.2 °C)]   Pulse:  [72-82]   Resp:  [15-20]   BP: (122-181)/(56-81)   SpO2:  [94 %-97 %] .   Home meds for hypertension were reviewed and noted below.   Hypertension Medications               valsartan (DIOVAN) 160 MG tablet Take 1 tablet (160 mg total) by mouth once daily.            While in the hospital, will manage blood pressure as follows; Continue home antihypertensive regimen    Will utilize p.r.n. blood pressure medication only if patient's blood pressure greater than  160/90 and she develops symptoms such as worsening chest pain or shortness of breath.      Other specified hypothyroidism  Patient has chronic hypothyroidism. TFTs reviewed-   Lab Results   Component Value Date    TSH 18.214 (H) 01/15/2025   . Will continue chronic levothyroxine and adjust for and clinical changes.        Chronic kidney disease (CKD), stage IV (severe)  Creatine stable for now. BMP reviewed- noted Estimated Creatinine Clearance: 17.8 mL/min (A) (based on SCr of 1.6 mg/dL (H)). according to latest data. Based on current GFR, CKD stage is stage 4 - GFR 15-29.  Monitor UOP and serial BMP and adjust therapy as needed. Renally dose meds. Avoid nephrotoxic medications and procedures.    Cont IVF    improving    Diabetes mellitus  Patient's FSGs are uncontrolled due to hyperglycemia on current medication regimen.  Last A1c reviewed-   Lab Results   Component Value Date    HGBA1C 7.8 (H) 01/15/2025     Most recent fingerstick glucose reviewed-   Recent Labs   Lab 02/06/25  1641 02/06/25  1642 02/07/25  1134   POCTGLUCOSE 411* 399* 360*       Current correctional scale  Low  Maintain anti-hyperglycemic dose as follows-   Antihyperglycemics (From admission, onward)      Start     Stop Route Frequency Ordered    02/07/25 1630  insulin glargine U-100 (Lantus) pen 10 Units         -- SubQ 2 times daily 02/07/25 1623    02/05/25 2200  insulin aspart U-100 pen 0-10 Units         -- SubQ Every  6 hours PRN 02/05/25 2101          Hold Oral hypoglycemics while patient is in the hospital.  SSI  Accuchecks    Cont current Tx    Uncontrolled- start Lantus plus SSI      VTE Risk Mitigation (From admission, onward)           Ordered     Reason for No Pharmacological VTE Prophylaxis  Once        Comments: Planned surgical procedure   Question:  Reasons:  Answer:  Physician Provided (leave comment)    02/05/25 0005     IP VTE HIGH RISK PATIENT  Once         02/05/25 0005     Place sequential compression device  Until discontinued         02/05/25 0005                    Discharge Planning   BRIAN: 2/7/2025     Code Status: Full Code   Medical Readiness for Discharge Date:   Discharge Plan A: Skilled Nursing Facility        Please place Justification for DME      Nava George MD  Department of Hospital Medicine   [place] [facility] [place] [facility]

## 2025-02-07 NOTE — PLAN OF CARE
PT COMPLETED STAND PIVOT T/F TO CHAIR WITH RW AND TDWB ON R LE WITH MOD A AND INC [email or web address]

## 2025-02-07 NOTE — ASSESSMENT & PLAN NOTE
RCRI revealed 3.9% risk for major cardiac event. Ortho consulted with plans to take to OR in am.  Plan:  -NPO  -Continue current pain regimen, titrate as needed  -Bowel regimen  -Bedrest  -Wound care   -None weightbearing RLE  -Continue splint per ortho recs  -IVFs prn  -Antiemetics prn  -Tylenol as needed for fever   -PT/OT   -Ortho consulted    S/p ORIF today- cont post op care    POD 1- doing better, H/H stable

## 2025-02-07 NOTE — PLAN OF CARE
SW provided pt and family with the list of accepting snf facilities. Pending snf choice. Pasr/142 scanned into media.

## 2025-02-07 NOTE — HOSPITAL COURSE
85 y.o. female with hx of DM2 w CKD 4, HTN, Hypothyroidism, UTI, who presented to the ER for evaluation of R leg pain. Pt fell on 1/27/25 and had surgery on her femur in [place] on 1/29/25 and was sent here for pain relief and f/u. External fixator is in place. Pt was prescribed Cefuroxime 500mg BID for 5 days and Paracetamol 1g TID for 5 days.       ER workup revealed H/H of 9.4/29, (previously 12/36 on 01/15/2025), Bun/Cr 42/1.6 (at baseline),  mg/dL. Plain film imaging of right femur revealed:[Complex, comminuted intra-articular fracture of the distal right femur with external fixation device in place].  Ortho consulted and plans to take to OR in a.m..Patient received 2 mg of morphine, 4 mg Zofran in ER and admitted to [facility].      Appreciate ortho input. Pt comfortable with pain controlled, getting IVF, BS controlled with SSI. Ortho plans surgery tomorrow. Diabetic Diet ordered for today.     2/6- seen post op- doing well, VSS afeb, AAOx4, speech clear, moving all 4 ext, NV intact- distal pulses 2 + BLE, dressing R thing clean and dry. BS under control. Pain under control. Cont post op orders.     2/7- looks and feels better, sitting up in chair comfortably on RA, pain RLE under control, VSS Afeb, did well with PT/OT, eating drinking better now- labs stable, Bun/Cr improving, so IVF d/linda, BS higher- started on lantus plus SSI. Will give inj B12 plus triple vitamins. Await SNF placement.     2/8/- Reported dizziness with exertion.  Denies any bleeding concerns or prior history of abnormal bleeding or intolerance to anticoagulation.  Concerns for symptomatic anemia given recent surgical procedure, so  will transfuse 1u  pRbc .  Adjusted insulin due to hyperglycemia concern.  History of advanced CKD, so switched DVT prophylaxis to Eliquis instead of aspirin 650 mg.    2/9- alleviation in anemia symptoms after blood transfusion yesterday.  Will also administer iron infusion given concerns for functional  iron-deficiency in setting of CKD due to low iron saturation levels. Bowel regimen optimized for constipation.    2/10- looks and feels much better, stronger, sitting up in chair, did well with PT/OT, walked almost 50 feet with the walker. H/H stable at 8.3/25, had got 1 unit of blood the day before. Will give Inj Venofer and Inj b12 IM.     2/11- feels better, stronger, sitting up in chair on RA. She is accepted at SNF and feels ready to go there, doing well with PT/OT. Eating drinking well, pain under control. She is seen and examined and deemed stable for discharge home today.

## 2025-02-07 NOTE — OP NOTE
OP NOTE:      Surgery was performed 02/06/2025    Date of Procedure: 2/7/2025     Pre-op Diagnosis: Fall [W19.XXXA]  Closed displaced supracondylar fracture of distal end of right femur without intracondylar extension, initial encounter [S72.451A]     Post-op Diagnosis: Post-Op Diagnosis Codes:     * Fall [W19.XXXA]     * Closed displaced supracondylar fracture of distal end of right femur without intracondylar extension, initial encounter [S72.451A]     Procedure Performed: Procedure(s):  REMOVAL, EXTERNAL FIXATION DEVICE (Right)  ORIF, FRACTURE, FEMUR, TRANSCONDYLAR WITH INTERCONDYLAR EXTENSION (Right)    Surgeon/Assistants: Surgeons and Role:     * Damian Jean Jr., MD - Primary    Assistant:  Trevin Wiley PA-C        Anesthesia: General     Estimated Blood Loss:  200    Drains: none    Specimen: * No specimens in log *    Complications:  None    Condition of Patient:  Stable         HISTORY/INDICATIONS  Patient had a fall while in [place] with a complex supracondylar femur fracture.  An ex fix was placed so she could get home to have formal treatment.    DESCRIPTION OF PROCEDURE:    Patient was taken to the operating room where general anesthetic was administered.  We prepped and draped the right lower extremity in the usual sterile manner.  Attempts were 1st made to manipulate the fracture with the external fixator in place but I was unable to get a good reduction so we removed it.  I then made a 6 in incision for a lateral approach to the femur.  Electrocautery was used for hemostasis.  Synthes supracondylar femur plate was used and fluoroscopy was used for assistance throughout the procedure.  We are able to obtain a good reduction and solid fixation both proximally and distally.  Joint was irrigated with saline and fascia hemal was closed with running 0 Vicryl suture.  Subcutaneous tissue with 3-0 Vicryl suture and the skin with staples.  We did irrigate out the fixator incisions and loosely closed them  with staples.  The patient was taken the postanesthesia care unit stable condition.

## 2025-02-07 NOTE — ASSESSMENT & PLAN NOTE
Patient's FSGs are uncontrolled due to hyperglycemia on current medication regimen.  Last A1c reviewed-   Lab Results   Component Value Date    HGBA1C 7.8 (H) 01/15/2025     Most recent fingerstick glucose reviewed-   Recent Labs   Lab 02/06/25  1641 02/06/25  1642 02/07/25  1134   POCTGLUCOSE 411* 399* 360*     Current correctional scale  Low  Maintain anti-hyperglycemic dose as follows-   Antihyperglycemics (From admission, onward)      Start     Stop Route Frequency Ordered    02/05/25 2200  insulin aspart U-100 pen 0-10 Units         -- SubQ Every 6 hours PRN 02/05/25 2101          Hold Oral hypoglycemics while patient is in the hospital.  SSI  Accuchecks    Cont current Tx

## 2025-02-07 NOTE — PLAN OF CARE
Discussed poc with pt, pt verbalized understanding    Purposeful rounding every 2hours    VS wnl    Blood glucose monitoring   Fall precautions in place, remains injury free  Pt c/o pain  Pain under control with PRN meds    IVFs infusing  Accurate I&Os  Abx given as prescribed  Bed locked at lowest position  Call light within reach    Chart check complete  Will cont with POC

## 2025-02-07 NOTE — PT/OT/SLP EVAL
Physical Therapy Evaluation    Patient Name:  Odin Oliva   MRN:  [id number]    Recommendations:     Discharge Recommendations: High Intensity Therapy   Discharge Equipment Recommendations: to be determined by next level of care   Barriers to discharge: Decreased caregiver support    Assessment:     Odin Oliva is a 85 y.o. female admitted with a medical diagnosis of Closed fracture of right femur.  She presents with the following impairments/functional limitations: weakness, impaired endurance, impaired self care skills, impaired functional mobility, gait instability, impaired balance, orthopedic precautions, decreased ROM, pain, decreased safety awareness, decreased lower extremity function, decreased upper extremity function, decreased coordination, edema .    Rehab Prognosis: Good; patient would benefit from acute skilled PT services to address these deficits and reach maximum level of function.    Recent Surgery: Procedure(s) (LRB):  REMOVAL, EXTERNAL FIXATION DEVICE (Right)  ORIF, FRACTURE, FEMUR, TRANSCONDYLAR WITH INTERCONDYLAR EXTENSION (Right) 1 Day Post-Op    Plan:     During this hospitalization, patient to be seen 3 x/week to address the identified rehab impairments via gait training, therapeutic activities, therapeutic exercises and progress toward the following goals:    Plan of Care Expires:  02/21/25    Subjective     Chief Complaint: PAIN   Patient/Family Comments/goals: GET BACK TO GARDENING  Pain/Comfort:  Pain Rating 1: 8/10  Location - Side 1: Right  Location 1: leg  Pain Addressed 1: Reposition, Pre-medicate for activity  Pain Rating Post-Intervention 1: 8/10    Patients cultural, spiritual, Sabianist conflicts given the current situation:      Living Environment:  PT LIVES AT HOME ALONE HOWEVER SON LIVES WITH HER AT TIMES.   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  Equipment used at home: none.  DME owned (not  "currently used): none.  Upon discharge, patient will have assistance from FAMILY .    Objective:     Communicated with NURSE AND EPIC CHART REVIEW  prior to session.  Patient found supine with peripheral IV  upon PT entry to room.    General Precautions: Standard, fall  Orthopedic Precautions:RLE toe touch weight bearing   Braces: N/A  Respiratory Status: Room air    Exams:  Cognitive Exam:  Patient is oriented to Person, Place, Time, and Situation  Postural Exam:  Patient presented with the following abnormalities:    -       Rounded shoulders  RLE ROM: LIMITED  RLE Strength: NA  LLE ROM: WFL  LLE Strength: WFL    Functional Mobility:  Bed Mobility:     Scooting: minimum assistance  Bridging: minimum assistance  Supine to Sit: minimum assistance  Transfers:     Sit to Stand:  moderate assistance with rolling walker  Bed to Chair: moderate assistance with  rolling walker  using  Stand Pivot and TDWB       AM-PAC 6 CLICK MOBILITY  Total Score:12       Treatment & Education:  GT. BELT AND  SOCKS DONNED PRIOR TO OOB MOBILITY.  PT COMPLETED UNILATERAL SCOOT TO EOB AND SEATED EOB WITH MIN A.    PT STOOD FOR T/F WITH INC CUES AND VISUAL DEMONSTRATION FROM P.T.   PT T/F TO CHAIR WITH TTWB ON B LE HOWEVER GIVEN CUES OF FWB OK FOR L LE AND TTWB ON R LE.  PT SEATED IN CHAIR AND R LE ELEVATED. PT EDUCATED ON ROM TE AND PT EDUCATED ON "CALL DON'T FALL", ENCOURAGED TO CALL FOR ASSISTANCE WITH ALL NEEDS FOR OOB MOBILITY.      Patient left up in chair with  FAMILY  present.    GOALS:   Multidisciplinary Problems       Physical Therapy Goals          Problem: Physical Therapy    Goal Priority Disciplines Outcome Interventions   Physical Therapy Goal     PT, PT/OT     Description: LT25  1. PT WILL COMPLETE BED MOBILITY WITH CGA  2. PT WILL STAND T/F TO CHAIR WITH RW TDWB AND MIN A  3. PT WILL GT TRAIN X 20' WITH RW AND TDWB WITH ROWENA TO PROGRESS GT  4. PT WILL INC AMPAC SCORE BY 2 POINTS TO PROGRESS GROSS FUNC MOBILITY. "                          DME Justifications:  No DME recommended requiring DME justifications    History:     Past Medical History:   Diagnosis Date    CKD (chronic kidney disease), stage IV     Diabetes mellitus     HTN (hypertension)     Hyperuricemia     Seasonal allergies     Thyroid disease     Urinary tract infection        Past Surgical History:   Procedure Laterality Date    CATARACT EXTRACTION, BILATERAL      CHOLECYSTECTOMY      EXOSTECTOMY Bilateral 11/8/2019    Procedure: EXOSTECTOMY;  Surgeon: Lissy Comer DPM;  Location: [facility] OR;  Service: Podiatry;  Laterality: Bilateral;  left fourth digit, right fifth digit    FOOT MASS EXCISION Bilateral 11/8/2019    Procedure: HAMMER TOE, DEROTATIONAL ARTHROPLASTY FIFTH DIGIT;  Surgeon: Lissy Comer DPM;  Location: [facility] OR;  Service: Podiatry;  Laterality: Bilateral;  Left 5th toe incision @ 08:51.  Left 4th toe incision @ 08:54.  Right 5th toe incision : 09:24.    TOTAL THYROIDECTOMY      UMBILICAL HERNIA REPAIR      x 2       Time Tracking:     PT Received On: 02/07/25  PT Start Time: 0920     PT Stop Time: 0945  PT Total Time (min): 25 min     Billable Minutes: Evaluation 15 and Therapeutic Activity 10      02/07/2025

## 2025-02-07 NOTE — PLAN OF CARE
Discussed poc with pt, pt verbalized understanding    Purposeful rounding every 2hours    VS wnl  Blood glucose monitoring   Fall precautions in place, remains injury free  Pain under control with PRN meds  IVFs  Abx given as prescribed  Bed locked at lowest position  Call light within reach    Chart check complete  Will cont with POC

## 2025-02-07 NOTE — SUBJECTIVE & OBJECTIVE
Interval History: looks and feels better, sitting up in chair comfortably on RA, pain RLE under control, VSS Afeb, did well with PT/OT, eating drinking better now- labs stable, Bun/Cr improving, so IVF d/linda, BS higher- started on lantus plus SSI. Will give inj B12 plus triple vitamins. Await SNF placement.     Review of Systems   Constitutional:  Positive for activity change and fatigue. Negative for appetite change, chills, diaphoresis and fever.   Respiratory:  Negative for cough and shortness of breath.    Cardiovascular:  Negative for chest pain and palpitations.   Gastrointestinal:  Negative for diarrhea, nausea and vomiting.   Musculoskeletal:  Positive for arthralgias, gait problem and myalgias.   Neurological:  Positive for weakness.   All other systems reviewed and are negative.    Objective:     Vital Signs (Most Recent):  Temp: 97.9 °F (36.6 °C) (02/07/25 1131)  Pulse: 72 (02/07/25 1131)  Resp: 18 (02/07/25 1537)  BP: (!) 122/56 (02/07/25 1131)  SpO2: 95 % (02/07/25 1131) Vital Signs (24h Range):  Temp:  [96.4 °F (35.8 °C)-98.9 °F (37.2 °C)] 97.9 °F (36.6 °C)  Pulse:  [72-82] 72  Resp:  [16-20] 18  SpO2:  [94 %-97 %] 95 %  BP: (122-181)/(56-81) 122/56     Weight: 57 kg (125 lb 10.6 oz)  Body mass index is 36.8 kg/m².    Intake/Output Summary (Last 24 hours) at 2/7/2025 1627  Last data filed at 2/6/2025 2145  Gross per 24 hour   Intake 240 ml   Output --   Net 240 ml         Physical Exam  Vitals and nursing note reviewed.   Constitutional:       General: She is awake. She is not in acute distress.     Appearance: Normal appearance. She is well-developed and well-groomed. She is not ill-appearing, toxic-appearing or diaphoretic.   HENT:      Head: Normocephalic and atraumatic.      Right Ear: External ear normal.      Left Ear: External ear normal.      Nose: Nose normal.      Mouth/Throat:      Lips: Pink.      Mouth: Mucous membranes are moist.      Pharynx: Oropharynx is clear. Uvula midline.   Eyes:       Extraocular Movements: Extraocular movements intact.      Conjunctiva/sclera: Conjunctivae normal.      Pupils: Pupils are equal, round, and reactive to light.   Cardiovascular:      Rate and Rhythm: Normal rate and regular rhythm.      Heart sounds: Normal heart sounds. No murmur heard.  Pulmonary:      Effort: Pulmonary effort is normal.      Breath sounds: Normal breath sounds.   Abdominal:      General: Bowel sounds are normal.      Palpations: Abdomen is soft.      Tenderness: There is no abdominal tenderness.   Musculoskeletal:         General: Swelling and tenderness present.      Cervical back: Normal range of motion and neck supple.      Comments: Ex fix device noted to right lower extremity. NVS grossly intact.    R thigh dressing C/D/I- distal pulses 2 + B. Moving all 4 ext well.    Skin:     General: Skin is warm and dry.      Capillary Refill: Capillary refill takes less than 2 seconds.   Neurological:      General: No focal deficit present.      Mental Status: She is alert and oriented to person, place, and time. Mental status is at baseline.      GCS: GCS eye subscore is 4. GCS verbal subscore is 5. GCS motor subscore is 6.      Cranial Nerves: Cranial nerves 2-12 are intact.      Sensory: Sensation is intact.      Motor: Weakness present.      Gait: Gait abnormal.   Psychiatric:         Mood and Affect: Mood normal.         Speech: Speech normal.         Behavior: Behavior normal. Behavior is cooperative.         Thought Content: Thought content normal.         Judgment: Judgment normal.             Significant Labs: All pertinent labs within the past 24 hours have been reviewed.  A1C:   Recent Labs   Lab 09/09/24  1546 11/22/24  1308 01/15/25  1110   HGBA1C 6.6* 6.4* 7.8*     BMP:   Recent Labs   Lab 02/07/25  0901   *   *   K 4.6      CO2 25   BUN 30*   CREATININE 1.6*   CALCIUM 8.5*     CBC:   Recent Labs   Lab 02/07/25  0901   WBC 6.16   HGB 7.8*   HCT 24.5*         POCT Glucose:   Recent Labs   Lab 02/06/25  1641 02/06/25  1642 02/07/25  1134   POCTGLUCOSE 411* 399* 360*       Significant Imaging: I have reviewed all pertinent imaging results/findings within the past 24 hours.

## 2025-02-07 NOTE — ASSESSMENT & PLAN NOTE
Creatine stable for now. BMP reviewed- noted Estimated Creatinine Clearance: 17.8 mL/min (A) (based on SCr of 1.6 mg/dL (H)). according to latest data. Based on current GFR, CKD stage is stage 4 - GFR 15-29.  Monitor UOP and serial BMP and adjust therapy as needed. Renally dose meds. Avoid nephrotoxic medications and procedures.    Cont IVF    improving

## 2025-02-07 NOTE — SUBJECTIVE & OBJECTIVE
Interval History:  seen post op- doing well, VSS afeb, AAOx4, speech clear, moving all 4 ext, NV intact- distal pulses 2 + BLE, dressing R thing clean and dry. BS under control. Pain under control. Cont post op orders.     Review of Systems   Constitutional:  Positive for activity change, appetite change and fatigue. Negative for chills, diaphoresis and fever.   Respiratory:  Negative for cough and shortness of breath.    Cardiovascular:  Negative for chest pain and palpitations.   Gastrointestinal:  Negative for diarrhea, nausea and vomiting.   Musculoskeletal:  Positive for arthralgias, gait problem and myalgias.   Neurological:  Positive for weakness.   All other systems reviewed and are negative.    Objective:     Vital Signs (Most Recent):  Temp: 96.4 °F (35.8 °C) (02/07/25 0720)  Pulse: 78 (02/07/25 0720)  Resp: 18 (02/07/25 0720)  BP: (!) 164/72 (02/07/25 0720)  SpO2: (!) 94 % (02/07/25 0720) Vital Signs (24h Range):  Temp:  [96.4 °F (35.8 °C)-98.9 °F (37.2 °C)] 96.4 °F (35.8 °C)  Pulse:  [69-85] 78  Resp:  [12-28] 18  SpO2:  [93 %-98 %] 94 %  BP: (134-208)/(60-84) 164/72     Weight: 57 kg (125 lb 10.6 oz)  Body mass index is 36.8 kg/m².    Intake/Output Summary (Last 24 hours) at 2/7/2025 0855  Last data filed at 2/6/2025 2145  Gross per 24 hour   Intake 290 ml   Output --   Net 290 ml         Physical Exam  Vitals and nursing note reviewed.   Constitutional:       General: She is awake. She is not in acute distress.     Appearance: Normal appearance. She is well-developed and well-groomed. She is not ill-appearing, toxic-appearing or diaphoretic.   HENT:      Head: Normocephalic and atraumatic.      Right Ear: External ear normal.      Left Ear: External ear normal.      Nose: Nose normal.      Mouth/Throat:      Lips: Pink.      Mouth: Mucous membranes are moist.      Pharynx: Oropharynx is clear. Uvula midline.   Eyes:      Extraocular Movements: Extraocular movements intact.      Conjunctiva/sclera:  Conjunctivae normal.      Pupils: Pupils are equal, round, and reactive to light.   Cardiovascular:      Rate and Rhythm: Normal rate and regular rhythm.      Heart sounds: Normal heart sounds. No murmur heard.  Pulmonary:      Effort: Pulmonary effort is normal.      Breath sounds: Normal breath sounds.   Abdominal:      General: Bowel sounds are normal.      Palpations: Abdomen is soft.      Tenderness: There is no abdominal tenderness.   Musculoskeletal:         General: Swelling and tenderness present.      Cervical back: Normal range of motion and neck supple.      Comments: Ex fix device noted to right lower extremity. NVS grossly intact.    R thigh dressing C/D/I- distal pulses 2 + B. Moving all 4 ext well.    Skin:     General: Skin is warm and dry.      Capillary Refill: Capillary refill takes less than 2 seconds.   Neurological:      General: No focal deficit present.      Mental Status: She is alert and oriented to person, place, and time. Mental status is at baseline.      GCS: GCS eye subscore is 4. GCS verbal subscore is 5. GCS motor subscore is 6.      Cranial Nerves: Cranial nerves 2-12 are intact.      Sensory: Sensation is intact.   Psychiatric:         Mood and Affect: Mood normal.         Speech: Speech normal.         Behavior: Behavior normal. Behavior is cooperative.         Thought Content: Thought content normal.         Judgment: Judgment normal.             Significant Labs: All pertinent labs within the past 24 hours have been reviewed.    Significant Imaging: I have reviewed all pertinent imaging results/findings within the past 24 hours.

## 2025-02-07 NOTE — PLAN OF CARE
OT mulugeta completed. Recommends high intensity therapy.  Min A for bed mobility. Mod A for t/fs with RW.

## 2025-02-07 NOTE — ASSESSMENT & PLAN NOTE
Creatine stable for now. BMP reviewed- noted Estimated Creatinine Clearance: 17.8 mL/min (A) (based on SCr of 1.6 mg/dL (H)). according to latest data. Based on current GFR, CKD stage is stage 4 - GFR 15-29.  Monitor UOP and serial BMP and adjust therapy as needed. Renally dose meds. Avoid nephrotoxic medications and procedures.    Cont IVF

## 2025-02-07 NOTE — ANESTHESIA POSTPROCEDURE EVALUATION
Anesthesia Post Evaluation    Patient: Odin Oliva    Procedure(s) Performed: Procedure(s) (LRB):  REMOVAL, EXTERNAL FIXATION DEVICE (Right)  ORIF, FRACTURE, FEMUR, TRANSCONDYLAR WITH INTERCONDYLAR EXTENSION (Right)    Final Anesthesia Type: general      Patient location during evaluation: PACU  Patient participation: Yes- Able to Participate  Level of consciousness: awake and alert and oriented  Post-procedure vital signs: reviewed and stable  Pain management: adequate  Airway patency: patent  NILDA mitigation strategies: Verification of full reversal of neuromuscular block  PONV status at discharge: No PONV  Anesthetic complications: no      Cardiovascular status: blood pressure returned to baseline and hemodynamically stable  Respiratory status: unassisted  Hydration status: euvolemic  Follow-up not needed.              Vitals Value Taken Time   /72 02/06/25 1215   Temp 35.8 °C (96.4 °F) 02/06/25 1215   Pulse 78 02/06/25 1215   Resp 18 02/06/25 1215   SpO2 94 % 02/06/25 1215         Event Time   Out of Recovery 02/06/2025 12:15:00         Pain/Adia Score: Pain Rating Prior to Med Admin: 10 (2/7/2025  8:49 AM)  Pain Rating Post Med Admin: 3 (2/7/2025  4:00 AM)  Adia Score: 8 (2/6/2025 12:00 PM)

## 2025-02-07 NOTE — ASSESSMENT & PLAN NOTE
RCRI revealed 3.9% risk for major cardiac event. Ortho consulted with plans to take to OR in am.  Plan:  -NPO  -Continue current pain regimen, titrate as needed  -Bowel regimen  -Bedrest  -Wound care   -None weightbearing RLE  -Continue splint per ortho recs  -IVFs prn  -Antiemetics prn  -Tylenol as needed for fever   -PT/OT   -Ortho consulted    S/p ORIF today- cont post op care

## 2025-02-07 NOTE — PROGRESS NOTES
[facility] Medicine  Progress Note    Patient Name: Odin Oliva  MRN: [id number]  Patient Class: IP- Inpatient   Admission Date: 2/4/2025  Length of Stay: 2 days  Attending Physician: Nava George MD  Primary Care Provider: Juventino Barbosa MD        Subjective     Principal Problem:Closed fracture of right femur        HPI:  Odin Oliva is a 85 y.o. female with a PMH  has a past medical history of CKD (chronic kidney disease), stage IV, Diabetes mellitus, HTN (hypertension), Hyperuricemia, Seasonal allergies, Thyroid disease, and Urinary tract infection. presented to the Emergency Department for evaluation of R leg pain. Pt fell on 1/27/25. Pt states she had surgery on her femur in [place] on 1/29/25 and was sent here for pain relief and f/u. External fixator is in place. Symptoms are constant and moderate in severity. Patient denies all other sxs at this time. Pt was prescribed Cefuroxime 500mg BID for 5 days and Paracetamol 1g TID for 5 days. Prior Tx includes a Paracetamol around 10:00 PM tonight. No further complaints or concerns at this time.     ER workup revealed CBC with H/H of 9.4/29.1 (previously 11.8/35.8 on 01/15/2025).  CMP revealed BUN/creatinine of 42/1.6 (at baseline),  mg/dL.  Plain film imaging of right femur revealed:[Complex, comminuted intra-articular fracture of the distal right femur with external fixation device in place].  On-call orthopedic surgeon consulted.  Plans to take to OR in a.m..  Patient received 2 mg of morphine, 4 mg Zofran in ER.  Hospital Medicine consulted with.  Patient in agreement with treatment plan.  Patient admitted under inpatient status.    PCP: Juventino Barbosa      Overview/Hospital Course:  85 y.o. female with hx of DM2 w CKD 4, HTN, Hypothyroidism, UTI, who presented to the ER for evaluation of R leg pain. Pt fell on 1/27/25 and had surgery on her femur in [place] on 1/29/25 and was sent here for pain relief and f/u. External fixator is  in place. Pt was prescribed Cefuroxime 500mg BID for 5 days and Paracetamol 1g TID for 5 days.       ER workup revealed H/H of 9.4/29, (previously 12/36 on 01/15/2025), Bun/Cr 42/1.6 (at baseline),  mg/dL. Plain film imaging of right femur revealed:[Complex, comminuted intra-articular fracture of the distal right femur with external fixation device in place].  Ortho consulted and plans to take to OR in a.m..Patient received 2 mg of morphine, 4 mg Zofran in ER and admitted to [facility].      Appreciate ortho input. Pt comfortable with pain controlled, getting IVF, BS controlled with SSI. Ortho plans surgery tomorrow. Diabetic Diet ordered for today.     2/6- seen post op- doing well, VSS afeb, AAOx4, speech clear, moving all 4 ext, NV intact- distal pulses 2 + BLE, dressing R thing clean and dry. BS under control. Pain under control. Cont post op orders.     Interval History:  seen post op- doing well, VSS afeb, AAOx4, speech clear, moving all 4 ext, NV intact- distal pulses 2 + BLE, dressing R thing clean and dry. BS under control. Pain under control. Cont post op orders.     Review of Systems   Constitutional:  Positive for activity change, appetite change and fatigue. Negative for chills, diaphoresis and fever.   Respiratory:  Negative for cough and shortness of breath.    Cardiovascular:  Negative for chest pain and palpitations.   Gastrointestinal:  Negative for diarrhea, nausea and vomiting.   Musculoskeletal:  Positive for arthralgias, gait problem and myalgias.   Neurological:  Positive for weakness.   All other systems reviewed and are negative.    Objective:     Vital Signs (Most Recent):  Temp: 96.4 °F (35.8 °C) (02/07/25 0720)  Pulse: 78 (02/07/25 0720)  Resp: 18 (02/07/25 0720)  BP: (!) 164/72 (02/07/25 0720)  SpO2: (!) 94 % (02/07/25 0720) Vital Signs (24h Range):  Temp:  [96.4 °F (35.8 °C)-98.9 °F (37.2 °C)] 96.4 °F (35.8 °C)  Pulse:  [69-85] 78  Resp:  [12-28] 18  SpO2:  [93 %-98 %] 94 %  BP:  (134-208)/(60-84) 164/72     Weight: 57 kg (125 lb 10.6 oz)  Body mass index is 36.8 kg/m².    Intake/Output Summary (Last 24 hours) at 2/7/2025 0855  Last data filed at 2/6/2025 2145  Gross per 24 hour   Intake 290 ml   Output --   Net 290 ml         Physical Exam  Vitals and nursing note reviewed.   Constitutional:       General: She is awake. She is not in acute distress.     Appearance: Normal appearance. She is well-developed and well-groomed. She is not ill-appearing, toxic-appearing or diaphoretic.   HENT:      Head: Normocephalic and atraumatic.      Right Ear: External ear normal.      Left Ear: External ear normal.      Nose: Nose normal.      Mouth/Throat:      Lips: Pink.      Mouth: Mucous membranes are moist.      Pharynx: Oropharynx is clear. Uvula midline.   Eyes:      Extraocular Movements: Extraocular movements intact.      Conjunctiva/sclera: Conjunctivae normal.      Pupils: Pupils are equal, round, and reactive to light.   Cardiovascular:      Rate and Rhythm: Normal rate and regular rhythm.      Heart sounds: Normal heart sounds. No murmur heard.  Pulmonary:      Effort: Pulmonary effort is normal.      Breath sounds: Normal breath sounds.   Abdominal:      General: Bowel sounds are normal.      Palpations: Abdomen is soft.      Tenderness: There is no abdominal tenderness.   Musculoskeletal:         General: Swelling and tenderness present.      Cervical back: Normal range of motion and neck supple.      Comments: Ex fix device noted to right lower extremity. NVS grossly intact.    R thigh dressing C/D/I- distal pulses 2 + B. Moving all 4 ext well.    Skin:     General: Skin is warm and dry.      Capillary Refill: Capillary refill takes less than 2 seconds.   Neurological:      General: No focal deficit present.      Mental Status: She is alert and oriented to person, place, and time. Mental status is at baseline.      GCS: GCS eye subscore is 4. GCS verbal subscore is 5. GCS motor subscore is  6.      Cranial Nerves: Cranial nerves 2-12 are intact.      Sensory: Sensation is intact.   Psychiatric:         Mood and Affect: Mood normal.         Speech: Speech normal.         Behavior: Behavior normal. Behavior is cooperative.         Thought Content: Thought content normal.         Judgment: Judgment normal.             Significant Labs: All pertinent labs within the past 24 hours have been reviewed.    Significant Imaging: I have reviewed all pertinent imaging results/findings within the past 24 hours.    Assessment and Plan     * Closed fracture of right femur  RCRI revealed 3.9% risk for major cardiac event. Ortho consulted with plans to take to OR in am.  Plan:  -NPO  -Continue current pain regimen, titrate as needed  -Bowel regimen  -Bedrest  -Wound care   -None weightbearing RLE  -Continue splint per ortho recs  -IVFs prn  -Antiemetics prn  -Tylenol as needed for fever   -PT/OT   -Ortho consulted    S/p ORIF today- cont post op care    Hypertension  Chronic, controlled.  Latest blood pressure and vitals reviewed-   Temp:  [96.4 °F (35.8 °C)-98.9 °F (37.2 °C)]   Pulse:  [72-82]   Resp:  [15-20]   BP: (122-181)/(56-81)   SpO2:  [94 %-97 %] .   Home meds for hypertension were reviewed and noted below.   Hypertension Medications               valsartan (DIOVAN) 160 MG tablet Take 1 tablet (160 mg total) by mouth once daily.            While in the hospital, will manage blood pressure as follows; Continue home antihypertensive regimen    Will utilize p.r.n. blood pressure medication only if patient's blood pressure greater than  160/90 and she develops symptoms such as worsening chest pain or shortness of breath.      Other specified hypothyroidism  Patient has chronic hypothyroidism. TFTs reviewed-   Lab Results   Component Value Date    TSH 18.214 (H) 01/15/2025   . Will continue chronic levothyroxine and adjust for and clinical changes.        Chronic kidney disease (CKD), stage IV (severe)  Creatine  stable for now. BMP reviewed- noted Estimated Creatinine Clearance: 17.8 mL/min (A) (based on SCr of 1.6 mg/dL (H)). according to latest data. Based on current GFR, CKD stage is stage 4 - GFR 15-29.  Monitor UOP and serial BMP and adjust therapy as needed. Renally dose meds. Avoid nephrotoxic medications and procedures.    Cont IVF    Diabetes mellitus  Patient's FSGs are uncontrolled due to hyperglycemia on current medication regimen.  Last A1c reviewed-   Lab Results   Component Value Date    HGBA1C 7.8 (H) 01/15/2025     Most recent fingerstick glucose reviewed-   Recent Labs   Lab 02/06/25  1641 02/06/25  1642 02/07/25  1134   POCTGLUCOSE 411* 399* 360*     Current correctional scale  Low  Maintain anti-hyperglycemic dose as follows-   Antihyperglycemics (From admission, onward)      Start     Stop Route Frequency Ordered    02/05/25 2200  insulin aspart U-100 pen 0-10 Units         -- SubQ Every 6 hours PRN 02/05/25 2101          Hold Oral hypoglycemics while patient is in the hospital.  SSI  Accuchecks    Cont current Tx      VTE Risk Mitigation (From admission, onward)           Ordered     Reason for No Pharmacological VTE Prophylaxis  Once        Comments: Planned surgical procedure   Question:  Reasons:  Answer:  Physician Provided (leave comment)    02/05/25 0005     IP VTE HIGH RISK PATIENT  Once         02/05/25 0005     Place sequential compression device  Until discontinued         02/05/25 0005                    Discharge Planning   BRIAN: 2/7/2025     Code Status: Full Code   Medical Readiness for Discharge Date:   Discharge Plan A: Skilled Nursing Facility          Please place Justification for DME      Nava George MD  Department of Hospital Medicine   [facility] Med S[facility]

## 2025-02-08 LAB
ABO + RH BLD: NORMAL
ALBUMIN SERPL BCP-MCNC: 2.7 G/DL (ref 3.5–5.2)
ALP SERPL-CCNC: 93 U/L (ref 40–150)
ALT SERPL W/O P-5'-P-CCNC: 8 U/L (ref 10–44)
ANION GAP SERPL CALC-SCNC: 11 MMOL/L (ref 8–16)
AST SERPL-CCNC: 20 U/L (ref 10–40)
BASOPHILS # BLD AUTO: 0.03 K/UL (ref 0–0.2)
BASOPHILS NFR BLD: 0.4 % (ref 0–1.9)
BILIRUB SERPL-MCNC: 0.6 MG/DL (ref 0.1–1)
BLD GP AB SCN CELLS X3 SERPL QL: NORMAL
BLD PROD TYP BPU: NORMAL
BLOOD UNIT EXPIRATION DATE: NORMAL
BLOOD UNIT TYPE CODE: 5100
BLOOD UNIT TYPE: NORMAL
BUN SERPL-MCNC: 31 MG/DL (ref 8–23)
CALCIUM SERPL-MCNC: 8.3 MG/DL (ref 8.7–10.5)
CHLORIDE SERPL-SCNC: 99 MMOL/L (ref 95–110)
CO2 SERPL-SCNC: 20 MMOL/L (ref 23–29)
CODING SYSTEM: NORMAL
CREAT SERPL-MCNC: 1.7 MG/DL (ref 0.5–1.4)
CROSSMATCH INTERPRETATION: NORMAL
DIFFERENTIAL METHOD BLD: ABNORMAL
DISPENSE STATUS: NORMAL
EOSINOPHIL # BLD AUTO: 0.2 K/UL (ref 0–0.5)
EOSINOPHIL NFR BLD: 3.1 % (ref 0–8)
ERYTHROCYTE [DISTWIDTH] IN BLOOD BY AUTOMATED COUNT: 14.3 % (ref 11.5–14.5)
EST. GFR  (NO RACE VARIABLE): 29 ML/MIN/1.73 M^2
FERRITIN SERPL-MCNC: 675 NG/ML (ref 20–300)
GLUCOSE SERPL-MCNC: 354 MG/DL (ref 70–110)
HCT VFR BLD AUTO: 24.7 % (ref 37–48.5)
HGB BLD-MCNC: 7.7 G/DL (ref 12–16)
IMM GRANULOCYTES # BLD AUTO: 0.14 K/UL (ref 0–0.04)
IMM GRANULOCYTES NFR BLD AUTO: 1.8 % (ref 0–0.5)
LYMPHOCYTES # BLD AUTO: 0.7 K/UL (ref 1–4.8)
LYMPHOCYTES NFR BLD: 8.8 % (ref 18–48)
MAGNESIUM SERPL-MCNC: 1.7 MG/DL (ref 1.6–2.6)
MCH RBC QN AUTO: 29.4 PG (ref 27–31)
MCHC RBC AUTO-ENTMCNC: 31.2 G/DL (ref 32–36)
MCV RBC AUTO: 94 FL (ref 82–98)
MONOCYTES # BLD AUTO: 0.4 K/UL (ref 0.3–1)
MONOCYTES NFR BLD: 5 % (ref 4–15)
NEUTROPHILS # BLD AUTO: 6.3 K/UL (ref 1.8–7.7)
NEUTROPHILS NFR BLD: 80.9 % (ref 38–73)
NRBC BLD-RTO: 0 /100 WBC
NUM UNITS TRANS PACKED RBC: NORMAL
PHOSPHATE SERPL-MCNC: 2.4 MG/DL (ref 2.7–4.5)
PLATELET # BLD AUTO: 405 K/UL (ref 150–450)
PMV BLD AUTO: 10.2 FL (ref 9.2–12.9)
POCT GLUCOSE: 163 MG/DL (ref 70–110)
POCT GLUCOSE: 171 MG/DL (ref 70–110)
POCT GLUCOSE: 362 MG/DL (ref 70–110)
POCT GLUCOSE: 392 MG/DL (ref 70–110)
POTASSIUM SERPL-SCNC: 4.8 MMOL/L (ref 3.5–5.1)
PROT SERPL-MCNC: 6.4 G/DL (ref 6–8.4)
RBC # BLD AUTO: 2.62 M/UL (ref 4–5.4)
SODIUM SERPL-SCNC: 130 MMOL/L (ref 136–145)
SPECIMEN OUTDATE: NORMAL
WBC # BLD AUTO: 7.75 K/UL (ref 3.9–12.7)

## 2025-02-08 PROCEDURE — 63600175 PHARM REV CODE 636 W HCPCS: Mod: HCNC | Performed by: EMERGENCY MEDICINE

## 2025-02-08 PROCEDURE — 36430 TRANSFUSION BLD/BLD COMPNT: CPT | Mod: HCNC

## 2025-02-08 PROCEDURE — 25000003 PHARM REV CODE 250: Mod: HCNC | Performed by: EMERGENCY MEDICINE

## 2025-02-08 PROCEDURE — 84466 ASSAY OF TRANSFERRIN: CPT | Mod: HCNC | Performed by: STUDENT IN AN ORGANIZED HEALTH CARE EDUCATION/TRAINING PROGRAM

## 2025-02-08 PROCEDURE — 11000001 HC ACUTE MED/SURG PRIVATE ROOM: Mod: HCNC

## 2025-02-08 PROCEDURE — 25000003 PHARM REV CODE 250: Mod: HCNC | Performed by: NURSE PRACTITIONER

## 2025-02-08 PROCEDURE — 25000003 PHARM REV CODE 250: Mod: HCNC | Performed by: STUDENT IN AN ORGANIZED HEALTH CARE EDUCATION/TRAINING PROGRAM

## 2025-02-08 PROCEDURE — 25000003 PHARM REV CODE 250: Mod: HCNC | Performed by: ORTHOPAEDIC SURGERY

## 2025-02-08 PROCEDURE — 86901 BLOOD TYPING SEROLOGIC RH(D): CPT | Mod: HCNC | Performed by: STUDENT IN AN ORGANIZED HEALTH CARE EDUCATION/TRAINING PROGRAM

## 2025-02-08 PROCEDURE — 80053 COMPREHEN METABOLIC PANEL: CPT | Mod: HCNC | Performed by: STUDENT IN AN ORGANIZED HEALTH CARE EDUCATION/TRAINING PROGRAM

## 2025-02-08 PROCEDURE — 30233N1 TRANSFUSION OF NONAUTOLOGOUS RED BLOOD CELLS INTO PERIPHERAL VEIN, PERCUTANEOUS APPROACH: ICD-10-PCS | Performed by: HOSPITALIST

## 2025-02-08 PROCEDURE — 63600175 PHARM REV CODE 636 W HCPCS: Mod: HCNC | Performed by: ORTHOPAEDIC SURGERY

## 2025-02-08 PROCEDURE — 82728 ASSAY OF FERRITIN: CPT | Mod: HCNC | Performed by: STUDENT IN AN ORGANIZED HEALTH CARE EDUCATION/TRAINING PROGRAM

## 2025-02-08 PROCEDURE — 84100 ASSAY OF PHOSPHORUS: CPT | Mod: HCNC | Performed by: STUDENT IN AN ORGANIZED HEALTH CARE EDUCATION/TRAINING PROGRAM

## 2025-02-08 PROCEDURE — 63600175 PHARM REV CODE 636 W HCPCS: Mod: HCNC | Performed by: STUDENT IN AN ORGANIZED HEALTH CARE EDUCATION/TRAINING PROGRAM

## 2025-02-08 PROCEDURE — 97530 THERAPEUTIC ACTIVITIES: CPT | Mod: HCNC,CQ

## 2025-02-08 PROCEDURE — 86920 COMPATIBILITY TEST SPIN: CPT | Mod: HCNC | Performed by: STUDENT IN AN ORGANIZED HEALTH CARE EDUCATION/TRAINING PROGRAM

## 2025-02-08 PROCEDURE — 97110 THERAPEUTIC EXERCISES: CPT | Mod: HCNC,CQ

## 2025-02-08 PROCEDURE — 36415 COLL VENOUS BLD VENIPUNCTURE: CPT | Mod: HCNC | Performed by: STUDENT IN AN ORGANIZED HEALTH CARE EDUCATION/TRAINING PROGRAM

## 2025-02-08 PROCEDURE — 83735 ASSAY OF MAGNESIUM: CPT | Mod: HCNC | Performed by: STUDENT IN AN ORGANIZED HEALTH CARE EDUCATION/TRAINING PROGRAM

## 2025-02-08 PROCEDURE — P9016 RBC LEUKOCYTES REDUCED: HCPCS | Mod: HCNC | Performed by: STUDENT IN AN ORGANIZED HEALTH CARE EDUCATION/TRAINING PROGRAM

## 2025-02-08 PROCEDURE — 85025 COMPLETE CBC W/AUTO DIFF WBC: CPT | Mod: HCNC | Performed by: STUDENT IN AN ORGANIZED HEALTH CARE EDUCATION/TRAINING PROGRAM

## 2025-02-08 RX ORDER — HYDROCODONE BITARTRATE AND ACETAMINOPHEN 500; 5 MG/1; MG/1
TABLET ORAL
Status: DISCONTINUED | OUTPATIENT
Start: 2025-02-08 | End: 2025-02-11 | Stop reason: HOSPADM

## 2025-02-08 RX ORDER — POLYETHYLENE GLYCOL 3350 17 G/17G
17 POWDER, FOR SOLUTION ORAL 2 TIMES DAILY
Status: DISCONTINUED | OUTPATIENT
Start: 2025-02-08 | End: 2025-02-11 | Stop reason: HOSPADM

## 2025-02-08 RX ORDER — INSULIN ASPART 100 [IU]/ML
5 INJECTION, SOLUTION INTRAVENOUS; SUBCUTANEOUS
Status: DISCONTINUED | OUTPATIENT
Start: 2025-02-08 | End: 2025-02-11 | Stop reason: HOSPADM

## 2025-02-08 RX ORDER — AMOXICILLIN 250 MG
1 CAPSULE ORAL 2 TIMES DAILY
Status: DISCONTINUED | OUTPATIENT
Start: 2025-02-08 | End: 2025-02-11 | Stop reason: HOSPADM

## 2025-02-08 RX ORDER — INSULIN GLARGINE 100 [IU]/ML
7 INJECTION, SOLUTION SUBCUTANEOUS 2 TIMES DAILY
Status: DISCONTINUED | OUTPATIENT
Start: 2025-02-08 | End: 2025-02-11 | Stop reason: HOSPADM

## 2025-02-08 RX ORDER — DIPHENHYDRAMINE HCL 25 MG
25 CAPSULE ORAL EVERY 6 HOURS PRN
Status: DISCONTINUED | OUTPATIENT
Start: 2025-02-08 | End: 2025-02-10

## 2025-02-08 RX ADMIN — SODIUM CHLORIDE, POTASSIUM CHLORIDE, SODIUM LACTATE AND CALCIUM CHLORIDE 500 ML: 600; 310; 30; 20 INJECTION, SOLUTION INTRAVENOUS at 03:02

## 2025-02-08 RX ADMIN — INSULIN ASPART 5 UNITS: 100 INJECTION, SOLUTION INTRAVENOUS; SUBCUTANEOUS at 12:02

## 2025-02-08 RX ADMIN — CHOLECALCIFEROL TAB 125 MCG (5000 UNIT) 5000 UNITS: 125 TAB at 09:02

## 2025-02-08 RX ADMIN — INSULIN ASPART 5 UNITS: 100 INJECTION, SOLUTION INTRAVENOUS; SUBCUTANEOUS at 05:02

## 2025-02-08 RX ADMIN — ASPIRIN 650 MG: 325 TABLET ORAL at 09:02

## 2025-02-08 RX ADMIN — MORPHINE SULFATE 2 MG: 4 INJECTION INTRAVENOUS at 08:02

## 2025-02-08 RX ADMIN — DIPHENHYDRAMINE HYDROCHLORIDE 25 MG: 25 CAPSULE ORAL at 10:02

## 2025-02-08 RX ADMIN — HYDROCODONE BITARTRATE AND ACETAMINOPHEN 1 TABLET: 5; 325 TABLET ORAL at 12:02

## 2025-02-08 RX ADMIN — MORPHINE SULFATE 2 MG: 4 INJECTION INTRAVENOUS at 10:02

## 2025-02-08 RX ADMIN — POLYETHYLENE GLYCOL 3350 17 G: 17 POWDER, FOR SOLUTION ORAL at 12:02

## 2025-02-08 RX ADMIN — SENNOSIDES AND DOCUSATE SODIUM 1 TABLET: 50; 8.6 TABLET ORAL at 09:02

## 2025-02-08 RX ADMIN — SENNOSIDES AND DOCUSATE SODIUM 1 TABLET: 50; 8.6 TABLET ORAL at 12:02

## 2025-02-08 RX ADMIN — Medication 1 TABLET: at 09:02

## 2025-02-08 RX ADMIN — APIXABAN 2.5 MG: 2.5 TABLET, FILM COATED ORAL at 09:02

## 2025-02-08 RX ADMIN — POLYETHYLENE GLYCOL 3350 17 G: 17 POWDER, FOR SOLUTION ORAL at 09:02

## 2025-02-08 RX ADMIN — INSULIN ASPART 10 UNITS: 100 INJECTION, SOLUTION INTRAVENOUS; SUBCUTANEOUS at 10:02

## 2025-02-08 RX ADMIN — HYDROCODONE BITARTRATE AND ACETAMINOPHEN 1 TABLET: 5; 325 TABLET ORAL at 01:02

## 2025-02-08 RX ADMIN — INSULIN GLARGINE 7 UNITS: 100 INJECTION, SOLUTION SUBCUTANEOUS at 09:02

## 2025-02-08 RX ADMIN — HYDROCODONE BITARTRATE AND ACETAMINOPHEN 1 TABLET: 5; 325 TABLET ORAL at 07:02

## 2025-02-08 RX ADMIN — Medication 100 MG: at 09:02

## 2025-02-08 RX ADMIN — Medication 6 MG: at 02:02

## 2025-02-08 RX ADMIN — INSULIN GLARGINE 5 UNITS: 100 INJECTION, SOLUTION SUBCUTANEOUS at 09:02

## 2025-02-08 RX ADMIN — THERA TABS 1 TABLET: TAB at 09:02

## 2025-02-08 RX ADMIN — INSULIN ASPART 2 UNITS: 100 INJECTION, SOLUTION INTRAVENOUS; SUBCUTANEOUS at 06:02

## 2025-02-08 RX ADMIN — AMLODIPINE BESYLATE 5 MG: 5 TABLET ORAL at 09:02

## 2025-02-08 NOTE — PT/OT/SLP PROGRESS
Physical Therapy  Treatment    Odin Oliva   MRN: [id number]   Admitting Diagnosis: Closed fracture of right femur    PT Received On: 02/08/25  PT Start Time: 0700     PT Stop Time: 0725    PT Total Time (min): 25 min       Billable Minutes:  Therapeutic Activity 15 and Therapeutic Exercise 10    Treatment Type: Treatment  PT/PTA: PTA     Number of PTA visits since last PT visit: 1       General Precautions: Standard, fall  Orthopedic Precautions: RLE toe touch weight bearing  Braces: N/A  Respiratory Status: Room air         Subjective:  Communicated with nurse Schwartz and completed Epic chart review prior to session. Pt eager to participate.     Pain/Comfort  Pain Rating 1:  (unrated)  Location - Side 1: Right  Location - Orientation 1: generalized  Location 1: leg  Pain Addressed 1: Reposition, Distraction    Objective:   Patient found with: peripheral IV    Supine > Sit: Min A  Scoot towards EOB: SBA  STS from EOB: Min A with RW, cues for TTWB RLE  Pt ambulated 3' to BSC with Min A with RW    Performed LLE AROM TE x10 ea: LAQ, MIP, AP    Treatment and Education:  Educated pt on benefits of increasing time spent OOB and direct impact on CV endurance. Encouraged pt to sit up in BSC for all meals. Encouraged pt to perform HEP throughout the day to maintain/regain strength. Reviewed RLE TTWB  precautions and importance of compliance for optimal healing. Reviewed call don't fall policy and to call for assistance with all OOB needs. Pt and family agreed to safety request.      AM-PAC 6 CLICK MOBILITY  How much help from another person does this patient currently need?   1 = Unable, Total/Dependent Assistance  2 = A lot, Maximum/Moderate Assistance  3 = A little, Minimum/Contact Guard/Supervision  4 = None, Modified [place]/Independent    Turning over in bed (including adjusting bedclothes, sheets and blankets)?: 3  Sitting down on and standing up from a chair with arms (e.g., wheelchair, bedside commode, etc.):  3  Moving from lying on back to sitting on the side of the bed?: 3  Moving to and from a bed to a chair (including a wheelchair)?: 3  Need to walk in hospital room?: 3  Climbing 3-5 steps with a railing?: 1  Basic Mobility Total Score: 16    AM-PAC Raw Score CMS G-Code Modifier Level of Impairment Assistance   6 % Total / Unable   7 - 9 CM 80 - 100% Maximal Assist   10 - 14 CL 60 - 80% Moderate Assist   15 - 19 CK 40 - 60% Moderate Assist   20 - 22 CJ 20 - 40% Minimal Assist   23 CI 1-20% SBA / CGA   24 CH 0% Independent/ Mod I     Patient left up in chair with all lines intact, call button in reach, nurse notified, and daughter present.    Assessment:  Odin Oliva is a 85 y.o. female with a medical diagnosis of Closed fracture of right femur and presents with the following functional limitations. Pt will continue to benefit from skilled PT in order to address the below deficits to reach maximum level of function.     Rehab identified problem list/impairments: weakness, impaired endurance, impaired self care skills, impaired functional mobility, gait instability, impaired balance, orthopedic precautions, decreased ROM, pain, decreased safety awareness, decreased lower extremity function, decreased upper extremity function, decreased coordination, edema    Rehab potential is good.    Activity tolerance: Good    Discharge recommendations: High Intensity Therapy      Barriers to discharge:      Equipment recommendations: to be determined by next level of care     GOALS:   Multidisciplinary Problems       Physical Therapy Goals          Problem: Physical Therapy    Goal Priority Disciplines Outcome Interventions   Physical Therapy Goal     PT, PT/OT     Description: LT25  1. PT WILL COMPLETE BED MOBILITY WITH CGA  2. PT WILL STAND T/F TO CHAIR WITH RW TDWB AND MIN A  3. PT WILL GT TRAIN X 20' WITH RW AND TDWB WITH ROWENA TO PROGRESS GT  4. PT WILL INC AMPAC SCORE BY 2 POINTS TO PROGRESS GROSS FUNC  MOBILITY.                          DME Justifications:  No DME recommended requiring DME justifications    PLAN:    Patient to be seen 3 x/week to address the above listed problems via gait training, therapeutic activities, therapeutic exercises  Plan of Care expires: 02/21/25  Plan of Care reviewed with: patient, daughter         02/08/2025

## 2025-02-08 NOTE — PLAN OF CARE
Discussed poc with pt, pt verbalized understanding    Purposeful rounding every 2hours    VS wnl most of the day. Bp dropped low. Gave bolus of LR.  1 unit of RBC ordered.  Blood glucose monitoring   Fall precautions in place, remains injury free  Pt denies c/o nausea  Pain under control with PRN meds    IVFs  Accurate I&Os    Bed locked at lowest position  Call light within reach    Chart check complete  Will cont with POC

## 2025-02-08 NOTE — PLAN OF CARE
Discussed poc with pt, pt verbalized understanding    Purposeful rounding every 2hours    VS wnl    Blood glucose monitoring   Fall precautions in place, remains injury free  Pt denies c/o pain  Pain under control with PRN meds    IVFs saline locked  Accurate I&Os    Bed locked at lowest position  Call light within reach    Chart check complete  Will cont with POC

## 2025-02-08 NOTE — PT/OT/SLP EVAL
Occupational Therapy   Evaluation    Name: Odin Oliva  MRN: [id number]  Admitting Diagnosis: Closed fracture of right femur  Recent Surgery: Procedure(s) (LRB):  REMOVAL, EXTERNAL FIXATION DEVICE (Right)  ORIF, FRACTURE, FEMUR, TRANSCONDYLAR WITH INTERCONDYLAR EXTENSION (Right) 1 Day Post-Op    Recommendations:     Discharge Recommendations: High Intensity Therapy  Discharge Equipment Recommendations:  to be determined by next level of care  Barriers to discharge:  Decreased caregiver support    Assessment:     Odin Oliva is a 85 y.o. female with a medical diagnosis of Closed fracture of right femur.  She presents with the following performance deficits affecting function: weakness, impaired endurance, impaired self care skills, impaired functional mobility, gait instability, impaired balance, decreased coordination, decreased lower extremity function, decreased safety awareness, pain, decreased ROM, edema, orthopedic precautions.      Rehab Prognosis: Good; patient would benefit from acute skilled OT services to address these deficits and reach maximum level of function.       Plan:     Patient to be seen 2 x/week to address the above listed problems via self-care/home management, therapeutic activities, therapeutic exercises  Plan of Care Expires: 02/21/25  Plan of Care Reviewed with: patient, daughter    Subjective     Chief Complaint: RLE pain, weakness  Patient/Family Comments/goals: improve strength, mobility, and independence    Occupational Profile:  Living Environment: lives with son in a 1 story house with threshold to enter. Walk-in shower. Pt's son works during the day at which time pt is alone at home.  Previous level of function: Pt (I) with ADLs and functional mobility.   Roles and Routines: does not drive or work. Enjoys gardening.   Equipment Used at Home: grab bar  Assistance upon Discharge: limited assist from family    Pain/Comfort:  Pain Rating 1: 8/10  Location - Side 1: Right  Location -  "Orientation 1: generalized  Location 1: leg  Pain Addressed 1: Reposition, Distraction  Pain Rating Post-Intervention 1: 8/10    Objective:     Communicated with: Nurse and epic chart review prior to session.  Patient found HOB elevated with peripheral IV upon OT entry to room.    General Precautions: Standard, fall  Orthopedic Precautions: RLE toe touch weight bearing  Braces: N/A  Respiratory Status: Room air    Occupational Performance:    Bed Mobility:    Patient completed Rolling/Turning to Right with minimum assistance  Patient completed Scooting/Bridging with minimum assistance  Patient completed Supine to Sit with minimum assistance    Functional Mobility/Transfers:  Patient completed Sit <> Stand Transfer with moderate assistance  with  rolling walker   Verbal cueing for hand placement with RW.  Patient completed Bed <> Chair Transfer using Stand Pivot technique with moderate assistance with rolling walker  Verbal cueing for TTWB precautions.    Activities of Daily Living:  Lower Body Dressing: maximal assistance eric socks    Cognitive/Visual Perceptual:  Cognitive/Psychosocial Skills:     -       Oriented to: Person, Place, Time, and Situation   -       Follows Commands/attention:Follows multistep  commands  -       Communication: clear/fluent  -       Memory: No Deficits noted  -       Safety awareness/insight to disability: impaired   Pt reports she is "itchy everywhere"    Physical Exam:  Sensation:    -       Intact  Upper Extremity Range of Motion:     -       Right Upper Extremity: WFL  -       Left Upper Extremity: WFL  Upper Extremity Strength:    -       Right Upper Extremity: 4/5 grossly  -       Left Upper Extremity: 4/5 grossly   Strength:    -       Right Upper Extremity: WFL  -       Left Upper Extremity: WFL    AMPAC 6 Click ADL:  AMPAC Total Score: 18    Treatment & Education:  Educated pt on RLE TTWB status, functional implications, and importance of compliance. Patient educated on " role of OT in acute setting and benefits of participation. Educated on techniques to use to increase independence and decrease fall risk with functional transfers. Educated on importance of OOB activity and calling for A to transfer back to bed and meet needs. Encouraged completion of B UE AROM therex throughout the day to tolerance to increase functional strength and activity tolerance. Educated patient on importance of increased tolerance to upright position and direct impact on CV endurance and strength. Patient encouraged to sit up in chair for a minimum of 2 consecutive hours per day. Patient stated understanding and in agreement with POC.     Patient left up in chair with all lines intact, call button in reach, chair alarm on, and daughter present    GOALS:   Multidisciplinary Problems       Occupational Therapy Goals          Problem: Occupational Therapy    Goal Priority Disciplines Outcome Interventions   Occupational Therapy Goal     OT, PT/OT     Description: Goals to be met by: 2/21/25     Patient will increase functional independence with ADLs by performing:    LE Dressing with Contact Guard Assistance.  Grooming while standing at sink with Contact Guard Assistance.  Toileting from toilet with Supervision for hygiene and clothing management.   Toilet transfer to toilet with Contact Guard Assistance with RW.  Upper extremity exercise program x15 reps per handout, with independence.                         History:     Past Medical History:   Diagnosis Date    CKD (chronic kidney disease), stage IV     Diabetes mellitus     HTN (hypertension)     Hyperuricemia     Seasonal allergies     Thyroid disease     Urinary tract infection          Past Surgical History:   Procedure Laterality Date    CATARACT EXTRACTION, BILATERAL      CHOLECYSTECTOMY      EXOSTECTOMY Bilateral 11/8/2019    Procedure: EXOSTECTOMY;  Surgeon: Lissy Comer DPM;  Location: [facility];  Service: Podiatry;  Laterality: Bilateral;   left fourth digit, right fifth digit    FOOT MASS EXCISION Bilateral 11/8/2019    Procedure: HAMMER TOE, DEROTATIONAL ARTHROPLASTY FIFTH DIGIT;  Surgeon: Lissy Comer DPM;  Location: [facility] OR;  Service: Podiatry;  Laterality: Bilateral;  Left 5th toe incision @ 08:51.  Left 4th toe incision @ 08:54.  Right 5th toe incision : 09:24.    ORIF, FRACTURE, FEMUR, TRANSCONDYLAR WITH INTERCONDYLAR EXTENSION Right 2/6/2025    Procedure: ORIF, FRACTURE, FEMUR, TRANSCONDYLAR WITH INTERCONDYLAR EXTENSION;  Surgeon: Damian Jean Jr., MD;  Location: [facility] OR;  Service: Orthopedics;  Laterality: Right;    REMOVAL OF EXTERNAL FIXATION DEVICE Right 2/6/2025    Procedure: REMOVAL, EXTERNAL FIXATION DEVICE;  Surgeon: Damian Jean Jr., MD;  Location: [facility] OR;  Service: Orthopedics;  Laterality: Right;    TOTAL THYROIDECTOMY      UMBILICAL HERNIA REPAIR      x 2       Time Tracking:     OT Date of Treatment: 02/07/25  OT Start Time: 0910  OT Stop Time: 0940  OT Total Time (min): 30 min    Billable Minutes:Evaluation 15  Therapeutic Activity 15    2/7/2025  Jaja Donovan OT

## 2025-02-08 NOTE — PLAN OF CARE
Updated list of accepting and denying SNF facilities given to family per their request.  List given by primary SW misplaced.  Will follow up with family Sunday.   Significant Other

## 2025-02-08 NOTE — PROGRESS NOTES
[facility]  Orthopedics  Progress Note    Patient Name: Odin Oliva  MRN: [id number]  Admission Date: 2/4/2025  Hospital Length of Stay: 3 days  Attending Provider: Efrain Ross DO  Primary Care Provider: Juventino Barbosa MD  Follow-up For: Procedure(s) (LRB):  REMOVAL, EXTERNAL FIXATION DEVICE (Right)  ORIF, FRACTURE, FEMUR, TRANSCONDYLAR WITH INTERCONDYLAR EXTENSION (Right)    Post-Operative Day: 2 Days Post-Op  Subjective:     Principal Problem:Closed fracture of right femur    Principal Orthopedic Problem:  Supracondylar femur fracture    Interval History: Odin Oliva is a 85 y.o. female doing well with only complaint of itching    Review of patient's allergies indicates:  No Known Allergies    Current Facility-Administered Medications   Medication    acetaminophen suppository 650 mg    acetaminophen tablet 650 mg    aluminum-magnesium hydroxide-simethicone 200-200-20 mg/5 mL suspension 30 mL    amLODIPine tablet 5 mg    aspirin tablet 650 mg    cholecalciferol (vitamin D3) 125 mcg (5,000 unit) tablet 5,000 Units    dextrose 50% injection 12.5 g    dextrose 50% injection 12.5 g    dextrose 50% injection 25 g    dextrose 50% injection 25 g    folic acid-vit B6-vit B12 2.5-25-2 mg tablet 1 tablet    glucagon (human recombinant) injection 1 mg    glucagon (human recombinant) injection 1 mg    glucose chewable tablet 16 g    glucose chewable tablet 16 g    glucose chewable tablet 24 g    glucose chewable tablet 24 g    hydrALAZINE tablet 10 mg    HYDROcodone-acetaminophen 5-325 mg per tablet 1 tablet    insulin aspart U-100 pen 0-10 Units    insulin glargine U-100 (Lantus) pen 5 Units    melatonin tablet 6 mg    morphine injection 2 mg    multivitamin tablet    naloxone 0.4 mg/mL injection 0.02 mg    ondansetron injection 4 mg    polyethylene glycol packet 17 g    promethazine tablet 25 mg    simethicone chewable tablet 80 mg    sodium chloride 0.9% flush 3 mL    thiamine tablet 100 mg  "    Objective:     Vital Signs (Most Recent):  Temp: 98.2 °F (36.8 °C) (02/08/25 0744)  Pulse: 77 (02/08/25 0744)  Resp: 18 (02/08/25 0809)  BP: (!) 140/65 (02/08/25 0744)  SpO2: 97 % (02/08/25 0744) Vital Signs (24h Range):  Temp:  [97.8 °F (36.6 °C)-98.8 °F (37.1 °C)] 98.2 °F (36.8 °C)  Pulse:  [70-78] 77  Resp:  [16-20] 18  SpO2:  [94 %-97 %] 97 %  BP: (122-148)/(56-66) 140/65     Weight: 57 kg (125 lb 10.6 oz)  Height: 4' 1" (124.5 cm)  Body mass index is 36.8 kg/m².      Intake/Output Summary (Last 24 hours) at 2/8/2025 0956  Last data filed at 2/8/2025 0031  Gross per 24 hour   Intake 290 ml   Output --   Net 290 ml       Ortho/SPM Exam  Dressings dry and intact, we will change tomorrow  Neurovascular exam is normal  Compartments are soft    GEN: Well developed, well nourished female. AAOX3. No acute distress.   Head: Normocephalic, atraumatic.   Eyes: SHAHBAZ  Neck: Trachea is midline, no adenopathy  Resp: Breathing unlabored.  Neuro: Motor function normal, Cranial nerves intact  Psych: Mood and affect appropriate.      Significant Labs: CBC:   Recent Labs   Lab 02/07/25  0901   WBC 6.16   HGB 7.8*   HCT 24.5*        All pertinent labs within the past 24 hours have been reviewed.    Significant Imaging: None    Assessment/Plan:     Active Diagnoses:    Diagnosis Date Noted POA    PRINCIPAL PROBLEM:  Closed fracture of right femur [S72.91XA] 02/05/2025 Yes    Hypertension [I10] 11/04/2019 Yes    Other specified hypothyroidism [E03.8] 10/28/2019 Yes    Chronic kidney disease (CKD), stage IV (severe) [N18.4] 10/23/2019 Yes    Diabetes mellitus [E11.9] 04/03/2019 Yes      Problems Resolved During this Admission:       Assessment:  85 y.o. female she is doing well.  Hemoglobin is low she will likely require transfusion    Plan:  We will plan dressing change in a.m. and a weight ECF placement    Damian Jean MD, MD  Orthopedics  [facility]   "

## 2025-02-09 PROBLEM — D62 ACUTE BLOOD LOSS ANEMIA: Status: ACTIVE | Noted: 2025-02-09

## 2025-02-09 LAB
ALBUMIN SERPL BCP-MCNC: 2.5 G/DL (ref 3.5–5.2)
ALP SERPL-CCNC: 90 U/L (ref 40–150)
ALT SERPL W/O P-5'-P-CCNC: 8 U/L (ref 10–44)
ANION GAP SERPL CALC-SCNC: 8 MMOL/L (ref 8–16)
AST SERPL-CCNC: 17 U/L (ref 10–40)
BASOPHILS # BLD AUTO: 0.04 K/UL (ref 0–0.2)
BASOPHILS NFR BLD: 0.6 % (ref 0–1.9)
BILIRUB SERPL-MCNC: 0.7 MG/DL (ref 0.1–1)
BUN SERPL-MCNC: 27 MG/DL (ref 8–23)
CALCIUM SERPL-MCNC: 8.6 MG/DL (ref 8.7–10.5)
CHLORIDE SERPL-SCNC: 106 MMOL/L (ref 95–110)
CO2 SERPL-SCNC: 26 MMOL/L (ref 23–29)
CREAT SERPL-MCNC: 1.5 MG/DL (ref 0.5–1.4)
DIFFERENTIAL METHOD BLD: ABNORMAL
EOSINOPHIL # BLD AUTO: 0.5 K/UL (ref 0–0.5)
EOSINOPHIL NFR BLD: 7.3 % (ref 0–8)
ERYTHROCYTE [DISTWIDTH] IN BLOOD BY AUTOMATED COUNT: 16.2 % (ref 11.5–14.5)
EST. GFR  (NO RACE VARIABLE): 34 ML/MIN/1.73 M^2
GLUCOSE SERPL-MCNC: 132 MG/DL (ref 70–110)
HCT VFR BLD AUTO: 27.1 % (ref 37–48.5)
HGB BLD-MCNC: 8.7 G/DL (ref 12–16)
IMM GRANULOCYTES # BLD AUTO: 0.13 K/UL (ref 0–0.04)
IMM GRANULOCYTES NFR BLD AUTO: 2.1 % (ref 0–0.5)
IRON SERPL-MCNC: 39 UG/DL (ref 30–160)
LYMPHOCYTES # BLD AUTO: 1.1 K/UL (ref 1–4.8)
LYMPHOCYTES NFR BLD: 16.9 % (ref 18–48)
MAGNESIUM SERPL-MCNC: 1.9 MG/DL (ref 1.6–2.6)
MCH RBC QN AUTO: 28.2 PG (ref 27–31)
MCHC RBC AUTO-ENTMCNC: 32.1 G/DL (ref 32–36)
MCV RBC AUTO: 88 FL (ref 82–98)
MONOCYTES # BLD AUTO: 0.5 K/UL (ref 0.3–1)
MONOCYTES NFR BLD: 8.1 % (ref 4–15)
NEUTROPHILS # BLD AUTO: 4 K/UL (ref 1.8–7.7)
NEUTROPHILS NFR BLD: 65 % (ref 38–73)
NRBC BLD-RTO: 0 /100 WBC
PHOSPHATE SERPL-MCNC: 2.9 MG/DL (ref 2.7–4.5)
PLATELET # BLD AUTO: 418 K/UL (ref 150–450)
PMV BLD AUTO: 9 FL (ref 9.2–12.9)
POCT GLUCOSE: 112 MG/DL (ref 70–110)
POCT GLUCOSE: 161 MG/DL (ref 70–110)
POCT GLUCOSE: 204 MG/DL (ref 70–110)
POTASSIUM SERPL-SCNC: 4.6 MMOL/L (ref 3.5–5.1)
PROT SERPL-MCNC: 6.1 G/DL (ref 6–8.4)
RBC # BLD AUTO: 3.08 M/UL (ref 4–5.4)
SATURATED IRON: 14 % (ref 20–50)
SODIUM SERPL-SCNC: 140 MMOL/L (ref 136–145)
TOTAL IRON BINDING CAPACITY: 280 UG/DL (ref 250–450)
TRANSFERRIN SERPL-MCNC: 189 MG/DL (ref 200–375)
WBC # BLD AUTO: 6.2 K/UL (ref 3.9–12.7)

## 2025-02-09 PROCEDURE — 63600175 PHARM REV CODE 636 W HCPCS: Mod: HCNC | Performed by: STUDENT IN AN ORGANIZED HEALTH CARE EDUCATION/TRAINING PROGRAM

## 2025-02-09 PROCEDURE — 25000003 PHARM REV CODE 250: Mod: HCNC | Performed by: STUDENT IN AN ORGANIZED HEALTH CARE EDUCATION/TRAINING PROGRAM

## 2025-02-09 PROCEDURE — 25000003 PHARM REV CODE 250: Mod: HCNC | Performed by: EMERGENCY MEDICINE

## 2025-02-09 PROCEDURE — 36430 TRANSFUSION BLD/BLD COMPNT: CPT | Mod: HCNC

## 2025-02-09 PROCEDURE — 83735 ASSAY OF MAGNESIUM: CPT | Mod: HCNC | Performed by: STUDENT IN AN ORGANIZED HEALTH CARE EDUCATION/TRAINING PROGRAM

## 2025-02-09 PROCEDURE — 25000003 PHARM REV CODE 250: Mod: HCNC | Performed by: ORTHOPAEDIC SURGERY

## 2025-02-09 PROCEDURE — 84100 ASSAY OF PHOSPHORUS: CPT | Mod: HCNC | Performed by: STUDENT IN AN ORGANIZED HEALTH CARE EDUCATION/TRAINING PROGRAM

## 2025-02-09 PROCEDURE — 80053 COMPREHEN METABOLIC PANEL: CPT | Mod: HCNC | Performed by: STUDENT IN AN ORGANIZED HEALTH CARE EDUCATION/TRAINING PROGRAM

## 2025-02-09 PROCEDURE — 97530 THERAPEUTIC ACTIVITIES: CPT | Mod: HCNC,CQ

## 2025-02-09 PROCEDURE — 36415 COLL VENOUS BLD VENIPUNCTURE: CPT | Mod: HCNC | Performed by: STUDENT IN AN ORGANIZED HEALTH CARE EDUCATION/TRAINING PROGRAM

## 2025-02-09 PROCEDURE — 85025 COMPLETE CBC W/AUTO DIFF WBC: CPT | Mod: HCNC | Performed by: STUDENT IN AN ORGANIZED HEALTH CARE EDUCATION/TRAINING PROGRAM

## 2025-02-09 PROCEDURE — 97116 GAIT TRAINING THERAPY: CPT | Mod: HCNC,CQ

## 2025-02-09 PROCEDURE — 11000001 HC ACUTE MED/SURG PRIVATE ROOM: Mod: HCNC

## 2025-02-09 RX ORDER — BISACODYL 10 MG/1
10 SUPPOSITORY RECTAL ONCE
Status: DISCONTINUED | OUTPATIENT
Start: 2025-02-09 | End: 2025-02-11 | Stop reason: HOSPADM

## 2025-02-09 RX ORDER — ASPIRIN 325 MG
325 TABLET ORAL ONCE
Status: COMPLETED | OUTPATIENT
Start: 2025-02-09 | End: 2025-02-09

## 2025-02-09 RX ADMIN — INSULIN ASPART 5 UNITS: 100 INJECTION, SOLUTION INTRAVENOUS; SUBCUTANEOUS at 07:02

## 2025-02-09 RX ADMIN — HYDROCODONE BITARTRATE AND ACETAMINOPHEN 1 TABLET: 5; 325 TABLET ORAL at 02:02

## 2025-02-09 RX ADMIN — APIXABAN 2.5 MG: 2.5 TABLET, FILM COATED ORAL at 08:02

## 2025-02-09 RX ADMIN — POLYETHYLENE GLYCOL 3350 17 G: 17 POWDER, FOR SOLUTION ORAL at 08:02

## 2025-02-09 RX ADMIN — HYDROCODONE BITARTRATE AND ACETAMINOPHEN 1 TABLET: 5; 325 TABLET ORAL at 08:02

## 2025-02-09 RX ADMIN — Medication 100 MG: at 08:02

## 2025-02-09 RX ADMIN — INSULIN GLARGINE 7 UNITS: 100 INJECTION, SOLUTION SUBCUTANEOUS at 10:02

## 2025-02-09 RX ADMIN — ASPIRIN 325 MG: 325 TABLET ORAL at 11:02

## 2025-02-09 RX ADMIN — THERA TABS 1 TABLET: TAB at 08:02

## 2025-02-09 RX ADMIN — INSULIN ASPART 4 UNITS: 100 INJECTION, SOLUTION INTRAVENOUS; SUBCUTANEOUS at 04:02

## 2025-02-09 RX ADMIN — HYDROCODONE BITARTRATE AND ACETAMINOPHEN 1 TABLET: 5; 325 TABLET ORAL at 09:02

## 2025-02-09 RX ADMIN — IRON SUCROSE 200 MG: 20 INJECTION, SOLUTION INTRAVENOUS at 05:02

## 2025-02-09 RX ADMIN — AMLODIPINE BESYLATE 5 MG: 5 TABLET ORAL at 08:02

## 2025-02-09 RX ADMIN — SENNOSIDES AND DOCUSATE SODIUM 1 TABLET: 50; 8.6 TABLET ORAL at 09:02

## 2025-02-09 RX ADMIN — SENNOSIDES AND DOCUSATE SODIUM 1 TABLET: 50; 8.6 TABLET ORAL at 08:02

## 2025-02-09 RX ADMIN — INSULIN GLARGINE 7 UNITS: 100 INJECTION, SOLUTION SUBCUTANEOUS at 08:02

## 2025-02-09 RX ADMIN — Medication 1 TABLET: at 08:02

## 2025-02-09 RX ADMIN — INSULIN ASPART 5 UNITS: 100 INJECTION, SOLUTION INTRAVENOUS; SUBCUTANEOUS at 04:02

## 2025-02-09 RX ADMIN — CHOLECALCIFEROL TAB 125 MCG (5000 UNIT) 5000 UNITS: 125 TAB at 08:02

## 2025-02-09 RX ADMIN — INSULIN ASPART 5 UNITS: 100 INJECTION, SOLUTION INTRAVENOUS; SUBCUTANEOUS at 11:02

## 2025-02-09 RX ADMIN — POLYETHYLENE GLYCOL 3350 17 G: 17 POWDER, FOR SOLUTION ORAL at 09:02

## 2025-02-09 NOTE — ASSESSMENT & PLAN NOTE
Patient's FSGs are uncontrolled due to hyperglycemia on current medication regimen.  Last A1c reviewed-   Lab Results   Component Value Date    HGBA1C 7.8 (H) 01/15/2025     Most recent fingerstick glucose reviewed-   Recent Labs   Lab 02/08/25  1028 02/08/25  1230   POCTGLUCOSE 392* 362*       Current correctional scale  Low  Maintain anti-hyperglycemic dose as follows-   Antihyperglycemics (From admission, onward)      Start     Stop Route Frequency Ordered    02/08/25 2100  insulin glargine U-100 (Lantus) pen 7 Units         -- SubQ 2 times daily 02/08/25 1235    02/08/25 1145  insulin aspart U-100 pen 5 Units         -- SubQ 3 times daily with meals 02/08/25 1040    02/05/25 2200  insulin aspart U-100 pen 0-10 Units         -- SubQ Every 6 hours PRN 02/05/25 2101

## 2025-02-09 NOTE — ASSESSMENT & PLAN NOTE
Anemia is likely due to acute blood loss which was from Orthopedic surgery and chronic disease due to Chronic Kidney Disease. Most recent hemoglobin and hematocrit are listed below.  Recent Labs     02/07/25  0901 02/08/25  1046 02/09/25  0754   HGB 7.8* 7.7* 8.7*   HCT 24.5* 24.7* 27.1*     Plan  - Monitor serial CBC: Daily  - Transfuse PRBC if patient becomes hemodynamically unstable, symptomatic or H/H drops below 7/21.  - Patient has received 1 units of PRBCs on 2/8/25  - Patient's anemia is currently improving

## 2025-02-09 NOTE — ASSESSMENT & PLAN NOTE
Creatine stable for now. BMP reviewed- noted Estimated Creatinine Clearance: 16.7 mL/min (A) (based on SCr of 1.7 mg/dL (H)). according to latest data. Based on current GFR, CKD stage is stage 4 - GFR 15-29.  Monitor UOP and serial BMP and adjust therapy as needed. Renally dose meds. Avoid nephrotoxic medications and procedures.

## 2025-02-09 NOTE — ASSESSMENT & PLAN NOTE
RCRI revealed 3.9% risk for major cardiac event. S/p ORIF 02/06/25    Plan:  -  -Continue current pain regimen, titrate as needed  -Bowel regimen  -Bedrest  -Wound care   --IVFs prn  -Antiemetics prn  -PT/OT   -Ortho consulted, follow-up recs  -DVT prophylaxis

## 2025-02-09 NOTE — ASSESSMENT & PLAN NOTE
Patient's FSGs are uncontrolled due to hyperglycemia on current medication regimen.  Last A1c reviewed-   Lab Results   Component Value Date    HGBA1C 7.8 (H) 01/15/2025     Most recent fingerstick glucose reviewed-   Recent Labs   Lab 02/08/25  1826 02/08/25  2159 02/09/25  0609 02/09/25  1125   POCTGLUCOSE 163* 171* 161* 204*       Current correctional scale  Low  Maintain anti-hyperglycemic dose as follows-   Antihyperglycemics (From admission, onward)    Start     Stop Route Frequency Ordered    02/08/25 2100  insulin glargine U-100 (Lantus) pen 7 Units         -- SubQ 2 times daily 02/08/25 1235    02/08/25 1145  insulin aspart U-100 pen 5 Units         -- SubQ 3 times daily with meals 02/08/25 1040    02/05/25 2200  insulin aspart U-100 pen 0-10 Units         -- SubQ Every 6 hours PRN 02/05/25 2101

## 2025-02-09 NOTE — ASSESSMENT & PLAN NOTE
Chronic, controlled.  Latest blood pressure and vitals reviewed-   Temp:  [97.7 °F (36.5 °C)-98.5 °F (36.9 °C)]   Pulse:  [70-78]   Resp:  [14-19]   BP: (115-168)/(53-72)   SpO2:  [94 %-98 %] .   Home meds for hypertension were reviewed and noted below.   Hypertension Medications               valsartan (DIOVAN) 160 MG tablet Take 1 tablet (160 mg total) by mouth once daily.            While in the hospital, will manage blood pressure as follows; Continue home antihypertensive regimen    Will utilize p.r.n. blood pressure medication only if patient's blood pressure greater than  160/90 and she develops symptoms such as worsening chest pain or shortness of breath.

## 2025-02-09 NOTE — NURSING
Pt blood transfusion was started. IV access was lost. Transfusion was paused while IV access was regained. Transfusion restarted. Vital signs stable. Will continue with POC

## 2025-02-09 NOTE — PLAN OF CARE
Discussed poc with pt, pt verbalized understanding  Purposeful rounding every 2hours  VS wnl  Blood glucose monitoring   Fall precautions in place, remains injury free  Pain and nausea under control with PRN meds  IVFs  Accurate I&Os  Bed locked at lowest position  Call light within reach  Chart check complete  Will cont with POC

## 2025-02-09 NOTE — PT/OT/SLP PROGRESS
Physical Therapy  Treatment    Odin Oliva   MRN: [id number]   Admitting Diagnosis: Closed fracture of right femur    PT Received On: 02/09/25  PT Start Time: 0822     PT Stop Time: 0847    PT Total Time (min): 25 min       Billable Minutes:  Gait Training 10 and Therapeutic Activity 15    Treatment Type: Treatment  PT/PTA: PTA     Number of PTA visits since last PT visit: 2       General Precautions: Standard, fall  Orthopedic Precautions: RLE toe touch weight bearing  Braces: N/A  Respiratory Status: Room air         Subjective:  Communicated with nurse Alexis and completed Epic chart review prior to session. Pt agreed to session.       Objective:   Patient found with: peripheral IV    Supine >Sit: CGA  Scoot towards EOB: SBA  STS from EOB: CGA with RW, cues for R TTWB    Pt ambulated 10' with RW with Min A. Cues to maintain (R) TTWB precautions. Demonstrates slow vannessa and decreased step length.    Stand Pivot to BSC: Min A with RW  Treatment and Education:  Educated pt on benefits of increasing time spent OOB. Encouraged pt to sit up in BSC for all meals and for a min 2hr 3x/day. Reviewed HEP and encouraged pt to perform throughout the day to regain strength. Reviewed call don't fall policy and to call for assistance with all OOB needs. Pt agreed to safety request.    AM-PAC 6 CLICK MOBILITY  How much help from another person does this patient currently need?   1 = Unable, Total/Dependent Assistance  2 = A lot, Maximum/Moderate Assistance  3 = A little, Minimum/Contact Guard/Supervision  4 = None, Modified Hinds/Independent    Turning over in bed (including adjusting bedclothes, sheets and blankets)?: 3  Sitting down on and standing up from a chair with arms (e.g., wheelchair, bedside commode, etc.): 3  Moving from lying on back to sitting on the side of the bed?: 3  Moving to and from a bed to a chair (including a wheelchair)?: 3  Need to walk in hospital room?: 3  Climbing 3-5 steps with a railing?:  1  Basic Mobility Total Score: 16    AM-PAC Raw Score CMS G-Code Modifier Level of Impairment Assistance   6 % Total / Unable   7 - 9 CM 80 - 100% Maximal Assist   10 - 14 CL 60 - 80% Moderate Assist   15 - 19 CK 40 - 60% Moderate Assist   20 - 22 CJ 20 - 40% Minimal Assist   23 CI 1-20% SBA / CGA   24 CH 0% Independent/ Mod I     Patient left up in chair with all lines intact, call button in reach, chair alarm on, nurse notified, and spouse present.    Assessment:  Odin Oliva is a 85 y.o. female with a medical diagnosis of Closed fracture of right femur and presents with the following functional limitations. Pt able to ambulate 10' with RW with Min A, cues needed to maintain (R) TTWB precautions. Pt will continue to benefit from skilled PT in order to address the below deficits to reach maximum level of function.    Rehab identified problem list/impairments: weakness, impaired endurance, impaired self care skills, impaired functional mobility, gait instability, impaired balance, orthopedic precautions, decreased ROM, pain, decreased safety awareness, decreased lower extremity function, decreased upper extremity function, decreased coordination, edema    Rehab potential is good.    Activity tolerance: Good    Discharge recommendations: High Intensity Therapy      Barriers to discharge:      Equipment recommendations: to be determined by next level of care     GOALS:   Multidisciplinary Problems       Physical Therapy Goals          Problem: Physical Therapy    Goal Priority Disciplines Outcome Interventions   Physical Therapy Goal     PT, PT/OT     Description: LT25  1. PT WILL COMPLETE BED MOBILITY WITH CGA  2. PT WILL STAND T/F TO CHAIR WITH RW TDWB AND MIN A  3. PT WILL GT TRAIN X 20' WITH RW AND TDWB WITH ROWENA TO PROGRESS GT  4. PT WILL INC AMPAC SCORE BY 2 POINTS TO PROGRESS GROSS FUNC MOBILITY.                          DME Justifications:  No DME recommended requiring DME  justifications    PLAN:    Patient to be seen 3 x/week to address the above listed problems via gait training, therapeutic activities, therapeutic exercises  Plan of Care expires: 02/21/25  Plan of Care reviewed with: patient, daughter         02/09/2025

## 2025-02-09 NOTE — ASSESSMENT & PLAN NOTE
Creatine stable for now. BMP reviewed- noted Estimated Creatinine Clearance: 19.1 mL/min (A) (based on SCr of 1.5 mg/dL (H)). according to latest data. Based on current GFR, CKD stage is stage 4 - GFR 15-29.  Monitor UOP and serial BMP and adjust therapy as needed. Renally dose meds. Avoid nephrotoxic medications and procedures.

## 2025-02-09 NOTE — SUBJECTIVE & OBJECTIVE
Interval History: No acute events overnight, afebrile, hemodynamically stable. Alleviation in anemia symptoms after blood transfusion yesterday.  Will also administer iron infusion. Bowel regimen optimized for constipation     Objective:     Vital Signs (Most Recent):  Temp: 98.3 °F (36.8 °C) (02/09/25 1527)  Pulse: 78 (02/09/25 1527)  Resp: 18 (02/09/25 1527)  BP: (!) 161/71 (02/09/25 1529)  SpO2: 98 % (02/09/25 1527) Vital Signs (24h Range):  Temp:  [97.7 °F (36.5 °C)-98.5 °F (36.9 °C)] 98.3 °F (36.8 °C)  Pulse:  [70-78] 78  Resp:  [14-19] 18  SpO2:  [94 %-98 %] 98 %  BP: (115-168)/(53-72) 161/71     Weight: 58 kg (127 lb 13.9 oz)  Body mass index is 37.44 kg/m².    Intake/Output Summary (Last 24 hours) at 2/9/2025 1622  Last data filed at 2/9/2025 0134  Gross per 24 hour   Intake 818.36 ml   Output --   Net 818.36 ml         Physical Exam  Vitals and nursing note reviewed.   Constitutional:       General: She is not in acute distress.     Appearance: She is not ill-appearing, toxic-appearing or diaphoretic.   HENT:      Head: Normocephalic and atraumatic.      Mouth/Throat:      Mouth: Mucous membranes are moist.   Eyes:      General: No scleral icterus.        Right eye: No discharge.         Left eye: No discharge.   Cardiovascular:      Rate and Rhythm: Normal rate and regular rhythm.      Heart sounds: Normal heart sounds.   Pulmonary:      Effort: Pulmonary effort is normal. No respiratory distress.      Breath sounds: Normal breath sounds.   Abdominal:      General: Bowel sounds are normal.      Palpations: Abdomen is soft.      Tenderness: There is no abdominal tenderness.   Musculoskeletal:      Cervical back: No rigidity.      Right lower leg: No edema.      Left lower leg: No edema.   Skin:     General: Skin is warm and dry.      Coloration: Skin is not jaundiced.      Comments: Right lower extremity with postsurgical wound dressing c/d/i   Neurological:      Mental Status: She is oriented to person,  place, and time. Mental status is at baseline.   Psychiatric:         Mood and Affect: Mood normal.         Behavior: Behavior normal.             Significant Labs: All pertinent labs within the past 24 hours have been reviewed.   Recent Labs   Lab 02/07/25  0901 02/08/25  1046 02/09/25  0754   * 130* 140   K 4.6 4.8 4.6    99 106   CO2 25 20* 26   BUN 30* 31* 27*   CREATININE 1.6* 1.7* 1.5*   * 354* 132*   ANIONGAP 6* 11 8     Recent Labs   Lab 02/08/25  1046 02/09/25  0754   MG 1.7 1.9   PHOS 2.4* 2.9     Recent Labs   Lab 02/07/25  0901 02/08/25  1046 02/09/25  0754   AST 18 20 17   ALT 8* 8* 8*   ALKPHOS 89 93 90   BILITOT 0.6 0.6 0.7   ALBUMIN 2.6* 2.7* 2.5*     POCT Glucose:   Recent Labs   Lab 02/08/25  2159 02/09/25  0609 02/09/25  1125   POCTGLUCOSE 171* 161* 204*    Recent Labs   Lab 02/07/25  0901 02/08/25  1046 02/09/25  0754   WBC 6.16 7.75 6.20   HGB 7.8* 7.7* 8.7*   HCT 24.5* 24.7* 27.1*    405 418   GRAN 72.0  4.4 80.9*  6.3 65.0  4.0                  Significant Imaging:  I have reviewed all pertinent imaging results/findings within the past 24 hours.   SURG FL Surgery Fluoro Usage   Final Result      X-Ray Femur 2 View Right   Final Result   FINDINGS/   Intraoperative fluoroscopic images were obtained.    Total fluoro time was 1.6 minutes, 4.9 mGy and 2 fluoroscopic images were obtained.  See operative report.         Electronically signed by: Roderick Castillo MD   Date:    02/06/2025   Time:    10:53      X-Ray Chest 1 View   Final Result      As above.         Electronically signed by: Rich Acosta MD   Date:    02/05/2025   Time:    00:45      X-Ray Tibia Fibula 2 View Right   Final Result      Complex, comminuted intra-articular fracture of the distal right femur with external fixation device in place.  Correlation with any prior outside imaging advised.         Electronically signed by: Rich Acosta MD   Date:    02/04/2025   Time:    23:37      X-Ray Femur  Ap/Lat Right   Final Result      Complex, comminuted intra-articular fracture of the distal right femur with external fixation device in place.  Correlation with any prior outside imaging advised.         Electronically signed by: Rich Acosta MD   Date:    02/04/2025   Time:    23:37          Inpatient Medications:  Continuous Infusions:    Scheduled Meds:   amLODIPine  5 mg Oral Daily    apixaban  2.5 mg Oral BID    bisacodyL  10 mg Rectal Once    cholecalciferol (vitamin D3)  5,000 Units Oral Daily    folic acid-vit B6-vit B12 2.5-25-2 mg  1 tablet Oral Daily    insulin aspart U-100  5 Units Subcutaneous TIDWM    insulin glargine U-100  7 Units Subcutaneous BID    iron sucrose  200 mg Intravenous Once    multivitamin  1 tablet Oral Daily    polyethylene glycol  17 g Oral BID    senna-docusate 8.6-50 mg  1 tablet Oral BID    thiamine  100 mg Oral Daily       PRN Meds:  Current Facility-Administered Medications:     0.9%  NaCl infusion (for blood administration), , Intravenous, Q24H PRN    acetaminophen, 650 mg, Rectal, Q6H PRN    acetaminophen, 650 mg, Oral, Q6H PRN    aluminum-magnesium hydroxide-simethicone, 30 mL, Oral, QID PRN    dextrose 50%, 12.5 g, Intravenous, PRN    dextrose 50%, 12.5 g, Intravenous, PRN    dextrose 50%, 25 g, Intravenous, PRN    dextrose 50%, 25 g, Intravenous, PRN    diphenhydrAMINE, 25 mg, Oral, Q6H PRN    glucagon (human recombinant), 1 mg, Intramuscular, PRN    glucagon (human recombinant), 1 mg, Intramuscular, PRN    glucose, 16 g, Oral, PRN    glucose, 16 g, Oral, PRN    glucose, 24 g, Oral, PRN    glucose, 24 g, Oral, PRN    hydrALAZINE, 10 mg, Oral, Q6H PRN    HYDROcodone-acetaminophen, 1 tablet, Oral, Q6H PRN    insulin aspart U-100, 0-10 Units, Subcutaneous, Q6H PRN    melatonin, 6 mg, Oral, Nightly PRN    morphine, 2 mg, Intravenous, Q2H PRN    naloxone, 0.02 mg, Intravenous, PRN    ondansetron, 4 mg, Intravenous, Q8H PRN    polyethylene glycol, 17 g, Oral, Daily PRN     promethazine, 25 mg, Oral, Q6H PRN    simethicone, 1 tablet, Oral, QID PRN    sodium chloride 0.9%, 3 mL, Intravenous, Q12H PRN

## 2025-02-09 NOTE — PROGRESS NOTES
[facility]  Orthopedics  Progress Note    Patient Name: Odin Oliva  MRN: [id number]  Admission Date: 2/4/2025  Hospital Length of Stay: 4 days  Attending Provider: Efrain Ross DO  Primary Care Provider: Juventino Barbosa MD  Follow-up For: Procedure(s) (LRB):  REMOVAL, EXTERNAL FIXATION DEVICE (Right)  ORIF, FRACTURE, FEMUR, TRANSCONDYLAR WITH INTERCONDYLAR EXTENSION (Right)    Post-Operative Day: 3 Days Post-Op  Subjective:     Principal Problem:Closed fracture of right femur    Principal Orthopedic Problem:  Right femur fracture    Interval History: Odin Oliva is a 85 y.o. female feeling better today than yesterday    Review of patient's allergies indicates:  No Known Allergies    Current Facility-Administered Medications   Medication    0.9%  NaCl infusion (for blood administration)    acetaminophen suppository 650 mg    acetaminophen tablet 650 mg    aluminum-magnesium hydroxide-simethicone 200-200-20 mg/5 mL suspension 30 mL    amLODIPine tablet 5 mg    apixaban tablet 2.5 mg    aspirin tablet 325 mg    cholecalciferol (vitamin D3) 125 mcg (5,000 unit) tablet 5,000 Units    dextrose 50% injection 12.5 g    dextrose 50% injection 12.5 g    dextrose 50% injection 25 g    dextrose 50% injection 25 g    diphenhydrAMINE capsule 25 mg    folic acid-vit B6-vit B12 2.5-25-2 mg tablet 1 tablet    glucagon (human recombinant) injection 1 mg    glucagon (human recombinant) injection 1 mg    glucose chewable tablet 16 g    glucose chewable tablet 16 g    glucose chewable tablet 24 g    glucose chewable tablet 24 g    hydrALAZINE tablet 10 mg    HYDROcodone-acetaminophen 5-325 mg per tablet 1 tablet    insulin aspart U-100 pen 0-10 Units    insulin aspart U-100 pen 5 Units    insulin glargine U-100 (Lantus) pen 7 Units    melatonin tablet 6 mg    morphine injection 2 mg    multivitamin tablet    naloxone 0.4 mg/mL injection 0.02 mg    ondansetron injection 4 mg    polyethylene glycol packet 17 g     "polyethylene glycol packet 17 g    promethazine tablet 25 mg    senna-docusate 8.6-50 mg per tablet 1 tablet    simethicone chewable tablet 80 mg    sodium chloride 0.9% flush 3 mL    thiamine tablet 100 mg     Objective:     Vital Signs (Most Recent):  Temp: 98.3 °F (36.8 °C) (02/09/25 0802)  Pulse: 70 (02/09/25 0802)  Resp: 18 (02/09/25 0845)  BP: (!) 158/66 (02/09/25 0802)  SpO2: 95 % (02/09/25 0802) Vital Signs (24h Range):  Temp:  [97.7 °F (36.5 °C)-98.5 °F (36.9 °C)] 98.3 °F (36.8 °C)  Pulse:  [69-75] 70  Resp:  [14-20] 18  SpO2:  [94 %-98 %] 95 %  BP: (101-162)/(48-67) 158/66     Weight: 57 kg (125 lb 10.6 oz)  Height: 4' 1" (124.5 cm)  Body mass index is 36.8 kg/m².      Intake/Output Summary (Last 24 hours) at 2/9/2025 1029  Last data filed at 2/9/2025 0134  Gross per 24 hour   Intake 818.36 ml   Output --   Net 818.36 ml       Ortho/SPM Exam  Dressings were changed and there is minimal drainage.  No signs of erythema or infection  Leg is significantly swollen but not unexpected with this diagnosis  Neurovascular exam of the foot and ankle is normal    GEN: Well developed, well nourished female. AAOX3. No acute distress.   Head: Normocephalic, atraumatic.   Eyes: SHAHBAZ  Neck: Trachea is midline, no adenopathy  Resp: Breathing unlabored.  Neuro: Motor function normal, Cranial nerves intact  Psych: Mood and affect happy and alert.      Significant Labs: BMP:   Recent Labs   Lab 02/09/25  0754   *      K 4.6      CO2 26   BUN 27*   CREATININE 1.5*   CALCIUM 8.6*   MG 1.9     CBC:   Recent Labs   Lab 02/08/25  1046 02/09/25  0754   WBC 7.75 6.20   HGB 7.7* 8.7*   HCT 24.7* 27.1*    418     All pertinent labs within the past 24 hours have been reviewed.    Significant Imaging: None    Assessment/Plan:     Active Diagnoses:    Diagnosis Date Noted POA    PRINCIPAL PROBLEM:  Closed fracture of right femur [S72.91XA] 02/05/2025 Yes    Hypertension [I10] 11/04/2019 Yes    Other specified " hypothyroidism [E03.8] 10/28/2019 Yes    Chronic kidney disease (CKD), stage IV (severe) [N18.4] 10/23/2019 Yes    Diabetes mellitus [E11.9] 04/03/2019 Yes      Problems Resolved During this Admission:       Assessment:  85 y.o. female overall doing well.  Leg is quite swollen but this is not unexpected with her injury.  I changed the dressing today and the incisions look good with minimal drainage.    Appreciate hospitalist support in the transfusion yesterday which raised her hemoglobin to 8.7.    Plan:  We are going to put Scar hose on the right lower extremity today and begin aspirin for DVT prophylaxis    Damian Jean MD, MD  Orthopedics  [facility]

## 2025-02-09 NOTE — PROGRESS NOTES
[facility]– [place] Medicine  Progress Note    Patient Name: Odin Oliva  MRN: [id number]  Patient Class: IP- Inpatient   Admission Date: 2/4/2025  Length of Stay: 4 days  Attending Physician: Efrain Ross DO  Primary Care Provider: Juventino Barbosa MD        Subjective     Principal Problem:Closed fracture of right femur        HPI:  Odin Oliva is a 85 y.o. female with a PMH  has a past medical history of CKD (chronic kidney disease), stage IV, Diabetes mellitus, HTN (hypertension), Hyperuricemia, Seasonal allergies, Thyroid disease, and Urinary tract infection. presented to the Emergency Department for evaluation of R leg pain. Pt fell on 1/27/25. Pt states she had surgery on her femur in [place] on 1/29/25 and was sent here for pain relief and f/u. External fixator is in place. Symptoms are constant and moderate in severity. Patient denies all other sxs at this time. Pt was prescribed Cefuroxime 500mg BID for 5 days and Paracetamol 1g TID for 5 days. Prior Tx includes a Paracetamol around 10:00 PM tonight. No further complaints or concerns at this time.     ER workup revealed CBC with H/H of 9.4/29.1 (previously 11.8/35.8 on 01/15/2025).  CMP revealed BUN/creatinine of 42/1.6 (at baseline),  mg/dL.  Plain film imaging of right femur revealed:[Complex, comminuted intra-articular fracture of the distal right femur with external fixation device in place].  On-call orthopedic surgeon consulted.  Plans to take to OR in a.m..  Patient received 2 mg of morphine, 4 mg Zofran in ER.  Hospital Medicine consulted with.  Patient in agreement with treatment plan.  Patient admitted under inpatient status.    PCP: Juventino Barbosa      Overview/Hospital Course:  85 y.o. female with hx of DM2 w CKD 4, HTN, Hypothyroidism, UTI, who presented to the ER for evaluation of R leg pain. Pt fell on 1/27/25 and had surgery on her femur in [place] on 1/29/25 and was sent here for pain relief and f/u. External fixator  is in place. Pt was prescribed Cefuroxime 500mg BID for 5 days and Paracetamol 1g TID for 5 days.       ER workup revealed H/H of 9.4/29, (previously 12/36 on 01/15/2025), Bun/Cr 42/1.6 (at baseline),  mg/dL. Plain film imaging of right femur revealed:[Complex, comminuted intra-articular fracture of the distal right femur with external fixation device in place].  Ortho consulted and plans to take to OR in a.m..Patient received 2 mg of morphine, 4 mg Zofran in ER and admitted to [facility].      Appreciate ortho input. Pt comfortable with pain controlled, getting IVF, BS controlled with SSI. Ortho plans surgery tomorrow. Diabetic Diet ordered for today.     2/6- seen post op- doing well, VSS afeb, AAOx4, speech clear, moving all 4 ext, NV intact- distal pulses 2 + BLE, dressing R thing clean and dry. BS under control. Pain under control. Cont post op orders.     2/7- looks and feels better, sitting up in chair comfortably on RA, pain RLE under control, VSS Afeb, did well with PT/OT, eating drinking better now- labs stable, Bun/Cr improving, so IVF d/linda, BS higher- started on lantus plus SSI. Will give inj B12 plus triple vitamins. Await SNF placement.     2/8/- Reported dizziness with exertion.  Denies any bleeding concerns or prior history of abnormal bleeding or intolerance to anticoagulation.  Concerns for symptomatic anemia given recent surgical procedure, so  will transfuse 1u  pRbc .  Adjusted insulin due to hyperglycemia concern.  History of advanced CKD, so switched DVT prophylaxis to Eliquis instead of aspirin 650 mg.    2/9- alleviation in anemia symptoms after blood transfusion yesterday.  Will also administer iron infusion given concerns for functional iron-deficiency in setting of CKD due to low iron saturation levels. Bowel regimen optimized for constipation    Interval History: No acute events overnight, afebrile, hemodynamically stable. Alleviation in anemia symptoms after blood transfusion  yesterday.  Will also administer iron infusion. Bowel regimen optimized for constipation     Objective:     Vital Signs (Most Recent):  Temp: 98.3 °F (36.8 °C) (02/09/25 1527)  Pulse: 78 (02/09/25 1527)  Resp: 18 (02/09/25 1527)  BP: (!) 161/71 (02/09/25 1529)  SpO2: 98 % (02/09/25 1527) Vital Signs (24h Range):  Temp:  [97.7 °F (36.5 °C)-98.5 °F (36.9 °C)] 98.3 °F (36.8 °C)  Pulse:  [70-78] 78  Resp:  [14-19] 18  SpO2:  [94 %-98 %] 98 %  BP: (115-168)/(53-72) 161/71     Weight: 58 kg (127 lb 13.9 oz)  Body mass index is 37.44 kg/m².    Intake/Output Summary (Last 24 hours) at 2/9/2025 1622  Last data filed at 2/9/2025 0134  Gross per 24 hour   Intake 818.36 ml   Output --   Net 818.36 ml         Physical Exam  Vitals and nursing note reviewed.   Constitutional:       General: She is not in acute distress.     Appearance: She is not ill-appearing, toxic-appearing or diaphoretic.   HENT:      Head: Normocephalic and atraumatic.      Mouth/Throat:      Mouth: Mucous membranes are moist.   Eyes:      General: No scleral icterus.        Right eye: No discharge.         Left eye: No discharge.   Cardiovascular:      Rate and Rhythm: Normal rate and regular rhythm.      Heart sounds: Normal heart sounds.   Pulmonary:      Effort: Pulmonary effort is normal. No respiratory distress.      Breath sounds: Normal breath sounds.   Abdominal:      General: Bowel sounds are normal.      Palpations: Abdomen is soft.      Tenderness: There is no abdominal tenderness.   Musculoskeletal:      Cervical back: No rigidity.      Right lower leg: No edema.      Left lower leg: No edema.   Skin:     General: Skin is warm and dry.      Coloration: Skin is not jaundiced.      Comments: Right lower extremity with postsurgical wound dressing c/d/i   Neurological:      Mental Status: She is oriented to person, place, and time. Mental status is at baseline.   Psychiatric:         Mood and Affect: Mood normal.         Behavior: Behavior normal.              Significant Labs: All pertinent labs within the past 24 hours have been reviewed.   Recent Labs   Lab 02/07/25  0901 02/08/25  1046 02/09/25  0754   * 130* 140   K 4.6 4.8 4.6    99 106   CO2 25 20* 26   BUN 30* 31* 27*   CREATININE 1.6* 1.7* 1.5*   * 354* 132*   ANIONGAP 6* 11 8     Recent Labs   Lab 02/08/25  1046 02/09/25  0754   MG 1.7 1.9   PHOS 2.4* 2.9     Recent Labs   Lab 02/07/25  0901 02/08/25  1046 02/09/25  0754   AST 18 20 17   ALT 8* 8* 8*   ALKPHOS 89 93 90   BILITOT 0.6 0.6 0.7   ALBUMIN 2.6* 2.7* 2.5*     POCT Glucose:   Recent Labs   Lab 02/08/25  2159 02/09/25  0609 02/09/25  1125   POCTGLUCOSE 171* 161* 204*    Recent Labs   Lab 02/07/25  0901 02/08/25  1046 02/09/25  0754   WBC 6.16 7.75 6.20   HGB 7.8* 7.7* 8.7*   HCT 24.5* 24.7* 27.1*    405 418   GRAN 72.0  4.4 80.9*  6.3 65.0  4.0                  Significant Imaging:  I have reviewed all pertinent imaging results/findings within the past 24 hours.   SURG FL Surgery Fluoro Usage   Final Result      X-Ray Femur 2 View Right   Final Result   FINDINGS/   Intraoperative fluoroscopic images were obtained.    Total fluoro time was 1.6 minutes, 4.9 mGy and 2 fluoroscopic images were obtained.  See operative report.         Electronically signed by: Roderick Castillo MD   Date:    02/06/2025   Time:    10:53      X-Ray Chest 1 View   Final Result      As above.         Electronically signed by: Rich Acosta MD   Date:    02/05/2025   Time:    00:45      X-Ray Tibia Fibula 2 View Right   Final Result      Complex, comminuted intra-articular fracture of the distal right femur with external fixation device in place.  Correlation with any prior outside imaging advised.         Electronically signed by: Rich Acosta MD   Date:    02/04/2025   Time:    23:37      X-Ray Femur Ap/Lat Right   Final Result      Complex, comminuted intra-articular fracture of the distal right femur with external fixation device in  place.  Correlation with any prior outside imaging advised.         Electronically signed by: Rich Acosta MD   Date:    02/04/2025   Time:    23:37          Inpatient Medications:  Continuous Infusions:    Scheduled Meds:   amLODIPine  5 mg Oral Daily    apixaban  2.5 mg Oral BID    bisacodyL  10 mg Rectal Once    cholecalciferol (vitamin D3)  5,000 Units Oral Daily    folic acid-vit B6-vit B12 2.5-25-2 mg  1 tablet Oral Daily    insulin aspart U-100  5 Units Subcutaneous TIDWM    insulin glargine U-100  7 Units Subcutaneous BID    iron sucrose  200 mg Intravenous Once    multivitamin  1 tablet Oral Daily    polyethylene glycol  17 g Oral BID    senna-docusate 8.6-50 mg  1 tablet Oral BID    thiamine  100 mg Oral Daily       PRN Meds:  Current Facility-Administered Medications:     0.9%  NaCl infusion (for blood administration), , Intravenous, Q24H PRN    acetaminophen, 650 mg, Rectal, Q6H PRN    acetaminophen, 650 mg, Oral, Q6H PRN    aluminum-magnesium hydroxide-simethicone, 30 mL, Oral, QID PRN    dextrose 50%, 12.5 g, Intravenous, PRN    dextrose 50%, 12.5 g, Intravenous, PRN    dextrose 50%, 25 g, Intravenous, PRN    dextrose 50%, 25 g, Intravenous, PRN    diphenhydrAMINE, 25 mg, Oral, Q6H PRN    glucagon (human recombinant), 1 mg, Intramuscular, PRN    glucagon (human recombinant), 1 mg, Intramuscular, PRN    glucose, 16 g, Oral, PRN    glucose, 16 g, Oral, PRN    glucose, 24 g, Oral, PRN    glucose, 24 g, Oral, PRN    hydrALAZINE, 10 mg, Oral, Q6H PRN    HYDROcodone-acetaminophen, 1 tablet, Oral, Q6H PRN    insulin aspart U-100, 0-10 Units, Subcutaneous, Q6H PRN    melatonin, 6 mg, Oral, Nightly PRN    morphine, 2 mg, Intravenous, Q2H PRN    naloxone, 0.02 mg, Intravenous, PRN    ondansetron, 4 mg, Intravenous, Q8H PRN    polyethylene glycol, 17 g, Oral, Daily PRN    promethazine, 25 mg, Oral, Q6H PRN    simethicone, 1 tablet, Oral, QID PRN    sodium chloride 0.9%, 3 mL, Intravenous, Q12H  PRN          Assessment and Plan     * Closed fracture of right femur  RCRI revealed 3.9% risk for major cardiac event. S/p ORIF 02/06/25    Plan:  -  -Continue current pain regimen, titrate as needed  -Bowel regimen  -Bedrest  -Wound care   --IVFs prn  -Antiemetics prn  -PT/OT   -Ortho consulted, follow-up recs  -DVT prophylaxis    Acute blood loss anemia  Anemia is likely due to acute blood loss which was from Orthopedic surgery and chronic disease due to Chronic Kidney Disease. Most recent hemoglobin and hematocrit are listed below.  Recent Labs     02/07/25  0901 02/08/25  1046 02/09/25  0754   HGB 7.8* 7.7* 8.7*   HCT 24.5* 24.7* 27.1*     Plan  - Monitor serial CBC: Daily  - Transfuse PRBC if patient becomes hemodynamically unstable, symptomatic or H/H drops below 7/21.  - Patient has received 1 units of PRBCs on 2/8/25  - Patient's anemia is currently improving      Hypertension  Chronic, controlled.  Latest blood pressure and vitals reviewed-   Temp:  [97.7 °F (36.5 °C)-98.5 °F (36.9 °C)]   Pulse:  [70-78]   Resp:  [14-19]   BP: (115-168)/(53-72)   SpO2:  [94 %-98 %] .   Home meds for hypertension were reviewed and noted below.   Hypertension Medications               valsartan (DIOVAN) 160 MG tablet Take 1 tablet (160 mg total) by mouth once daily.            While in the hospital, will manage blood pressure as follows; Continue home antihypertensive regimen    Will utilize p.r.n. blood pressure medication only if patient's blood pressure greater than  160/90 and she develops symptoms such as worsening chest pain or shortness of breath.      Other specified hypothyroidism  Patient has chronic hypothyroidism. TFTs reviewed-   Lab Results   Component Value Date    TSH 18.214 (H) 01/15/2025   . Will continue chronic levothyroxine and adjust for and clinical changes.        Chronic kidney disease (CKD), stage IV (severe)  Creatine stable for now. BMP reviewed- noted Estimated Creatinine Clearance: 19.1 mL/min  (A) (based on SCr of 1.5 mg/dL (H)). according to latest data. Based on current GFR, CKD stage is stage 4 - GFR 15-29.  Monitor UOP and serial BMP and adjust therapy as needed. Renally dose meds. Avoid nephrotoxic medications and procedures.    Diabetes mellitus  Patient's FSGs are uncontrolled due to hyperglycemia on current medication regimen.  Last A1c reviewed-   Lab Results   Component Value Date    HGBA1C 7.8 (H) 01/15/2025     Most recent fingerstick glucose reviewed-   Recent Labs   Lab 02/08/25  1826 02/08/25  2159 02/09/25  0609 02/09/25  1125   POCTGLUCOSE 163* 171* 161* 204*       Current correctional scale  Low  Maintain anti-hyperglycemic dose as follows-   Antihyperglycemics (From admission, onward)      Start     Stop Route Frequency Ordered    02/08/25 2100  insulin glargine U-100 (Lantus) pen 7 Units         -- SubQ 2 times daily 02/08/25 1235    02/08/25 1145  insulin aspart U-100 pen 5 Units         -- SubQ 3 times daily with meals 02/08/25 1040    02/05/25 2200  insulin aspart U-100 pen 0-10 Units         -- SubQ Every 6 hours PRN 02/05/25 2101              VTE Risk Mitigation (From admission, onward)           Ordered     apixaban tablet 2.5 mg  2 times daily         02/08/25 1213     Reason for No Pharmacological VTE Prophylaxis  Once        Comments: Planned surgical procedure   Question:  Reasons:  Answer:  Physician Provided (leave comment)    02/05/25 0005     IP VTE HIGH RISK PATIENT  Once         02/05/25 0005     Place sequential compression device  Until discontinued         02/05/25 0005                    Discharge Planning   BRIAN: 2/7/2025     Code Status: Full Code   Medical Readiness for Discharge Date:   Discharge Plan A: Skilled Nursing Facility                Efrain Ross DO  Department of Hospital Medicine   [facility] - Med S[facility]    Voice recognition software was used in the creation of this note/communication and any sound-alike/typographical errors which may have occurred  despite initial review prior to signing should be taken in context when interpreting.  If such errors prevent a clear understanding of the note/communication, please contact the provider/office for clarification.

## 2025-02-09 NOTE — PROGRESS NOTES
[facility] Medicine  Progress Note    Patient Name: Odin Oliva  MRN: [id number]  Patient Class: IP- Inpatient   Admission Date: 2/4/2025  Length of Stay: 3 days  Attending Physician: Efrain Ross DO  Primary Care Provider: Juventino Barbosa MD        Subjective     Principal Problem:Closed fracture of right femur        HPI:  Odin Oliva is a 85 y.o. female with a PMH  has a past medical history of CKD (chronic kidney disease), stage IV, Diabetes mellitus, HTN (hypertension), Hyperuricemia, Seasonal allergies, Thyroid disease, and Urinary tract infection. presented to the Emergency Department for evaluation of R leg pain. Pt fell on 1/27/25. Pt states she had surgery on her femur in [place] on 1/29/25 and was sent here for pain relief and f/u. External fixator is in place. Symptoms are constant and moderate in severity. Patient denies all other sxs at this time. Pt was prescribed Cefuroxime 500mg BID for 5 days and Paracetamol 1g TID for 5 days. Prior Tx includes a Paracetamol around 10:00 PM tonight. No further complaints or concerns at this time.     ER workup revealed CBC with H/H of 9.4/29.1 (previously 11.8/35.8 on 01/15/2025).  CMP revealed BUN/creatinine of 42/1.6 (at baseline),  mg/dL.  Plain film imaging of right femur revealed:[Complex, comminuted intra-articular fracture of the distal right femur with external fixation device in place].  On-call orthopedic surgeon consulted.  Plans to take to OR in a.m..  Patient received 2 mg of morphine, 4 mg Zofran in ER.  Hospital Medicine consulted with.  Patient in agreement with treatment plan.  Patient admitted under inpatient status.    PCP: Juventino Barbosa      Overview/Hospital Course:  85 y.o. female with hx of DM2 w CKD 4, HTN, Hypothyroidism, UTI, who presented to the ER for evaluation of R leg pain. Pt fell on 1/27/25 and had surgery on her femur in [place] on 1/29/25 and was sent here for pain relief and f/u. External fixator  is in place. Pt was prescribed Cefuroxime 500mg BID for 5 days and Paracetamol 1g TID for 5 days.       ER workup revealed H/H of 9.4/29, (previously 12/36 on 01/15/2025), Bun/Cr 42/1.6 (at baseline),  mg/dL. Plain film imaging of right femur revealed:[Complex, comminuted intra-articular fracture of the distal right femur with external fixation device in place].  Ortho consulted and plans to take to OR in a.m..Patient received 2 mg of morphine, 4 mg Zofran in ER and admitted to [facility].      Appreciate ortho input. Pt comfortable with pain controlled, getting IVF, BS controlled with SSI. Ortho plans surgery tomorrow. Diabetic Diet ordered for today.     2/6- seen post op- doing well, VSS afeb, AAOx4, speech clear, moving all 4 ext, NV intact- distal pulses 2 + BLE, dressing R thing clean and dry. BS under control. Pain under control. Cont post op orders.     2/7- looks and feels better, sitting up in chair comfortably on RA, pain RLE under control, VSS Afeb, did well with PT/OT, eating drinking better now- labs stable, Bun/Cr improving, so IVF d/linda, BS higher- started on lantus plus SSI. Will give inj B12 plus triple vitamins. Await SNF placement.     2/7- Reported dizziness with exertion.  Denies any bleeding concerns or prior history of abnormal bleeding or intolerance to anticoagulation.  Concerns for symptomatic anemia given recent surgical procedure, so  will transfuse 1u  pRbc .  Adjusted insulin due to hyperglycemia concern.  History of advanced CKD, so switched DVT prophylaxis to Eliquis instead of aspirin 650 mg.    Interval History: No acute events overnight, afebrile, hemodynamically stable.  Reported dizziness with exertion.  Denies any bleeding concerns or prior history of abnormal bleeding or intolerance to anticoagulation.  Concerns for symptomatic anemia given recent surgical procedure, so  will transfuse 1u  pRbc .  Adjusted insulin due to hyperglycemia concerns      Objective:     Vital  Signs (Most Recent):  Temp: 98 °F (36.7 °C) (02/08/25 1509)  Pulse: 69 (02/08/25 1604)  Resp: 18 (02/08/25 1509)  BP: (!) 108/56 (02/08/25 1604)  SpO2: 96 % (02/08/25 1509) Vital Signs (24h Range):  Temp:  [97.8 °F (36.6 °C)-98.8 °F (37.1 °C)] 98 °F (36.7 °C)  Pulse:  [69-78] 69  Resp:  [18-20] 18  SpO2:  [94 %-98 %] 96 %  BP: (101-148)/(48-66) 108/56     Weight: 57 kg (125 lb 10.6 oz)  Body mass index is 36.8 kg/m².    Intake/Output Summary (Last 24 hours) at 2/8/2025 1803  Last data filed at 2/8/2025 0031  Gross per 24 hour   Intake 290 ml   Output --   Net 290 ml         Physical Exam  Vitals and nursing note reviewed.   Constitutional:       General: She is not in acute distress.     Appearance: She is not ill-appearing, toxic-appearing or diaphoretic.   HENT:      Head: Normocephalic and atraumatic.      Mouth/Throat:      Mouth: Mucous membranes are moist.   Eyes:      General: No scleral icterus.        Right eye: No discharge.         Left eye: No discharge.   Cardiovascular:      Rate and Rhythm: Normal rate and regular rhythm.      Heart sounds: Normal heart sounds.   Pulmonary:      Effort: Pulmonary effort is normal. No respiratory distress.      Breath sounds: Normal breath sounds.   Abdominal:      General: Bowel sounds are normal.      Palpations: Abdomen is soft.      Tenderness: There is no abdominal tenderness.   Musculoskeletal:      Cervical back: No rigidity.      Right lower leg: No edema.      Left lower leg: No edema.   Skin:     General: Skin is warm and dry.      Coloration: Skin is not jaundiced.      Comments: Right lower extremity with postsurgical wound dressing c/d/i   Neurological:      Mental Status: She is oriented to person, place, and time. Mental status is at baseline.   Psychiatric:         Mood and Affect: Mood normal.         Behavior: Behavior normal.             Significant Labs: All pertinent labs within the past 24 hours have been reviewed.   Recent Labs   Lab  "02/05/25  0000 02/07/25  0901 02/08/25  1046   * 134* 130*   K 4.7 4.6 4.8    103 99   CO2 18* 25 20*   BUN 42* 30* 31*   CREATININE 1.6* 1.6* 1.7*   * 230* 354*   ANIONGAP 11 6* 11     Recent Labs   Lab 02/08/25  1046   MG 1.7   PHOS 2.4*     Recent Labs   Lab 02/05/25  0000 02/07/25  0901 02/08/25  1046   AST 23 18 20   ALT 16 8* 8*   ALKPHOS 96 89 93   BILITOT 0.7 0.6 0.6   ALBUMIN 2.8* 2.6* 2.7*     POCT Glucose:   Recent Labs   Lab 02/07/25  1658 02/08/25  1028 02/08/25  1230   POCTGLUCOSE 276* 392* 362*    Recent Labs   Lab 02/05/25  0000 02/07/25  0901 02/08/25  1046   WBC 5.76 6.16 7.75   HGB 9.4* 7.8* 7.7*   HCT 29.1* 24.5* 24.7*    371 405   GRAN 69.2  4.0 72.0  4.4 80.9*  6.3                   Microbiology  Blood Cultures  No results found for: "LABBLOO"  Urine Cultures  Lab Results   Component Value Date    LABURIN No growth 02/24/2023    LABURIN ESCHERICHIA COLI  >100,000 cfu/ml   02/25/2019         Significant Imaging:  I have reviewed all pertinent imaging results/findings within the past 24 hours.   SURG FL Surgery Fluoro Usage   Final Result      X-Ray Femur 2 View Right   Final Result   FINDINGS/   Intraoperative fluoroscopic images were obtained.    Total fluoro time was 1.6 minutes, 4.9 mGy and 2 fluoroscopic images were obtained.  See operative report.         Electronically signed by: Roderick Castillo MD   Date:    02/06/2025   Time:    10:53      X-Ray Chest 1 View   Final Result      As above.         Electronically signed by: Rich Acosta MD   Date:    02/05/2025   Time:    00:45      X-Ray Tibia Fibula 2 View Right   Final Result      Complex, comminuted intra-articular fracture of the distal right femur with external fixation device in place.  Correlation with any prior outside imaging advised.         Electronically signed by: Rich Acosta MD   Date:    02/04/2025   Time:    23:37      X-Ray Femur Ap/Lat Right   Final Result      Complex, comminuted " intra-articular fracture of the distal right femur with external fixation device in place.  Correlation with any prior outside imaging advised.         Electronically signed by: Rich Acosta MD   Date:    02/04/2025   Time:    23:37          Inpatient Medications:  Continuous Infusions:    Scheduled Meds:   amLODIPine  5 mg Oral Daily    apixaban  2.5 mg Oral BID    cholecalciferol (vitamin D3)  5,000 Units Oral Daily    folic acid-vit B6-vit B12 2.5-25-2 mg  1 tablet Oral Daily    insulin aspart U-100  5 Units Subcutaneous TIDWM    insulin glargine U-100  7 Units Subcutaneous BID    multivitamin  1 tablet Oral Daily    polyethylene glycol  17 g Oral BID    senna-docusate 8.6-50 mg  1 tablet Oral BID    thiamine  100 mg Oral Daily       PRN Meds:  Current Facility-Administered Medications:     0.9%  NaCl infusion (for blood administration), , Intravenous, Q24H PRN    acetaminophen, 650 mg, Rectal, Q6H PRN    acetaminophen, 650 mg, Oral, Q6H PRN    aluminum-magnesium hydroxide-simethicone, 30 mL, Oral, QID PRN    dextrose 50%, 12.5 g, Intravenous, PRN    dextrose 50%, 12.5 g, Intravenous, PRN    dextrose 50%, 25 g, Intravenous, PRN    dextrose 50%, 25 g, Intravenous, PRN    diphenhydrAMINE, 25 mg, Oral, Q6H PRN    glucagon (human recombinant), 1 mg, Intramuscular, PRN    glucagon (human recombinant), 1 mg, Intramuscular, PRN    glucose, 16 g, Oral, PRN    glucose, 16 g, Oral, PRN    glucose, 24 g, Oral, PRN    glucose, 24 g, Oral, PRN    hydrALAZINE, 10 mg, Oral, Q6H PRN    HYDROcodone-acetaminophen, 1 tablet, Oral, Q6H PRN    insulin aspart U-100, 0-10 Units, Subcutaneous, Q6H PRN    melatonin, 6 mg, Oral, Nightly PRN    morphine, 2 mg, Intravenous, Q2H PRN    naloxone, 0.02 mg, Intravenous, PRN    ondansetron, 4 mg, Intravenous, Q8H PRN    polyethylene glycol, 17 g, Oral, Daily PRN    promethazine, 25 mg, Oral, Q6H PRN    simethicone, 1 tablet, Oral, QID PRN    sodium chloride 0.9%, 3 mL, Intravenous, Q12H  PRN          Assessment and Plan     * Closed fracture of right femur  RCRI revealed 3.9% risk for major cardiac event. S/p ORIF 02/06/25    Plan:  -  -Continue current pain regimen, titrate as needed  -Bowel regimen  -Bedrest  -Wound care   --IVFs prn  -Antiemetics prn  -PT/OT   -Ortho consulted, follow-up recs  -DVT prophylaxis    Hypertension  Chronic, controlled.  Latest blood pressure and vitals reviewed-   Temp:  [97.8 °F (36.6 °C)-98.8 °F (37.1 °C)]   Pulse:  [69-78]   Resp:  [18-20]   BP: (101-148)/(48-66)   SpO2:  [94 %-98 %] .   Home meds for hypertension were reviewed and noted below.   Hypertension Medications               valsartan (DIOVAN) 160 MG tablet Take 1 tablet (160 mg total) by mouth once daily.            While in the hospital, will manage blood pressure as follows; Continue home antihypertensive regimen    Will utilize p.r.n. blood pressure medication only if patient's blood pressure greater than  160/90 and she develops symptoms such as worsening chest pain or shortness of breath.      Other specified hypothyroidism  Patient has chronic hypothyroidism. TFTs reviewed-   Lab Results   Component Value Date    TSH 18.214 (H) 01/15/2025   . Will continue chronic levothyroxine and adjust for and clinical changes.        Chronic kidney disease (CKD), stage IV (severe)  Creatine stable for now. BMP reviewed- noted Estimated Creatinine Clearance: 16.7 mL/min (A) (based on SCr of 1.7 mg/dL (H)). according to latest data. Based on current GFR, CKD stage is stage 4 - GFR 15-29.  Monitor UOP and serial BMP and adjust therapy as needed. Renally dose meds. Avoid nephrotoxic medications and procedures.    Diabetes mellitus  Patient's FSGs are uncontrolled due to hyperglycemia on current medication regimen.  Last A1c reviewed-   Lab Results   Component Value Date    HGBA1C 7.8 (H) 01/15/2025     Most recent fingerstick glucose reviewed-   Recent Labs   Lab 02/08/25  1028 02/08/25  1230   POCTGLUCOSE 392*  362*       Current correctional scale  Low  Maintain anti-hyperglycemic dose as follows-   Antihyperglycemics (From admission, onward)      Start     Stop Route Frequency Ordered    02/08/25 2100  insulin glargine U-100 (Lantus) pen 7 Units         -- SubQ 2 times daily 02/08/25 1235    02/08/25 1145  insulin aspart U-100 pen 5 Units         -- SubQ 3 times daily with meals 02/08/25 1040    02/05/25 2200  insulin aspart U-100 pen 0-10 Units         -- SubQ Every 6 hours PRN 02/05/25 2101              VTE Risk Mitigation (From admission, onward)           Ordered     apixaban tablet 2.5 mg  2 times daily         02/08/25 1213     Reason for No Pharmacological VTE Prophylaxis  Once        Comments: Planned surgical procedure   Question:  Reasons:  Answer:  Physician Provided (leave comment)    02/05/25 0005     IP VTE HIGH RISK PATIENT  Once         02/05/25 0005     Place sequential compression device  Until discontinued         02/05/25 0005                    Discharge Planning   BRIAN: 2/7/2025     Code Status: Full Code   Medical Readiness for Discharge Date:   Discharge Plan A: Skilled Nursing Facility                Efrain Ross DO  Department of Hospital Medicine   [facility]      Voice recognition software was used in the creation of this note/communication and any sound-alike/typographical errors which may have occurred despite initial review prior to signing should be taken in context when interpreting.  If such errors prevent a clear understanding of the note/communication, please contact the provider/office for clarification.

## 2025-02-09 NOTE — ASSESSMENT & PLAN NOTE
Chronic, controlled.  Latest blood pressure and vitals reviewed-   Temp:  [97.8 °F (36.6 °C)-98.8 °F (37.1 °C)]   Pulse:  [69-78]   Resp:  [18-20]   BP: (101-148)/(48-66)   SpO2:  [94 %-98 %] .   Home meds for hypertension were reviewed and noted below.   Hypertension Medications               valsartan (DIOVAN) 160 MG tablet Take 1 tablet (160 mg total) by mouth once daily.            While in the hospital, will manage blood pressure as follows; Continue home antihypertensive regimen    Will utilize p.r.n. blood pressure medication only if patient's blood pressure greater than  160/90 and she develops symptoms such as worsening chest pain or shortness of breath.

## 2025-02-09 NOTE — SUBJECTIVE & OBJECTIVE
Interval History: No acute events overnight, afebrile, hemodynamically stable.  Reported dizziness with exertion.  Denies any bleeding concerns or prior history of abnormal bleeding or intolerance to anticoagulation.  Concerns for symptomatic anemia given recent surgical procedure, so  will transfuse 1u  pRbc .  Adjusted insulin due to hyperglycemia concerns      Objective:     Vital Signs (Most Recent):  Temp: 98 °F (36.7 °C) (02/08/25 1509)  Pulse: 69 (02/08/25 1604)  Resp: 18 (02/08/25 1509)  BP: (!) 108/56 (02/08/25 1604)  SpO2: 96 % (02/08/25 1509) Vital Signs (24h Range):  Temp:  [97.8 °F (36.6 °C)-98.8 °F (37.1 °C)] 98 °F (36.7 °C)  Pulse:  [69-78] 69  Resp:  [18-20] 18  SpO2:  [94 %-98 %] 96 %  BP: (101-148)/(48-66) 108/56     Weight: 57 kg (125 lb 10.6 oz)  Body mass index is 36.8 kg/m².    Intake/Output Summary (Last 24 hours) at 2/8/2025 1803  Last data filed at 2/8/2025 0031  Gross per 24 hour   Intake 290 ml   Output --   Net 290 ml         Physical Exam  Vitals and nursing note reviewed.   Constitutional:       General: She is not in acute distress.     Appearance: She is not ill-appearing, toxic-appearing or diaphoretic.   HENT:      Head: Normocephalic and atraumatic.      Mouth/Throat:      Mouth: Mucous membranes are moist.   Eyes:      General: No scleral icterus.        Right eye: No discharge.         Left eye: No discharge.   Cardiovascular:      Rate and Rhythm: Normal rate and regular rhythm.      Heart sounds: Normal heart sounds.   Pulmonary:      Effort: Pulmonary effort is normal. No respiratory distress.      Breath sounds: Normal breath sounds.   Abdominal:      General: Bowel sounds are normal.      Palpations: Abdomen is soft.      Tenderness: There is no abdominal tenderness.   Musculoskeletal:      Cervical back: No rigidity.      Right lower leg: No edema.      Left lower leg: No edema.   Skin:     General: Skin is warm and dry.      Coloration: Skin is not jaundiced.       "Comments: Right lower extremity with postsurgical wound dressing c/d/i   Neurological:      Mental Status: She is oriented to person, place, and time. Mental status is at baseline.   Psychiatric:         Mood and Affect: Mood normal.         Behavior: Behavior normal.             Significant Labs: All pertinent labs within the past 24 hours have been reviewed.   Recent Labs   Lab 02/05/25  0000 02/07/25  0901 02/08/25  1046   * 134* 130*   K 4.7 4.6 4.8    103 99   CO2 18* 25 20*   BUN 42* 30* 31*   CREATININE 1.6* 1.6* 1.7*   * 230* 354*   ANIONGAP 11 6* 11     Recent Labs   Lab 02/08/25  1046   MG 1.7   PHOS 2.4*     Recent Labs   Lab 02/05/25  0000 02/07/25  0901 02/08/25  1046   AST 23 18 20   ALT 16 8* 8*   ALKPHOS 96 89 93   BILITOT 0.7 0.6 0.6   ALBUMIN 2.8* 2.6* 2.7*     POCT Glucose:   Recent Labs   Lab 02/07/25  1658 02/08/25  1028 02/08/25  1230   POCTGLUCOSE 276* 392* 362*    Recent Labs   Lab 02/05/25  0000 02/07/25  0901 02/08/25  1046   WBC 5.76 6.16 7.75   HGB 9.4* 7.8* 7.7*   HCT 29.1* 24.5* 24.7*    371 405   GRAN 69.2  4.0 72.0  4.4 80.9*  6.3                   Microbiology  Blood Cultures  No results found for: "LABBLOO"  Urine Cultures  Lab Results   Component Value Date    LABURIN No growth 02/24/2023    LABURIN ESCHERICHIA COLI  >100,000 cfu/ml   02/25/2019         Significant Imaging:  I have reviewed all pertinent imaging results/findings within the past 24 hours.   SURG FL Surgery Fluoro Usage   Final Result      X-Ray Femur 2 View Right   Final Result   FINDINGS/   Intraoperative fluoroscopic images were obtained.    Total fluoro time was 1.6 minutes, 4.9 mGy and 2 fluoroscopic images were obtained.  See operative report.         Electronically signed by: Roderick Castillo MD   Date:    02/06/2025   Time:    10:53      X-Ray Chest 1 View   Final Result      As above.         Electronically signed by: Rich Acosta MD   Date:    02/05/2025   Time:    00:45    "   X-Ray Tibia Fibula 2 View Right   Final Result      Complex, comminuted intra-articular fracture of the distal right femur with external fixation device in place.  Correlation with any prior outside imaging advised.         Electronically signed by: Rich Acosta MD   Date:    02/04/2025   Time:    23:37      X-Ray Femur Ap/Lat Right   Final Result      Complex, comminuted intra-articular fracture of the distal right femur with external fixation device in place.  Correlation with any prior outside imaging advised.         Electronically signed by: Rich Acosta MD   Date:    02/04/2025   Time:    23:37          Inpatient Medications:  Continuous Infusions:    Scheduled Meds:   amLODIPine  5 mg Oral Daily    apixaban  2.5 mg Oral BID    cholecalciferol (vitamin D3)  5,000 Units Oral Daily    folic acid-vit B6-vit B12 2.5-25-2 mg  1 tablet Oral Daily    insulin aspart U-100  5 Units Subcutaneous TIDWM    insulin glargine U-100  7 Units Subcutaneous BID    multivitamin  1 tablet Oral Daily    polyethylene glycol  17 g Oral BID    senna-docusate 8.6-50 mg  1 tablet Oral BID    thiamine  100 mg Oral Daily       PRN Meds:  Current Facility-Administered Medications:     0.9%  NaCl infusion (for blood administration), , Intravenous, Q24H PRN    acetaminophen, 650 mg, Rectal, Q6H PRN    acetaminophen, 650 mg, Oral, Q6H PRN    aluminum-magnesium hydroxide-simethicone, 30 mL, Oral, QID PRN    dextrose 50%, 12.5 g, Intravenous, PRN    dextrose 50%, 12.5 g, Intravenous, PRN    dextrose 50%, 25 g, Intravenous, PRN    dextrose 50%, 25 g, Intravenous, PRN    diphenhydrAMINE, 25 mg, Oral, Q6H PRN    glucagon (human recombinant), 1 mg, Intramuscular, PRN    glucagon (human recombinant), 1 mg, Intramuscular, PRN    glucose, 16 g, Oral, PRN    glucose, 16 g, Oral, PRN    glucose, 24 g, Oral, PRN    glucose, 24 g, Oral, PRN    hydrALAZINE, 10 mg, Oral, Q6H PRN    HYDROcodone-acetaminophen, 1 tablet, Oral, Q6H PRN    insulin aspart  U-100, 0-10 Units, Subcutaneous, Q6H PRN    melatonin, 6 mg, Oral, Nightly PRN    morphine, 2 mg, Intravenous, Q2H PRN    naloxone, 0.02 mg, Intravenous, PRN    ondansetron, 4 mg, Intravenous, Q8H PRN    polyethylene glycol, 17 g, Oral, Daily PRN    promethazine, 25 mg, Oral, Q6H PRN    simethicone, 1 tablet, Oral, QID PRN    sodium chloride 0.9%, 3 mL, Intravenous, Q12H PRN

## 2025-02-10 LAB
BASOPHILS # BLD AUTO: 0.05 K/UL (ref 0–0.2)
BASOPHILS NFR BLD: 0.9 % (ref 0–1.9)
DIFFERENTIAL METHOD BLD: ABNORMAL
EOSINOPHIL # BLD AUTO: 0.4 K/UL (ref 0–0.5)
EOSINOPHIL NFR BLD: 6.7 % (ref 0–8)
ERYTHROCYTE [DISTWIDTH] IN BLOOD BY AUTOMATED COUNT: 16.1 % (ref 11.5–14.5)
HCT VFR BLD AUTO: 26.2 % (ref 37–48.5)
HGB BLD-MCNC: 8.3 G/DL (ref 12–16)
IMM GRANULOCYTES # BLD AUTO: 0.11 K/UL (ref 0–0.04)
IMM GRANULOCYTES NFR BLD AUTO: 1.9 % (ref 0–0.5)
LYMPHOCYTES # BLD AUTO: 0.8 K/UL (ref 1–4.8)
LYMPHOCYTES NFR BLD: 14.1 % (ref 18–48)
MCH RBC QN AUTO: 28.4 PG (ref 27–31)
MCHC RBC AUTO-ENTMCNC: 31.7 G/DL (ref 32–36)
MCV RBC AUTO: 90 FL (ref 82–98)
MONOCYTES # BLD AUTO: 0.4 K/UL (ref 0.3–1)
MONOCYTES NFR BLD: 7.2 % (ref 4–15)
NEUTROPHILS # BLD AUTO: 4 K/UL (ref 1.8–7.7)
NEUTROPHILS NFR BLD: 69.2 % (ref 38–73)
NRBC BLD-RTO: 0 /100 WBC
PLATELET # BLD AUTO: 417 K/UL (ref 150–450)
PMV BLD AUTO: 8.9 FL (ref 9.2–12.9)
POCT GLUCOSE: 101 MG/DL (ref 70–110)
POCT GLUCOSE: 104 MG/DL (ref 70–110)
POCT GLUCOSE: 232 MG/DL (ref 70–110)
POCT GLUCOSE: 265 MG/DL (ref 70–110)
POCT GLUCOSE: 272 MG/DL (ref 70–110)
RBC # BLD AUTO: 2.92 M/UL (ref 4–5.4)
WBC # BLD AUTO: 5.83 K/UL (ref 3.9–12.7)

## 2025-02-10 PROCEDURE — 25000003 PHARM REV CODE 250: Mod: HCNC | Performed by: EMERGENCY MEDICINE

## 2025-02-10 PROCEDURE — 97110 THERAPEUTIC EXERCISES: CPT | Mod: HCNC

## 2025-02-10 PROCEDURE — 85025 COMPLETE CBC W/AUTO DIFF WBC: CPT | Mod: HCNC | Performed by: STUDENT IN AN ORGANIZED HEALTH CARE EDUCATION/TRAINING PROGRAM

## 2025-02-10 PROCEDURE — 63600175 PHARM REV CODE 636 W HCPCS: Mod: HCNC | Performed by: EMERGENCY MEDICINE

## 2025-02-10 PROCEDURE — 97116 GAIT TRAINING THERAPY: CPT | Mod: HCNC

## 2025-02-10 PROCEDURE — 25000003 PHARM REV CODE 250: Mod: HCNC | Performed by: STUDENT IN AN ORGANIZED HEALTH CARE EDUCATION/TRAINING PROGRAM

## 2025-02-10 PROCEDURE — 25000003 PHARM REV CODE 250: Mod: HCNC | Performed by: ORTHOPAEDIC SURGERY

## 2025-02-10 PROCEDURE — 11000001 HC ACUTE MED/SURG PRIVATE ROOM: Mod: HCNC

## 2025-02-10 PROCEDURE — 97530 THERAPEUTIC ACTIVITIES: CPT | Mod: HCNC

## 2025-02-10 PROCEDURE — 36415 COLL VENOUS BLD VENIPUNCTURE: CPT | Mod: HCNC | Performed by: STUDENT IN AN ORGANIZED HEALTH CARE EDUCATION/TRAINING PROGRAM

## 2025-02-10 RX ORDER — DIPHENHYDRAMINE HCL 25 MG
25 CAPSULE ORAL EVERY 6 HOURS PRN
Status: DISCONTINUED | OUTPATIENT
Start: 2025-02-10 | End: 2025-02-11 | Stop reason: HOSPADM

## 2025-02-10 RX ORDER — CYANOCOBALAMIN 1000 UG/ML
1000 INJECTION, SOLUTION INTRAMUSCULAR; SUBCUTANEOUS DAILY
Status: COMPLETED | OUTPATIENT
Start: 2025-02-10 | End: 2025-02-11

## 2025-02-10 RX ORDER — LANOLIN ALCOHOL/MO/W.PET/CERES
1 CREAM (GRAM) TOPICAL DAILY
Status: DISCONTINUED | OUTPATIENT
Start: 2025-02-11 | End: 2025-02-11 | Stop reason: HOSPADM

## 2025-02-10 RX ADMIN — HYDROCODONE BITARTRATE AND ACETAMINOPHEN 1 TABLET: 5; 325 TABLET ORAL at 06:02

## 2025-02-10 RX ADMIN — APIXABAN 2.5 MG: 2.5 TABLET, FILM COATED ORAL at 08:02

## 2025-02-10 RX ADMIN — APIXABAN 2.5 MG: 2.5 TABLET, FILM COATED ORAL at 09:02

## 2025-02-10 RX ADMIN — POLYETHYLENE GLYCOL 3350 17 G: 17 POWDER, FOR SOLUTION ORAL at 08:02

## 2025-02-10 RX ADMIN — CYANOCOBALAMIN 1000 MCG: 1000 INJECTION INTRAMUSCULAR; SUBCUTANEOUS at 09:02

## 2025-02-10 RX ADMIN — INSULIN GLARGINE 7 UNITS: 100 INJECTION, SOLUTION SUBCUTANEOUS at 08:02

## 2025-02-10 RX ADMIN — POLYETHYLENE GLYCOL 3350 17 G: 17 POWDER, FOR SOLUTION ORAL at 09:02

## 2025-02-10 RX ADMIN — INSULIN GLARGINE 7 UNITS: 100 INJECTION, SOLUTION SUBCUTANEOUS at 09:02

## 2025-02-10 RX ADMIN — INSULIN ASPART 5 UNITS: 100 INJECTION, SOLUTION INTRAVENOUS; SUBCUTANEOUS at 07:02

## 2025-02-10 RX ADMIN — INSULIN ASPART 5 UNITS: 100 INJECTION, SOLUTION INTRAVENOUS; SUBCUTANEOUS at 12:02

## 2025-02-10 RX ADMIN — AMLODIPINE BESYLATE 5 MG: 5 TABLET ORAL at 08:02

## 2025-02-10 RX ADMIN — Medication 100 MG: at 08:02

## 2025-02-10 RX ADMIN — SENNOSIDES AND DOCUSATE SODIUM 1 TABLET: 50; 8.6 TABLET ORAL at 09:02

## 2025-02-10 RX ADMIN — INSULIN ASPART 6 UNITS: 100 INJECTION, SOLUTION INTRAVENOUS; SUBCUTANEOUS at 12:02

## 2025-02-10 RX ADMIN — SENNOSIDES AND DOCUSATE SODIUM 1 TABLET: 50; 8.6 TABLET ORAL at 08:02

## 2025-02-10 RX ADMIN — CHOLECALCIFEROL TAB 125 MCG (5000 UNIT) 5000 UNITS: 125 TAB at 08:02

## 2025-02-10 RX ADMIN — INSULIN ASPART 5 UNITS: 100 INJECTION, SOLUTION INTRAVENOUS; SUBCUTANEOUS at 04:02

## 2025-02-10 RX ADMIN — Medication 1 TABLET: at 08:02

## 2025-02-10 RX ADMIN — INSULIN ASPART 4 UNITS: 100 INJECTION, SOLUTION INTRAVENOUS; SUBCUTANEOUS at 10:02

## 2025-02-10 RX ADMIN — THERA TABS 1 TABLET: TAB at 08:02

## 2025-02-10 RX ADMIN — HYDROCODONE BITARTRATE AND ACETAMINOPHEN 1 TABLET: 5; 325 TABLET ORAL at 12:02

## 2025-02-10 NOTE — PT/OT/SLP PROGRESS
"Physical Therapy  Treatment    Odin Oliva   MRN: [id number]   Admitting Diagnosis: Closed fracture of right femur    PT Received On: 02/10/25  PT Start Time: 1035     PT Stop Time: 1100    PT Total Time (min): 25 min       Billable Minutes:  Gait Training 15 and Therapeutic Exercise 10    Treatment Type: Treatment  PT/PTA: PT     Number of PTA visits since last PT visit: 0       General Precautions: Standard, fall  Orthopedic Precautions: RLE toe touch weight bearing  Braces: N/A  Respiratory Status: Room air         Subjective:  Communicated with NURSE AND EPIC CHART REVIEW  prior to session.   PT AGREED TO TX     Pain/Comfort  Pain Rating 1: 0/10  Location 1: leg  Pain Addressed 1: Cessation of Activity  Pain Rating Post-Intervention 1: 5/10    Objective:   Patient found with: peripheral IV    Functional Mobility:  PT MET IN RM SUP IN BED. PT AGREED TO TX. PT BRIDGED TO SCOOT TO RIGHT SIDE OF BED WITH MIN A. PT SEATED EOB WITH MIN A. GT. BELT AND  SOCKS DONNED PRIOR TO OOB MOBILITY.  PT STOOD WITH RW AND TDWB R LE FOR GT TRAINING. PT GT TRAINED X 50' WITH RW AND MIN A WITH CUES FOR TDWB AND ONE STAND REST BREAK. PT RETURNED TO RM T/F TO CHAIR WITH RW AND MIN A.     Treatment and Education:  PT COMPLETED AP , TKE , MIP AND GLUT SETS WITH AAROM OF R LE FOR TKE AND MIP. PT WITH INC PAIN WITH ROM. PT LEFT WITH R LE ELEVATED FOR SUPPORT AND PT EDUCATED ON "CALL DON'T FALL", ENCOURAGED TO CALL FOR ASSISTANCE WITH ALL NEEDS FOR OOB MOBILITY.      AM-PAC 6 CLICK MOBILITY  How much help from another person does this patient currently need?   1 = Unable, Total/Dependent Assistance  2 = A lot, Maximum/Moderate Assistance  3 = A little, Minimum/Contact Guard/Supervision  4 = None, Modified [place]/Independent    Turning over in bed (including adjusting bedclothes, sheets and blankets)?: 3  Sitting down on and standing up from a chair with arms (e.g., wheelchair, bedside commode, etc.): 3  Moving from lying on back " to sitting on the side of the bed?: 3  Moving to and from a bed to a chair (including a wheelchair)?: 3  Need to walk in hospital room?: 3  Climbing 3-5 steps with a railing?: 1  Basic Mobility Total Score: 16    AM-PAC Raw Score CMS G-Code Modifier Level of Impairment Assistance   6 % Total / Unable   7 - 9 CM 80 - 100% Maximal Assist   10 - 14 CL 60 - 80% Moderate Assist   15 - 19 CK 40 - 60% Moderate Assist   20 - 22 CJ 20 - 40% Minimal Assist   23 CI 1-20% SBA / CGA   24 CH 0% Independent/ Mod I     Patient left up in chair with  DAUGHTER present.    Assessment:  PT PROGRESSING WITH GT TRAINING.     Rehab identified problem list/impairments: weakness, impaired endurance, impaired self care skills, impaired functional mobility, gait instability, pain, decreased ROM, orthopedic precautions, impaired balance, decreased lower extremity function, edema, decreased coordination    Rehab potential is good.    Activity tolerance: Good    Discharge recommendations: High Intensity Therapy      Barriers to discharge:      Equipment recommendations: to be determined by next level of care     GOALS:   Multidisciplinary Problems       Physical Therapy Goals          Problem: Physical Therapy    Goal Priority Disciplines Outcome Interventions   Physical Therapy Goal     PT, PT/OT Progressing    Description: LT25  1. PT WILL COMPLETE BED MOBILITY WITH CGA  2. PT WILL STAND T/F TO CHAIR WITH RW TDWB AND MIN A  3. PT WILL GT TRAIN X 20' WITH RW AND TDWB WITH ROWENA TO PROGRESS GT  4. PT WILL INC AMPAC SCORE BY 2 POINTS TO PROGRESS GROSS FUNC MOBILITY.                          DME Justifications:  No DME recommended requiring DME justifications    PLAN:    Patient to be seen 3 x/week to address the above listed problems via gait training, therapeutic activities, therapeutic exercises  Plan of Care expires: 25  Plan of Care reviewed with: patient, daughter         02/10/2025

## 2025-02-10 NOTE — PLAN OF CARE
02/10/25 0923   Post-Acute Status   Post-Acute Authorization Placement   Post-Acute Placement Status Pending payor review/awaiting authorization (if required)   Coverage human[employer]   Discharge Plan   Discharge Plan A Skilled Nursing Facility     Pt and daughter chose [facility]. SW spoke with Gudelia at [facility] who will submit for auth. Pending auth.

## 2025-02-10 NOTE — PROGRESS NOTES
[facility]  Orthopedics  Progress Note    Patient Name: Odin Oliva  MRN: [id number]  Admission Date: 2/4/2025  Hospital Length of Stay: 5 days  Attending Provider: Nava George MD  Primary Care Provider: Juventino Barbosa MD  Follow-up For: Procedure(s) (LRB):  REMOVAL, EXTERNAL FIXATION DEVICE (Right)  ORIF, FRACTURE, FEMUR, TRANSCONDYLAR WITH INTERCONDYLAR EXTENSION (Right)    Post-Operative Day: 4 Days Post-Op  Subjective:     Principal Problem:Closed fracture of right femur    Principal Orthopedic Problem:  Closed displaced supracondylar fracture of distal end of right femur    Interval History: Odin Oliva is a 85 y.o. female postop day 4 status post removal of external fixation device and ORIF of closed displaced supracondylar fracture of distal end of right femur.  Patient is resting comfortably in bed.  No new complaints at this time.    Review of patient's allergies indicates:  No Known Allergies    Current Facility-Administered Medications   Medication    0.9%  NaCl infusion (for blood administration)    acetaminophen suppository 650 mg    acetaminophen tablet 650 mg    aluminum-magnesium hydroxide-simethicone 200-200-20 mg/5 mL suspension 30 mL    amLODIPine tablet 5 mg    apixaban tablet 2.5 mg    bisacodyL suppository 10 mg    cholecalciferol (vitamin D3) 125 mcg (5,000 unit) tablet 5,000 Units    dextrose 50% injection 12.5 g    dextrose 50% injection 12.5 g    dextrose 50% injection 25 g    dextrose 50% injection 25 g    diphenhydrAMINE capsule 25 mg    folic acid-vit B6-vit B12 2.5-25-2 mg tablet 1 tablet    glucagon (human recombinant) injection 1 mg    glucagon (human recombinant) injection 1 mg    glucose chewable tablet 16 g    glucose chewable tablet 16 g    glucose chewable tablet 24 g    glucose chewable tablet 24 g    hydrALAZINE tablet 10 mg    HYDROcodone-acetaminophen 5-325 mg per tablet 1 tablet    insulin aspart U-100 pen 0-10 Units    insulin aspart U-100 pen 5 Units     "insulin glargine U-100 (Lantus) pen 7 Units    melatonin tablet 6 mg    morphine injection 2 mg    multivitamin tablet    naloxone 0.4 mg/mL injection 0.02 mg    ondansetron injection 4 mg    polyethylene glycol packet 17 g    polyethylene glycol packet 17 g    promethazine tablet 25 mg    senna-docusate 8.6-50 mg per tablet 1 tablet    simethicone chewable tablet 80 mg    sodium chloride 0.9% flush 3 mL    thiamine tablet 100 mg     Objective:     Vital Signs (Most Recent):  Temp: 98.3 °F (36.8 °C) (02/10/25 0755)  Pulse: 72 (02/10/25 0755)  Resp: 18 (02/10/25 0755)  BP: (!) 140/66 (02/10/25 0755)  SpO2: 96 % (02/10/25 0755) Vital Signs (24h Range):  Temp:  [98.2 °F (36.8 °C)-98.4 °F (36.9 °C)] 98.3 °F (36.8 °C)  Pulse:  [72-80] 72  Resp:  [16-19] 18  SpO2:  [94 %-98 %] 96 %  BP: (134-168)/(61-72) 140/66     Weight: 58 kg (127 lb 13.9 oz)  Height: 4' 1" (124.5 cm)  Body mass index is 37.44 kg/m².    No intake or output data in the 24 hours ending 02/10/25 0812    Ortho/SPM Exam  Right lower extremity:   Dressings are clean, dry, and intact  Toes are in place and well fitting   Mild edema diffusely   Calf and compartments are soft and compressible   Motor exam normal   Sensation and pulses intact  Cap refill brisk    GEN: Well developed, well nourished female. AAOX3. No acute distress.   Head: Normocephalic, atraumatic.   Eyes: SHAHBAZ  Neck: Trachea is midline, no adenopathy  Resp: Breathing unlabored.  Neuro: Motor function normal, Cranial nerves intact  Psych: Mood and affect appropriate.      Significant Labs:   Recent Lab Results         02/10/25  0608   02/10/25  0545   02/09/25  2240   02/09/25  1125        Baso #   0.05           Basophil %   0.9           Differential Method   Automated           Eos #   0.4           Eos %   6.7           Gran # (ANC)   4.0           Gran %   69.2           Hematocrit   26.2           Hemoglobin   8.3           Immature Grans (Abs)   0.11  Comment: Mild elevation in immature " granulocytes is non specific and   can be seen in a variety of conditions including stress response,   acute inflammation, trauma and pregnancy. Correlation with other   laboratory and clinical findings is essential.             Immature Granulocytes   1.9           Lymph #   0.8           Lymph %   14.1           MCH   28.4           MCHC   31.7           MCV   90           Mono #   0.4           Mono %   7.2           MPV   8.9           nRBC   0           Platelet Count   417           POCT Glucose 104     112   204       RBC   2.92           RDW   16.1           WBC   5.83                 All pertinent labs within the past 24 hours have been reviewed.    Significant Imaging: I have reviewed and interpreted all pertinent imaging results/findings.    Assessment/Plan:     Active Diagnoses:    Diagnosis Date Noted POA    PRINCIPAL PROBLEM:  Closed fracture of right femur [S72.91XA] 02/05/2025 Yes    Acute blood loss anemia [D62] 02/09/2025 No    Hypertension [I10] 11/04/2019 Yes    Other specified hypothyroidism [E03.8] 10/28/2019 Yes    Chronic kidney disease (CKD), stage IV (severe) [N18.4] 10/23/2019 Yes    Diabetes mellitus [E11.9] 04/03/2019 Yes      Problems Resolved During this Admission:       Assessment:  85 y.o. female postop day 4 status post removal of external fixation device and ORIF of closed displaced supracondylar fracture of distal end of right femur    Plan:  Toe touch weight-bearing to the right lower extremity   PT/OT for gait training and ADLs   DVT prophylaxis per primary team   Patient is ready for discharge from orthopedic standpoint once arrangements have been made  We will see her back in Ortho Trauma Clinic at 2 weeks postop 4 staple removal    Trevin Wiley PA-C  Orthopedics  [facility]

## 2025-02-10 NOTE — PLAN OF CARE
SW spoke with pt and daughter regarding snf choice. Pt's daughter stated she has a snf in mind but wants to discuss with her brother. Pending snf choice. [facility].

## 2025-02-10 NOTE — PLAN OF CARE
02/10/25 0840   Rounds   Attendance Provider;Physical therapist;;Charge nurse;Occupational therapist   Discharge Plan A Skilled Nursing Facility   Why the patient remains in the hospital Requires continued medical care   Transition of Care Barriers None     Pending snf choice.

## 2025-02-10 NOTE — PT/OT/SLP PROGRESS
Occupational Therapy   Treatment    Name: Odin Oliva  MRN: [id number]  Admitting Diagnosis:  Closed fracture of right femur  4 Days Post-Op    Recommendations:     Discharge Recommendations: High Intensity Therapy  Discharge Equipment Recommendations:  to be determined by next level of care  Barriers to discharge:  Decreased caregiver support    Assessment:     Odin Oilva is a 85 y.o. female with a medical diagnosis of Closed fracture of right femur.  She presents with the following performance deficits affecting function are weakness, impaired endurance, impaired self care skills, impaired functional mobility, gait instability, impaired balance, decreased coordination, decreased lower extremity function, decreased safety awareness, pain, decreased ROM, edema, orthopedic precautions.     Rehab Prognosis:  Good; patient would benefit from acute skilled OT services to address these deficits and reach maximum level of function.       Plan:     Patient to be seen 2 x/week to address the above listed problems via self-care/home management, therapeutic activities, therapeutic exercises  Plan of Care Expires: 02/21/25  Plan of Care Reviewed with: patient, daughter    Subjective     Chief Complaint: weakness, RLE pain  Patient/Family Comments/goals: get better; improve strength, mobility, and independence  Pain/Comfort:  Pain Rating 1: 0/10 (at rest)  Location - Side 1: Right  Location - Orientation 1: generalized  Location 1: leg  Pain Addressed 1: Reposition, Distraction  Pain Rating Post-Intervention 1: 5/10 (with movement/weightbearing)    Objective:     Communicated with: Nurse and epic chart review prior to session.  Patient found HOB elevated with peripheral IV upon OT entry to room.    General Precautions: Standard, fall    Orthopedic Precautions:RLE toe touch weight bearing  Braces: N/A  Respiratory Status: Room air     Occupational Performance:     Bed Mobility:    Patient completed Rolling/Turning to Right  with minimum assistance  Patient completed Supine to Sit with minimum assistance   Forward scoot to EOB with CGA.    Functional Mobility/Transfers:  Patient completed Sit <> Stand Transfer with minimum assistance  with  rolling walker   Patient completed Bed <> Chair Transfer using Stand Pivot technique with minimum assistance with rolling walker  Functional Mobility: Patient completed x25ft x2 functional mobility with Min A and RW to increase dynamic standing balance and activity tolerance needed for ADL completion.   Required x1 standing rest break d/t fatigue.  Pt compliant with RLE TTWB precautions after verbal cueing to maintain.    Activities of Daily Living:  Lower Body Dressing: maximal assistance eric socks EOB    [facility] 6 Click ADL: 19    Treatment & Education:  Pt performed x10 reps BUE AROM therex while in chair:  Shoulder flexion  Elbow flexion/ext  Wrist flexion/ext  Digit flexion/ext  Reviewed RLE TTWB precautions, functional implications, and importance of compliance. Reviewed role of OT in acute setting and benefits of participation. Educated on techniques to use to increase independence and decrease fall risk with functional transfers. Educated on importance of OOB activity and calling for A to transfer back to bed and meet needs. Encouraged completion of B UE AROM therex throughout the day to tolerance to increase functional strength and activity tolerance. Educated patient on importance of increased tolerance to upright position and direct impact on CV endurance and strength. Patient encouraged to sit up in chair for a minimum of 2 consecutive hours per day. Patient stated understanding and in agreement with POC.     Patient left up in chair with all lines intact, call button in reach, and daughter present    GOALS:   Multidisciplinary Problems       Occupational Therapy Goals          Problem: Occupational Therapy    Goal Priority Disciplines Outcome Interventions   Occupational Therapy Goal      OT, PT/OT Progressing    Description: Goals to be met by: 2/21/25     Patient will increase functional independence with ADLs by performing:    LE Dressing with Contact Guard Assistance.  Grooming while standing at sink with Contact Guard Assistance.  Toileting from toilet with Supervision for hygiene and clothing management.   Toilet transfer to toilet with Contact Guard Assistance with RW.  Upper extremity exercise program x15 reps per handout, with independence.                         Time Tracking:     OT Date of Treatment: 02/10/25  OT Start Time: 0910  OT Stop Time: 0935  OT Total Time (min): 25 min    Billable Minutes:Therapeutic Activity 15  Therapeutic Exercise 10    OT/HAZEL: OT     Number of HAZEL visits since last OT visit: 0  Jaja Donovan OT     2/10/2025

## 2025-02-10 NOTE — PLAN OF CARE
Continue OT POC.  Min A for bed mobility, t/fs, and ambulation 50ft with RW.  Pt compliant with RLE TTWB.

## 2025-02-11 VITALS
OXYGEN SATURATION: 99 % | RESPIRATION RATE: 18 BRPM | WEIGHT: 127.88 LBS | HEIGHT: 55 IN | SYSTOLIC BLOOD PRESSURE: 136 MMHG | HEART RATE: 72 BPM | DIASTOLIC BLOOD PRESSURE: 59 MMHG | BODY MASS INDEX: 29.6 KG/M2 | TEMPERATURE: 97 F

## 2025-02-11 PROBLEM — D62 ACUTE BLOOD LOSS ANEMIA: Status: RESOLVED | Noted: 2025-02-09 | Resolved: 2025-02-11

## 2025-02-11 PROBLEM — S72.91XA CLOSED FRACTURE OF RIGHT FEMUR: Status: RESOLVED | Noted: 2025-02-05 | Resolved: 2025-02-11

## 2025-02-11 LAB
BASOPHILS # BLD AUTO: 0.05 K/UL (ref 0–0.2)
BASOPHILS NFR BLD: 0.9 % (ref 0–1.9)
DIFFERENTIAL METHOD BLD: ABNORMAL
EOSINOPHIL # BLD AUTO: 0.5 K/UL (ref 0–0.5)
EOSINOPHIL NFR BLD: 8.3 % (ref 0–8)
ERYTHROCYTE [DISTWIDTH] IN BLOOD BY AUTOMATED COUNT: 15.8 % (ref 11.5–14.5)
HCT VFR BLD AUTO: 26.9 % (ref 37–48.5)
HGB BLD-MCNC: 8.4 G/DL (ref 12–16)
IMM GRANULOCYTES # BLD AUTO: 0.14 K/UL (ref 0–0.04)
IMM GRANULOCYTES NFR BLD AUTO: 2.5 % (ref 0–0.5)
LYMPHOCYTES # BLD AUTO: 0.9 K/UL (ref 1–4.8)
LYMPHOCYTES NFR BLD: 16.3 % (ref 18–48)
MCH RBC QN AUTO: 28.5 PG (ref 27–31)
MCHC RBC AUTO-ENTMCNC: 31.2 G/DL (ref 32–36)
MCV RBC AUTO: 91 FL (ref 82–98)
MONOCYTES # BLD AUTO: 0.5 K/UL (ref 0.3–1)
MONOCYTES NFR BLD: 9.1 % (ref 4–15)
NEUTROPHILS # BLD AUTO: 3.5 K/UL (ref 1.8–7.7)
NEUTROPHILS NFR BLD: 62.9 % (ref 38–73)
NRBC BLD-RTO: 0 /100 WBC
PLATELET # BLD AUTO: 440 K/UL (ref 150–450)
PMV BLD AUTO: 8.8 FL (ref 9.2–12.9)
POCT GLUCOSE: 127 MG/DL (ref 70–110)
POCT GLUCOSE: 159 MG/DL (ref 70–110)
POCT GLUCOSE: 183 MG/DL (ref 70–110)
RBC # BLD AUTO: 2.95 M/UL (ref 4–5.4)
WBC # BLD AUTO: 5.52 K/UL (ref 3.9–12.7)

## 2025-02-11 PROCEDURE — 25000003 PHARM REV CODE 250: Mod: HCNC | Performed by: EMERGENCY MEDICINE

## 2025-02-11 PROCEDURE — 25000003 PHARM REV CODE 250: Mod: HCNC | Performed by: STUDENT IN AN ORGANIZED HEALTH CARE EDUCATION/TRAINING PROGRAM

## 2025-02-11 PROCEDURE — 63600175 PHARM REV CODE 636 W HCPCS: Mod: HCNC | Performed by: EMERGENCY MEDICINE

## 2025-02-11 PROCEDURE — 97535 SELF CARE MNGMENT TRAINING: CPT | Mod: HCNC

## 2025-02-11 PROCEDURE — 97530 THERAPEUTIC ACTIVITIES: CPT | Mod: HCNC

## 2025-02-11 PROCEDURE — 85025 COMPLETE CBC W/AUTO DIFF WBC: CPT | Mod: HCNC | Performed by: STUDENT IN AN ORGANIZED HEALTH CARE EDUCATION/TRAINING PROGRAM

## 2025-02-11 PROCEDURE — 36415 COLL VENOUS BLD VENIPUNCTURE: CPT | Mod: HCNC | Performed by: STUDENT IN AN ORGANIZED HEALTH CARE EDUCATION/TRAINING PROGRAM

## 2025-02-11 PROCEDURE — 97110 THERAPEUTIC EXERCISES: CPT | Mod: HCNC

## 2025-02-11 PROCEDURE — 25000003 PHARM REV CODE 250: Mod: HCNC | Performed by: NURSE PRACTITIONER

## 2025-02-11 PROCEDURE — 25000003 PHARM REV CODE 250: Mod: HCNC | Performed by: ORTHOPAEDIC SURGERY

## 2025-02-11 PROCEDURE — 25000003 PHARM REV CODE 250: Mod: HCNC | Performed by: HOSPITALIST

## 2025-02-11 PROCEDURE — 97116 GAIT TRAINING THERAPY: CPT | Mod: HCNC

## 2025-02-11 RX ORDER — HYDROCODONE BITARTRATE AND ACETAMINOPHEN 5; 325 MG/1; MG/1
1 TABLET ORAL EVERY 6 HOURS PRN
Qty: 5 TABLET | Refills: 0 | Status: SHIPPED | OUTPATIENT
Start: 2025-02-11 | End: 2025-02-11

## 2025-02-11 RX ORDER — HYDROCODONE BITARTRATE AND ACETAMINOPHEN 5; 325 MG/1; MG/1
1 TABLET ORAL EVERY 6 HOURS PRN
Qty: 5 TABLET | Refills: 0 | Status: SHIPPED | OUTPATIENT
Start: 2025-02-11

## 2025-02-11 RX ORDER — BISACODYL 10 MG/1
10 SUPPOSITORY RECTAL ONCE
Start: 2025-02-11 | End: 2025-02-11

## 2025-02-11 RX ORDER — FERROUS SULFATE 325(65) MG
325 TABLET, DELAYED RELEASE (ENTERIC COATED) ORAL DAILY
Start: 2025-02-11

## 2025-02-11 RX ORDER — AMLODIPINE BESYLATE 5 MG/1
5 TABLET ORAL DAILY
Start: 2025-02-12 | End: 2026-02-12

## 2025-02-11 RX ORDER — ACETAMINOPHEN 500 MG
5000 TABLET ORAL DAILY
Start: 2025-02-12

## 2025-02-11 RX ORDER — AMOXICILLIN 250 MG
1 CAPSULE ORAL 2 TIMES DAILY
Start: 2025-02-11

## 2025-02-11 RX ADMIN — INSULIN ASPART 5 UNITS: 100 INJECTION, SOLUTION INTRAVENOUS; SUBCUTANEOUS at 11:02

## 2025-02-11 RX ADMIN — INSULIN ASPART 5 UNITS: 100 INJECTION, SOLUTION INTRAVENOUS; SUBCUTANEOUS at 08:02

## 2025-02-11 RX ADMIN — AMLODIPINE BESYLATE 5 MG: 5 TABLET ORAL at 09:02

## 2025-02-11 RX ADMIN — INSULIN GLARGINE 7 UNITS: 100 INJECTION, SOLUTION SUBCUTANEOUS at 09:02

## 2025-02-11 RX ADMIN — POLYETHYLENE GLYCOL 3350 17 G: 17 POWDER, FOR SOLUTION ORAL at 09:02

## 2025-02-11 RX ADMIN — INSULIN ASPART 2 UNITS: 100 INJECTION, SOLUTION INTRAVENOUS; SUBCUTANEOUS at 08:02

## 2025-02-11 RX ADMIN — INSULIN ASPART 2 UNITS: 100 INJECTION, SOLUTION INTRAVENOUS; SUBCUTANEOUS at 11:02

## 2025-02-11 RX ADMIN — THERA TABS 1 TABLET: TAB at 09:02

## 2025-02-11 RX ADMIN — SENNOSIDES AND DOCUSATE SODIUM 1 TABLET: 50; 8.6 TABLET ORAL at 09:02

## 2025-02-11 RX ADMIN — FERROUS SULFATE TAB 325 MG (65 MG ELEMENTAL FE) 1 EACH: 325 (65 FE) TAB at 09:02

## 2025-02-11 RX ADMIN — CHOLECALCIFEROL TAB 125 MCG (5000 UNIT) 5000 UNITS: 125 TAB at 09:02

## 2025-02-11 RX ADMIN — CYANOCOBALAMIN 1000 MCG: 1000 INJECTION INTRAMUSCULAR; SUBCUTANEOUS at 09:02

## 2025-02-11 RX ADMIN — HYDROCODONE BITARTRATE AND ACETAMINOPHEN 1 TABLET: 5; 325 TABLET ORAL at 06:02

## 2025-02-11 RX ADMIN — Medication 6 MG: at 12:02

## 2025-02-11 RX ADMIN — Medication 100 MG: at 09:02

## 2025-02-11 RX ADMIN — HYDROCODONE BITARTRATE AND ACETAMINOPHEN 1 TABLET: 5; 325 TABLET ORAL at 12:02

## 2025-02-11 RX ADMIN — Medication 1 TABLET: at 09:02

## 2025-02-11 RX ADMIN — APIXABAN 2.5 MG: 2.5 TABLET, FILM COATED ORAL at 09:02

## 2025-02-11 RX ADMIN — DIPHENHYDRAMINE HYDROCHLORIDE 25 MG: 25 CAPSULE ORAL at 12:02

## 2025-02-11 NOTE — ASSESSMENT & PLAN NOTE
Anemia is likely due to acute blood loss which was from Orthopedic surgery and chronic disease due to Chronic Kidney Disease. Most recent hemoglobin and hematocrit are listed below.  Recent Labs     02/08/25  1046 02/09/25  0754 02/10/25  0545   HGB 7.7* 8.7* 8.3*   HCT 24.7* 27.1* 26.2*       Plan  - Monitor serial CBC: Daily  - Transfuse PRBC if patient becomes hemodynamically unstable, symptomatic or H/H drops below 7/21.  - Patient has received 1 units of PRBCs on 2/8/25  - Patient's anemia is currently improving    Stable, start oral ferrous sulfate

## 2025-02-11 NOTE — ASSESSMENT & PLAN NOTE
Chronic, controlled.  Latest blood pressure and vitals reviewed-   Temp:  [98.1 °F (36.7 °C)-98.4 °F (36.9 °C)]   Pulse:  [70-80]   Resp:  [16-18]   BP: (140-153)/(62-68)   SpO2:  [94 %-98 %] .   Home meds for hypertension were reviewed and noted below.   Hypertension Medications               valsartan (DIOVAN) 160 MG tablet Take 1 tablet (160 mg total) by mouth once daily.            While in the hospital, will manage blood pressure as follows; Continue home antihypertensive regimen    Will utilize p.r.n. blood pressure medication only if patient's blood pressure greater than  160/90 and she develops symptoms such as worsening chest pain or shortness of breath.

## 2025-02-11 NOTE — SUBJECTIVE & OBJECTIVE
Interval History: looks and feels much better, stronger, sitting up in chair, did well with PT/OT, walked almost 50 feet with the walker. H/H stable at 8.3/25, had got 1 unit of blood the day before. Will give Inj Venofer and Inj b12 IM.     Review of Systems   Constitutional:  Positive for activity change and fatigue. Negative for appetite change, chills, diaphoresis and fever.   Respiratory:  Negative for cough and shortness of breath.    Cardiovascular:  Negative for chest pain and palpitations.   Gastrointestinal:  Negative for diarrhea, nausea and vomiting.   Musculoskeletal:  Positive for arthralgias, gait problem and myalgias.   Neurological:  Positive for weakness.   All other systems reviewed and are negative.    Objective:     Vital Signs (Most Recent):  Temp: 98.2 °F (36.8 °C) (02/10/25 1615)  Pulse: 73 (02/10/25 1615)  Resp: 17 (02/10/25 1804)  BP: (!) 141/62 (02/10/25 1615)  SpO2: 97 % (02/10/25 1615) Vital Signs (24h Range):  Temp:  [98.1 °F (36.7 °C)-98.4 °F (36.9 °C)] 98.2 °F (36.8 °C)  Pulse:  [70-80] 73  Resp:  [16-18] 17  SpO2:  [94 %-98 %] 97 %  BP: (140-153)/(62-68) 141/62     Weight: 58 kg (127 lb 13.9 oz)  Body mass index is 37.44 kg/m².  No intake or output data in the 24 hours ending 02/10/25 1821      Physical Exam  Vitals and nursing note reviewed.   Constitutional:       General: She is awake. She is not in acute distress.     Appearance: Normal appearance. She is well-developed and well-groomed. She is not ill-appearing, toxic-appearing or diaphoretic.   HENT:      Head: Normocephalic and atraumatic.      Right Ear: External ear normal.      Left Ear: External ear normal.      Nose: Nose normal.      Mouth/Throat:      Lips: Pink.      Mouth: Mucous membranes are moist.      Pharynx: Oropharynx is clear. Uvula midline.   Eyes:      General: No scleral icterus.     Extraocular Movements: Extraocular movements intact.      Conjunctiva/sclera: Conjunctivae normal.      Pupils: Pupils are  equal, round, and reactive to light.   Cardiovascular:      Rate and Rhythm: Normal rate and regular rhythm.      Pulses: Normal pulses.      Heart sounds: Normal heart sounds. No murmur heard.     No friction rub.   Pulmonary:      Effort: Pulmonary effort is normal. No respiratory distress.      Breath sounds: Normal breath sounds. No wheezing.   Abdominal:      General: Abdomen is flat. Bowel sounds are normal. There is no distension.      Palpations: Abdomen is soft.      Tenderness: There is no abdominal tenderness.   Musculoskeletal:         General: No swelling, tenderness, deformity or signs of injury. Normal range of motion.      Cervical back: Normal range of motion and neck supple.      Comments: Ex fix device noted to right lower extremity. NVS grossly intact.    R thigh dressing C/D/I- distal pulses 2 + B. Moving all 4 ext well.    Skin:     General: Skin is warm and dry.      Capillary Refill: Capillary refill takes less than 2 seconds.      Coloration: Skin is not pale.      Findings: No erythema or rash.   Neurological:      General: No focal deficit present.      Mental Status: She is alert and oriented to person, place, and time. Mental status is at baseline.      GCS: GCS eye subscore is 4. GCS verbal subscore is 5. GCS motor subscore is 6.      Cranial Nerves: Cranial nerves 2-12 are intact.      Sensory: Sensation is intact.      Motor: Weakness present.      Gait: Gait abnormal.   Psychiatric:         Mood and Affect: Mood normal.         Speech: Speech normal.         Behavior: Behavior normal. Behavior is cooperative.         Thought Content: Thought content normal.         Judgment: Judgment normal.             Significant Labs: All pertinent labs within the past 24 hours have been reviewed.  BMP:   Recent Labs   Lab 02/09/25  0754   *      K 4.6      CO2 26   BUN 27*   CREATININE 1.5*   CALCIUM 8.6*   MG 1.9     CBC:   Recent Labs   Lab 02/09/25  0754 02/10/25  0545   WBC  6.20 5.83   HGB 8.7* 8.3*   HCT 27.1* 26.2*    417       Significant Imaging: I have reviewed all pertinent imaging results/findings within the past 24 hours.

## 2025-02-11 NOTE — ASSESSMENT & PLAN NOTE
Creatine stable for now. BMP reviewed- noted Estimated Creatinine Clearance: 19.1 mL/min (A) (based on SCr of 1.5 mg/dL (H)). according to latest data. Based on current GFR, CKD stage is stage 4 - GFR 15-29.  Monitor UOP and serial BMP and adjust therapy as needed. Renally dose meds. Avoid nephrotoxic medications and procedures.    improving

## 2025-02-11 NOTE — PROGRESS NOTES
[facility]  Orthopedics  Progress Note    Patient Name: Odin Oliva  MRN: [id number]  Admission Date: 2/4/2025  Hospital Length of Stay: 6 days  Attending Provider: Nava George MD  Primary Care Provider: Juventino Barbosa MD  Follow-up For: Procedure(s) (LRB):  REMOVAL, EXTERNAL FIXATION DEVICE (Right)  ORIF, FRACTURE, FEMUR, TRANSCONDYLAR WITH INTERCONDYLAR EXTENSION (Right)    Post-Operative Day: 5 Days Post-Op  Subjective:     Principal Problem:Closed fracture of right femur    Principal Orthopedic Problem:  Closed displaced supracondylar fracture of distal end of right femur    Interval History: Odin Oliva is a 85 y.o. female postop day 5 status post removal of external fixation device and ORIF of closed displaced supracondylar fracture of distal end of right femur.  Patient is resting comfortably in bed.  No new complaints at this time.    Review of patient's allergies indicates:  No Known Allergies    Current Facility-Administered Medications   Medication    0.9%  NaCl infusion (for blood administration)    acetaminophen suppository 650 mg    acetaminophen tablet 650 mg    aluminum-magnesium hydroxide-simethicone 200-200-20 mg/5 mL suspension 30 mL    amLODIPine tablet 5 mg    apixaban tablet 2.5 mg    bisacodyL suppository 10 mg    cholecalciferol (vitamin D3) 125 mcg (5,000 unit) tablet 5,000 Units    cyanocobalamin injection 1,000 mcg    dextrose 50% injection 12.5 g    dextrose 50% injection 12.5 g    dextrose 50% injection 25 g    dextrose 50% injection 25 g    diphenhydrAMINE capsule 25 mg    ferrous sulfate tablet 1 each    folic acid-vit B6-vit B12 2.5-25-2 mg tablet 1 tablet    glucagon (human recombinant) injection 1 mg    glucagon (human recombinant) injection 1 mg    glucose chewable tablet 16 g    glucose chewable tablet 16 g    glucose chewable tablet 24 g    glucose chewable tablet 24 g    hydrALAZINE tablet 10 mg    HYDROcodone-acetaminophen 5-325 mg per tablet 1 tablet     "insulin aspart U-100 pen 0-10 Units    insulin aspart U-100 pen 5 Units    insulin glargine U-100 (Lantus) pen 7 Units    melatonin tablet 6 mg    morphine injection 2 mg    multivitamin tablet    naloxone 0.4 mg/mL injection 0.02 mg    ondansetron injection 4 mg    polyethylene glycol packet 17 g    polyethylene glycol packet 17 g    promethazine tablet 25 mg    senna-docusate 8.6-50 mg per tablet 1 tablet    simethicone chewable tablet 80 mg    sodium chloride 0.9% flush 3 mL    thiamine tablet 100 mg     Objective:     Vital Signs (Most Recent):  Temp: 98.3 °F (36.8 °C) (02/11/25 0836)  Pulse: 70 (02/11/25 0836)  Resp: 18 (02/11/25 0836)  BP: (!) 143/65 (02/11/25 0836)  SpO2: 96 % (02/11/25 0836) Vital Signs (24h Range):  Temp:  [97.7 °F (36.5 °C)-98.3 °F (36.8 °C)] 98.3 °F (36.8 °C)  Pulse:  [70-77] 70  Resp:  [16-18] 18  SpO2:  [95 %-98 %] 96 %  BP: (126-151)/(58-65) 143/65     Weight: 58 kg (127 lb 13.9 oz)  Height: 4' 1" (124.5 cm)  Body mass index is 37.44 kg/m².    No intake or output data in the 24 hours ending 02/11/25 0858    Ortho/SPM Exam  Right lower extremity:   Dressings are clean, dry, and intact  Toes are in place and well fitting   Mild edema diffusely   Calf and compartments are soft and compressible   Motor exam normal   Sensation and pulses intact  Cap refill brisk    GEN: Well developed, well nourished female. AAOX3. No acute distress.   Head: Normocephalic, atraumatic.   Eyes: SHAHBAZ  Neck: Trachea is midline, no adenopathy  Resp: Breathing unlabored.  Neuro: Motor function normal, Cranial nerves intact  Psych: Mood and affect appropriate.      Significant Labs:   Recent Lab Results  (Last 5 results in the past 24 hours)        02/11/25  0803   02/11/25  0553   02/11/25  0441   02/10/25  2204   02/10/25  1618        Baso #   0.05             Basophil %   0.9             Differential Method   Automated             Eos #   0.5             Eos %   8.3             Gran # (ANC)   3.5             " Gran %   62.9             Hematocrit   26.9             Hemoglobin   8.4             Immature Grans (Abs)   0.14  Comment: Mild elevation in immature granulocytes is non specific and   can be seen in a variety of conditions including stress response,   acute inflammation, trauma and pregnancy. Correlation with other   laboratory and clinical findings is essential.               Immature Granulocytes   2.5             Lymph #   0.9             Lymph %   16.3             MCH   28.5             MCHC   31.2             MCV   91             Mono #   0.5             Mono %   9.1             MPV   8.8             nRBC   0             Platelet Count   440             POCT Glucose 159     127   272   101       RBC   2.95             RDW   15.8             WBC   5.52                                  All pertinent labs within the past 24 hours have been reviewed.    Significant Imaging: I have reviewed and interpreted all pertinent imaging results/findings.    Assessment/Plan:     Active Diagnoses:    Diagnosis Date Noted POA    PRINCIPAL PROBLEM:  Closed fracture of right femur s/p EXt Fixator- s/p ORIF [S72.91XA] 02/05/2025 Yes    Acute blood loss anemia [D62] 02/09/2025 No    Hypertension [I10] 11/04/2019 Yes    Other specified hypothyroidism [E03.8] 10/28/2019 Yes    Chronic kidney disease (CKD), stage IV (severe) [N18.4] 10/23/2019 Yes    Diabetes mellitus [E11.9] 04/03/2019 Yes      Problems Resolved During this Admission:       Assessment:  85 y.o. female postop day 5 status post removal of external fixation device and ORIF of closed displaced supracondylar fracture of distal end of right femur    Plan:  Toe touch weight-bearing to the right lower extremity   PT/OT for gait training and ADLs   DVT prophylaxis per primary team   Patient is ready for discharge from orthopedic standpoint once arrangements have been made  We will see her back in [facility] Trauma Clinic at 2 weeks postop for staple removal    Trevin Wiley,  CIARA  Orthopedics  [facility]

## 2025-02-11 NOTE — PT/OT/SLP PROGRESS
"Physical Therapy  Treatment    Odin Oliva   MRN: [id number]   Admitting Diagnosis: Closed fracture of right femur    PT Received On: 02/11/25  PT Start Time: 1015     PT Stop Time: 1055    PT Total Time (min): 40 min       Billable Minutes:  Gait Training 15, Therapeutic Activity 15, and Therapeutic Exercise 10    Treatment Type: Treatment  PT/PTA: PT     Number of PTA visits since last PT visit: 0       General Precautions: Standard, fall  Orthopedic Precautions: RLE toe touch weight bearing  Braces: N/A  Respiratory Status: Room air         Subjective:  Communicated with NURSE CJ AND EPIC CHART REVIEW prior to session.  PT AGREED TO TX THROUGH REPORTED FEELING DIZZY    Pain/Comfort  Pain Rating 1: 4/10  Location - Side 1: Right  Location 1: leg  Pain Rating Post-Intervention 1: 4/10    Objective:   Patient found with: peripheral IV    Functional Mobility:  PT COMPLETED SUP>SIT EOB WITH CGA OF R LE. AND SCOOTED IN BED WITH SBA. P.T. CHECKED PT O2 SATS AND BP @ 98 % ON ROOM AIR AND /74 SEATED EOB. GT. BELT AND  SOCKS DONNED PRIOR TO OOB MOBILITY.  PT STOOD WITH RW AND TDWB ON R LE FOR GT TRAINING. PT GT TRAINED X 20 WITH SLOW PACE GT AND MIN A. PT T/F TO EOB AFTER FOR REST BREAK. PT COMPLETED AP AND HS X 15 REPS FOR INC ROM AND STRENGTHENING WITH LIMITED ROM ON R LE. PT STOOD FOR GT TO BATHROOM FOR MIN A. PT GT TRAINED X 8' WITH TDWB R LE AND MIN A. PT ON AND OFF COMMODE FOR TOILETING WITH MIN A. PT ABLE TO GROOM SELF. PT STOOD AND P.T. DONNED DIAPER WITH TOTAL A.  PT RETURNED TO CHAIR AND T/F WITH MIN A. PT LEFT WITH B LE ELEVATED AND ALL NEEDS MET     Treatment and Education:  PT EDUCATED ON "CALL DON'T FALL", ENCOURAGED TO CALL FOR ASSISTANCE WITH ALL NEEDS FOR OOB MOBILITY.  P.T. EDUCATED PT AND DAUGHTER TO CALL FOR ASSISTANCE FOR TOILETING.      AM-PAC 6 CLICK MOBILITY  How much help from another person does this patient currently need?   1 = Unable, Total/Dependent Assistance  2 = A lot, " Maximum/Moderate Assistance  3 = A little, Minimum/Contact Guard/Supervision  4 = None, Modified [place]/Independent    Turning over in bed (including adjusting bedclothes, sheets and blankets)?: 3  Sitting down on and standing up from a chair with arms (e.g., wheelchair, bedside commode, etc.): 3  Moving from lying on back to sitting on the side of the bed?: 3  Moving to and from a bed to a chair (including a wheelchair)?: 3  Need to walk in hospital room?: 3  Climbing 3-5 steps with a railing?: 1  Basic Mobility Total Score: 16    AM-PAC Raw Score CMS G-Code Modifier Level of Impairment Assistance   6 % Total / Unable   7 - 9 CM 80 - 100% Maximal Assist   10 - 14 CL 60 - 80% Moderate Assist   15 - 19 CK 40 - 60% Moderate Assist   20 - 22 CJ 20 - 40% Minimal Assist   23 CI 1-20% SBA / CGA   24 CH 0% Independent/ Mod I     Patient left up in chair with call button in reach.    Assessment:  PT TOÑO GROSS FUNC MOBILITY AND IS MOTIVATED TO PROGRESS.    Rehab identified problem list/impairments: weakness, impaired endurance, impaired self care skills, impaired functional mobility, gait instability, impaired balance, decreased ROM, pain, decreased safety awareness, decreased lower extremity function, orthopedic precautions    Rehab potential is good.    Activity tolerance: Fair    Discharge recommendations: High Intensity Therapy      Barriers to discharge:      Equipment recommendations: to be determined by next level of care     GOALS:   Multidisciplinary Problems       Physical Therapy Goals          Problem: Physical Therapy    Goal Priority Disciplines Outcome Interventions   Physical Therapy Goal     PT, PT/OT Progressing    Description: LT25  1. PT WILL COMPLETE BED MOBILITY WITH CGA  2. PT WILL STAND T/F TO CHAIR WITH RW TDWB AND MIN A  3. PT WILL GT TRAIN X 20' WITH RW AND TDWB WITH ROWENA TO PROGRESS GT  4. PT WILL INC AMPAC SCORE BY 2 POINTS TO PROGRESS GROSS FUNC MOBILITY.                           PLAN:    Patient to be seen 3 x/week to address the above listed problems via gait training, therapeutic activities, therapeutic exercises  Plan of Care expires: 02/21/25  Plan of Care reviewed with: patient, daughter         02/11/2025

## 2025-02-11 NOTE — ASSESSMENT & PLAN NOTE
RCRI revealed 3.9% risk for major cardiac event. S/p ORIF 02/06/25    Plan:  -  -Continue current pain regimen, titrate as needed  -Bowel regimen  -Bedrest  -Wound care   --IVFs prn  -Antiemetics prn  -PT/OT   -Ortho consulted, follow-up recs  -DVT prophylaxis    Stable, doing well post op, await SNF placement

## 2025-02-11 NOTE — PT/OT/SLP PROGRESS
Occupational Therapy   Treatment    Name: Odin Oliva  MRN: [id number]  Admitting Diagnosis:  Closed fracture of right femur  5 Days Post-Op    Recommendations:     Discharge Recommendations: High Intensity Therapy  Discharge Equipment Recommendations:  to be determined by next level of care  Barriers to discharge:       Assessment:     Odin Oliva is a 85 y.o. female with a medical diagnosis of Closed fracture of right femur.  She presents with DEBILITY AND GENERALIZED WEAKNESS. Performance deficits affecting function are weakness, impaired self care skills, impaired balance, decreased safety awareness, impaired endurance, impaired functional mobility, gait instability.     Rehab Prognosis:  Good; patient would benefit from acute skilled OT services to address these deficits and reach maximum level of function.       Plan:     Patient to be seen 2 x/week to address the above listed problems via self-care/home management, therapeutic activities, therapeutic exercises  Plan of Care Expires: 02/21/25  Plan of Care Reviewed with: patient, daughter    Subjective     Chief Complaint: DEBILITY AND GENERALIZED WEAKNESS  Patient/Family Comments/goals: GET STRONGER  Pain/Comfort:  Pain Rating 1: 4/10  Location - Side 1: Right  Location - Orientation 1: generalized  Location 1: leg    Objective:     Communicated with: NURSE KRISITY AND EPIC CHART REVIEW prior to session.  Patient found HOB elevated with peripheral IV upon OT entry to room.    General Precautions: Standard, fall    Orthopedic Precautions:RLE toe touch weight bearing  Braces: N/A  Respiratory Status: Room air     Occupational Performance:     Bed Mobility:    Patient completed Rolling/Turning to Left with  minimum assistance  Patient completed Scooting/Bridging with minimum assistance  Patient completed Supine to Sit with minimum assistance     Functional Mobility/Transfers:  Patient completed Sit <> Stand Transfer with minimum assistance  with  rolling  walker   Patient completed Bed <> Chair Transfer using Step Transfer technique with minimum assistance with rolling walker  Patient completed Toilet Transfer Step Transfer technique with minimum assistance with  rolling walker  Functional Mobility: PT AMBULATED 10 FEET X 2 WITH 1 SEATED REST BREAK; 8 FEET X 2 X 1 SEATED REST BREAK    Activities of Daily Living:  Upper Body Dressing: stand by assistance .  Lower Body Dressing: maximal assistance .  Toileting: minimum assistance .      AMPAC 6 Click ADL: 18    Treatment & Education:  Educated patient on importance of increased tolerance to upright position and direct impact on CV endurance and strength. Patient encouraged to sit up in chair/ EOB, for a minimum of 2 consecutive hours including for all meals.. Encouraged patient to perform AROM TE to BUE throughout the day within all available planes of motion. Re enforced importance of utilizing call light to meet needs in room and not attempt to get up without staff assistance. Patient verbalized understanding and agreed to comply.           Patient left up in chair with all lines intact, call button in reach, chair alarm on, NURSE notified, and DAUGHTER present    GOALS:   Multidisciplinary Problems       Occupational Therapy Goals          Problem: Occupational Therapy    Goal Priority Disciplines Outcome Interventions   Occupational Therapy Goal     OT, PT/OT Progressing    Description: Goals to be met by: 2/21/25     Patient will increase functional independence with ADLs by performing:    LE Dressing with Contact Guard Assistance.  Grooming while standing at sink with Contact Guard Assistance.  Toileting from toilet with Supervision for hygiene and clothing management.   Toilet transfer to toilet with Contact Guard Assistance with RW.  Upper extremity exercise program x15 reps per handout, with independence.                         DME Justifications:   Odin's mobility limitation cannot be sufficiently resolved  by the use of a cane. Her functional mobility deficit can be sufficiently resolved with the use of a Rolling Walker. Patient's mobility limitation significantly impairs their ability to participate in one of more activities of daily living.  The use of a RW will significantly improve the patient's ability to participate in MRADLS and the patient will use it on regular basis in the home.    Time Tracking:     OT Date of Treatment: 02/11/25  OT Start Time: 1000  OT Stop Time: 1038  OT Total Time (min): 38 min    Billable Minutes:Self Care/Home Management 23 MINUTES  Therapeutic Activity 15 MINUTES    OT/HAZEL: OT     Number of HAZEL visits since last OT visit: 0    2/11/2025

## 2025-02-11 NOTE — PLAN OF CARE
[facility]  Discharge Final Note    Primary Care Provider: Juventino Barbosa MD    Expected Discharge Date: 2/7/2025    Final Discharge Note (most recent)       Final Note - 02/11/25 1144          Final Note    Assessment Type Final Discharge Note     Anticipated Discharge Disposition Skilled Nursing Facility        Post-Acute Status    Post-Acute Authorization Placement     Post-Acute Placement Status Set-up Complete/Auth obtained                               Follow-up providers       Juventino Barbosa MD   Specialty: Family Medicine   Relationship: PCP - General  Hypertension Digital Medicine Responsible Provider    [address] [place] [address] [place]  [facility] [id number]   Phone: [phone]       Next Steps: Schedule an appointment as soon as possible for a visit in 2 day(s)    Instructions: Hospital follow up    Evelin Linn FNP-C   Specialty: Cardiology    [address] [place] [address] [address] [place] [address]   Phone: [phone]       Next Steps: Schedule an appointment as soon as possible for a visit in 1 week(s)    Instructions: Hospital follow up              After-discharge care                Destination       *[facility]   Service: Skilled Nursing    [address] [facility]  [place] [id number]   Phone: [phone]                             Pt is discharging to [facility].

## 2025-02-11 NOTE — PROGRESS NOTES
[facility] Medicine  Progress Note    Patient Name: Odin Oliva  MRN: [id number]  Patient Class: IP- Inpatient   Admission Date: 2/4/2025  Length of Stay: 5 days  Attending Physician: Nava George MD  Primary Care Provider: Juventino Barbosa MD        Subjective     Principal Problem:Closed fracture of right femur        HPI:  Odin Oliva is a 85 y.o. female with a PMH  has a past medical history of CKD (chronic kidney disease), stage IV, Diabetes mellitus, HTN (hypertension), Hyperuricemia, Seasonal allergies, Thyroid disease, and Urinary tract infection. presented to the Emergency Department for evaluation of R leg pain. Pt fell on 1/27/25. Pt states she had surgery on her femur in [place] on 1/29/25 and was sent here for pain relief and f/u. External fixator is in place. Symptoms are constant and moderate in severity. Patient denies all other sxs at this time. Pt was prescribed Cefuroxime 500mg BID for 5 days and Paracetamol 1g TID for 5 days. Prior Tx includes a Paracetamol around 10:00 PM tonight. No further complaints or concerns at this time.     ER workup revealed CBC with H/H of 9.4/29.1 (previously 11.8/35.8 on 01/15/2025).  CMP revealed BUN/creatinine of 42/1.6 (at baseline),  mg/dL.  Plain film imaging of right femur revealed:[Complex, comminuted intra-articular fracture of the distal right femur with external fixation device in place].  On-call orthopedic surgeon consulted.  Plans to take to OR in a.m..  Patient received 2 mg of morphine, 4 mg Zofran in ER.  Hospital Medicine consulted with.  Patient in agreement with treatment plan.  Patient admitted under inpatient status.    PCP: Juventino Barbosa      Overview/Hospital Course:  85 y.o. female with hx of DM2 w CKD 4, HTN, Hypothyroidism, UTI, who presented to the ER for evaluation of R leg pain. Pt fell on 1/27/25 and had surgery on her femur in [place] on 1/29/25 and was sent here for pain relief and f/u. External fixator is  in place. Pt was prescribed Cefuroxime 500mg BID for 5 days and Paracetamol 1g TID for 5 days.       ER workup revealed H/H of 9.4/29, (previously 12/36 on 01/15/2025), Bun/Cr 42/1.6 (at baseline),  mg/dL. Plain film imaging of right femur revealed:[Complex, comminuted intra-articular fracture of the distal right femur with external fixation device in place].  Ortho consulted and plans to take to OR in a.m..Patient received 2 mg of morphine, 4 mg Zofran in ER and admitted to [facility].      Appreciate ortho input. Pt comfortable with pain controlled, getting IVF, BS controlled with SSI. Ortho plans surgery tomorrow. Diabetic Diet ordered for today.     2/6- seen post op- doing well, VSS afeb, AAOx4, speech clear, moving all 4 ext, NV intact- distal pulses 2 + BLE, dressing R thing clean and dry. BS under control. Pain under control. Cont post op orders.     2/7- looks and feels better, sitting up in chair comfortably on RA, pain RLE under control, VSS Afeb, did well with PT/OT, eating drinking better now- labs stable, Bun/Cr improving, so IVF d/linda, BS higher- started on lantus plus SSI. Will give inj B12 plus triple vitamins. Await SNF placement.     2/8/- Reported dizziness with exertion.  Denies any bleeding concerns or prior history of abnormal bleeding or intolerance to anticoagulation.  Concerns for symptomatic anemia given recent surgical procedure, so  will transfuse 1u  pRbc .  Adjusted insulin due to hyperglycemia concern.  History of advanced CKD, so switched DVT prophylaxis to Eliquis instead of aspirin 650 mg.    2/9- alleviation in anemia symptoms after blood transfusion yesterday.  Will also administer iron infusion given concerns for functional iron-deficiency in setting of CKD due to low iron saturation levels. Bowel regimen optimized for constipation.    2/10- looks and feels much better, stronger, sitting up in chair, did well with PT/OT, walked almost 50 feet with the walker. H/H stable at  8.3/25, had got 1 unit of blood the day before. Will give Inj Venofer and Inj b12 IM.     Interval History: looks and feels much better, stronger, sitting up in chair, did well with PT/OT, walked almost 50 feet with the walker. H/H stable at 8.3/25, had got 1 unit of blood the day before. Will give Inj Venofer and Inj b12 IM.     Review of Systems   Constitutional:  Positive for activity change and fatigue. Negative for appetite change, chills, diaphoresis and fever.   Respiratory:  Negative for cough and shortness of breath.    Cardiovascular:  Negative for chest pain and palpitations.   Gastrointestinal:  Negative for diarrhea, nausea and vomiting.   Musculoskeletal:  Positive for arthralgias, gait problem and myalgias.   Neurological:  Positive for weakness.   All other systems reviewed and are negative.    Objective:     Vital Signs (Most Recent):  Temp: 98.2 °F (36.8 °C) (02/10/25 1615)  Pulse: 73 (02/10/25 1615)  Resp: 17 (02/10/25 1804)  BP: (!) 141/62 (02/10/25 1615)  SpO2: 97 % (02/10/25 1615) Vital Signs (24h Range):  Temp:  [98.1 °F (36.7 °C)-98.4 °F (36.9 °C)] 98.2 °F (36.8 °C)  Pulse:  [70-80] 73  Resp:  [16-18] 17  SpO2:  [94 %-98 %] 97 %  BP: (140-153)/(62-68) 141/62     Weight: 58 kg (127 lb 13.9 oz)  Body mass index is 37.44 kg/m².  No intake or output data in the 24 hours ending 02/10/25 1821      Physical Exam  Vitals and nursing note reviewed.   Constitutional:       General: She is awake. She is not in acute distress.     Appearance: Normal appearance. She is well-developed and well-groomed. She is not ill-appearing, toxic-appearing or diaphoretic.   HENT:      Head: Normocephalic and atraumatic.      Right Ear: External ear normal.      Left Ear: External ear normal.      Nose: Nose normal.      Mouth/Throat:      Lips: Pink.      Mouth: Mucous membranes are moist.      Pharynx: Oropharynx is clear. Uvula midline.   Eyes:      General: No scleral icterus.     Extraocular Movements: Extraocular  movements intact.      Conjunctiva/sclera: Conjunctivae normal.      Pupils: Pupils are equal, round, and reactive to light.   Cardiovascular:      Rate and Rhythm: Normal rate and regular rhythm.      Pulses: Normal pulses.      Heart sounds: Normal heart sounds. No murmur heard.     No friction rub.   Pulmonary:      Effort: Pulmonary effort is normal. No respiratory distress.      Breath sounds: Normal breath sounds. No wheezing.   Abdominal:      General: Abdomen is flat. Bowel sounds are normal. There is no distension.      Palpations: Abdomen is soft.      Tenderness: There is no abdominal tenderness.   Musculoskeletal:         General: No swelling, tenderness, deformity or signs of injury. Normal range of motion.      Cervical back: Normal range of motion and neck supple.      Comments: Ex fix device noted to right lower extremity. NVS grossly intact.    R thigh dressing C/D/I- distal pulses 2 + B. Moving all 4 ext well.    Skin:     General: Skin is warm and dry.      Capillary Refill: Capillary refill takes less than 2 seconds.      Coloration: Skin is not pale.      Findings: No erythema or rash.   Neurological:      General: No focal deficit present.      Mental Status: She is alert and oriented to person, place, and time. Mental status is at baseline.      GCS: GCS eye subscore is 4. GCS verbal subscore is 5. GCS motor subscore is 6.      Cranial Nerves: Cranial nerves 2-12 are intact.      Sensory: Sensation is intact.      Motor: Weakness present.      Gait: Gait abnormal.   Psychiatric:         Mood and Affect: Mood normal.         Speech: Speech normal.         Behavior: Behavior normal. Behavior is cooperative.         Thought Content: Thought content normal.         Judgment: Judgment normal.             Significant Labs: All pertinent labs within the past 24 hours have been reviewed.  BMP:   Recent Labs   Lab 02/09/25  0754   *      K 4.6      CO2 26   BUN 27*   CREATININE 1.5*    CALCIUM 8.6*   MG 1.9     CBC:   Recent Labs   Lab 02/09/25  0754 02/10/25  0545   WBC 6.20 5.83   HGB 8.7* 8.3*   HCT 27.1* 26.2*    417       Significant Imaging: I have reviewed all pertinent imaging results/findings within the past 24 hours.    Assessment and Plan     * Closed fracture of right femur s/p EXt Fixator- s/p ORIF  RCRI revealed 3.9% risk for major cardiac event. S/p ORIF 02/06/25    Plan:  -  -Continue current pain regimen, titrate as needed  -Bowel regimen  -Bedrest  -Wound care   --IVFs prn  -Antiemetics prn  -PT/OT   -Ortho consulted, follow-up recs  -DVT prophylaxis    Stable, doing well post op, await SNF placement    Acute blood loss anemia  Anemia is likely due to acute blood loss which was from Orthopedic surgery and chronic disease due to Chronic Kidney Disease. Most recent hemoglobin and hematocrit are listed below.  Recent Labs     02/08/25  1046 02/09/25  0754 02/10/25  0545   HGB 7.7* 8.7* 8.3*   HCT 24.7* 27.1* 26.2*       Plan  - Monitor serial CBC: Daily  - Transfuse PRBC if patient becomes hemodynamically unstable, symptomatic or H/H drops below 7/21.  - Patient has received 1 units of PRBCs on 2/8/25  - Patient's anemia is currently improving    Stable, start oral ferrous sulfate    Hypertension  Chronic, controlled.  Latest blood pressure and vitals reviewed-   Temp:  [98.1 °F (36.7 °C)-98.4 °F (36.9 °C)]   Pulse:  [70-80]   Resp:  [16-18]   BP: (140-153)/(62-68)   SpO2:  [94 %-98 %] .   Home meds for hypertension were reviewed and noted below.   Hypertension Medications               valsartan (DIOVAN) 160 MG tablet Take 1 tablet (160 mg total) by mouth once daily.            While in the hospital, will manage blood pressure as follows; Continue home antihypertensive regimen    Will utilize p.r.n. blood pressure medication only if patient's blood pressure greater than  160/90 and she develops symptoms such as worsening chest pain or shortness of breath.      Other  specified hypothyroidism  Patient has chronic hypothyroidism. TFTs reviewed-   Lab Results   Component Value Date    TSH 18.214 (H) 01/15/2025   . Will continue chronic levothyroxine and adjust for and clinical changes.        Chronic kidney disease (CKD), stage IV (severe)  Creatine stable for now. BMP reviewed- noted Estimated Creatinine Clearance: 19.1 mL/min (A) (based on SCr of 1.5 mg/dL (H)). according to latest data. Based on current GFR, CKD stage is stage 4 - GFR 15-29.  Monitor UOP and serial BMP and adjust therapy as needed. Renally dose meds. Avoid nephrotoxic medications and procedures.    improving    Diabetes mellitus  Patient's FSGs are uncontrolled due to hyperglycemia on current medication regimen.  Last A1c reviewed-   Lab Results   Component Value Date    HGBA1C 7.8 (H) 01/15/2025     Most recent fingerstick glucose reviewed-   Recent Labs   Lab 02/09/25  2240 02/10/25  0608 02/10/25  1204 02/10/25  1618   POCTGLUCOSE 112* 104 265* 101       Current correctional scale  Low  Maintain anti-hyperglycemic dose as follows-   Antihyperglycemics (From admission, onward)      Start     Stop Route Frequency Ordered    02/08/25 2100  insulin glargine U-100 (Lantus) pen 7 Units         -- SubQ 2 times daily 02/08/25 1235    02/08/25 1145  insulin aspart U-100 pen 5 Units         -- SubQ 3 times daily with meals 02/08/25 1040    02/05/25 2200  insulin aspart U-100 pen 0-10 Units         -- SubQ Every 6 hours PRN 02/05/25 2101          Under control with Lantus and  SSI/diet      VTE Risk Mitigation (From admission, onward)           Ordered     apixaban tablet 2.5 mg  2 times daily         02/08/25 1213     Reason for No Pharmacological VTE Prophylaxis  Once        Comments: Planned surgical procedure   Question:  Reasons:  Answer:  Physician Provided (leave comment)    02/05/25 0005     IP VTE HIGH RISK PATIENT  Once         02/05/25 0005     Place sequential compression device  Until discontinued          02/05/25 0005                    Discharge Planning   BRIAN: 2/7/2025     Code Status: Full Code   Medical Readiness for Discharge Date:   Discharge Plan A: Skilled Nursing Facility        Please place Justification for DME    Nava George MD  Department of Hospital Medicine   [facility]

## 2025-02-11 NOTE — ASSESSMENT & PLAN NOTE
Patient's FSGs are uncontrolled due to hyperglycemia on current medication regimen.  Last A1c reviewed-   Lab Results   Component Value Date    HGBA1C 7.8 (H) 01/15/2025     Most recent fingerstick glucose reviewed-   Recent Labs   Lab 02/09/25  2240 02/10/25  0608 02/10/25  1204 02/10/25  1618   POCTGLUCOSE 112* 104 265* 101       Current correctional scale  Low  Maintain anti-hyperglycemic dose as follows-   Antihyperglycemics (From admission, onward)      Start     Stop Route Frequency Ordered    02/08/25 2100  insulin glargine U-100 (Lantus) pen 7 Units         -- SubQ 2 times daily 02/08/25 1235    02/08/25 1145  insulin aspart U-100 pen 5 Units         -- SubQ 3 times daily with meals 02/08/25 1040    02/05/25 2200  insulin aspart U-100 pen 0-10 Units         -- SubQ Every 6 hours PRN 02/05/25 2101          Under control with Lantus and  SSI/diet

## 2025-02-20 ENCOUNTER — HOSPITAL ENCOUNTER (OUTPATIENT)
Dept: RADIOLOGY | Facility: HOSPITAL | Age: 85
Discharge: HOME | End: 2025-02-20
Attending: ORTHOPAEDIC SURGERY
Payer: MEDICARE

## 2025-02-20 ENCOUNTER — OFFICE VISIT (OUTPATIENT)
Dept: ORTHOPEDICS | Facility: CLINIC | Age: 85
End: 2025-02-20
Payer: MEDICARE

## 2025-02-20 VITALS
WEIGHT: 127.88 LBS | BODY MASS INDEX: 25.78 KG/M2 | DIASTOLIC BLOOD PRESSURE: 65 MMHG | HEIGHT: 59 IN | SYSTOLIC BLOOD PRESSURE: 124 MMHG | HEART RATE: 69 BPM | TEMPERATURE: 97 F

## 2025-02-20 DIAGNOSIS — M89.8X5 PAIN IN FEMUR: Primary | ICD-10-CM

## 2025-02-20 DIAGNOSIS — S72.451D CLOSED DISPLACED SUPRACONDYLAR FRACTURE OF DISTAL END OF RIGHT FEMUR WITHOUT INTRACONDYLAR EXTENSION WITH ROUTINE HEALING, SUBSEQUENT ENCOUNTER: Primary | ICD-10-CM

## 2025-02-20 DIAGNOSIS — M89.8X5 PAIN IN RIGHT FEMUR: Primary | ICD-10-CM

## 2025-02-20 DIAGNOSIS — M89.8X5 PAIN IN FEMUR: ICD-10-CM

## 2025-02-20 PROCEDURE — 73552 X-RAY EXAM OF FEMUR 2/>: CPT | Mod: TC,HCNC,RT

## 2025-02-20 RX ORDER — INSULIN ASPART 100 [IU]/ML
INJECTION, SOLUTION INTRAVENOUS; SUBCUTANEOUS
COMMUNITY
Start: 2025-02-12

## 2025-02-20 RX ORDER — MONTELUKAST SODIUM 10 MG/1
10 TABLET ORAL DAILY
COMMUNITY

## 2025-02-20 RX ORDER — INSULIN GLARGINE 100 [IU]/ML
INJECTION, SOLUTION SUBCUTANEOUS
COMMUNITY
Start: 2025-02-13

## 2025-02-20 RX ORDER — FUROSEMIDE 20 MG/1
20 TABLET ORAL DAILY
COMMUNITY

## 2025-02-20 RX ORDER — BISACODYL 10 MG/1
10 SUPPOSITORY RECTAL DAILY PRN
COMMUNITY
Start: 2025-02-11

## 2025-02-20 NOTE — DISCHARGE SUMMARY
[facility] Medicine  Discharge Summary      Patient Name: Odin Oliva  MRN: [id number]  JOSSY: [id number]  Patient Class: IP- Inpatient  Admission Date: 2/4/2025  Hospital Length of Stay: 6 days  Discharge Date and Time: 2/11/2025  1:36 PM  Attending Physician: Piedad att. providers found   Discharging Provider: Nava George MD  Primary Care Provider: Juventino Barbosa MD    Primary Care Team: [facility] MEDICINE D    HPI:   Odin Oliva is a 85 y.o. female with a PMH  has a past medical history of CKD (chronic kidney disease), stage IV, Diabetes mellitus, HTN (hypertension), Hyperuricemia, Seasonal allergies, Thyroid disease, and Urinary tract infection. presented to the Emergency Department for evaluation of R leg pain. Pt fell on 1/27/25. Pt states she had surgery on her femur in [place] on 1/29/25 and was sent here for pain relief and f/u. External fixator is in place. Symptoms are constant and moderate in severity. Patient denies all other sxs at this time. Pt was prescribed Cefuroxime 500mg BID for 5 days and Paracetamol 1g TID for 5 days. Prior Tx includes a Paracetamol around 10:00 PM tonight. No further complaints or concerns at this time.     ER workup revealed CBC with H/H of 9.4/29.1 (previously 11.8/35.8 on 01/15/2025).  CMP revealed BUN/creatinine of 42/1.6 (at baseline),  mg/dL.  Plain film imaging of right femur revealed:[Complex, comminuted intra-articular fracture of the distal right femur with external fixation device in place].  On-call orthopedic surgeon consulted.  Plans to take to OR in a.m..  Patient received 2 mg of morphine, 4 mg Zofran in ER.  Hospital Medicine consulted with.  Patient in agreement with treatment plan.  Patient admitted under inpatient status.    PCP: Juventino Barbosa      Procedure(s) (LRB):  REMOVAL, EXTERNAL FIXATION DEVICE (Right)  ORIF, FRACTURE, FEMUR, TRANSCONDYLAR WITH INTERCONDYLAR EXTENSION (Right)      Hospital Course:   85 y.o. female with  hx of DM2 w CKD 4, HTN, Hypothyroidism, UTI, who presented to the ER for evaluation of R leg pain. Pt fell on 1/27/25 and had surgery on her femur in [place] on 1/29/25 and was sent here for pain relief and f/u. External fixator is in place. Pt was prescribed Cefuroxime 500mg BID for 5 days and Paracetamol 1g TID for 5 days.       ER workup revealed H/H of 9.4/29, (previously 12/36 on 01/15/2025), Bun/Cr 42/1.6 (at baseline),  mg/dL. Plain film imaging of right femur revealed:[Complex, comminuted intra-articular fracture of the distal right femur with external fixation device in place].  Ortho consulted and plans to take to OR in a.m..Patient received 2 mg of morphine, 4 mg Zofran in ER and admitted to [facility].      Appreciate ortho input. Pt comfortable with pain controlled, getting IVF, BS controlled with SSI. Ortho plans surgery tomorrow. Diabetic Diet ordered for today.     2/6- seen post op- doing well, VSS afeb, AAOx4, speech clear, moving all 4 ext, NV intact- distal pulses 2 + BLE, dressing R thing clean and dry. BS under control. Pain under control. Cont post op orders.     2/7- looks and feels better, sitting up in chair comfortably on RA, pain RLE under control, VSS Afeb, did well with PT/OT, eating drinking better now- labs stable, Bun/Cr improving, so IVF d/linda, BS higher- started on lantus plus SSI. Will give inj B12 plus triple vitamins. Await SNF placement.     2/8/- Reported dizziness with exertion.  Denies any bleeding concerns or prior history of abnormal bleeding or intolerance to anticoagulation.  Concerns for symptomatic anemia given recent surgical procedure, so  will transfuse 1u  pRbc .  Adjusted insulin due to hyperglycemia concern.  History of advanced CKD, so switched DVT prophylaxis to Eliquis instead of aspirin 650 mg.    2/9- alleviation in anemia symptoms after blood transfusion yesterday.  Will also administer iron infusion given concerns for functional iron-deficiency in  setting of CKD due to low iron saturation levels. Bowel regimen optimized for constipation.    2/10- looks and feels much better, stronger, sitting up in chair, did well with PT/OT, walked almost 50 feet with the walker. H/H stable at 8.3/25, had got 1 unit of blood the day before. Will give Inj Venofer and Inj b12 IM.     2/11- feels better, stronger, sitting up in chair on RA. She is accepted at SNF and feels ready to go there, doing well with PT/OT. Eating drinking well, pain under control. She is seen and examined and deemed stable for discharge home today.      Goals of Care Treatment Preferences:  Code Status: Full Code      SDOH Screening:  The patient was screened for utility difficulties, food insecurity, transport difficulties, housing insecurity, and interpersonal safety and there were no concerns identified this admission.     Consults:   Consults (From admission, onward)          Status Ordering Provider     Inpatient consult to Orthopedic Surgery  Once        Provider:  Damian Jean Jr., MD    Completed AVILA MCNAIR            Hypertension  Chronic, controlled.  Latest blood pressure and vitals reviewed-   Temp:  [98.1 °F (36.7 °C)-98.4 °F (36.9 °C)]   Pulse:  [70-80]   Resp:  [16-18]   BP: (140-153)/(62-68)   SpO2:  [94 %-98 %] .   Home meds for hypertension were reviewed and noted below.   Hypertension Medications               valsartan (DIOVAN) 160 MG tablet Take 1 tablet (160 mg total) by mouth once daily.            While in the hospital, will manage blood pressure as follows; Continue home antihypertensive regimen    Will utilize p.r.n. blood pressure medication only if patient's blood pressure greater than  160/90 and she develops symptoms such as worsening chest pain or shortness of breath.      Other specified hypothyroidism  Patient has chronic hypothyroidism. TFTs reviewed-   Lab Results   Component Value Date    TSH 18.214 (H) 01/15/2025   . Will continue chronic  levothyroxine and adjust for and clinical changes.        Chronic kidney disease (CKD), stage IV (severe)  Creatine stable for now. BMP reviewed- noted Estimated Creatinine Clearance: 19.1 mL/min (A) (based on SCr of 1.5 mg/dL (H)). according to latest data. Based on current GFR, CKD stage is stage 4 - GFR 15-29.  Monitor UOP and serial BMP and adjust therapy as needed. Renally dose meds. Avoid nephrotoxic medications and procedures.    improving    Diabetes mellitus  Patient's FSGs are uncontrolled due to hyperglycemia on current medication regimen.  Last A1c reviewed-   Lab Results   Component Value Date    HGBA1C 7.8 (H) 01/15/2025     Most recent fingerstick glucose reviewed-   Recent Labs   Lab 02/09/25  2240 02/10/25  0608 02/10/25  1204 02/10/25  1618   POCTGLUCOSE 112* 104 265* 101       Current correctional scale  Low  Maintain anti-hyperglycemic dose as follows-   Antihyperglycemics (From admission, onward)      Start     Stop Route Frequency Ordered    02/08/25 2100  insulin glargine U-100 (Lantus) pen 7 Units         -- SubQ 2 times daily 02/08/25 1235    02/08/25 1145  insulin aspart U-100 pen 5 Units         -- SubQ 3 times daily with meals 02/08/25 1040    02/05/25 2200  insulin aspart U-100 pen 0-10 Units         -- SubQ Every 6 hours PRN 02/05/25 2101          Under control with Lantus and  SSI/diet      Final Active Diagnoses:    Diagnosis Date Noted POA    Hypertension [I10] 11/04/2019 Yes    Other specified hypothyroidism [E03.8] 10/28/2019 Yes    Chronic kidney disease (CKD), stage IV (severe) [N18.4] 10/23/2019 Yes    Diabetes mellitus [E11.9] 04/03/2019 Yes      Problems Resolved During this Admission:    Diagnosis Date Noted Date Resolved POA    PRINCIPAL PROBLEM:  Closed fracture of right femur s/p EXt Fixator- s/p ORIF [S72.91XA] 02/05/2025 02/11/2025 Yes    Acute blood loss anemia [D62] 02/09/2025 02/11/2025 No       Discharged Condition: stable    Disposition: Skilled Nursing  Facility    Follow Up:   Contact information for follow-up providers       Juventino Barbosa MD. Schedule an appointment as soon as possible for a visit in 2 day(s).    Specialty: Family Medicine  Why: Hospital follow up  Contact information:  [address]  [place] [address]  [phone]               Evelin Linn FNP-C. Schedule an appointment as soon as possible for a visit in 1 week(s).    Specialty: Cardiology  Why: Hospital follow up  Contact information:  [id number] [place] [address] [address] [address]  [phone]                       Contact information for after-discharge care       Destination       [facility] .    Service: Skilled Nursing  Contact information:  [address] [facility] [address]  [phone]                                 Patient Instructions:      Diet Cardiac     Diet diabetic     Activity as tolerated       Significant Diagnostic Studies: Labs: All labs within the past 24 hours have been reviewed  Radiology:   Imaging Results              X-Ray Chest 1 View (Final result)  Result time 02/05/25 00:45:20      Final result by Rich Acosta MD (02/05/25 00:45:20)                   Impression:      As above.      Electronically signed by: Rich Acosta MD  Date:    02/05/2025  Time:    00:45               Narrative:    EXAMINATION:  XR CHEST 1 VIEW    CLINICAL HISTORY:  Preop clearance;    TECHNIQUE:  Single frontal view of the chest was performed.    COMPARISON:  05/27/2024    FINDINGS:  The patient is rotated limiting assessment.  There is borderline enlargement of the cardiac silhouette.  There is mild tortuosity of the thoracic aorta.  Lungs are symmetrically expanded without evidence of large confluent airspace consolidation.  There is mild chronic coarse interstitial attenuation.  No significant volume of pleural fluid or definitive pneumothorax identified.  Osseous structures demonstrate degenerative changes.  Mild  gaseous distention of visualized bowel within the upper abdomen.                                       X-Ray Tibia Fibula 2 View Right (Final result)  Result time 02/04/25 23:37:35      Final result by Rich Acosta MD (02/04/25 23:37:35)                   Impression:      Complex, comminuted intra-articular fracture of the distal right femur with external fixation device in place.  Correlation with any prior outside imaging advised.      Electronically signed by: Rich Acosta MD  Date:    02/04/2025  Time:    23:37               Narrative:    EXAMINATION:  XR FEMUR 2 VIEW RIGHT; XR TIBIA FIBULA 2 VIEW RIGHT    CLINICAL HISTORY:  Unspecified fall, initial encounter    TECHNIQUE:  AP and lateral views of the right femur were performed.  AP and lateral views of the right tibia and fibula were performed.    COMPARISON:  None    FINDINGS:  There is an external fixation device in place with percutaneous pins spanning the right femur as well as the right tibia.    There is a complex, comminuted, moderately displaced fracture of the distal right femur.  There is intra-articular involvement of the patellofemoral articulation.  There is an associated complex joint effusion present.  There is diffuse soft tissue edema present about the right knee.    The right femoral head remains appropriately seated within the acetabulum.  The right tibia and fibula appear grossly intact.  The ankle mortise appears maintained.                                       X-Ray Femur Ap/Lat Right (Final result)  Result time 02/04/25 23:37:35      Final result by Rich Acosta MD (02/04/25 23:37:35)                   Impression:      Complex, comminuted intra-articular fracture of the distal right femur with external fixation device in place.  Correlation with any prior outside imaging advised.      Electronically signed by: Rich Acosta MD  Date:    02/04/2025  Time:    23:37               Narrative:    EXAMINATION:  XR FEMUR 2  VIEW RIGHT; XR TIBIA FIBULA 2 VIEW RIGHT    CLINICAL HISTORY:  Unspecified fall, initial encounter    TECHNIQUE:  AP and lateral views of the right femur were performed.  AP and lateral views of the right tibia and fibula were performed.    COMPARISON:  None    FINDINGS:  There is an external fixation device in place with percutaneous pins spanning the right femur as well as the right tibia.    There is a complex, comminuted, moderately displaced fracture of the distal right femur.  There is intra-articular involvement of the patellofemoral articulation.  There is an associated complex joint effusion present.  There is diffuse soft tissue edema present about the right knee.    The right femoral head remains appropriately seated within the acetabulum.  The right tibia and fibula appear grossly intact.  The ankle mortise appears maintained.                                       Pending Diagnostic Studies:       None           Medications:  Reconciled Home Medications:      Medication List        START taking these medications      amLODIPine 5 MG tablet  Commonly known as: NORVASC  Take 1 tablet (5 mg total) by mouth once daily.     cholecalciferol (vitamin D3) 125 mcg (5,000 unit) Tab  Take 1 tablet (5,000 Units total) by mouth once daily.     ferrous sulfate 325 (65 FE) MG EC tablet  Take 1 tablet (325 mg total) by mouth once daily.     folic acid-vit B6-vit B12 2.5-25-2 mg 2.5-25-2 mg Tab  Commonly known as: FOLBIC or Equiv  Take 1 tablet by mouth once daily.     HYDROcodone-acetaminophen 5-325 mg per tablet  Commonly known as: NORCO  Take 1 tablet by mouth every 6 (six) hours as needed for Pain.     multivitamin Tab  Take 1 tablet by mouth once daily.     senna-docusate 8.6-50 mg 8.6-50 mg per tablet  Commonly known as: PERICOLACE  Take 1 tablet by mouth 2 (two) times daily.            CONTINUE taking these medications      albuterol 90 mcg/actuation inhaler  Commonly known as: PROVENTIL/VENTOLIN HFA  Inhale 2  puffs into the lungs every 4 (four) hours as needed for Wheezing or Shortness of Breath. Rescue     apixaban 2.5 mg Tab  Commonly known as: ELIQUIS  Take 1 tablet (2.5 mg total) by mouth 2 (two) times daily. for 9 days     aspirin-acetaminophen-caffeine 250-250-65 mg 250-250-65 mg per tablet  Commonly known as: EXCEDRIN MIGRAINE  Take 1 tablet by mouth every 6 (six) hours as needed for Pain.     azelastine 137 mcg (0.1 %) nasal spray  Commonly known as: ASTELIN  1 spray (137 mcg total) by Nasal route 2 (two) times daily.     blood sugar diagnostic Strp  1 each by Misc.(Non-Drug; Combo Route) route 3 (three) times daily. Insurance preferred     blood-glucose meter kit  Use as instructed. Insurance preferred     cetirizine 10 MG tablet  Commonly known as: ZYRTEC  Take 1 tablet (10 mg total) by mouth once daily.     FREESTYLE HAROLDO 14 DAY SENSOR Kit  Generic drug: flash glucose sensor  1 each by Misc.(Non-Drug; Combo Route) route every 14 (fourteen) days.     hydrOXYzine HCL 10 MG Tab  Commonly known as: ATARAX  Take 1 tablet (10 mg total) by mouth every evening.     JARDIANCE 10 mg tablet  Generic drug: empagliflozin  Take 1 tablet (10 mg total) by mouth once daily.     lancets Misc  1 each by Misc.(Non-Drug; Combo Route) route 3 (three) times daily.     levothyroxine 75 MCG tablet  Commonly known as: SYNTHROID  Take 1 tablet (75 mcg total) by mouth before breakfast.     TRADJENTA 5 mg Tab tablet  Generic drug: linaGLIPtin  Take 1 tablet (5 mg total) by mouth once daily.     valsartan 160 MG tablet  Commonly known as: DIOVAN  Take 1 tablet (160 mg total) by mouth once daily.            STOP taking these medications      fluticasone-salmeterol 250-50 mcg/dose 250-50 mcg/dose diskus inhaler  Commonly known as: ADVAIR DISKUS            ASK your doctor about these medications      bisacodyL 10 mg Supp  Commonly known as: DULCOLAX  Place 1 suppository (10 mg total) rectally once. for 1 dose  Ask about: Should I take this  medication?     montelukast 10 mg tablet  Commonly known as: SINGULAIR  Take 1 tablet (10 mg total) by mouth every evening.  Ask about: Should I take this medication?              Indwelling Lines/Drains at time of discharge:   Lines/Drains/Airways       None                   Time spent on the discharge of patient: 45 minutes         Nava George MD  Department of Hospital Medicine  '[facility]

## 2025-02-20 NOTE — PROGRESS NOTES
Patient ID: Odin Oliva  YOB: 1940  MRN: [id number]    Chief Complaint: Post-op Evaluation and Pain of the Right Femur      Referred By: No ref. provider found     History of Present Illness: Odin Oliva is a  85 y.o. female   Data Unavailable with a chief complaint of Post-op Evaluation and Pain of the Right Femur    Two weeks status post ORIF supracondylar femur fracture    HPI    Past Medical History:   Past Medical History:   Diagnosis Date    CKD (chronic kidney disease), stage IV     Diabetes mellitus     HTN (hypertension)     Hyperuricemia     Seasonal allergies     Thyroid disease     Urinary tract infection      Past Surgical History:   Procedure Laterality Date    CATARACT EXTRACTION, BILATERAL      CHOLECYSTECTOMY      EXOSTECTOMY Bilateral 11/8/2019    Procedure: EXOSTECTOMY;  Surgeon: Lissy Comer DPM;  Location: [facility] OR;  Service: Podiatry;  Laterality: Bilateral;  left fourth digit, right fifth digit    FOOT MASS EXCISION Bilateral 11/8/2019    Procedure: HAMMER TOE, DEROTATIONAL ARTHROPLASTY FIFTH DIGIT;  Surgeon: Lissy Comer DPM;  Location: [facility] OR;  Service: Podiatry;  Laterality: Bilateral;  Left 5th toe incision @ 08:51.  Left 4th toe incision @ 08:54.  Right 5th toe incision : 09:24.    ORIF, FRACTURE, FEMUR, TRANSCONDYLAR WITH INTERCONDYLAR EXTENSION Right 2/6/2025    Procedure: ORIF, FRACTURE, FEMUR, TRANSCONDYLAR WITH INTERCONDYLAR EXTENSION;  Surgeon: Damian Jean Jr., MD;  Location: [facility] OR;  Service: Orthopedics;  Laterality: Right;    REMOVAL OF EXTERNAL FIXATION DEVICE Right 2/6/2025    Procedure: REMOVAL, EXTERNAL FIXATION DEVICE;  Surgeon: Damian Jena Jr., MD;  Location: [facility] OR;  Service: Orthopedics;  Laterality: Right;    TOTAL THYROIDECTOMY      UMBILICAL HERNIA REPAIR      x 2     Family History   Problem Relation Name Age of Onset    Asthma Mother      No Known Problems Father      Lung cancer Brother  65    Diabetes Brother       Diabetes Brother  80    Heart disease Daughter       Social History[1]  Medication List with Changes/Refills   Current Medications    ALBUTEROL (PROVENTIL/VENTOLIN HFA) 90 MCG/ACTUATION INHALER    Inhale 2 puffs into the lungs every 4 (four) hours as needed for Wheezing or Shortness of Breath. Rescue    AMLODIPINE (NORVASC) 5 MG TABLET    Take 1 tablet (5 mg total) by mouth once daily.    APIXABAN (ELIQUIS) 2.5 MG TAB    Take 1 tablet (2.5 mg total) by mouth 2 (two) times daily. for 9 days    ASPIRIN-ACETAMINOPHEN-CAFFEINE 250-250-65 MG (EXCEDRIN MIGRAINE) 250-250-65 MG PER TABLET    Take 1 tablet by mouth every 6 (six) hours as needed for Pain.    AZELASTINE (ASTELIN) 137 MCG (0.1 %) NASAL SPRAY    1 spray (137 mcg total) by Nasal route 2 (two) times daily.    BISACODYL (DULCOLAX) 10 MG SUPP    Place 10 mg rectally daily as needed.    BLOOD SUGAR DIAGNOSTIC STRP    1 each by Misc.(Non-Drug; Combo Route) route 3 (three) times daily. Insurance preferred    BLOOD-GLUCOSE METER KIT    Use as instructed. Insurance preferred    CETIRIZINE (ZYRTEC) 10 MG TABLET    Take 1 tablet (10 mg total) by mouth once daily.    CHOLECALCIFEROL, VITAMIN D3, 125 MCG (5,000 UNIT) TAB    Take 1 tablet (5,000 Units total) by mouth once daily.    EMPAGLIFLOZIN (JARDIANCE) 10 MG TABLET    Take 1 tablet (10 mg total) by mouth once daily.    FERROUS SULFATE 325 (65 FE) MG EC TABLET    Take 1 tablet (325 mg total) by mouth once daily.    FLASH GLUCOSE SENSOR (FREESTYLE HAROLDO 14 DAY SENSOR) KIT    1 each by Misc.(Non-Drug; Combo Route) route every 14 (fourteen) days.    FOLIC ACID-VIT B6-VIT B12 2.5-25-2 MG (FOLBIC OR EQUIV) 2.5-25-2 MG TAB    Take 1 tablet by mouth once daily.    FUROSEMIDE (LASIX) 20 MG TABLET    Take 20 mg by mouth once daily.    HYDROCODONE-ACETAMINOPHEN (NORCO) 5-325 MG PER TABLET    Take 1 tablet by mouth every 6 (six) hours as needed for Pain.    HYDROXYZINE HCL (ATARAX) 10 MG TAB    Take 1 tablet (10 mg total) by mouth  every evening.    LANCETS MISC    1 each by Misc.(Non-Drug; Combo Route) route 3 (three) times daily.    LANTUS SOLOSTAR U-100 INSULIN 100 UNIT/ML (3 ML) INPN PEN    Inject into the skin.    LEVOTHYROXINE (SYNTHROID) 75 MCG TABLET    Take 1 tablet (75 mcg total) by mouth before breakfast.    LINAGLIPTIN (TRADJENTA) 5 MG TAB TABLET    Take 1 tablet (5 mg total) by mouth once daily.    MONTELUKAST (SINGULAIR) 10 MG TABLET    Take 10 mg by mouth once daily.    MULTIVITAMIN TAB    Take 1 tablet by mouth once daily.    NOVOLOG FLEXPEN U-100 INSULIN 100 UNIT/ML (3 ML) INPN PEN    Inject into the skin.    SENNA-DOCUSATE 8.6-50 MG (PERICOLACE) 8.6-50 MG PER TABLET    Take 1 tablet by mouth 2 (two) times daily.    VALSARTAN (DIOVAN) 160 MG TABLET    Take 1 tablet (160 mg total) by mouth once daily.     Review of patient's allergies indicates:  No Known Allergies  ROS    Physical Exam:   Body mass index is 25.83 kg/m².  Vitals:    02/20/25 1109   BP: 124/65   Pulse: 69   Temp: 97 °F (36.1 °C)      GENERAL: Well appearing, appropriate for stated age, no acute distress.  CARDIOVASCULAR: Pulses regular by peripheral palpation.  PULMONARY: Respirations are even and non-labored.  NEURO: Awake, alert, and oriented x 3.  PSYCH: Mood & affect are appropriate.  HEENT: Head is normocephalic and atraumatic.  Ortho/SPM Exam  Incisions are all healing well there is no signs of erythema or infection  Limited knee range of motion as expected  Neurovascular exam of the extremities intact  Imaging:    X-Ray Femur 2 View Right  Narrative: EXAMINATION:  XR FEMUR 2 VIEW RIGHT    CLINICAL HISTORY:  surgery;    COMPARISON:  None  Impression: FINDINGS/  Intraoperative fluoroscopic images were obtained.    Total fluoro time was 1.6 minutes, 4.9 mGy and 2 fluoroscopic images were obtained.  See operative report.    Electronically signed by: Roderick Castillo MD  Date:    02/06/2025  Time:    10:53  SURG FL Surgery Fluoro Usage  See OP Notes for results.      IMPRESSION: See OP Notes for results.     This procedure was auto-finalized by: Virtual Radiologist      Relevant imaging results reviewed and interpreted by me, discussed with the patient and / or family today.  X-rays are pending    Other Tests:     X-rays show no change in alignment from surgery.  There is no prominent hardware and overall alignment looks good    There are no Patient Instructions on file for this visit.  Provider Note/Medical Decision Making:     She is doing well 2 weeks postop.  Still has significant stiffness of the knee which she needs to continue to work on with physical therapy at her ECF.  We will have [facility] remove staples and sutures on 02/21/2025.  She will follow up in 2 weeks    I discussed worrisome and red flag signs and symptoms with the patient. The patient expressed understanding and agreed to alert me immediately or to go to the emergency room if they experience any of these.   Treatment plan was developed with input from the patient/family, and they expressed understanding and agreement with the plan. All questions were answered today.         Damian Jean MD  Orthopaedic Surgery       Disclaimer: This note was prepared using a voice recognition system and is likely to have sound alike errors within the text.            [1]   Social History  Socioeconomic History    Marital status:    Tobacco Use    Smoking status: Never     Passive exposure: Never    Smokeless tobacco: Never   Substance and Sexual Activity    Alcohol use: No    Drug use: No    Sexual activity: Never     Social Drivers of Health     Financial Resource Strain: Low Risk  (2/5/2025)    Overall Financial Resource Strain (CARDIA)     Difficulty of Paying Living Expenses: Not very hard   Recent Concern: Financial Resource Strain - High Risk (11/21/2024)    Overall Financial Resource Strain (CARDIA)     Difficulty of Paying Living Expenses: Hard   Food Insecurity: No Food Insecurity (2/5/2025)     Hunger Vital Sign     Worried About Running Out of Food in the Last Year: Never true     Ran Out of Food in the Last Year: Never true   Transportation Needs: No Transportation Needs (2/5/2025)    TRANSPORTATION NEEDS     Transportation : No   Physical Activity: Sufficiently Active (11/21/2024)    Exercise Vital Sign     Days of Exercise per Week: 7 days     Minutes of Exercise per Session: 30 min   Stress: Stress Concern Present (2/5/2025)    [employer] [place] [employer] - Occupational Stress Questionnaire     Feeling of Stress : To some extent   Housing Stability: Unknown (2/5/2025)    Housing Stability Vital Sign     Unable to Pay for Housing in the Last Year: No     Homeless in the Last Year: No

## 2025-03-05 ENCOUNTER — OFFICE VISIT (OUTPATIENT)
Dept: DIABETES | Facility: CLINIC | Age: 85
End: 2025-03-05
Payer: MEDICARE

## 2025-03-05 VITALS
BODY MASS INDEX: 26.4 KG/M2 | WEIGHT: 130.75 LBS | SYSTOLIC BLOOD PRESSURE: 133 MMHG | HEART RATE: 66 BPM | DIASTOLIC BLOOD PRESSURE: 64 MMHG

## 2025-03-05 DIAGNOSIS — E11.69 TYPE 2 DIABETES MELLITUS WITH OTHER SPECIFIED COMPLICATION, WITHOUT LONG-TERM CURRENT USE OF INSULIN: Primary | ICD-10-CM

## 2025-03-05 LAB — GLUCOSE SERPL-MCNC: 274 MG/DL (ref 70–110)

## 2025-03-05 PROCEDURE — 3078F DIAST BP <80 MM HG: CPT | Mod: CPTII,S$GLB,, | Performed by: NURSE PRACTITIONER

## 2025-03-05 PROCEDURE — 82962 GLUCOSE BLOOD TEST: CPT | Mod: S$GLB,,, | Performed by: NURSE PRACTITIONER

## 2025-03-05 PROCEDURE — 99214 OFFICE O/P EST MOD 30 MIN: CPT | Mod: S$GLB,,, | Performed by: NURSE PRACTITIONER

## 2025-03-05 PROCEDURE — 1111F DSCHRG MED/CURRENT MED MERGE: CPT | Mod: CPTII,S$GLB,, | Performed by: NURSE PRACTITIONER

## 2025-03-05 PROCEDURE — 3288F FALL RISK ASSESSMENT DOCD: CPT | Mod: CPTII,S$GLB,, | Performed by: NURSE PRACTITIONER

## 2025-03-05 PROCEDURE — 1101F PT FALLS ASSESS-DOCD LE1/YR: CPT | Mod: CPTII,S$GLB,, | Performed by: NURSE PRACTITIONER

## 2025-03-05 PROCEDURE — 3075F SYST BP GE 130 - 139MM HG: CPT | Mod: CPTII,S$GLB,, | Performed by: NURSE PRACTITIONER

## 2025-03-05 PROCEDURE — G2211 COMPLEX E/M VISIT ADD ON: HCPCS | Mod: S$GLB,,, | Performed by: NURSE PRACTITIONER

## 2025-03-05 PROCEDURE — 99999 PR PBB SHADOW E&M-EST. PATIENT-LVL V: CPT | Mod: PBBFAC,,, | Performed by: NURSE PRACTITIONER

## 2025-03-05 PROCEDURE — 1125F AMNT PAIN NOTED PAIN PRSNT: CPT | Mod: CPTII,S$GLB,, | Performed by: NURSE PRACTITIONER

## 2025-03-05 RX ORDER — PEN NEEDLE, DIABETIC 30 GX3/16"
NEEDLE, DISPOSABLE MISCELLANEOUS
Qty: 100 EACH | Refills: 5 | Status: SHIPPED | OUTPATIENT
Start: 2025-03-05

## 2025-03-05 RX ORDER — INSULIN GLARGINE 100 [IU]/ML
12 INJECTION, SOLUTION SUBCUTANEOUS DAILY
Qty: 3.6 ML | Refills: 11 | Status: SHIPPED | OUTPATIENT
Start: 2025-03-05 | End: 2026-03-05

## 2025-03-05 RX ORDER — BLOOD-GLUCOSE,RECEIVER,CONT
1 EACH MISCELLANEOUS DAILY
Qty: 1 EACH | Refills: 0 | Status: SHIPPED | OUTPATIENT
Start: 2025-03-05 | End: 2026-03-05

## 2025-03-05 RX ORDER — VALSARTAN AND HYDROCHLOROTHIAZIDE 160; 12.5 MG/1; MG/1
1 TABLET, FILM COATED ORAL NIGHTLY
COMMUNITY

## 2025-03-05 RX ORDER — INSULIN ASPART 100 [IU]/ML
5 INJECTION, SOLUTION INTRAVENOUS; SUBCUTANEOUS
Qty: 15 ML | Refills: 11 | Status: SHIPPED | OUTPATIENT
Start: 2025-03-05 | End: 2026-03-05

## 2025-03-05 RX ORDER — BLOOD-GLUCOSE SENSOR
1 EACH MISCELLANEOUS
Qty: 3 EACH | Refills: 11 | Status: SHIPPED | OUTPATIENT
Start: 2025-03-05 | End: 2026-03-05

## 2025-03-05 NOTE — PATIENT INSTRUCTIONS
PATIENT INSTRUCTIONS     Restart Lantus 12 units subcutaneously daily.   Change Novolog to 5 units subcutaneously three times daily with meals.   Continue Tradjenta 5 mg by mouth daily.   Continue Jardiance 10 mg by mouth daily.     Dexcom G7 CGM ordered. - sent to [facility].   Blood Sugar Goals:       Fastin-130.       1-2 hours after a meal: Less than 180.

## 2025-03-05 NOTE — PROGRESS NOTES
Odin Oliva is a 85 y.o. female who presents for a follow up evaluation of Type 2 diabetes mellitus.     CHIEF COMPLAINT: Diabetes Consultation    PCP: Juventino Barbosa MD      Initial visit with me - 2/2023    The patient was initially diagnosed with diabetes at age 56.   Previously followed at [facility] by Inna Stephen NP, last visit 3/1/2022.      Previous failed treatments include:  Metformin - stopped due to decline in renal function.     Social Documentation:  Patient lives in her home in [place], daughter and son in law live with her.   Occupation: does not work.   Exercise: walking outside/gardening.     Diabetes related complications:   nephropathy.   denies Pancreatitis  denies Gastroparesis  denies DKA  denies Hx/family Hx of MEN2/MTC  denies Frequent UTIs/yeast infections     Diabetes Medications              empagliflozin (JARDIANCE) 10 mg tablet Take 1 tablet (10 mg total) by mouth once daily.    LANTUS SOLOSTAR U-100 INSULIN 100 unit/mL (3 mL) InPn pen Inject into the skin. - THEY STOPPED AFTER D/C FROM SNF. WAS ON 12 UNITS.     linaGLIPtin (TRADJENTA) 5 mg Tab tablet Take 1 tablet (5 mg total) by mouth once daily.    NOVOLOG FLEXPEN U-100 INSULIN 100 unit/mL (3 mL) InPn pen Inject into the skin. - TAKING 10-12 UNITS TID.      Current monitoring regimen: capillary blood glucose monitoring with finger sticks.    Patient currently taking insulin or sulfonylurea?  Yes. Emergency Glucagon Prescription current? no  Recent hypoglycemic episodes: No.     Patient compliant with glucose checks and medication administration? Yes    DIABETES MANAGEMENT STATUS  Statin: Taking  ACE/ARB: Taking  Screening or Prevention Patient's value Goal Complete/Controlled?   HgA1C Testing and Control   Lab Results   Component Value Date    HGBA1C 7.8 (H) 01/15/2025      Annually/Less than 8% Yes   Lipid profile : 05/07/2024 Annually No   LDL control Lab Results   Component Value Date    LDLCALC 99.2  05/07/2024    Annually/Less than 100 mg/dl  No   Nephropathy screening Lab Results   Component Value Date    LABMICR 408.0 11/22/2024     Lab Results   Component Value Date    PROTEINUA Trace (A) 02/24/2023     Lab Results   Component Value Date    UTPCR 0.27 (H) 10/27/2021      Annually Yes   Blood pressure BP Readings from Last 1 Encounters:   03/05/25 133/64    Less than 140/90 Yes   Dilated retinal exam : 05/08/2024 Annually No   Foot exam   Most Recent Foot Exam Date: Not Found Annually No   Patient's medications, allergies, surgical, social and family histories were reviewed and updated as appropriate.     Review of Systems   Constitutional:  Negative for weight loss.   Eyes:  Negative for blurred vision and double vision.   Cardiovascular:  Negative for chest pain.   Gastrointestinal:  Negative for nausea and vomiting.   Genitourinary:  Negative for frequency.   Musculoskeletal:  Negative for falls.   Neurological:  Negative for dizziness and weakness.   Endo/Heme/Allergies:  Negative for polydipsia.   Psychiatric/Behavioral:  Negative for depression.    All other systems reviewed and are negative.       Physical Exam  Constitutional:       Appearance: Normal appearance.   HENT:      Head: Normocephalic and atraumatic.   Pulmonary:      Effort: No respiratory distress.   Musculoskeletal:      Cervical back: Normal range of motion.   Neurological:      Mental Status: She is alert and oriented to person, place, and time.   Psychiatric:         Mood and Affect: Mood normal.         Behavior: Behavior normal.        Blood pressure 133/64, pulse 66, weight 59.3 kg (130 lb 11.7 oz).  Wt Readings from Last 3 Encounters:   03/05/25 59.3 kg (130 lb 11.7 oz)   02/20/25 58 kg (127 lb 13.9 oz)   02/09/25 58 kg (127 lb 13.9 oz)       LAB REVIEW  Lab Results   Component Value Date     02/09/2025    K 4.6 02/09/2025     02/09/2025    CO2 26 02/09/2025    BUN 27 (H) 02/09/2025    CREATININE 1.5 (H) 02/09/2025     "CALCIUM 8.6 (L) 02/09/2025    ANIONGAP 8 02/09/2025    EGFRNORACEVR 34 (A) 02/09/2025     No results found for: "CPEPTIDE", "GLUTAMICACID", "INSLNABS"  Hemoglobin A1C   Date Value Ref Range Status   01/15/2025 7.8 (H) 4.0 - 5.6 % Final     Comment:     ADA Screening Guidelines:  5.7-6.4%  Consistent with prediabetes  >or=6.5%  Consistent with diabetes    High levels of fetal hemoglobin interfere with the HbA1C  assay. Heterozygous hemoglobin variants (HbS, HgC, etc)do  not significantly interfere with this assay.   However, presence of multiple variants may affect accuracy.     11/22/2024 6.4 (H) 4.0 - 5.6 % Final     Comment:     ADA Screening Guidelines:  5.7-6.4%  Consistent with prediabetes  >or=6.5%  Consistent with diabetes    High levels of fetal hemoglobin interfere with the HbA1C  assay. Heterozygous hemoglobin variants (HbS, HgC, etc)do  not significantly interfere with this assay.   However, presence of multiple variants may affect accuracy.     09/09/2024 6.6 (H) 4.0 - 5.6 % Final     Comment:     ADA Screening Guidelines:  5.7-6.4%  Consistent with prediabetes  >or=6.5%  Consistent with diabetes    High levels of fetal hemoglobin interfere with the HbA1C  assay. Heterozygous hemoglobin variants (HbS, HgC, etc)do  not significantly interfere with this assay.   However, presence of multiple variants may affect accuracy.          ASSESSMENT    ICD-10-CM ICD-9-CM   1. Type 2 diabetes mellitus with other specified complication, without long-term current use of insulin  E11.69 250.80         PLAN  Diagnoses and all orders for this visit:    Type 2 diabetes mellitus with other specified complication, without long-term current use of insulin  -     POCT Glucose, Hand-Held Device  -     LANTUS SOLOSTAR U-100 INSULIN 100 unit/mL (3 mL) InPn pen; Inject 12 Units into the skin once daily.  -     NOVOLOG FLEXPEN U-100 INSULIN 100 unit/mL (3 mL) InPn pen; Inject 5 Units into the skin 3 (three) times daily with " "meals.  -     pen needle, diabetic (BD ULTRA-FINE JOSE J PEN NEEDLE) 32 gauge x 5/32" Ndle; Use with insulin 3 times daily  -     blood-glucose sensor (DEXCOM G7 SENSOR) Erica; 1 Device by Misc.(Non-Drug; Combo Route) route every 10 days.  -     blood-glucose meter,continuous (DEXCOM G7 ) Misc; 1 Device by Misc.(Non-Drug; Combo Route) route once daily.  -     Ambulatory referral/consult to Diabetes Education; Future          Reviewed pathophysiology of diabetes, complications related to the disease, importance of annual dilated eye exam and daily foot examination. Explained MOA, SE, dosage of medications. Written instructions given and reviewed with patient and patient verbalizes understanding.     3/5/2025 - Pt fell on 25 while on vacation in [place] sustaining right femur fracture. Pt states she had surgery on her femur in [place] on 25. After returning back home, patient was admitted and had ORIF supracondylar femur fracture on 25. Her postoperative course was complicated by hyperglycemia requiring Lantus and anemia requiring 1 unit pRBC on 25 and IV iron. She was discharged to [facility] for continued supportive care including PT/OT.  Daughter stopped lantus after d/c from SNF, was unsure she was supposed to continue. Will restart as glucoses over 300 at home per patient. Will order G7 and set up training. Daughter would like initial training to be done by education, then will continue to help with sensor application.     PATIENT INSTRUCTIONS     Restart Lantus 12 units subcutaneously daily.   Change Novolog to 5 units subcutaneously three times daily with meals.   Continue Tradjenta 5 mg by mouth daily.   Continue Jardiance 10 mg by mouth daily.     Dexcom G7 CGM ordered. - sent to [facility].   Blood Sugar Goals:       Fastin-130.       1-2 hours after a meal: Less than 180.         Follow up in about 4 weeks (around 2025) for Dexcom.    "

## 2025-03-07 ENCOUNTER — OFFICE VISIT (OUTPATIENT)
Dept: INTERNAL MEDICINE | Facility: CLINIC | Age: 85
End: 2025-03-07
Payer: MEDICARE

## 2025-03-07 VITALS
DIASTOLIC BLOOD PRESSURE: 76 MMHG | SYSTOLIC BLOOD PRESSURE: 122 MMHG | RESPIRATION RATE: 18 BRPM | BODY MASS INDEX: 25.25 KG/M2 | HEART RATE: 64 BPM | OXYGEN SATURATION: 99 % | TEMPERATURE: 97 F | WEIGHT: 125 LBS

## 2025-03-07 DIAGNOSIS — Z79.899 ENCOUNTER FOR LONG-TERM (CURRENT) USE OF MEDICATIONS: ICD-10-CM

## 2025-03-07 DIAGNOSIS — E03.9 HYPOTHYROIDISM, UNSPECIFIED TYPE: ICD-10-CM

## 2025-03-07 DIAGNOSIS — N18.4 CKD (CHRONIC KIDNEY DISEASE), STAGE IV: ICD-10-CM

## 2025-03-07 DIAGNOSIS — I10 HYPERTENSION, UNSPECIFIED TYPE: ICD-10-CM

## 2025-03-07 DIAGNOSIS — D64.9 ANEMIA, UNSPECIFIED TYPE: Primary | ICD-10-CM

## 2025-03-07 DIAGNOSIS — E11.69 TYPE 2 DIABETES MELLITUS WITH OTHER SPECIFIED COMPLICATION, WITHOUT LONG-TERM CURRENT USE OF INSULIN: ICD-10-CM

## 2025-03-07 PROCEDURE — 99999 PR PBB SHADOW E&M-EST. PATIENT-LVL III: CPT | Mod: PBBFAC,,, | Performed by: FAMILY MEDICINE

## 2025-03-07 NOTE — PROGRESS NOTES
Subjective:       Patient ID: Odin Oliva is a 85 y.o. female.    Chief Complaint:  Follow up visit HPI    Problem List[1]    Past Medical History:   Diagnosis Date    CKD (chronic kidney disease), stage IV     Diabetes mellitus     HTN (hypertension)     Hyperuricemia     Seasonal allergies     Thyroid disease     Urinary tract infection        Past Surgical History:   Procedure Laterality Date    CATARACT EXTRACTION, BILATERAL      CHOLECYSTECTOMY      EXOSTECTOMY Bilateral 11/8/2019    Procedure: EXOSTECTOMY;  Surgeon: Lissy Comer DPM;  Location: [facility] OR;  Service: Podiatry;  Laterality: Bilateral;  left fourth digit, right fifth digit    FOOT MASS EXCISION Bilateral 11/8/2019    Procedure: HAMMER TOE, DEROTATIONAL ARTHROPLASTY FIFTH DIGIT;  Surgeon: Lissy Comer DPM;  Location: [facility] OR;  Service: Podiatry;  Laterality: Bilateral;  Left 5th toe incision @ 08:51.  Left 4th toe incision @ 08:54.  Right 5th toe incision : 09:24.    ORIF, FRACTURE, FEMUR, TRANSCONDYLAR WITH INTERCONDYLAR EXTENSION Right 2/6/2025    Procedure: ORIF, FRACTURE, FEMUR, TRANSCONDYLAR WITH INTERCONDYLAR EXTENSION;  Surgeon: Damian Jean Jr., MD;  Location: [facility] OR;  Service: Orthopedics;  Laterality: Right;    REMOVAL OF EXTERNAL FIXATION DEVICE Right 2/6/2025    Procedure: REMOVAL, EXTERNAL FIXATION DEVICE;  Surgeon: Damian Jean Jr., MD;  Location: [facility] OR;  Service: Orthopedics;  Laterality: Right;    TOTAL THYROIDECTOMY      UMBILICAL HERNIA REPAIR      x 2       Family History   Problem Relation Name Age of Onset    Asthma Mother      No Known Problems Father      Lung cancer Brother  65    Diabetes Brother      Diabetes Brother  80    Heart disease Daughter         Tobacco Use History[2]    Wt Readings from Last 5 Encounters:   03/07/25 56.7 kg (125 lb)   03/05/25 59.3 kg (130 lb 11.7 oz)   02/20/25 58 kg (127 lb 13.9 oz)   02/09/25 58 kg (127 lb 13.9 oz)   01/17/25 57 kg (125 lb 10.6 oz)       For  "further HPI details, see assessment and plan.    Review of Systems    Objective:      Vitals:    03/07/25 1113   BP: 122/76   Pulse: 64   Resp: 18   Temp: 97 °F (36.1 °C)       Physical Exam  Constitutional:       General: She is not in acute distress.     Appearance: She is not ill-appearing.   Pulmonary:      Effort: Pulmonary effort is normal. No respiratory distress.   Neurological:      General: No focal deficit present.      Mental Status: She is alert.   Psychiatric:         Mood and Affect: Mood normal.         Behavior: Behavior normal.         Assessment:       1. Anemia, unspecified type    2. Hypothyroidism, unspecified type    3. CKD (chronic kidney disease), stage IV    4. Type 2 diabetes mellitus with other specified complication, without long-term current use of insulin    5. Hypertension, unspecified type    6. Encounter for long-term (current) use of medications        Plan:       Doing "ok" no acute issue  First visit after her fall/ fracture in [place].  Presents with daughter.  Some concern about med changes.  They do not have the drug list with them today      Type 2 diabetes   Working with diabetes management   Reviewed recent encounter   Continue medications as per specialist and follow up in 1 month  Lab Results   Component Value Date    HGBA1C 7.8 (H) 01/15/2025     Hypertension   Blood pressure controlled   No change to meds       Hypothyroidism   Lab Results   Component Value Date    TSH 18.214 (H) 01/15/2025   TSH was even higher on February 12th based off of last documentation.  Synthroid dose was adjusted to 75 mcg  Plan to update a TSH in about 2-3 weeks    Anemia   Quite substantial   Last hemoglobin 8.4   We will include anemia workup when we do her next round of blood    CKD 4   Continue to monitor   Avoid NSAIDs     Recent fracture   Working with the Orthopedics   Has had staples and sutures removed.  Does have follow up plan.  Patient was ongoing physical therapy at the house.  She " is walking with a walker.    Lab lab in 2 weeks.  Needs to follow up with me after the blood work.  I am worried about the drug list.  I want to be 100% confident in the drug list.  After the blood is drawn she is to come back and bring me all of her medications      This note was verbally dictated, please excuse any type errors.         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

## 2025-03-11 ENCOUNTER — OFFICE VISIT (OUTPATIENT)
Dept: ORTHOPEDICS | Facility: CLINIC | Age: 85
End: 2025-03-11
Payer: MEDICARE

## 2025-03-11 ENCOUNTER — HOSPITAL ENCOUNTER (OUTPATIENT)
Dept: RADIOLOGY | Facility: HOSPITAL | Age: 85
Discharge: HOME | End: 2025-03-11
Attending: ORTHOPAEDIC SURGERY
Payer: MEDICARE

## 2025-03-11 VITALS
SYSTOLIC BLOOD PRESSURE: 130 MMHG | WEIGHT: 125 LBS | HEART RATE: 57 BPM | DIASTOLIC BLOOD PRESSURE: 67 MMHG | BODY MASS INDEX: 25.2 KG/M2 | HEIGHT: 59 IN

## 2025-03-11 DIAGNOSIS — M89.8X5 PAIN IN FEMUR: Primary | ICD-10-CM

## 2025-03-11 DIAGNOSIS — M89.8X5 PAIN IN RIGHT FEMUR: ICD-10-CM

## 2025-03-11 DIAGNOSIS — S72.451D CLOSED DISPLACED SUPRACONDYLAR FRACTURE OF DISTAL END OF RIGHT FEMUR WITHOUT INTRACONDYLAR EXTENSION WITH ROUTINE HEALING, SUBSEQUENT ENCOUNTER: Primary | ICD-10-CM

## 2025-03-11 PROCEDURE — 73552 X-RAY EXAM OF FEMUR 2/>: CPT | Mod: TC,RT

## 2025-03-11 PROCEDURE — 99999 PR PBB SHADOW E&M-EST. PATIENT-LVL V: CPT | Mod: PBBFAC,,, | Performed by: ORTHOPAEDIC SURGERY

## 2025-03-11 PROCEDURE — 73552 X-RAY EXAM OF FEMUR 2/>: CPT | Mod: 26,RT,, | Performed by: RADIOLOGY

## 2025-03-11 NOTE — PROGRESS NOTES
Patient ID: Odin Oliva  YOB: 1940  MRN: [id number]    Chief Complaint: Post-op Evaluation of the Right Femur      Referred By: No ref. provider found     History of Present Illness: Odin Oliva is a  85 y.o. female   Data Unavailable with a chief complaint of Post-op Evaluation of the Right Femur    She is almost 2 months out from her supracondylar femur fracture and doing well.  Knee flexion is not as good as I would like it to be as she is only flexing to 90°.  They report she is off all narcotic pain medications    HPI    Past Medical History:   Past Medical History:   Diagnosis Date    CKD (chronic kidney disease), stage IV     Diabetes mellitus     HTN (hypertension)     Hyperuricemia     Seasonal allergies     Thyroid disease     Urinary tract infection      Past Surgical History:   Procedure Laterality Date    CATARACT EXTRACTION, BILATERAL      CHOLECYSTECTOMY      EXOSTECTOMY Bilateral 11/8/2019    Procedure: EXOSTECTOMY;  Surgeon: Lissy Comer DPM;  Location: [facility] OR;  Service: Podiatry;  Laterality: Bilateral;  left fourth digit, right fifth digit    FOOT MASS EXCISION Bilateral 11/8/2019    Procedure: HAMMER TOE, DEROTATIONAL ARTHROPLASTY FIFTH DIGIT;  Surgeon: Lissy Comer DPM;  Location: [facility] OR;  Service: Podiatry;  Laterality: Bilateral;  Left 5th toe incision @ 08:51.  Left 4th toe incision @ 08:54.  Right 5th toe incision : 09:24.    ORIF, FRACTURE, FEMUR, TRANSCONDYLAR WITH INTERCONDYLAR EXTENSION Right 2/6/2025    Procedure: ORIF, FRACTURE, FEMUR, TRANSCONDYLAR WITH INTERCONDYLAR EXTENSION;  Surgeon: Damian Jean Jr., MD;  Location: [facility] OR;  Service: Orthopedics;  Laterality: Right;    REMOVAL OF EXTERNAL FIXATION DEVICE Right 2/6/2025    Procedure: REMOVAL, EXTERNAL FIXATION DEVICE;  Surgeon: Damian Jean Jr., MD;  Location: [facility] OR;  Service: Orthopedics;  Laterality: Right;    TOTAL THYROIDECTOMY      UMBILICAL HERNIA REPAIR      x 2     Family  History   Problem Relation Name Age of Onset    Asthma Mother      No Known Problems Father      Lung cancer Brother  65    Diabetes Brother      Diabetes Brother  80    Heart disease Daughter       Social History[1]  Medication List with Changes/Refills   Current Medications    ALBUTEROL (PROVENTIL/VENTOLIN HFA) 90 MCG/ACTUATION INHALER    Inhale 2 puffs into the lungs every 4 (four) hours as needed for Wheezing or Shortness of Breath. Rescue    AMLODIPINE (NORVASC) 5 MG TABLET    Take 1 tablet (5 mg total) by mouth once daily.    ASPIRIN-ACETAMINOPHEN-CAFFEINE 250-250-65 MG (EXCEDRIN MIGRAINE) 250-250-65 MG PER TABLET    Take 1 tablet by mouth every 6 (six) hours as needed for Pain.    AZELASTINE (ASTELIN) 137 MCG (0.1 %) NASAL SPRAY    1 spray (137 mcg total) by Nasal route 2 (two) times daily.    BISACODYL (DULCOLAX) 10 MG SUPP    Place 10 mg rectally daily as needed.    BLOOD SUGAR DIAGNOSTIC STRP    1 each by Misc.(Non-Drug; Combo Route) route 3 (three) times daily. Insurance preferred    BLOOD-GLUCOSE METER KIT    Use as instructed. Insurance preferred    BLOOD-GLUCOSE METER,CONTINUOUS (DEXCOM G7 ) MISC    1 Device by Misc.(Non-Drug; Combo Route) route once daily.    BLOOD-GLUCOSE SENSOR (DEXCOM G7 SENSOR) AFSHIN    1 Device by Misc.(Non-Drug; Combo Route) route every 10 days.    CETIRIZINE (ZYRTEC) 10 MG TABLET    Take 1 tablet (10 mg total) by mouth once daily.    CHOLECALCIFEROL, VITAMIN D3, 125 MCG (5,000 UNIT) TAB    Take 1 tablet (5,000 Units total) by mouth once daily.    EMPAGLIFLOZIN (JARDIANCE) 10 MG TABLET    Take 1 tablet (10 mg total) by mouth once daily.    FERROUS SULFATE 325 (65 FE) MG EC TABLET    Take 1 tablet (325 mg total) by mouth once daily.    FLASH GLUCOSE SENSOR (FREESTYLE HAROLDO 14 DAY SENSOR) KIT    1 each by Misc.(Non-Drug; Combo Route) route every 14 (fourteen) days.    FOLIC ACID-VIT B6-VIT B12 2.5-25-2 MG (FOLBIC OR EQUIV) 2.5-25-2 MG TAB    Take 1 tablet by mouth once  "daily.    FUROSEMIDE (LASIX) 20 MG TABLET    Take 20 mg by mouth once daily.    HYDROCODONE-ACETAMINOPHEN (NORCO) 5-325 MG PER TABLET    Take 1 tablet by mouth every 6 (six) hours as needed for Pain.    HYDROXYZINE HCL (ATARAX) 10 MG TAB    Take 1 tablet (10 mg total) by mouth every evening.    LANCETS MISC    1 each by Misc.(Non-Drug; Combo Route) route 3 (three) times daily.    LANTUS SOLOSTAR U-100 INSULIN 100 UNIT/ML (3 ML) INPN PEN    Inject 12 Units into the skin once daily.    LEVOTHYROXINE (SYNTHROID) 75 MCG TABLET    Take 1 tablet (75 mcg total) by mouth before breakfast.    LINAGLIPTIN (TRADJENTA) 5 MG TAB TABLET    Take 1 tablet (5 mg total) by mouth once daily.    MONTELUKAST (SINGULAIR) 10 MG TABLET    Take 1 tablet (10 mg total) by mouth every evening.    MONTELUKAST (SINGULAIR) 10 MG TABLET    Take 10 mg by mouth once daily.    MULTIVITAMIN TAB    Take 1 tablet by mouth once daily.    NOVOLOG FLEXPEN U-100 INSULIN 100 UNIT/ML (3 ML) INPN PEN    Inject 5 Units into the skin 3 (three) times daily with meals.    PEN NEEDLE, DIABETIC (BD ULTRA-FINE JOSE J PEN NEEDLE) 32 GAUGE X 5/32" NDLE    Use with insulin 3 times daily    SENNA-DOCUSATE 8.6-50 MG (PERICOLACE) 8.6-50 MG PER TABLET    Take 1 tablet by mouth 2 (two) times daily.    VALSARTAN (DIOVAN) 160 MG TABLET    Take 1 tablet (160 mg total) by mouth once daily.    VALSARTAN-HYDROCHLOROTHIAZIDE (DIOVAN-HCT) 160-12.5 MG PER TABLET    Take 1 tablet by mouth every evening.     Review of patient's allergies indicates:  No Known Allergies  ROS    Physical Exam:   Body mass index is 25.25 kg/m².  Vitals:    03/11/25 1146   BP: 130/67   Pulse: (!) 57      GENERAL: Well appearing, appropriate for stated age, no acute distress.  CARDIOVASCULAR: Pulses regular by peripheral palpation.  PULMONARY: Respirations are even and non-labored.  NEURO: Awake, alert, and oriented x 3.  PSYCH: Mood & affect are appropriate.  HEENT: Head is normocephalic and " atraumatic.  Ortho/SPM Exam  Flexion is only to close to 90  She has full extension  Wound healed very nicely  Neurovascular exam is normal    Imaging:    X-Ray Femur 2 View Right  Narrative: EXAMINATION:  XR FEMUR 2 VIEW RIGHT    CLINICAL HISTORY:  Other specified disorders of bone, thigh    TECHNIQUE:  AP and lateral views of the right femur were performed.    COMPARISON:  02/20/2025    FINDINGS:  Osteopenia.  Interval mild healing/callus formation at the distal comminuted fracture site with orthopedic hardware maintaining anatomic alignment.  No acute abnormality or detrimental change.  Impression: As above    Electronically signed by: Quinton Slater MD  Date:    03/11/2025  Time:    11:41      Relevant imaging results reviewed and interpreted by me, discussed with the patient and / or family today.  X-rays show the beginning of callus formation with no change in fracture alignment    Other Tests:     None    There are no Patient Instructions on file for this visit.  Provider Note/Medical Decision Making:     She is doing well.  I am going to transition her to outpatient physical therapy which I think will help her regain her motion.  She can begin 50% weight-bearing.  She will follow up with me in a month for repeat x-rays.    I discussed worrisome and red flag signs and symptoms with the patient. The patient expressed understanding and agreed to alert me immediately or to go to the emergency room if they experience any of these.   Treatment plan was developed with input from the patient/family, and they expressed understanding and agreement with the plan. All questions were answered today.         Damian Jean MD  Orthopaedic Surgery       Disclaimer: This note was prepared using a voice recognition system and is likely to have sound alike errors within the text.            [1]   Social History  Socioeconomic History    Marital status:    Tobacco Use    Smoking status: Never     Passive  exposure: Never    Smokeless tobacco: Never   Substance and Sexual Activity    Alcohol use: No    Drug use: No    Sexual activity: Never     Social Drivers of Health     Financial Resource Strain: Low Risk  (2/5/2025)    Overall Financial Resource Strain (CARDIA)     Difficulty of Paying Living Expenses: Not very hard   Recent Concern: Financial Resource Strain - High Risk (11/21/2024)    Overall Financial Resource Strain (CARDIA)     Difficulty of Paying Living Expenses: Hard   Food Insecurity: No Food Insecurity (2/5/2025)    Hunger Vital Sign     Worried About Running Out of Food in the Last Year: Never true     Ran Out of Food in the Last Year: Never true   Transportation Needs: No Transportation Needs (2/5/2025)    TRANSPORTATION NEEDS     Transportation : No   Physical Activity: Sufficiently Active (11/21/2024)    Exercise Vital Sign     Days of Exercise per Week: 7 days     Minutes of Exercise per Session: 30 min   Stress: Stress Concern Present (2/5/2025)    [employer] - Occupational Stress Questionnaire     Feeling of Stress : To some extent   Housing Stability: Unknown (2/5/2025)    Housing Stability Vital Sign     Unable to Pay for Housing in the Last Year: No     Homeless in the Last Year: No

## 2025-03-12 ENCOUNTER — CLINICAL SUPPORT (OUTPATIENT)
Dept: REHABILITATION | Facility: HOSPITAL | Age: 85
End: 2025-03-12
Attending: ORTHOPAEDIC SURGERY
Payer: MEDICARE

## 2025-03-12 DIAGNOSIS — S72.451D CLOSED DISPLACED SUPRACONDYLAR FRACTURE OF DISTAL END OF RIGHT FEMUR WITHOUT INTRACONDYLAR EXTENSION WITH ROUTINE HEALING, SUBSEQUENT ENCOUNTER: ICD-10-CM

## 2025-03-12 PROCEDURE — 97161 PT EVAL LOW COMPLEX 20 MIN: CPT | Mod: PN

## 2025-03-12 PROCEDURE — 97112 NEUROMUSCULAR REEDUCATION: CPT | Mod: PN

## 2025-03-12 NOTE — PROGRESS NOTES
Outpatient Rehab    Physical Therapy Evaluation    Date: 3/12/2025     Clinic Number: [id number]    Patient Name: Odin Oliva  MRN: [id number]  YOB: 1940  Today's Date: 3/12/2025    Therapy Diagnosis:   Encounter Diagnosis   Name Primary?    Closed displaced supracondylar fracture of distal end of right femur without intracondylar extension with routine healing, subsequent encounter        Physician: Damian Jean Jr.,*    Physician Orders: Eval and Treat  Medical Diagnosis: S72.451D (ICD-10-CM) - Closed displaced supracondylar fracture of distal end of right femur without intracondylar extension with routine healing, subsequent encounter    Visit # / Visits Authorized:  1 / 1   Date of Evaluation:  3/12/2025   Insurance Authorization Period: 03/11/2025 to 03/11/2026  Plan of Care Certification:  3/12/2025 to 5/9/2025      Time In:  1110  Time Out:  1215  Total Time:  65  Total Billable Time: 40      SUBJECTIVE     Date of onset: 2/06/2025    History of current condition - Odin reports she had a fall in the end of January and fractured her R femur. She had surgery to repair it 2 days later. She states she has been doing better and her pain has improved. She still has pain with walking and bending her knee. She states she has still has swelling in the knee and it feels very tight. She is 50% weight-bearing on R LE.    Falls: 1    Imaging: [x] Xray [] MRI [] CT: Performed on: 2/20/2025 FINDINGS:  Osteopenia.  Interval mild healing/callus formation at the distal comminuted fracture site with orthopedic hardware maintaining anatomic alignment.  No acute abnormality or detrimental change.    Pain:  Current 0/10, worst 10/10, best 0/10   Location: [x] Right   [] Left:  Knee/Thigh  Description: aching , deep, dull, and tight  Aggravating Factors: Standing, walking, bending   Easing Factors: activity avoidance, rest, medication, ice    Prior Therapy:   [] N/A    [x] Yes: SNF  Social History: Pt lives with  their family  Occupation: Pt is retired.   Prior Level of Function: Independent and pain free with all ADL, IADL, community mobility and functional activities.   Current Level of Function: Independent with all ADL, IADL, community mobility and functional activities with reports of increased pain and need for increased time and frequent breaks.      Dominant Extremity:    [x] Right    [] Left    Pts goals: Pt reported goals are to decrease overall pain levels in order to return to prior functional level.     Medical History:   Past Medical History:   Diagnosis Date    CKD (chronic kidney disease), stage IV     Diabetes mellitus     HTN (hypertension)     Hyperuricemia     Seasonal allergies     Thyroid disease     Urinary tract infection        Surgical History:   Odin Oliva  has a past surgical history that includes Total thyroidectomy; Cholecystectomy; Umbilical hernia repair; Cataract extraction, bilateral; Foot mass excision (Bilateral, 11/8/2019); Exostectomy (Bilateral, 11/8/2019); Removal of external fixation device (Right, 2/6/2025); and orif, fracture, femur, transcondylar with intercondylar extension (Right, 2/6/2025).    Medications:   Odin has a current medication list which includes the following prescription(s): albuterol, amlodipine, aspirin-acetaminophen-caffeine 250-250-65 mg, azelastine, bisacodyl, blood sugar diagnostic, blood-glucose meter, dexcom g7 , dexcom g7 sensor, cetirizine, cholecalciferol (vitamin d3), empagliflozin, ferrous sulfate, freestyle nawaf 14 day sensor, folic acid-vit b6-vit b12 2.5-25-2 mg, furosemide, hydrocodone-acetaminophen, hydroxyzine hcl, lancets, lantus solostar u-100 insulin, levothyroxine, tradjenta, montelukast, montelukast, multivitamin, novolog flexpen u-100 insulin, pen needle, diabetic, senna-docusate 8.6-50 mg, valsartan, and valsartan-hydrochlorothiazide.    Allergies:   Review of patient's allergies indicates:  No Known Allergies     Objective           Knee AROM/PROM Right Left Pain/Dysfunction with Movement Goal   Knee Flexion (135º) 90 135 Pain R 120   Knee Extension (0º) -5 0         0        L/E MMT Right  (spine) Left Pain/Dysfunction with Movement Goal   Hip Flexion  4-/5 5/5  4+/5 B   Hip Extension  4/5 5/5  4+/5 B   Hip Abduction  4-/5 5/5  4+/5 B   Knee Extension 3+/5 5/5  5/5 B   Knee Flexion 4-/5 5/5  5/5 B   Ankle DF 5/5 5/5  5/5 B   Ankle PF 5/5 5/5  5/5 B       GAIT ANALYSIS The patient ambulated with the following assistive device: rolling walker; the pt presents with the following gait abnormalities: increased RUPALI, decreased step length on left, decreased stance time on right, decreased hip extension on right, and decreased knee extension on right         Function:     CMS Impairment/Limitation/Restriction for FOTO Upper Leg Survey    Therapist reviewed FOTO scores for queenie on 3/12/2025.   FOTO documents entered into [employer] - see Media section.    Functional Score: 38%         Treatment     CPT Intervention  [employer] Duration / Intensity  3/13   MT Manual  -   NMR Seated knee flexion stretch 2x10: 5s   NMR LAQ 2x10    NMR Mini Squats  2x10                   PLAN             CPT Codes available for Billing:   (-) minutes of Manual therapy (MT) to improve pain and ROM.  (-) minutes of Therapeutic Exercise (TE) to develop strength, endurance, range of motion, and flexibility.  (15) minutes of Neuromuscular Re-Education (NMR)  to improve: Balance, Coordination, Kinesthetic, Sense, Proprioception, and Posture.  (-) minutes of Therapeutic Activities (TA) to improve functional performance.  (-) Unattended Electrical Stimulation (ES) for muscle performance or pain modulation.  (-) Vasopneumatic Device Therapy () for management of swelling/edema. (08243)  (-) BFR: Blood flow restriction applied during exercise  NP or (-): Not Performed    Patient Education and Home Exercises     Education provided:  PURPOSE: Patient educated on the impairments  noted above and the effects of physical therapy intervention to improve overall condition and QOL.   EXERCISE: Patient was educated on all the above exercise prior/during/after for proper posture, positioning, and execution for safe performance with home exercise program.   STRENGTH: Patient educated on the importance of improved core and extremity strength in order to improve alignment of the spine and extremities with static positions and dynamic movement.   GAIT & BALANCE: Patient educated on the importance of strong core and lower extremity musculature in order to improve both static and dynamic balance, improve gait mechanics, reduce fall risk and improve household and community mobility.   ASSISTIVE DEVICE: Patient educated on proper utilization of assistive device for safe and efficient ambulation and to reduce risk of falls    Written Home Exercises Provided: yes.  Exercises were reviewed and Odin was able to demonstrate them prior to the end of the session.  Odin demonstrated good  understanding of the education provided. See EMR under Patient Instructions for exercises provided during therapy sessions.    ASSESSMENT        Odin is a 85 year old female referred to outpatient Physical Therapy with a medical diagnosis of S72.451D (ICD-10-CM) - Closed displaced supracondylar fracture of distal end of right femur without intracondylar extension with routine healing, subsequent encounter. Pt presents with impairments in the following categories: IMPAIRMENTS: ROM, strength, endurance, joint mobility, muscle length, balance, gait mechanics, functional movement patterns, and post-operative precautions.     Pt prognosis is Good  Pt will benefit from skilled outpatient Physical Therapy to address the deficits stated above and in the chart below, provide pt/family education, and to maximize pt's level of independence.     Plan of care discussed with patient: Yes  Pt's spiritual, cultural and educational needs  "considered and patient is agreeable to the plan of care and goals as stated below:     Anticipated Barriers for therapy: co-morbidities    Medical Necessity is demonstrated by the following  History  Co-morbidities and personal factors that may impact the plan of care [] LOW: no personal factors / co-morbidities  [x] MODERATE: 1-2 personal factors / co-morbidities  [] HIGH: 3+ personal factors / co-morbidities    Moderate / High Support Documentation:   Past Medical History:   Diagnosis Date    CKD (chronic kidney disease), stage IV     Diabetes mellitus     HTN (hypertension)     Hyperuricemia     Seasonal allergies     Thyroid disease     Urinary tract infection           Examination  Body Structures and Functions, activity limitations and participation restrictions that may impact the plan of care [x] LOW: addressing 1-2 elements  [] MODERATE: 3+ elements  [] HIGH: 4+ elements (please support below)    Moderate / High Support Documentation: See above in "Current Level of Function"      Clinical Presentation [x] LOW: stable  [] MODERATE: Evolving  [] HIGH: Unstable     Decision Making/ Complexity Score: low         Short Term Goals:  4 weeks Status  Date Met   PAIN: Pt will report worst pain of 6/10 in order to progress toward max functional ability and improve quality of life. [x] Progressing  [] Met  [] Not Met    FUNCTION: Patient will demonstrate improved function as indicated by a functional score of greater than or equal to 50% predicted on FOTO. [x] Progressing  [] Met  [] Not Met    MOBILITY: Patient will improve AROM to 50% of stated goals, listed in objective measures above, in order to progress towards independence with functional activities.  [x] Progressing  [] Met  [] Not Met    STRENGTH: Patient will improve strength to 50% of stated goals, listed in objective measures above, in order to progress towards independence with functional activities. [x] Progressing  [] Met  [] Not Met    GAIT: Patient " will demonstrate improved gait mechanics in order to improve functional mobility, improve quality of life, and decrease risk of further injury or fall.  [x] Progressing  [] Met  [] Not Met    HEP: Patient will demonstrate independence with HEP in order to progress toward functional independence. [x] Progressing  [] Met  [] Not Met      Long Term Goals:  8 weeks Status Date Met   PAIN: Pt will report worst pain of 4/10 in order to progress toward max functional ability and improve quality of life [x] Progressing  [] Met  [] Not Met    FUNCTION: Patient will demonstrate improved function as indicated by a functional score of greater than or equal to predicted score on FOTO. [x] Progressing  [] Met  [] Not Met    MOBILITY: Patient will improve AROM to stated goals, listed in objective measures above, in order to return to maximal functional potential and improve quality of life.  [x] Progressing  [] Met  [] Not Met    STRENGTH: Patient will improve strength to stated goals, listed in objective measures above, in order to improve functional independence and quality of life.  [x] Progressing  [] Met  [] Not Met    GAIT: Patient will demonstrate normalized gait mechanics with minimal compensation in order to return to PLOF. [x] Progressing  [] Met  [] Not Met    Patient will return to normal ADL's, IADL's, community involvement, recreational activities, and work-related activities with less than or equal to 4/10 pain and maximal function.  [x] Progressing  [] Met  [] Not Met        Plan     Plan of care Certification: 3/12/2025 to 5/9/2025.    Outpatient Physical Therapy 2 times weekly for 8 weeks to include any combination of the following interventions: virtual visits, dry needling, modalities, electrical stimulation (IFC, Pre-Mod, Attended with Functional Dry Needling), Cervical/Lumbar Traction, Manual Therapy, Moist Heat/ Ice, Neuromuscular Re-ed, Patient Education, Self Care, Therapeutic Exercise, and Therapeutic  Activites           Demond Calix, PT, DPT

## 2025-03-13 ENCOUNTER — PATIENT MESSAGE (OUTPATIENT)
Dept: RHEUMATOLOGY | Facility: CLINIC | Age: 85
End: 2025-03-13
Payer: MEDICARE

## 2025-03-13 NOTE — TELEPHONE ENCOUNTER
Are the BP meds intended to be long term? Also, on her med list it has both valsartan and valsartan-hctz listed. Which one should I delete?

## 2025-03-13 NOTE — TELEPHONE ENCOUNTER
No care due was identified.  [facility] Embedded Care Due Messages. Reference number: [id number].   3/13/2025 1:27:17 PM CDT

## 2025-03-13 NOTE — TELEPHONE ENCOUNTER
----- Message from [employer] sent at 3/13/2025  1:00 PM CDT -----  ..Type:  Patient Requesting CallWho Called:Lemuel (Daughter)Would the patient rather a call back or a response via [facility]? Brad Call Back Number:.[phone] (home) Additional Information: Daughter called to discuss blood pressure amLODIPine (NORVASC) 5 MG tablet and valsartan (DIOVAN) 160 MG tablet. She says that patient has taken last dose and she wants to know if the provider wants to continue this medication for the patient.

## 2025-03-14 ENCOUNTER — CLINICAL SUPPORT (OUTPATIENT)
Dept: REHABILITATION | Facility: HOSPITAL | Age: 85
End: 2025-03-14
Payer: MEDICARE

## 2025-03-14 DIAGNOSIS — S72.451D CLOSED DISPLACED SUPRACONDYLAR FRACTURE OF DISTAL END OF RIGHT FEMUR WITHOUT INTRACONDYLAR EXTENSION WITH ROUTINE HEALING, SUBSEQUENT ENCOUNTER: Primary | ICD-10-CM

## 2025-03-14 PROCEDURE — 97112 NEUROMUSCULAR REEDUCATION: CPT | Mod: PN,CQ

## 2025-03-14 PROCEDURE — 97140 MANUAL THERAPY 1/> REGIONS: CPT | Mod: PN,CQ

## 2025-03-14 PROCEDURE — 97110 THERAPEUTIC EXERCISES: CPT | Mod: PN,CQ

## 2025-03-14 NOTE — PROGRESS NOTES
Outpatient Rehab    Physical Therapy Visit    Patient Name: Odin Oliva  MRN: [id number]  YOB: 1940  Encounter Date: 3/14/2025    Therapy Diagnosis:   Encounter Diagnosis   Name Primary?    Closed displaced supracondylar fracture of distal end of right femur without intracondylar extension with routine healing, subsequent encounter Yes     Physician: Damian Jean Jr.,*    Physician Orders: Eval and Treat  Medical Diagnosis: S72.451D (ICD-10-CM) - Closed displaced supracondylar fracture of distal end of right femur without intracondylar extension with routine healing, subsequent encounter     Visit # / Visits Authorized:  1 / 20 + eval  Date of Evaluation:  3/12/2025   Insurance Authorization Period: 03/11/2025 to 03/12/2027  Plan of Care Certification:  3/12/2025 to 5/9/2025                 Time In:  1215  Time Out:  1305  Total Time:  65  Total Billable Time: 65    FOTO:  Intake Score:  %  Survey Score 1:  %  Survey Score 2:  %         Subjective   Patient has pinching in left medial knee sometimes..  Family / care giver present for this visit:  (Daughter)         Objective            Treatment:     CPT Intervention  JointFocus Duration / Intensity  3/14   MT Manual  Knee flexion stretching  Joint mobs  Patellar mobs   TE Seated knee flexion stretch  Heel slides with strap 2x10: 5s each   NMR LAQ  SLR 2x10    NMR Mini Squats   Sit to stand 2x10   NMR  Weight shifts //  2x10    NMR  Bridges  2x10   PLAN             CPT Codes available for Billing:   (20) minutes of Manual therapy (MT) to improve pain and ROM.  (15) minutes of Therapeutic Exercise (TE) to develop strength, endurance, range of motion, and flexibility.  (30) minutes of Neuromuscular Re-Education (NMR)  to improve: Balance, Coordination, Kinesthetic, Sense, Proprioception, and Posture.  (-) minutes of Therapeutic Activities (TA) to improve functional performance.  (-) Unattended Electrical Stimulation (ES) for muscle performance  or pain modulation.  (-) Vasopneumatic Device Therapy () for management of swelling/edema. (34583)  (-) BFR: Blood flow restriction applied during exercise  NP or (-): Not Performed    Assessment & Plan   Assessment: Patient presents to therapy for initial visit following evaluation. She has increased swelling in right knee and pain. Good tolerance to knee joint mobs for pain relief and improving flexion range of motion. Patient educated on PWB with gait using walker to decrease WB on RLE. Patient demonstrates good control with STS although mini squats require increased cues to improve knee flexion and decrease hip hinge       Patient will continue to benefit from skilled outpatient physical therapy to address the deficits listed in the problem list box on initial evaluation, provide pt/family education and to maximize pt's level of independence in the home and community environment.     Patient's spiritual, cultural, and educational needs considered and patient agreeable to plan of care and goals.           Plan:      Goals:     Ale Friend, PTA

## 2025-03-19 ENCOUNTER — CLINICAL SUPPORT (OUTPATIENT)
Dept: REHABILITATION | Facility: HOSPITAL | Age: 85
End: 2025-03-19
Payer: MEDICARE

## 2025-03-19 DIAGNOSIS — S72.451D CLOSED DISPLACED SUPRACONDYLAR FRACTURE OF DISTAL END OF RIGHT FEMUR WITHOUT INTRACONDYLAR EXTENSION WITH ROUTINE HEALING, SUBSEQUENT ENCOUNTER: Primary | ICD-10-CM

## 2025-03-19 PROCEDURE — 97140 MANUAL THERAPY 1/> REGIONS: CPT | Mod: PN,CQ

## 2025-03-19 PROCEDURE — 97112 NEUROMUSCULAR REEDUCATION: CPT | Mod: PN,CQ

## 2025-03-19 PROCEDURE — 97110 THERAPEUTIC EXERCISES: CPT | Mod: PN,CQ

## 2025-03-19 NOTE — PROGRESS NOTES
"  Outpatient Rehab    Physical Therapy Visit    Patient Name: Odin Oliva  MRN: [id number]  YOB: 1940  Encounter Date: 3/19/2025    Therapy Diagnosis:   Encounter Diagnosis   Name Primary?    Closed displaced supracondylar fracture of distal end of right femur without intracondylar extension with routine healing, subsequent encounter Yes       Physician: Damian Jean Jr.,*    Physician Orders: Eval and Treat  Medical Diagnosis: S72.451D (ICD-10-CM) - Closed displaced supracondylar fracture of distal end of right femur without intracondylar extension with routine healing, subsequent encounter     Visit # / Visits Authorized:  1 / 20 + eval  Date of Evaluation:  3/12/2025   Insurance Authorization Period: 03/11/2025 to 03/12/2027  Plan of Care Certification:  3/12/2025 to 5/9/2025                 Time In:  1320  Time Out:  1415  Total Time:  55  Total Billable Time: 55    FOTO:  Intake Score:  %  Survey Score 1:  %  Survey Score 2:  %         Subjective   Patient reports she takes pain medication as needed especially at night time. Her medial right knee is most problematic..  Family / care giver present for this visit:  (Daughter translates)           Objective            Treatment:     CPT Intervention  JointFocus Duration / Intensity  3/19   MT Manual  Knee flexion stretching  STM to quad R   TE Seated knee flexion stretch  Heel slides with strap  2x10: 5s each  10x10" holds    NMR LAQ  SLR 2x10 each   NMR Mini Squats   Sit to stand 2x10   NMR  Nustep   5 mins    NMR  Bridges  2x10   PLAN             CPT Codes available for Billing:   (10) minutes of Manual therapy (MT) to improve pain and ROM.  (15) minutes of Therapeutic Exercise (TE) to develop strength, endurance, range of motion, and flexibility.  (30) minutes of Neuromuscular Re-Education (NMR)  to improve: Balance, Coordination, Kinesthetic, Sense, Proprioception, and Posture.  (-) minutes of Therapeutic Activities (TA) to improve " functional performance.  (-) Unattended Electrical Stimulation (ES) for muscle performance or pain modulation.  (-) Vasopneumatic Device Therapy () for management of swelling/edema. (25894)  (-) BFR: Blood flow restriction applied during exercise  NP or (-): Not Performed    Assessment & Plan   Assessment: Patient presents to therapy with notable tension in left quad addressed with soft tissue mobilization. Patient demonstrates improvement with less swelling today. Patient ambulates well with walker although still limited in quad strength and knee mobility.  Evaluation/Treatment Tolerance: Patient limited by fatigue    Patient will continue to benefit from skilled outpatient physical therapy to address the deficits listed in the problem list box on initial evaluation, provide pt/family education and to maximize pt's level of independence in the home and community environment.     Patient's spiritual, cultural, and educational needs considered and patient agreeable to plan of care and goals.           Plan:      Goals:     Ale Friend, PTA

## 2025-03-21 ENCOUNTER — LAB VISIT (OUTPATIENT)
Dept: LAB | Facility: HOSPITAL | Age: 85
End: 2025-03-21
Attending: FAMILY MEDICINE
Payer: MEDICARE

## 2025-03-21 ENCOUNTER — CLINICAL SUPPORT (OUTPATIENT)
Dept: REHABILITATION | Facility: HOSPITAL | Age: 85
End: 2025-03-21
Payer: MEDICARE

## 2025-03-21 DIAGNOSIS — S72.451D CLOSED DISPLACED SUPRACONDYLAR FRACTURE OF DISTAL END OF RIGHT FEMUR WITHOUT INTRACONDYLAR EXTENSION WITH ROUTINE HEALING, SUBSEQUENT ENCOUNTER: Primary | ICD-10-CM

## 2025-03-21 DIAGNOSIS — D64.9 ANEMIA, UNSPECIFIED TYPE: ICD-10-CM

## 2025-03-21 DIAGNOSIS — Z79.899 ENCOUNTER FOR LONG-TERM (CURRENT) USE OF MEDICATIONS: ICD-10-CM

## 2025-03-21 LAB
ALBUMIN SERPL BCP-MCNC: 3.8 G/DL (ref 3.5–5.2)
ALP SERPL-CCNC: 165 U/L (ref 40–150)
ALT SERPL W/O P-5'-P-CCNC: 13 U/L (ref 10–44)
ANION GAP SERPL CALC-SCNC: 10 MMOL/L (ref 8–16)
AST SERPL-CCNC: 16 U/L (ref 10–40)
BASOPHILS # BLD AUTO: 0.07 K/UL (ref 0–0.2)
BASOPHILS NFR BLD: 1.3 % (ref 0–1.9)
BILIRUB SERPL-MCNC: 0.3 MG/DL (ref 0.1–1)
BUN SERPL-MCNC: 44 MG/DL (ref 8–23)
CALCIUM SERPL-MCNC: 10 MG/DL (ref 8.7–10.5)
CHLORIDE SERPL-SCNC: 103 MMOL/L (ref 95–110)
CO2 SERPL-SCNC: 23 MMOL/L (ref 23–29)
CREAT SERPL-MCNC: 1.4 MG/DL (ref 0.5–1.4)
DIFFERENTIAL METHOD BLD: ABNORMAL
EOSINOPHIL # BLD AUTO: 0.3 K/UL (ref 0–0.5)
EOSINOPHIL NFR BLD: 4.8 % (ref 0–8)
ERYTHROCYTE [DISTWIDTH] IN BLOOD BY AUTOMATED COUNT: 13.5 % (ref 11.5–14.5)
EST. GFR  (NO RACE VARIABLE): 37.1 ML/MIN/1.73 M^2
ESTIMATED AVG GLUCOSE: 134 MG/DL (ref 68–131)
FERRITIN SERPL-MCNC: 415 NG/ML (ref 20–300)
FOLATE SERPL-MCNC: 14.5 NG/ML (ref 4–24)
GLUCOSE SERPL-MCNC: 214 MG/DL (ref 70–110)
HBA1C MFR BLD: 6.3 % (ref 4–5.6)
HCT VFR BLD AUTO: 36.4 % (ref 37–48.5)
HGB BLD-MCNC: 12.2 G/DL (ref 12–16)
IMM GRANULOCYTES # BLD AUTO: 0.04 K/UL (ref 0–0.04)
IMM GRANULOCYTES NFR BLD AUTO: 0.7 % (ref 0–0.5)
IRON SERPL-MCNC: 61 UG/DL (ref 30–160)
LYMPHOCYTES # BLD AUTO: 1.1 K/UL (ref 1–4.8)
LYMPHOCYTES NFR BLD: 21 % (ref 18–48)
MCH RBC QN AUTO: 29.8 PG (ref 27–31)
MCHC RBC AUTO-ENTMCNC: 33.5 G/DL (ref 32–36)
MCV RBC AUTO: 89 FL (ref 82–98)
MONOCYTES # BLD AUTO: 0.4 K/UL (ref 0.3–1)
MONOCYTES NFR BLD: 7.9 % (ref 4–15)
NEUTROPHILS # BLD AUTO: 3.5 K/UL (ref 1.8–7.7)
NEUTROPHILS NFR BLD: 64.3 % (ref 38–73)
NRBC BLD-RTO: 0 /100 WBC
PLATELET # BLD AUTO: 248 K/UL (ref 150–450)
PMV BLD AUTO: 10.1 FL (ref 9.2–12.9)
POTASSIUM SERPL-SCNC: 4.4 MMOL/L (ref 3.5–5.1)
PROT SERPL-MCNC: 7.9 G/DL (ref 6–8.4)
RBC # BLD AUTO: 4.09 M/UL (ref 4–5.4)
SATURATED IRON: 18 % (ref 20–50)
SODIUM SERPL-SCNC: 136 MMOL/L (ref 136–145)
T4 FREE SERPL-MCNC: 0.96 NG/DL (ref 0.71–1.51)
TOTAL IRON BINDING CAPACITY: 336 UG/DL (ref 250–450)
TRANSFERRIN SERPL-MCNC: 227 MG/DL (ref 200–375)
TSH SERPL DL<=0.005 MIU/L-ACNC: 30.67 UIU/ML (ref 0.4–4)
VIT B12 SERPL-MCNC: 1092 PG/ML (ref 210–950)
WBC # BLD AUTO: 5.44 K/UL (ref 3.9–12.7)

## 2025-03-21 PROCEDURE — 83036 HEMOGLOBIN GLYCOSYLATED A1C: CPT | Performed by: FAMILY MEDICINE

## 2025-03-21 PROCEDURE — 82746 ASSAY OF FOLIC ACID SERUM: CPT | Performed by: FAMILY MEDICINE

## 2025-03-21 PROCEDURE — 82728 ASSAY OF FERRITIN: CPT | Performed by: FAMILY MEDICINE

## 2025-03-21 PROCEDURE — 97140 MANUAL THERAPY 1/> REGIONS: CPT | Mod: HCNC,PN

## 2025-03-21 PROCEDURE — 80053 COMPREHEN METABOLIC PANEL: CPT | Performed by: FAMILY MEDICINE

## 2025-03-21 PROCEDURE — 84439 ASSAY OF FREE THYROXINE: CPT | Performed by: FAMILY MEDICINE

## 2025-03-21 PROCEDURE — 85025 COMPLETE CBC W/AUTO DIFF WBC: CPT | Performed by: FAMILY MEDICINE

## 2025-03-21 PROCEDURE — 82607 VITAMIN B-12: CPT | Performed by: FAMILY MEDICINE

## 2025-03-21 PROCEDURE — 97530 THERAPEUTIC ACTIVITIES: CPT | Mod: HCNC,PN

## 2025-03-21 PROCEDURE — 97112 NEUROMUSCULAR REEDUCATION: CPT | Mod: HCNC,PN

## 2025-03-21 PROCEDURE — 36415 COLL VENOUS BLD VENIPUNCTURE: CPT | Performed by: FAMILY MEDICINE

## 2025-03-21 PROCEDURE — 84466 ASSAY OF TRANSFERRIN: CPT | Performed by: FAMILY MEDICINE

## 2025-03-21 PROCEDURE — 84443 ASSAY THYROID STIM HORMONE: CPT | Performed by: FAMILY MEDICINE

## 2025-03-21 RX ORDER — AMLODIPINE BESYLATE 5 MG/1
5 TABLET ORAL DAILY
Start: 2025-03-21 | End: 2026-03-21

## 2025-03-21 RX ORDER — VALSARTAN 160 MG/1
160 TABLET ORAL DAILY
Qty: 90 TABLET | Refills: 3 | Status: SHIPPED | OUTPATIENT
Start: 2025-03-21 | End: 2026-03-21

## 2025-03-21 NOTE — PROGRESS NOTES
"  Outpatient Rehab    Physical Therapy Visit    Patient Name: Odin Oliva  MRN: [id number]  YOB: 1940  Encounter Date: 3/21/2025    Therapy Diagnosis:   Encounter Diagnosis   Name Primary?    Closed displaced supracondylar fracture of distal end of right femur without intracondylar extension with routine healing, subsequent encounter Yes         Physician: Damian Jean Jr.,*    Physician Orders: Eval and Treat  Medical Diagnosis: S72.451D (ICD-10-CM) - Closed displaced supracondylar fracture of distal end of right femur without intracondylar extension with routine healing, subsequent encounter     Visit # / Visits Authorized:  1 / 20 + eval  Date of Evaluation:  3/12/2025   Insurance Authorization Period: 03/11/2025 to 03/12/2027  Plan of Care Certification:  3/12/2025 to 5/9/2025                 Time In:  1200  Time Out:  1305  Total Billable Time: 50 min       Subjective    Patient states she has been trying to put more weight on her leg but it is hurting.   Pain 5/10 R Knee         Objective    Objective Measures updated at progress report unless specified.         Treatment:     CPT Intervention  R Knee Duration / Intensity 03/21   MT Manual  Knee flexion stretching  STM to quad R   TE  Nustep 5 min   TE Seated knee flexion stretch  Heel slides with strap  2x10: 5s each  10x10" holds    NMR SLR 2x10 B    TE LAQ  2x10 B    TA Mini Squats 2x10   TA Sit to Stands 2x10    TA  Bridges 2x10   TE Shuttle Squat (Cover w/ [place]lin) 4B 3x10   PLAN             CPT Codes available for Billing:   (10) minutes of Manual therapy (MT) to improve pain and ROM.  (20) minutes of Therapeutic Exercise (TE) to develop strength, endurance, range of motion, and flexibility.  (5) minutes of Neuromuscular Re-Education (NMR)  to improve: Balance, Coordination, Kinesthetic, Sense, Proprioception, and Posture.  (15) minutes of Therapeutic Activities (TA) to improve functional performance.  (-) Unattended Electrical " Stimulation (ES) for muscle performance or pain modulation.  (-) Vasopneumatic Device Therapy () for management of swelling/edema. (07977)  (-) BFR: Blood flow restriction applied during exercise  NP or (-): Not Performed    Assessment & Plan   Assessment:  Tolerated session well. VC for appropriate weight shift with ambulation       Patient will continue to benefit from skilled outpatient physical therapy to address the deficits listed in the problem list box on initial evaluation, provide pt/family education and to maximize pt's level of independence in the home and community environment.     Patient's spiritual, cultural, and educational needs considered and patient agreeable to plan of care and goals.           Plan:  Continue to progress as tolerated under current POC.      Goals:     Short Term Goals:  4 weeks Status  Date Met   PAIN: Pt will report worst pain of 6/10 in order to progress toward max functional ability and improve quality of life. [x] Progressing  [] Met  [] Not Met     FUNCTION: Patient will demonstrate improved function as indicated by a functional score of greater than or equal to 50% predicted on FOTO. [x] Progressing  [] Met  [] Not Met     MOBILITY: Patient will improve AROM to 50% of stated goals, listed in objective measures above, in order to progress towards independence with functional activities.  [x] Progressing  [] Met  [] Not Met     STRENGTH: Patient will improve strength to 50% of stated goals, listed in objective measures above, in order to progress towards independence with functional activities. [x] Progressing  [] Met  [] Not Met     GAIT: Patient will demonstrate improved gait mechanics in order to improve functional mobility, improve quality of life, and decrease risk of further injury or fall.  [x] Progressing  [] Met  [] Not Met     HEP: Patient will demonstrate independence with HEP in order to progress toward functional independence. [x] Progressing  [] Met  [] Not  Met        Long Term Goals:  8 weeks Status Date Met   PAIN: Pt will report worst pain of 4/10 in order to progress toward max functional ability and improve quality of life [x] Progressing  [] Met  [] Not Met     FUNCTION: Patient will demonstrate improved function as indicated by a functional score of greater than or equal to predicted score on FOTO. [x] Progressing  [] Met  [] Not Met     MOBILITY: Patient will improve AROM to stated goals, listed in objective measures above, in order to return to maximal functional potential and improve quality of life.  [x] Progressing  [] Met  [] Not Met     STRENGTH: Patient will improve strength to stated goals, listed in objective measures above, in order to improve functional independence and quality of life.  [x] Progressing  [] Met  [] Not Met     GAIT: Patient will demonstrate normalized gait mechanics with minimal compensation in order to return to PLOF. [x] Progressing  [] Met  [] Not Met     Patient will return to normal ADL's, IADL's, community involvement, recreational activities, and work-related activities with less than or equal to 4/10 pain and maximal function.  [x] Progressing  [] Met  [] Not Met          Demond Calix, PT

## 2025-03-24 ENCOUNTER — CLINICAL SUPPORT (OUTPATIENT)
Dept: REHABILITATION | Facility: HOSPITAL | Age: 85
End: 2025-03-24
Payer: MEDICARE

## 2025-03-24 DIAGNOSIS — S72.451D CLOSED DISPLACED SUPRACONDYLAR FRACTURE OF DISTAL END OF RIGHT FEMUR WITHOUT INTRACONDYLAR EXTENSION WITH ROUTINE HEALING, SUBSEQUENT ENCOUNTER: Primary | ICD-10-CM

## 2025-03-24 PROCEDURE — 97530 THERAPEUTIC ACTIVITIES: CPT | Mod: HCNC,PN

## 2025-03-24 PROCEDURE — 97140 MANUAL THERAPY 1/> REGIONS: CPT | Mod: HCNC,PN

## 2025-03-25 ENCOUNTER — RESULTS FOLLOW-UP (OUTPATIENT)
Dept: INTERNAL MEDICINE | Facility: CLINIC | Age: 85
End: 2025-03-25

## 2025-03-26 ENCOUNTER — OFFICE VISIT (OUTPATIENT)
Dept: INTERNAL MEDICINE | Facility: CLINIC | Age: 85
End: 2025-03-26
Payer: MEDICARE

## 2025-03-26 VITALS
BODY MASS INDEX: 25.04 KG/M2 | TEMPERATURE: 96 F | SYSTOLIC BLOOD PRESSURE: 108 MMHG | RESPIRATION RATE: 18 BRPM | WEIGHT: 124 LBS | OXYGEN SATURATION: 99 % | HEART RATE: 62 BPM | DIASTOLIC BLOOD PRESSURE: 64 MMHG

## 2025-03-26 DIAGNOSIS — E11.8 CONTROLLED TYPE 2 DIABETES MELLITUS WITH COMPLICATION, WITH LONG-TERM CURRENT USE OF INSULIN: ICD-10-CM

## 2025-03-26 DIAGNOSIS — N18.32 STAGE 3B CHRONIC KIDNEY DISEASE: ICD-10-CM

## 2025-03-26 DIAGNOSIS — E03.9 HYPOTHYROIDISM, UNSPECIFIED TYPE: ICD-10-CM

## 2025-03-26 DIAGNOSIS — M81.0 OSTEOPOROSIS, UNSPECIFIED OSTEOPOROSIS TYPE, UNSPECIFIED PATHOLOGICAL FRACTURE PRESENCE: Primary | ICD-10-CM

## 2025-03-26 DIAGNOSIS — Z79.4 CONTROLLED TYPE 2 DIABETES MELLITUS WITH COMPLICATION, WITH LONG-TERM CURRENT USE OF INSULIN: ICD-10-CM

## 2025-03-26 DIAGNOSIS — E11.69 TYPE 2 DIABETES MELLITUS WITH OTHER SPECIFIED COMPLICATION, WITHOUT LONG-TERM CURRENT USE OF INSULIN: ICD-10-CM

## 2025-03-26 DIAGNOSIS — I10 PRIMARY HYPERTENSION: ICD-10-CM

## 2025-03-26 DIAGNOSIS — J30.89 SEASONAL ALLERGIC RHINITIS DUE TO OTHER ALLERGIC TRIGGER: ICD-10-CM

## 2025-03-26 DIAGNOSIS — D64.9 MILD ANEMIA: ICD-10-CM

## 2025-03-26 PROCEDURE — G2211 COMPLEX E/M VISIT ADD ON: HCPCS | Mod: HCNC,S$GLB,, | Performed by: FAMILY MEDICINE

## 2025-03-26 PROCEDURE — 99999 PR PBB SHADOW E&M-EST. PATIENT-LVL V: CPT | Mod: PBBFAC,HCNC,, | Performed by: FAMILY MEDICINE

## 2025-03-26 PROCEDURE — 1100F PTFALLS ASSESS-DOCD GE2>/YR: CPT | Mod: HCNC,CPTII,S$GLB, | Performed by: FAMILY MEDICINE

## 2025-03-26 PROCEDURE — 3074F SYST BP LT 130 MM HG: CPT | Mod: HCNC,CPTII,S$GLB, | Performed by: FAMILY MEDICINE

## 2025-03-26 PROCEDURE — 3288F FALL RISK ASSESSMENT DOCD: CPT | Mod: HCNC,CPTII,S$GLB, | Performed by: FAMILY MEDICINE

## 2025-03-26 PROCEDURE — 99214 OFFICE O/P EST MOD 30 MIN: CPT | Mod: HCNC,S$GLB,, | Performed by: FAMILY MEDICINE

## 2025-03-26 PROCEDURE — 3078F DIAST BP <80 MM HG: CPT | Mod: HCNC,CPTII,S$GLB, | Performed by: FAMILY MEDICINE

## 2025-03-26 PROCEDURE — 1159F MED LIST DOCD IN RCRD: CPT | Mod: HCNC,CPTII,S$GLB, | Performed by: FAMILY MEDICINE

## 2025-03-26 RX ORDER — VALSARTAN AND HYDROCHLOROTHIAZIDE 160; 12.5 MG/1; MG/1
1 TABLET, FILM COATED ORAL DAILY
COMMUNITY

## 2025-03-26 RX ORDER — LEVOTHYROXINE SODIUM 100 UG/1
100 TABLET ORAL
Qty: 90 TABLET | Refills: 1 | Status: SHIPPED | OUTPATIENT
Start: 2025-03-26 | End: 2026-03-26

## 2025-03-26 NOTE — PROGRESS NOTES
Subjective:       Patient ID: Odin Oliva is a 85 y.o. female.    Chief Complaint:  Follow up on chronic conditions  HPI    Problem List[1]    Past Medical History:   Diagnosis Date    CKD (chronic kidney disease), stage IV     Diabetes mellitus     HTN (hypertension)     Hyperuricemia     Seasonal allergies     Thyroid disease     Urinary tract infection        Past Surgical History:   Procedure Laterality Date    CATARACT EXTRACTION, BILATERAL      CHOLECYSTECTOMY      EXOSTECTOMY Bilateral 11/8/2019    Procedure: EXOSTECTOMY;  Surgeon: Lissy Comer DPM;  Location: [facility] OR;  Service: Podiatry;  Laterality: Bilateral;  left fourth digit, right fifth digit    FOOT MASS EXCISION Bilateral 11/8/2019    Procedure: HAMMER TOE, DEROTATIONAL ARTHROPLASTY FIFTH DIGIT;  Surgeon: Lissy Comer DPM;  Location: [facility] OR;  Service: Podiatry;  Laterality: Bilateral;  Left 5th toe incision @ 08:51.  Left 4th toe incision @ 08:54.  Right 5th toe incision : 09:24.    ORIF, FRACTURE, FEMUR, TRANSCONDYLAR WITH INTERCONDYLAR EXTENSION Right 2/6/2025    Procedure: ORIF, FRACTURE, FEMUR, TRANSCONDYLAR WITH INTERCONDYLAR EXTENSION;  Surgeon: Damian Jean Jr., MD;  Location: [facility] OR;  Service: Orthopedics;  Laterality: Right;    REMOVAL OF EXTERNAL FIXATION DEVICE Right 2/6/2025    Procedure: REMOVAL, EXTERNAL FIXATION DEVICE;  Surgeon: Damian Jean Jr., MD;  Location: [facility] OR;  Service: Orthopedics;  Laterality: Right;    TOTAL THYROIDECTOMY      UMBILICAL HERNIA REPAIR      x 2       Family History   Problem Relation Name Age of Onset    Asthma Mother      No Known Problems Father      Lung cancer Brother  65    Diabetes Brother      Diabetes Brother  80    Heart disease Daughter         Tobacco Use History[2]    Wt Readings from Last 5 Encounters:   03/26/25 56.2 kg (124 lb)   03/11/25 56.7 kg (125 lb)   03/07/25 56.7 kg (125 lb)   03/05/25 59.3 kg (130 lb 11.7 oz)   02/20/25 58 kg (127 lb 13.9 oz)  "    Outpatient Medications as of 3/26/2025   Medication Sig Dispense Refill    albuterol (PROVENTIL/VENTOLIN HFA) 90 mcg/actuation inhaler Inhale 2 puffs into the lungs every 4 (four) hours as needed for Wheezing or Shortness of Breath. Rescue 25.5 g 3    aspirin-acetaminophen-caffeine 250-250-65 mg (EXCEDRIN MIGRAINE) 250-250-65 mg per tablet Take 1 tablet by mouth every 6 (six) hours as needed for Pain.      azelastine (ASTELIN) 137 mcg (0.1 %) nasal spray 1 spray (137 mcg total) by Nasal route 2 (two) times daily. 30 mL 11    blood sugar diagnostic Strp 1 each by Misc.(Non-Drug; Combo Route) route 3 (three) times daily. Insurance preferred 100 strip 11    blood-glucose meter kit Use as instructed. Insurance preferred 1 each 0    blood-glucose meter,continuous (DEXCOM G7 ) Misc 1 Device by Misc.(Non-Drug; Combo Route) route once daily. 1 each 0    blood-glucose sensor (DEXCOM G7 SENSOR) Erica 1 Device by Misc.(Non-Drug; Combo Route) route every 10 days. 3 each 11    empagliflozin (JARDIANCE) 10 mg tablet Take 1 tablet (10 mg total) by mouth once daily. 90 tablet 1    HYDROcodone-acetaminophen (NORCO) 5-325 mg per tablet Take 1 tablet by mouth every 6 (six) hours as needed for Pain. 5 tablet 0    hydrOXYzine HCL (ATARAX) 10 MG Tab Take 1 tablet (10 mg total) by mouth every evening. 90 tablet 1    lancets Misc 1 each by Misc.(Non-Drug; Combo Route) route 3 (three) times daily. 100 each 11    LANTUS SOLOSTAR U-100 INSULIN 100 unit/mL (3 mL) InPn pen Inject 12 Units into the skin once daily. 3.6 mL 11    linaGLIPtin (TRADJENTA) 5 mg Tab tablet Take 1 tablet (5 mg total) by mouth once daily. 90 tablet 1    montelukast (SINGULAIR) 10 mg tablet Take 1 tablet (10 mg total) by mouth every evening. 90 tablet 1    NOVOLOG FLEXPEN U-100 INSULIN 100 unit/mL (3 mL) InPn pen Inject 5 Units into the skin 3 (three) times daily with meals. 15 mL 11    pen needle, diabetic (BD ULTRA-FINE JOSE J PEN NEEDLE) 32 gauge x 5/32" Ndle " Use with insulin 3 times daily 100 each 5    levothyroxine (SYNTHROID) 100 MCG tablet Take 1 tablet (100 mcg total) by mouth before breakfast. 90 tablet 1     No current facility-administered medications on file as of 3/26/2025.       For further HPI details, see assessment and plan.    Review of Systems    Objective:      Vitals:    03/26/25 1343   BP: 108/64   Pulse: 62   Resp: 18   Temp: 96.3 °F (35.7 °C)       Physical Exam  Constitutional:       General: She is not in acute distress.     Appearance: She is not ill-appearing.   Pulmonary:      Effort: Pulmonary effort is normal. No respiratory distress.   Neurological:      General: No focal deficit present.      Mental Status: She is alert.   Psychiatric:         Mood and Affect: Mood normal.         Behavior: Behavior normal.         Assessment:       1. Osteoporosis, unspecified osteoporosis type, unspecified pathological fracture presence    2. Type 2 diabetes mellitus with other specified complication, without long-term current use of insulin    3. Hypothyroidism, unspecified type    4. Mild anemia    5. Stage 3b chronic kidney disease    6. Primary hypertension    7. Controlled type 2 diabetes mellitus with complication, with long-term current use of insulin    8. Seasonal allergic rhinitis due to other allergic trigger        Plan:   Osteoporosis, unspecified osteoporosis type, unspecified pathological fracture presence  -     Ambulatory referral/consult to Rheumatology; Future; Expected date: 04/02/2025    Type 2 diabetes mellitus with other specified complication, without long-term current use of insulin    Hypothyroidism, unspecified type  -     TSH; Future; Expected date: 03/26/2025    Mild anemia  -     CBC Auto Differential; Future; Expected date: 03/26/2025    Stage 3b chronic kidney disease  -     Comprehensive Metabolic Panel; Future; Expected date: 03/26/2025    Primary hypertension    Controlled type 2 diabetes mellitus with complication, with  long-term current use of insulin    Seasonal allergic rhinitis due to other allergic trigger    Other orders  -     levothyroxine (SYNTHROID) 100 MCG tablet; Take 1 tablet (100 mcg total) by mouth before breakfast.  Dispense: 90 tablet; Refill: 1      Closed displaced supracondylar fracture of distal end of right femur  Reviewed orthopedic evaluation on March 11th  Patient continues to work with the physical therapy department following her fracture.  She is making progress.  Continue efforts.      Type 2 diabetes   Working with diabetes management team   Lab Results   Component Value Date    HGBA1C 6.3 (H) 03/21/2025   Her blood sugar levels look quite well controlled.    At present she is on a substantial amount of diabetes medication   I want to avoid hypoglycemia.  I do not feel she needs this much medication.    We will continue insulin.  Given chronic kidney disease we will also continue Jardiance.  Discontinuing Tradjenta today.  We will ensure she has follow up with the diabetes team.  She has an upcoming appointment in April    Hypertension   Her blood pressure is a little low today   BP Readings from Last 3 Encounters:   03/26/25 108/64   03/11/25 130/67   03/07/25 122/76   Seems to be a deviation from her normal.  Daughter is checking her blood pressure at home.  It sounds as if the systolic is running above 140s pretty regular.  I ask that her daughter continues to monitor her blood pressure and if she is consistently running less than 120/80 I need to be made aware so I can reduce medications.  It is very important we avoid low blood pressure as I do not want her to fall again    Hypothyroidism   Lab Results   Component Value Date    TSH 30.674 (H) 03/21/2025   Not controlled   Presently on Synthroid 75 mcg.  Patient reports she is taking the medication.  Plan to make a fairly substantial dose increase today and we will check it again in about 6 weeks    Anemia much improved.  Her hemoglobin went from  8.4 to normal 12.2 and hematocrit catarino from 26.9-36.4.  We will continue to monitor  Mildly elevated B12.  She is on no supplements and I do not want her on B12    Chronic kidney disease stage 3 B   Has improved over the past month.  We will continue to monitor.  She is not to take any ibuprofen or other NSAIDs like ibuprofen    Allergic rhinitis and asthma  Breathing seems stable   Continue as needed albuterol and her Singulair  Albuterol seems sufficient.  She had previously taken Advair but it was no longer.  Does not feel necessary.  We will monitor breathing and if any issues arise we will returned to Advair    Polypharmacy   Drug list cleaned up.  At present time it should be 100% accurate.  Needs to stay this way and monitor closely    Itching   Patient takes a low dose hydroxyzine night in his seems to help.  We will continue.  If itching resolves we will stop that drug    Osteoporosis   We will arrange rheumatology consult    Family knows how to use MyChart.  Encouraged they read my note so we can prevent any confusion.  I would like lab work drawn in 6 weeks and I would like to see her soon after that lab work is done    This note was verbally dictated, please excuse any type errors.         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

## 2025-03-28 ENCOUNTER — CLINICAL SUPPORT (OUTPATIENT)
Dept: REHABILITATION | Facility: HOSPITAL | Age: 85
End: 2025-03-28
Payer: MEDICARE

## 2025-03-28 DIAGNOSIS — S72.451D CLOSED DISPLACED SUPRACONDYLAR FRACTURE OF DISTAL END OF RIGHT FEMUR WITHOUT INTRACONDYLAR EXTENSION WITH ROUTINE HEALING, SUBSEQUENT ENCOUNTER: Primary | ICD-10-CM

## 2025-03-28 PROCEDURE — 97112 NEUROMUSCULAR REEDUCATION: CPT | Mod: HCNC,PN

## 2025-03-28 NOTE — PROGRESS NOTES
"  Outpatient Rehab    Physical Therapy Visit    Patient Name: Odin Oliva  MRN: [id number]  YOB: 1940  Encounter Date: 3/28/2025    Therapy Diagnosis:   No diagnosis found.      Physician: Damian Jean Jr.,*    Physician Orders: Eval and Treat  Medical Diagnosis: S72.451D (ICD-10-CM) - Closed displaced supracondylar fracture of distal end of right femur without intracondylar extension with routine healing, subsequent encounter     Visit # / Visits Authorized:  1 / 20 + eval  Date of Evaluation:  3/12/2025   Insurance Authorization Period: 03/11/2025 to 03/12/2027  Plan of Care Certification:  3/12/2025 to 5/9/2025                 Time In: 11:15  Time Out:  12:15  Total Time:  60  Total Billable Time: 45 mins    FOTO:  Intake Score:  %  Survey Score 1:  %  Survey Score 2:  %         Subjective      Pt's daughter reports that she has gotten a lot better. She has been doing her exercises at home. She would like to get back in her garden.            Objective            Treatment:     CPT Intervention  JointFocus Duration / Intensity  3/28   MT Manual  Knee flexion stretching     TE Seated knee flexion stretch  Heel slides with strap  2x10: 5s each  10x10" holds    NMR LAQ  SLR 2x10   3x10 R   NMR Mini Squats   Sit to stand 2x10  2x10   NMR  Nustep 5 mins   NMR Shuttle  3x10 4 bands    NMR  Bridges 2x10   PLAN             CPT Codes available for Billing:   (10) minutes of Manual therapy (MT) to improve pain and ROM.  (15) minutes of Therapeutic Exercise (TE) to develop strength, endurance, range of motion, and flexibility.  (30) minutes of Neuromuscular Re-Education (NMR)  to improve: Balance, Coordination, Kinesthetic, Sense, Proprioception, and Posture.  (-) minutes of Therapeutic Activities (TA) to improve functional performance.  (-) Unattended Electrical Stimulation (ES) for muscle performance or pain modulation.  (-) Vasopneumatic Device Therapy () for management of swelling/edema. " ([id number])  (-) BFR: Blood flow restriction applied during exercise  NP or (-): Not Performed    Assessment & Plan   Assessment:   Patient presents to therapy with tightness in quad and limited knee flexion. Patient ambulates well with walker and has ultimate goal of returning to ambulation without AD. Tolerated exercises well today with min assist and cueing from PT.        Patient will continue to benefit from skilled outpatient physical therapy to address the deficits listed in the problem list box on initial evaluation, provide pt/family education and to maximize pt's level of independence in the home and community environment.     Patient's spiritual, cultural, and educational needs considered and patient agreeable to plan of care and goals.           Plan:   cont with current POC.     Goals:     Nae Castellanos, PT

## 2025-03-31 ENCOUNTER — CLINICAL SUPPORT (OUTPATIENT)
Dept: REHABILITATION | Facility: HOSPITAL | Age: 85
End: 2025-03-31
Payer: MEDICARE

## 2025-03-31 DIAGNOSIS — S72.451D CLOSED DISPLACED SUPRACONDYLAR FRACTURE OF DISTAL END OF RIGHT FEMUR WITHOUT INTRACONDYLAR EXTENSION WITH ROUTINE HEALING, SUBSEQUENT ENCOUNTER: Primary | ICD-10-CM

## 2025-03-31 PROCEDURE — 97112 NEUROMUSCULAR REEDUCATION: CPT | Mod: HCNC,PN

## 2025-03-31 PROCEDURE — 97110 THERAPEUTIC EXERCISES: CPT | Mod: HCNC,PN

## 2025-03-31 PROCEDURE — 97140 MANUAL THERAPY 1/> REGIONS: CPT | Mod: HCNC,PN

## 2025-04-03 ENCOUNTER — OFFICE VISIT (OUTPATIENT)
Dept: NEPHROLOGY | Facility: CLINIC | Age: 85
End: 2025-04-03
Payer: MEDICARE

## 2025-04-03 VITALS
SYSTOLIC BLOOD PRESSURE: 112 MMHG | BODY MASS INDEX: 26 KG/M2 | DIASTOLIC BLOOD PRESSURE: 62 MMHG | WEIGHT: 129 LBS | HEIGHT: 59 IN

## 2025-04-03 DIAGNOSIS — R80.1 PERSISTENT PROTEINURIA: ICD-10-CM

## 2025-04-03 DIAGNOSIS — I10 PRIMARY HYPERTENSION: ICD-10-CM

## 2025-04-03 DIAGNOSIS — N18.32 STAGE 3B CHRONIC KIDNEY DISEASE: Primary | ICD-10-CM

## 2025-04-03 DIAGNOSIS — N18.4 CKD (CHRONIC KIDNEY DISEASE), STAGE IV: ICD-10-CM

## 2025-04-03 PROCEDURE — 99999 PR PBB SHADOW E&M-EST. PATIENT-LVL IV: CPT | Mod: PBBFAC,HCNC,, | Performed by: INTERNAL MEDICINE

## 2025-04-03 NOTE — PROGRESS NOTES
Odin Oliva is a 85 y.o. female     HPI:    She has not been seen in Nephrology Clinic in over 3 years.  She presents to clinic today to reestablish care.  Since her last office visit she has been doing well and has no specific complaints.  Her laboratory studies medications were reviewed.  All Nephrology related questions were answered to her satisfaction.  At her last office visit her creatinine was 1.4.  On most recent laboratory studies it was 1.4.  She has had a bit of fluctuation over the past several years with her creatinine ranging from 1.4-1.7.  She has longstanding history of diabetes mellitus and hypertension.    PAST MEDICAL HISTORY:  She  has a past medical history of CKD (chronic kidney disease), stage IV, Diabetes mellitus, HTN (hypertension), Hyperuricemia, Seasonal allergies, Thyroid disease, and Urinary tract infection.    PAST SURGICAL HISTORY:  She  has a past surgical history that includes Total thyroidectomy; Cholecystectomy; Umbilical hernia repair; Cataract extraction, bilateral; Foot mass excision (Bilateral, 11/8/2019); Exostectomy (Bilateral, 11/8/2019); Removal of external fixation device (Right, 2/6/2025); and orif, fracture, femur, transcondylar with intercondylar extension (Right, 2/6/2025).    SOCIAL HISTORY:  She  reports that she has never smoked. She has never been exposed to tobacco smoke. She has never used smokeless tobacco. She reports that she does not drink alcohol and does not use drugs.      FAMILY MEDICAL HISTORY:  Her family history includes Asthma in her mother; Diabetes in her brother; Diabetes (age of onset: 80) in her brother; Heart disease in her daughter; Lung cancer (age of onset: 65) in her brother; No Known Problems in her father.    Review of patient's allergies indicates:  No Known Allergies        Prior to Admission medications    Medication Sig Start Date End Date Taking? Authorizing Provider   albuterol (PROVENTIL/VENTOLIN HFA) 90 mcg/actuation inhaler  Inhale 2 puffs into the lungs every 4 (four) hours as needed for Wheezing or Shortness of Breath. Rescue 1/17/25  Yes Juventino Barbosa MD   aspirin-acetaminophen-caffeine 250-250-65 mg (EXCEDRIN MIGRAINE) 250-250-65 mg per tablet Take 1 tablet by mouth every 6 (six) hours as needed for Pain.   Yes Provider, Historical   azelastine (ASTELIN) 137 mcg (0.1 %) nasal spray 1 spray (137 mcg total) by Nasal route 2 (two) times daily. 12/10/24 12/10/25 Yes Juventino Barbosa MD   blood sugar diagnostic Strp 1 each by Misc.(Non-Drug; Combo Route) route 3 (three) times daily. Insurance preferred 10/28/22  Yes Xiang Al PA-C   blood-glucose meter kit Use as instructed. Insurance preferred 10/28/22  Yes Xiang Al PA-C   blood-glucose meter,continuous (DEXCOM G7 ) Misc 1 Device by Misc.(Non-Drug; Combo Route) route once daily. 3/5/25 3/5/26 Yes Bertha Paul FNP   blood-glucose sensor (DEXCOM G7 SENSOR) Erica 1 Device by Misc.(Non-Drug; Combo Route) route every 10 days. 3/5/25 3/5/26 Yes Bertha Paul FNP   empagliflozin (JARDIANCE) 10 mg tablet Take 1 tablet (10 mg total) by mouth once daily. 1/17/25  Yes Juventino Barbosa MD   HYDROcodone-acetaminophen (NORCO) 5-325 mg per tablet Take 1 tablet by mouth every 6 (six) hours as needed for Pain. 2/11/25  Yes Nava George MD   hydrOXYzine HCL (ATARAX) 10 MG Tab Take 1 tablet (10 mg total) by mouth every evening. 12/10/24  Yes Juventino Barbosa MD   lancets Misc 1 each by Misc.(Non-Drug; Combo Route) route 3 (three) times daily. 10/28/22  Yes Xiang Al PA-C   JEN VEGAAR U-100 INSULIN 100 unit/mL (3 mL) InPn pen Inject 12 Units into the skin once daily. 3/5/25 3/5/26 Yes Bertha Paul FNP   levothyroxine (SYNTHROID) 100 MCG tablet Take 1 tablet (100 mcg total) by mouth before breakfast. 3/26/25 3/26/26 Yes Juventino Barbosa MD   linaGLIPtin (TRADJENTA) 5 mg Tab tablet Take 1 tablet (5 mg total)  "by mouth once daily. 1/17/25 1/17/26 Yes Juventino Barbosa MD   montelukast (SINGULAIR) 10 mg tablet Take 1 tablet (10 mg total) by mouth every evening. 1/17/25 5/21/25 Yes Juventino Barbosa MD   NOVOLOG FLEXPEN U-100 INSULIN 100 unit/mL (3 mL) InPn pen Inject 5 Units into the skin 3 (three) times daily with meals. 3/5/25 3/5/26 Yes Bertha Paul FNP   pen needle, diabetic (BD ULTRA-FINE JOSE J PEN NEEDLE) 32 gauge x 5/32" Ndle Use with insulin 3 times daily 3/5/25  Yes Bertha Paul FNP   valsartan-hydrochlorothiazide (DIOVAN-HCT) 160-12.5 mg per tablet Take 1 tablet by mouth once daily.   Yes Provider, Historical      Sodium   Date Value Ref Range Status   03/21/2025 136 136 - 145 mmol/L Final   02/09/2025 140 136 - 145 mmol/L Final   02/08/2025 130 (L) 136 - 145 mmol/L Final     Potassium   Date Value Ref Range Status   03/21/2025 4.4 3.5 - 5.1 mmol/L Final   02/09/2025 4.6 3.5 - 5.1 mmol/L Final   02/08/2025 4.8 3.5 - 5.1 mmol/L Final     Chloride   Date Value Ref Range Status   03/21/2025 103 95 - 110 mmol/L Final   02/09/2025 106 95 - 110 mmol/L Final   02/08/2025 99 95 - 110 mmol/L Final     CO2   Date Value Ref Range Status   03/21/2025 23 23 - 29 mmol/L Final   02/09/2025 26 23 - 29 mmol/L Final   02/08/2025 20 (L) 23 - 29 mmol/L Final     Glucose   Date Value Ref Range Status   03/21/2025 214 (H) 70 - 110 mg/dL Final   02/09/2025 132 (H) 70 - 110 mg/dL Final   02/08/2025 354 (H) 70 - 110 mg/dL Final     BUN   Date Value Ref Range Status   03/21/2025 44 (H) 8 - 23 mg/dL Final   02/09/2025 27 (H) 8 - 23 mg/dL Final   02/08/2025 31 (H) 8 - 23 mg/dL Final     Creatinine   Date Value Ref Range Status   03/21/2025 1.4 0.5 - 1.4 mg/dL Final   02/09/2025 1.5 (H) 0.5 - 1.4 mg/dL Final   02/08/2025 1.7 (H) 0.5 - 1.4 mg/dL Final     Calcium   Date Value Ref Range Status   03/21/2025 10.0 8.7 - 10.5 mg/dL Final   02/09/2025 8.6 (L) 8.7 - 10.5 mg/dL Final   02/08/2025 8.3 (L) 8.7 - 10.5 mg/dL Final "     Total Protein   Date Value Ref Range Status   03/21/2025 7.9 6.0 - 8.4 g/dL Final   02/09/2025 6.1 6.0 - 8.4 g/dL Final   02/08/2025 6.4 6.0 - 8.4 g/dL Final     Albumin   Date Value Ref Range Status   03/21/2025 3.8 3.5 - 5.2 g/dL Final   02/09/2025 2.5 (L) 3.5 - 5.2 g/dL Final   02/08/2025 2.7 (L) 3.5 - 5.2 g/dL Final     Total Bilirubin   Date Value Ref Range Status   03/21/2025 0.3 0.1 - 1.0 mg/dL Final     Comment:     For infants and newborns, interpretation of results should be based  on gestational age, weight and in agreement with clinical  observations.    Premature Infant recommended reference ranges:  Up to 24 hours.............<8.0 mg/dL  Up to 48 hours............<12.0 mg/dL  3-5 days..................<15.0 mg/dL  6-29 days.................<15.0 mg/dL     02/09/2025 0.7 0.1 - 1.0 mg/dL Final     Comment:     For infants and newborns, interpretation of results should be based  on gestational age, weight and in agreement with clinical  observations.    Premature Infant recommended reference ranges:  Up to 24 hours.............<8.0 mg/dL  Up to 48 hours............<12.0 mg/dL  3-5 days..................<15.0 mg/dL  6-29 days.................<15.0 mg/dL     02/08/2025 0.6 0.1 - 1.0 mg/dL Final     Comment:     For infants and newborns, interpretation of results should be based  on gestational age, weight and in agreement with clinical  observations.    Premature Infant recommended reference ranges:  Up to 24 hours.............<8.0 mg/dL  Up to 48 hours............<12.0 mg/dL  3-5 days..................<15.0 mg/dL  6-29 days.................<15.0 mg/dL       Alkaline Phosphatase   Date Value Ref Range Status   03/21/2025 165 (H) 40 - 150 U/L Final   02/09/2025 90 40 - 150 U/L Final   02/08/2025 93 40 - 150 U/L Final     AST   Date Value Ref Range Status   03/21/2025 16 10 - 40 U/L Final   02/09/2025 17 10 - 40 U/L Final   02/08/2025 20 10 - 40 U/L Final     ALT   Date Value Ref Range Status   03/21/2025  13 10 - 44 U/L Final   02/09/2025 8 (L) 10 - 44 U/L Final   02/08/2025 8 (L) 10 - 44 U/L Final     Anion Gap   Date Value Ref Range Status   03/21/2025 10 8 - 16 mmol/L Final   02/09/2025 8 8 - 16 mmol/L Final   02/08/2025 11 8 - 16 mmol/L Final     eGFR if    Date Value Ref Range Status   05/30/2022 32.1 (A) >60 mL/min/1.73 m^2 Final   01/26/2022 41 (A) >60 mL/min/1.73 m^2 Final   10/27/2021 40.9 (A) >60 mL/min/1.73 m^2 Final     eGFR    Date Value Ref Range Status   09/25/2024 31 mL/min/1.73mSq Final     Comment:     In accordance with NKF-ASN Task Force recommendation, calculation based on the Chronic Kidney Disease Epidemiology Collaboration (CKD-EPI) equation without adjustment for race. eGFR adjusted for gender and age and calculated in ml/min/1.73mSquared. eGFR cannot be calculated if patient is under 18 years of age.     Reference Range:   >= 60 ml/min/1.73mSquared.     eGFR if non    Date Value Ref Range Status   05/30/2022 27.9 (A) >60 mL/min/1.73 m^2 Final     Comment:     Calculation used to obtain the estimated glomerular filtration  rate (eGFR) is the CKD-EPI equation.      01/26/2022 35 (A) >60 mL/min/1.73 m^2 Final     Comment:     Calculation used to obtain the estimated glomerular filtration  rate (eGFR) is the CKD-EPI equation.      10/27/2021 35.5 (A) >60 mL/min/1.73 m^2 Final     Comment:     Calculation used to obtain the estimated glomerular filtration  rate (eGFR) is the CKD-EPI equation.        RBC, UA   Date Value Ref Range Status   10/27/2021 1 0 - 4 /hpf Final     WBC, UA   Date Value Ref Range Status   10/27/2021 9 (H) 0 - 5 /hpf Final     Bacteria   Date Value Ref Range Status   10/27/2021 Rare None-Occ /hpf Final     Microscopic Comment   Date Value Ref Range Status   02/24/2023 SEE COMMENT  Final     Comment:     Other formed elements not mentioned in the report are not   present in the microscopic examination.      10/27/2021 SEE  "COMMENT  Final     Comment:     Other formed elements not mentioned in the report are not   present in the microscopic examination.        Protein, Urine Random   Date Value Ref Range Status   10/27/2021 23 (H) 0 - 15 mg/dL Final   10/20/2021 7 0 - 15 mg/dL Final   07/16/2021 18 (H) 0 - 15 mg/dL Final     Creatinine, Urine   Date Value Ref Range Status   11/22/2024 86.0 15.0 - 325.0 mg/dL Final   09/26/2024 90.79 15.70 - 330.70 MG/DL Final   12/06/2023 90.0 15.0 - 325.0 mg/dL Final   11/01/2022 73.0 15.0 - 325.0 mg/dL Final     Prot/Creat Ratio, Urine   Date Value Ref Range Status   10/27/2021 0.27 (H) 0.00 - 0.20 Final   10/20/2021 0.15 0.00 - 0.20 Final   07/16/2021 0.17 0.00 - 0.20 Final         REVIEW OF SYSTEMS:  Patient has no fever, fatigue, visual changes, chest pain, edema, cough, dyspnea, nausea, vomiting, constipation, diarrhea, arthralgias, pruritis, dizziness, weakness, depression, confusion.        PHYSICAL EXAM:   height is 4' 11" (1.499 m) and weight is 58.5 kg (128 lb 15.5 oz). Her blood pressure is 112/62.   Gen: WDWN female in no apparent distress  Psych: Normal mood and affect  Skin: No rashes or ulcers  Eyes: Normal conjunctiva and lids, PERRLA  ENT: Normal hearing with no oropharyngeal lesions  Neck: No JVD  Ext: No cyanosis, clubbing or edema          IMPRESSION AND RECOMMENDATIONS:      1. CKD 3B:  Creatinine is stable at around 1.4 with some normal intrinsic fluctuation.  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  She is on a RAAS inhibitor as well as an SGLT 2 inhibitor.  On review of her medications she thought that she was supposed to stop her Jardiance.  In the notes from her primary care physician he would ask her to continue it.  This issue was clarified as she is going to restart Jardiance.  We discussed the importance of good hydration while on an SGLT 2 inhibitor.    2. Hypertension: Blood pressure is well controlled " on current regimen.  Continue RAAS inhibitor as well as SGLT 2 inhibitor.    3. 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  Continue RAAS inhibition as well as SGLT 2 inhibitor.  Will check a PC ratio prior to her next office visit.    4. Secondary hyperparathyroidism of renal origin:  Calcium is stable at 10 with an albumin of 3.8.  Will check an intact PTH and vitamin-D prior to her next office visit.

## 2025-04-04 ENCOUNTER — CLINICAL SUPPORT (OUTPATIENT)
Dept: REHABILITATION | Facility: HOSPITAL | Age: 85
End: 2025-04-04
Payer: MEDICARE

## 2025-04-04 DIAGNOSIS — S72.451D CLOSED DISPLACED SUPRACONDYLAR FRACTURE OF DISTAL END OF RIGHT FEMUR WITHOUT INTRACONDYLAR EXTENSION WITH ROUTINE HEALING, SUBSEQUENT ENCOUNTER: Primary | ICD-10-CM

## 2025-04-04 PROCEDURE — 97112 NEUROMUSCULAR REEDUCATION: CPT | Mod: HCNC,PN

## 2025-04-04 PROCEDURE — 97530 THERAPEUTIC ACTIVITIES: CPT | Mod: HCNC,PN

## 2025-04-04 NOTE — PROGRESS NOTES
"  Outpatient Rehab    Physical Therapy Visit    Patient Name: Odin Oliva  MRN: [id number]  YOB: 1940  Encounter Date: 4/4/2025    Therapy Diagnosis:   No diagnosis found.      Physician: Damian Jean Jr.,*    Physician Orders: Eval and Treat  Medical Diagnosis: S72.451D (ICD-10-CM) - Closed displaced supracondylar fracture of distal end of right femur without intracondylar extension with routine healing, subsequent encounter     Visit # / Visits Authorized:  1 / 20 + eval  Date of Evaluation:  3/12/2025   Insurance Authorization Period: 03/11/2025 to 03/12/2027  Plan of Care Certification:  3/12/2025 to 5/9/2025                 Time In: 1100  Time Out:  1205  Total Billable Time: 25 mins       Subjective      Patient reports she is still hurting. She is unable to put all her weight on her leg without her walker.   Pain 5/10         Objective    Objective Measures updated at progress report unless specified.         Treatment:     CPT Intervention  R Knee Duration / Intensity  4/4   MT Manual Knee flexion stretching/ Distraction mobs 10   TE* Seated knee flexion stretch  Heel slides with strap  2x10: 5s each  10x10" holds    NMR* LAQ  SLR 2x10   3x10 R   TA Mini Squats   Sit to stand 2x10  2x10   TE*  Nustep 5 mins   NMR Shuttle Squats 3x10 5 bands    NMR  Bridges -   PLAN             CPT Codes available for Billing:   (10) minutes of Manual therapy (MT) to improve pain and ROM.  (-) minutes of Therapeutic Exercise (TE) to develop strength, endurance, range of motion, and flexibility.  (-) minutes of Neuromuscular Re-Education (NMR)  to improve: Balance, Coordination, Kinesthetic, Sense, Proprioception, and Posture.  (15) minutes of Therapeutic Activities (TA) to improve functional performance.  (-) Unattended Electrical Stimulation (ES) for muscle performance or pain modulation.  (-) Vasopneumatic Device Therapy () for management of swelling/edema. (85488)  (-) BFR: Blood flow restriction " applied during exercise  NP or (-): Not Performed    Assessment & Plan   Assessment:   Performed knee distraction mobs with knee flexion stretching with good reported symptom relief.        Patient will continue to benefit from skilled outpatient physical therapy to address the deficits listed in the problem list box on initial evaluation, provide pt/family education and to maximize pt's level of independence in the home and community environment.     Patient's spiritual, cultural, and educational needs considered and patient agreeable to plan of care and goals.         Plan:  Continue to progress as tolerated under current POC.      Goals:     Short Term Goals:  4 weeks Status  Date Met   PAIN: Pt will report worst pain of 6/10 in order to progress toward max functional ability and improve quality of life. [x] Progressing  [] Met  [] Not Met     FUNCTION: Patient will demonstrate improved function as indicated by a functional score of greater than or equal to 50% predicted on FOTO. [x] Progressing  [] Met  [] Not Met     MOBILITY: Patient will improve AROM to 50% of stated goals, listed in objective measures above, in order to progress towards independence with functional activities.  [x] Progressing  [] Met  [] Not Met     STRENGTH: Patient will improve strength to 50% of stated goals, listed in objective measures above, in order to progress towards independence with functional activities. [x] Progressing  [] Met  [] Not Met     GAIT: Patient will demonstrate improved gait mechanics in order to improve functional mobility, improve quality of life, and decrease risk of further injury or fall.  [x] Progressing  [] Met  [] Not Met     HEP: Patient will demonstrate independence with HEP in order to progress toward functional independence. [x] Progressing  [] Met  [] Not Met        Long Term Goals:  8 weeks Status Date Met   PAIN: Pt will report worst pain of 4/10 in order to progress toward max functional ability and  improve quality of life [x] Progressing  [] Met  [] Not Met     FUNCTION: Patient will demonstrate improved function as indicated by a functional score of greater than or equal to predicted score on FOTO. [x] Progressing  [] Met  [] Not Met     MOBILITY: Patient will improve AROM to stated goals, listed in objective measures above, in order to return to maximal functional potential and improve quality of life.  [x] Progressing  [] Met  [] Not Met     STRENGTH: Patient will improve strength to stated goals, listed in objective measures above, in order to improve functional independence and quality of life.  [x] Progressing  [] Met  [] Not Met     GAIT: Patient will demonstrate normalized gait mechanics with minimal compensation in order to return to PLOF. [x] Progressing  [] Met  [] Not Met     Patient will return to normal ADL's, IADL's, community involvement, recreational activities, and work-related activities with less than or equal to 4/10 pain and maximal function.  [x] Progressing  [] Met  [] Not Met          Demond Calix, PT

## 2025-04-07 ENCOUNTER — CLINICAL SUPPORT (OUTPATIENT)
Dept: REHABILITATION | Facility: HOSPITAL | Age: 85
End: 2025-04-07
Payer: MEDICARE

## 2025-04-07 DIAGNOSIS — S72.451D CLOSED DISPLACED SUPRACONDYLAR FRACTURE OF DISTAL END OF RIGHT FEMUR WITHOUT INTRACONDYLAR EXTENSION WITH ROUTINE HEALING, SUBSEQUENT ENCOUNTER: Primary | ICD-10-CM

## 2025-04-07 PROCEDURE — 97140 MANUAL THERAPY 1/> REGIONS: CPT | Mod: HCNC,PN

## 2025-04-07 PROCEDURE — 97112 NEUROMUSCULAR REEDUCATION: CPT | Mod: HCNC,PN

## 2025-04-11 ENCOUNTER — CLINICAL SUPPORT (OUTPATIENT)
Dept: REHABILITATION | Facility: HOSPITAL | Age: 85
End: 2025-04-11
Payer: MEDICARE

## 2025-04-11 DIAGNOSIS — S72.451D CLOSED DISPLACED SUPRACONDYLAR FRACTURE OF DISTAL END OF RIGHT FEMUR WITHOUT INTRACONDYLAR EXTENSION WITH ROUTINE HEALING, SUBSEQUENT ENCOUNTER: Primary | ICD-10-CM

## 2025-04-11 PROCEDURE — 97110 THERAPEUTIC EXERCISES: CPT | Mod: HCNC,PN

## 2025-04-11 PROCEDURE — 97140 MANUAL THERAPY 1/> REGIONS: CPT | Mod: HCNC,PN

## 2025-04-11 PROCEDURE — 97112 NEUROMUSCULAR REEDUCATION: CPT | Mod: HCNC,PN

## 2025-04-14 ENCOUNTER — HOSPITAL ENCOUNTER (OUTPATIENT)
Dept: RADIOLOGY | Facility: HOSPITAL | Age: 85
Discharge: HOME | End: 2025-04-14
Attending: ORTHOPAEDIC SURGERY
Payer: MEDICARE

## 2025-04-14 ENCOUNTER — OFFICE VISIT (OUTPATIENT)
Dept: ORTHOPEDICS | Facility: CLINIC | Age: 85
End: 2025-04-14
Payer: MEDICARE

## 2025-04-14 VITALS
WEIGHT: 128 LBS | SYSTOLIC BLOOD PRESSURE: 136 MMHG | BODY MASS INDEX: 25.85 KG/M2 | DIASTOLIC BLOOD PRESSURE: 68 MMHG | HEART RATE: 61 BPM

## 2025-04-14 DIAGNOSIS — M89.8X5 PAIN IN FEMUR: ICD-10-CM

## 2025-04-14 DIAGNOSIS — S72.451D CLOSED DISPLACED SUPRACONDYLAR FRACTURE OF DISTAL END OF RIGHT FEMUR WITHOUT INTRACONDYLAR EXTENSION WITH ROUTINE HEALING, SUBSEQUENT ENCOUNTER: Primary | ICD-10-CM

## 2025-04-14 DIAGNOSIS — M89.8X5 PAIN IN FEMUR: Primary | ICD-10-CM

## 2025-04-14 PROCEDURE — 73552 X-RAY EXAM OF FEMUR 2/>: CPT | Mod: TC,HCNC,RT

## 2025-04-14 PROCEDURE — 73552 X-RAY EXAM OF FEMUR 2/>: CPT | Mod: 26,HCNC,RT, | Performed by: RADIOLOGY

## 2025-04-14 PROCEDURE — 3078F DIAST BP <80 MM HG: CPT | Mod: HCNC,CPTII,S$GLB, | Performed by: ORTHOPAEDIC SURGERY

## 2025-04-14 PROCEDURE — 1159F MED LIST DOCD IN RCRD: CPT | Mod: HCNC,CPTII,S$GLB, | Performed by: ORTHOPAEDIC SURGERY

## 2025-04-14 PROCEDURE — 99024 POSTOP FOLLOW-UP VISIT: CPT | Mod: HCNC,S$GLB,, | Performed by: ORTHOPAEDIC SURGERY

## 2025-04-14 PROCEDURE — 99999 PR PBB SHADOW E&M-EST. PATIENT-LVL III: CPT | Mod: PBBFAC,HCNC,, | Performed by: ORTHOPAEDIC SURGERY

## 2025-04-14 PROCEDURE — 3075F SYST BP GE 130 - 139MM HG: CPT | Mod: HCNC,CPTII,S$GLB, | Performed by: ORTHOPAEDIC SURGERY

## 2025-04-14 PROCEDURE — 1160F RVW MEDS BY RX/DR IN RCRD: CPT | Mod: HCNC,CPTII,S$GLB, | Performed by: ORTHOPAEDIC SURGERY

## 2025-04-14 NOTE — PROGRESS NOTES
"  Outpatient Rehab    Physical Therapy Visit    Patient Name: Odin Oliva  MRN: [id number]  YOB: 1940  Encounter Date: 3/31/2025    Therapy Diagnosis:   No diagnosis found.      Physician: Damian Jean Jr.,*    Physician Orders: Eval and Treat  Medical Diagnosis: S72.451D (ICD-10-CM) - Closed displaced supracondylar fracture of distal end of right femur without intracondylar extension with routine healing, subsequent encounter     Visit # / Visits Authorized:  1 / 20 + eval  Date of Evaluation:  3/12/2025   Insurance Authorization Period: 03/11/2025 to 03/12/2027  Plan of Care Certification:  3/12/2025 to 5/9/2025                 Time In: 1100  Time Out:  1200  Total Billable Time: 45 mins         Subjective      Patient states she is feeling stronger but it still hurts to put all her weight on her leg.         Objective      Knee AROM/PROM Right Left Notes   Knee Flexion (135º) 100 120    Knee Extension (0º) 0 0            Treatment:     CPT Intervention  JointFocus Duration / Intensity  3/31   MT Manual  Knee flexion stretching 10'     TE Seated knee flexion stretch  Heel slides with strap  2x10: 5s each  10x10" holds    NMR LAQ  SLR 2x10   3x10 R   TA Mini Squats   Sit to stand 2x10  2x10   TE  Nustep 5 mins   NMR Shuttle  3x10 5 bands    NMR  Bridges -   PLAN             CPT Codes available for Billing:   (10) minutes of Manual therapy (MT) to improve pain and ROM.  (10) minutes of Therapeutic Exercise (TE) to develop strength, endurance, range of motion, and flexibility.  (15) minutes of Neuromuscular Re-Education (NMR)  to improve: Balance, Coordination, Kinesthetic, Sense, Proprioception, and Posture.  (5) minutes of Therapeutic Activities (TA) to improve functional performance.  (-) Unattended Electrical Stimulation (ES) for muscle performance or pain modulation.  (-) Vasopneumatic Device Therapy () for management of swelling/edema. (43750)  (-) BFR: Blood flow restriction applied " during exercise  NP or (-): Not Performed    Assessment & Plan   Assessment:   Patient is making good progress with her ROM and strength but remains limited due to pain.       Patient will continue to benefit from skilled outpatient physical therapy to address the deficits listed in the problem list box on initial evaluation, provide pt/family education and to maximize pt's level of independence in the home and community environment.     Patient's spiritual, cultural, and educational needs considered and patient agreeable to plan of care and goals.           Plan:   Continue to progress as tolerated under current POC.      Goals:     Short Term Goals:  4 weeks Status  Date Met   PAIN: Pt will report worst pain of 6/10 in order to progress toward max functional ability and improve quality of life. [x] Progressing  [] Met  [] Not Met     FUNCTION: Patient will demonstrate improved function as indicated by a functional score of greater than or equal to 50% predicted on FOTO. [x] Progressing  [] Met  [] Not Met     MOBILITY: Patient will improve AROM to 50% of stated goals, listed in objective measures above, in order to progress towards independence with functional activities.  [x] Progressing  [] Met  [] Not Met     STRENGTH: Patient will improve strength to 50% of stated goals, listed in objective measures above, in order to progress towards independence with functional activities. [x] Progressing  [] Met  [] Not Met     GAIT: Patient will demonstrate improved gait mechanics in order to improve functional mobility, improve quality of life, and decrease risk of further injury or fall.  [x] Progressing  [] Met  [] Not Met     HEP: Patient will demonstrate independence with HEP in order to progress toward functional independence. [x] Progressing  [] Met  [] Not Met        Long Term Goals:  8 weeks Status Date Met   PAIN: Pt will report worst pain of 4/10 in order to progress toward max functional ability and improve  quality of life [x] Progressing  [] Met  [] Not Met     FUNCTION: Patient will demonstrate improved function as indicated by a functional score of greater than or equal to predicted score on FOTO. [x] Progressing  [] Met  [] Not Met     MOBILITY: Patient will improve AROM to stated goals, listed in objective measures above, in order to return to maximal functional potential and improve quality of life.  [x] Progressing  [] Met  [] Not Met     STRENGTH: Patient will improve strength to stated goals, listed in objective measures above, in order to improve functional independence and quality of life.  [x] Progressing  [] Met  [] Not Met     GAIT: Patient will demonstrate normalized gait mechanics with minimal compensation in order to return to PLOF. [x] Progressing  [] Met  [] Not Met     Patient will return to normal ADL's, IADL's, community involvement, recreational activities, and work-related activities with less than or equal to 4/10 pain and maximal function.  [x] Progressing  [] Met  [] Not Met          Demond Calix, PT

## 2025-04-14 NOTE — PROGRESS NOTES
"  Outpatient Rehab    Physical Therapy Visit    Patient Name: Odin Oliva  MRN: [id number]  YOB: 1940  Encounter Date: 3/24/2025    Therapy Diagnosis:   No diagnosis found.        Physician: Damian Jean Jr.,*    Physician Orders: Eval and Treat  Medical Diagnosis: S72.451D (ICD-10-CM) - Closed displaced supracondylar fracture of distal end of right femur without intracondylar extension with routine healing, subsequent encounter     Visit # / Visits Authorized:  1 / 20 + eval  Date of Evaluation:  3/12/2025   Insurance Authorization Period: 03/11/2025 to 03/12/2027  Plan of Care Certification:  3/12/2025 to 5/9/2025                 Time In:  1100  Time Out:  1200  Total Billable Time: 30 min       Subjective    Patient states no new complaints.   Pain 5/10 R Knee         Objective    Objective Measures updated at progress report unless specified.         Treatment:     CPT Intervention  JointFocus Duration / intensity 03/24   MT Manual  10   TE*  Nustep 7 mins   TE* Seated knee flexion stretch  Heel slides with strap  2x10: 5s each  10x10" holds    NMR* SLR 2x10 B   TE LAQ  2x10 B   TA Mini Squats 2x10   TA Sit to Stands 2x10    NMR*  Bridges 2x10   TE Shuttle Squat (Cover w/ [place]) 4B 3x10   PLAN             CPT Codes available for Billing:   (10) minutes of Manual therapy (MT) to improve pain and ROM.  (10) minutes of Therapeutic Exercise (TE) to develop strength, endurance, range of motion, and flexibility.  (0) minutes of Neuromuscular Re-Education (NMR)  to improve: Balance, Coordination, Kinesthetic, Sense, Proprioception, and Posture.  (10) minutes of Therapeutic Activities (TA) to improve functional performance.  (-) Unattended Electrical Stimulation (ES) for muscle performance or pain modulation.  (-) Vasopneumatic Device Therapy () for management of swelling/edema. (59867)  (-) BFR: Blood flow restriction applied during exercise  NP or (-): Not Performed  *Not billable due to tech " assistance or not one on one care.      Assessment & Plan   Assessment:  Tolerated session with mild complaints.     Patient will continue to benefit from skilled outpatient physical therapy to address the deficits listed in the problem list box on initial evaluation, provide pt/family education and to maximize pt's level of independence in the home and community environment.     Patient's spiritual, cultural, and educational needs considered and patient agreeable to plan of care and goals.         Plan:  Continue to progress as tolerated under current POC.      Goals:     Short Term Goals:  4 weeks Status  Date Met   PAIN: Pt will report worst pain of 6/10 in order to progress toward max functional ability and improve quality of life. [x] Progressing  [] Met  [] Not Met     FUNCTION: Patient will demonstrate improved function as indicated by a functional score of greater than or equal to 50% predicted on FOTO. [x] Progressing  [] Met  [] Not Met     MOBILITY: Patient will improve AROM to 50% of stated goals, listed in objective measures above, in order to progress towards independence with functional activities.  [x] Progressing  [] Met  [] Not Met     STRENGTH: Patient will improve strength to 50% of stated goals, listed in objective measures above, in order to progress towards independence with functional activities. [x] Progressing  [] Met  [] Not Met     GAIT: Patient will demonstrate improved gait mechanics in order to improve functional mobility, improve quality of life, and decrease risk of further injury or fall.  [x] Progressing  [] Met  [] Not Met     HEP: Patient will demonstrate independence with HEP in order to progress toward functional independence. [x] Progressing  [] Met  [] Not Met        Long Term Goals:  8 weeks Status Date Met   PAIN: Pt will report worst pain of 4/10 in order to progress toward max functional ability and improve quality of life [x] Progressing  [] Met  [] Not Met     FUNCTION:  Patient will demonstrate improved function as indicated by a functional score of greater than or equal to predicted score on FOTO. [x] Progressing  [] Met  [] Not Met     MOBILITY: Patient will improve AROM to stated goals, listed in objective measures above, in order to return to maximal functional potential and improve quality of life.  [x] Progressing  [] Met  [] Not Met     STRENGTH: Patient will improve strength to stated goals, listed in objective measures above, in order to improve functional independence and quality of life.  [x] Progressing  [] Met  [] Not Met     GAIT: Patient will demonstrate normalized gait mechanics with minimal compensation in order to return to PLOF. [x] Progressing  [] Met  [] Not Met     Patient will return to normal ADL's, IADL's, community involvement, recreational activities, and work-related activities with less than or equal to 4/10 pain and maximal function.  [x] Progressing  [] Met  [] Not Met          Demond Calix, PT

## 2025-04-14 NOTE — PROGRESS NOTES
She is 2 months out from her supracondylar femur fracture on the right.  She is accompanied by her daughter was acting as secondary historian today.  She is doing well she is back out working in the garden although she still uses a walker because she is afraid to fall.  Radiographically she has not consolidated but she looks like she is healing.  There is no change in alignment.  She is complaining of some posterior and medial knee pain.  We reviewed these x-rays at a previous visit and she does not have any prominent hardware at either location.  I will see her back for repeat x-rays in 2 months.  I have no restrictions for her

## 2025-04-14 NOTE — PROGRESS NOTES
"  Outpatient Rehab    Physical Therapy Visit    Patient Name: Odin Oliva  MRN: [id number]  YOB: 1940  Encounter Date: 4/11/2025    Therapy Diagnosis:   Encounter Diagnosis   Name Primary?    Closed displaced supracondylar fracture of distal end of right femur without intracondylar extension with routine healing, subsequent encounter Yes         Physician: Damian Jean Jr.,*    Physician Orders: Eval and Treat  Medical Diagnosis: S72.451D (ICD-10-CM) - Closed displaced supracondylar fracture of distal end of right femur without intracondylar extension with routine healing, subsequent encounter     Visit # / Visits Authorized:  1 / 20 + eval  Date of Evaluation:  3/12/2025   Insurance Authorization Period: 03/11/2025 to 03/12/2027  Plan of Care Certification:  3/12/2025 to 5/9/2025                 Time In: 1100  Time Out:  1200  Total Billable Time: 50 mins         Subjective      Patient states she is feeling stronger but it still hurts to put all her weight on her leg.  Pain 5/10         Objective      Knee AROM/PROM Right Left Notes   Knee Flexion (135º) 108 120    Knee Extension (0º) 0 0            Treatment:     CPT Intervention  R Knee Duration/ Intensity   04/11   MT Manual  10   TE*  Nustep 7 mins   TE Seated knee flexion stretch  Heel slides with strap  -   NMR Standing Hip Flex/Abd X10 B   TE Calf Raises 2x10   NMR Step Ups 4" 2x10   TE LAQ  2x10 2# R   TE Standing HS Curl 2x10 2# R   TA Mini Squats 2x10   TA Sit to Stands 2x10 20" box   TE Shuttle Squat (Cover w/ [place]) 5B 3x10   PLAN             CPT Codes available for Billing:   (10) minutes of Manual therapy (MT) to improve pain and ROM.  (20) minutes of Therapeutic Exercise (TE) to develop strength, endurance, range of motion, and flexibility.  (10) minutes of Neuromuscular Re-Education (NMR)  to improve: Balance, Coordination, Kinesthetic, Sense, Proprioception, and Posture.  (10) minutes of Therapeutic Activities (TA) to " improve functional performance.  (-) Unattended Electrical Stimulation (ES) for muscle performance or pain modulation.  (-) Vasopneumatic Device Therapy () for management of swelling/edema. (42028)  (-) BFR: Blood flow restriction applied during exercise  NP or (-): Not Performed    Assessment & Plan   Assessment:   ROM continues to improve. Progressed strengthening with good tolerance.       Patient will continue to benefit from skilled outpatient physical therapy to address the deficits listed in the problem list box on initial evaluation, provide pt/family education and to maximize pt's level of independence in the home and community environment.     Patient's spiritual, cultural, and educational needs considered and patient agreeable to plan of care and goals.           Plan:   Continue to progress as tolerated under current POC.      Goals:     Short Term Goals:  4 weeks Status  Date Met   PAIN: Pt will report worst pain of 6/10 in order to progress toward max functional ability and improve quality of life. [x] Progressing  [] Met  [] Not Met     FUNCTION: Patient will demonstrate improved function as indicated by a functional score of greater than or equal to 50% predicted on FOTO. [x] Progressing  [] Met  [] Not Met     MOBILITY: Patient will improve AROM to 50% of stated goals, listed in objective measures above, in order to progress towards independence with functional activities.  [x] Progressing  [] Met  [] Not Met     STRENGTH: Patient will improve strength to 50% of stated goals, listed in objective measures above, in order to progress towards independence with functional activities. [x] Progressing  [] Met  [] Not Met     GAIT: Patient will demonstrate improved gait mechanics in order to improve functional mobility, improve quality of life, and decrease risk of further injury or fall.  [x] Progressing  [] Met  [] Not Met     HEP: Patient will demonstrate independence with HEP in order to progress  toward functional independence. [x] Progressing  [] Met  [] Not Met        Long Term Goals:  8 weeks Status Date Met   PAIN: Pt will report worst pain of 4/10 in order to progress toward max functional ability and improve quality of life [x] Progressing  [] Met  [] Not Met     FUNCTION: Patient will demonstrate improved function as indicated by a functional score of greater than or equal to predicted score on FOTO. [x] Progressing  [] Met  [] Not Met     MOBILITY: Patient will improve AROM to stated goals, listed in objective measures above, in order to return to maximal functional potential and improve quality of life.  [x] Progressing  [] Met  [] Not Met     STRENGTH: Patient will improve strength to stated goals, listed in objective measures above, in order to improve functional independence and quality of life.  [x] Progressing  [] Met  [] Not Met     GAIT: Patient will demonstrate normalized gait mechanics with minimal compensation in order to return to PLOF. [x] Progressing  [] Met  [] Not Met     Patient will return to normal ADL's, IADL's, community involvement, recreational activities, and work-related activities with less than or equal to 4/10 pain and maximal function.  [x] Progressing  [] Met  [] Not Met          Demond Calix, PT

## 2025-04-14 NOTE — PROGRESS NOTES
"  Outpatient Rehab    Physical Therapy Visit    Patient Name: Odin Oliva  MRN: [id number]  YOB: 1940  Encounter Date: 4/7/2025    Therapy Diagnosis:   Encounter Diagnosis   Name Primary?    Closed displaced supracondylar fracture of distal end of right femur without intracondylar extension with routine healing, subsequent encounter Yes         Physician: Damian Jean Jr.,*    Physician Orders: Eval and Treat  Medical Diagnosis: S72.451D (ICD-10-CM) - Closed displaced supracondylar fracture of distal end of right femur without intracondylar extension with routine healing, subsequent encounter     Visit # / Visits Authorized:  1 / 20 + eval  Date of Evaluation:  3/12/2025   Insurance Authorization Period: 03/11/2025 to 03/12/2027  Plan of Care Certification:  3/12/2025 to 5/9/2025                 Time In: 1100  Time Out:  1200  Total Billable Time: 35 mins       Subjective      Patient reports she is feeling about the same.   Pain 5/10         Objective    Objective Measures updated at progress report unless specified.         Treatment:     CPT Intervention  JointFocus Duration/ Intensity   4/7   MT Manual  10   TE*  Nustep 7 mins   TE* Seated knee flexion stretch  Heel slides with strap  20x5s   NMR SLRx  -   NMR Standing Hip Flex/Abd X10 B   TE Calf Raises 2x10   NMR Step Ups 4" 2x10   TE* LAQ  2x10 2# R   TE* Standing HS Curl 2x10 2# R   TA Mini Squats -   TA Sit to Stands 2x10 20" box   TE* Shuttle Squat (Cover w/ [place]) 5B 3x10   PLAN             CPT Codes available for Billing:   (10) minutes of Manual therapy (MT) to improve pain and ROM.  (5) minutes of Therapeutic Exercise (TE) to develop strength, endurance, range of motion, and flexibility.  (15) minutes of Neuromuscular Re-Education (NMR)  to improve: Balance, Coordination, Kinesthetic, Sense, Proprioception, and Posture.  (5) minutes of Therapeutic Activities (TA) to improve functional performance.  (-) Unattended Electrical " Stimulation (ES) for muscle performance or pain modulation.  (-) Vasopneumatic Device Therapy () for management of swelling/edema. (99194)  (-) BFR: Blood flow restriction applied during exercise  NP or (-): Not Performed  *Not billable due to tech assistance or not one on one care.      Assessment & Plan   Assessment:   Patient tolerated exercise progressions well.         Patient will continue to benefit from skilled outpatient physical therapy to address the deficits listed in the problem list box on initial evaluation, provide pt/family education and to maximize pt's level of independence in the home and community environment.     Patient's spiritual, cultural, and educational needs considered and patient agreeable to plan of care and goals.         Plan:  Continue to progress as tolerated under current POC.      Goals:     Short Term Goals:  4 weeks Status  Date Met   PAIN: Pt will report worst pain of 6/10 in order to progress toward max functional ability and improve quality of life. [x] Progressing  [] Met  [] Not Met     FUNCTION: Patient will demonstrate improved function as indicated by a functional score of greater than or equal to 50% predicted on FOTO. [x] Progressing  [] Met  [] Not Met     MOBILITY: Patient will improve AROM to 50% of stated goals, listed in objective measures above, in order to progress towards independence with functional activities.  [x] Progressing  [] Met  [] Not Met     STRENGTH: Patient will improve strength to 50% of stated goals, listed in objective measures above, in order to progress towards independence with functional activities. [x] Progressing  [] Met  [] Not Met     GAIT: Patient will demonstrate improved gait mechanics in order to improve functional mobility, improve quality of life, and decrease risk of further injury or fall.  [x] Progressing  [] Met  [] Not Met     HEP: Patient will demonstrate independence with HEP in order to progress toward functional  independence. [x] Progressing  [] Met  [] Not Met        Long Term Goals:  8 weeks Status Date Met   PAIN: Pt will report worst pain of 4/10 in order to progress toward max functional ability and improve quality of life [x] Progressing  [] Met  [] Not Met     FUNCTION: Patient will demonstrate improved function as indicated by a functional score of greater than or equal to predicted score on FOTO. [x] Progressing  [] Met  [] Not Met     MOBILITY: Patient will improve AROM to stated goals, listed in objective measures above, in order to return to maximal functional potential and improve quality of life.  [x] Progressing  [] Met  [] Not Met     STRENGTH: Patient will improve strength to stated goals, listed in objective measures above, in order to improve functional independence and quality of life.  [x] Progressing  [] Met  [] Not Met     GAIT: Patient will demonstrate normalized gait mechanics with minimal compensation in order to return to PLOF. [x] Progressing  [] Met  [] Not Met     Patient will return to normal ADL's, IADL's, community involvement, recreational activities, and work-related activities with less than or equal to 4/10 pain and maximal function.  [x] Progressing  [] Met  [] Not Met          Demond Calix, PT

## 2025-04-15 ENCOUNTER — CLINICAL SUPPORT (OUTPATIENT)
Dept: REHABILITATION | Facility: HOSPITAL | Age: 85
End: 2025-04-15
Payer: MEDICARE

## 2025-04-15 DIAGNOSIS — S72.451D CLOSED DISPLACED SUPRACONDYLAR FRACTURE OF DISTAL END OF RIGHT FEMUR WITHOUT INTRACONDYLAR EXTENSION WITH ROUTINE HEALING, SUBSEQUENT ENCOUNTER: Primary | ICD-10-CM

## 2025-04-15 PROCEDURE — 97110 THERAPEUTIC EXERCISES: CPT | Mod: HCNC,PN

## 2025-04-15 PROCEDURE — 97140 MANUAL THERAPY 1/> REGIONS: CPT | Mod: HCNC,PN

## 2025-04-15 PROCEDURE — 97112 NEUROMUSCULAR REEDUCATION: CPT | Mod: HCNC,PN

## 2025-04-15 NOTE — PROGRESS NOTES
"  Outpatient Rehab    Physical Therapy Visit    Patient Name: Odin Oliva  MRN: [id number]  YOB: 1940  Encounter Date: 4/15/2025    Therapy Diagnosis:   Encounter Diagnosis   Name Primary?    Closed displaced supracondylar fracture of distal end of right femur without intracondylar extension with routine healing, subsequent encounter Yes       Physician: Damian Jean Jr.,*    Physician Orders: Eval and Treat  Medical Diagnosis: S72.451D (ICD-10-CM) - Closed displaced supracondylar fracture of distal end of right femur without intracondylar extension with routine healing, subsequent encounter     Visit # / Visits Authorized:  10 / 20   Date of Evaluation:  3/12/2025   Insurance Authorization Period: 03/11/2025 to 03/12/2027  Plan of Care Certification:  3/12/2025 to 5/9/2025                 Time In: 1120  Time Out:  1215  Total Billable Time: 45 mins         Subjective    Her pain has been much better today. She still hurts when she puts all her weight but it has improved.   Pain 5/10         Objective      Knee AROM/PROM Right Left Notes   Knee Flexion (135º) 116 120    Knee Extension (0º) 0 0            Treatment:     CPT Intervention  JointFocus Duration/ Intensity   04/15   MT Manual  10'   TE*  Nustep 7 mins   TE Seated knee flexion stretch  Heel slides with strap   -   NMR* Standing Hip Flex/Abd X10 B   TE Calf Raises 2x10   TA Step Ups 6"   TE LAQ  2x10 2# R   TE Standing HS Curl 2x10 2# R   TA Mini Squats -   TA Sit to Stands 2x10 16" box    NMR  Bridges -   TE Shuttle Squat (Cover w/ [place]) 5B 3x10     SL 3B  2x10    PLAN             CPT Codes available for Billing:   (10) minutes of Manual therapy (MT) to improve pain and ROM.  (25) minutes of Therapeutic Exercise (TE) to develop strength, endurance, range of motion, and flexibility.  () minutes of Neuromuscular Re-Education (NMR)  to improve: Balance, Coordination, Kinesthetic, Sense, Proprioception, and Posture.  (10) minutes of " Therapeutic Activities (TA) to improve functional performance.  (-) Unattended Electrical Stimulation (ES) for muscle performance or pain modulation.  (-) Vasopneumatic Device Therapy () for management of swelling/edema. (86570)  (-) BFR: Blood flow restriction applied during exercise  NP or (-): Not Performed    Assessment & Plan   Assessment:   Patient with improved tolerance to weight bearing activities without significant complaints of pain. She continues to progress ROM.        Patient will continue to benefit from skilled outpatient physical therapy to address the deficits listed in the problem list box on initial evaluation, provide pt/family education and to maximize pt's level of independence in the home and community environment.     Patient's spiritual, cultural, and educational needs considered and patient agreeable to plan of care and goals.           Plan:  Continue to progress as tolerated under current POC.      Goals:     Short Term Goals:  4 weeks Status  Date Met   PAIN: Pt will report worst pain of 6/10 in order to progress toward max functional ability and improve quality of life. [x] Progressing  [] Met  [] Not Met     FUNCTION: Patient will demonstrate improved function as indicated by a functional score of greater than or equal to 50% predicted on FOTO. [x] Progressing  [] Met  [] Not Met     MOBILITY: Patient will improve AROM to 50% of stated goals, listed in objective measures above, in order to progress towards independence with functional activities.  [x] Progressing  [] Met  [] Not Met     STRENGTH: Patient will improve strength to 50% of stated goals, listed in objective measures above, in order to progress towards independence with functional activities. [x] Progressing  [] Met  [] Not Met     GAIT: Patient will demonstrate improved gait mechanics in order to improve functional mobility, improve quality of life, and decrease risk of further injury or fall.  [x] Progressing  []  Met  [] Not Met     HEP: Patient will demonstrate independence with HEP in order to progress toward functional independence. [x] Progressing  [] Met  [] Not Met        Long Term Goals:  8 weeks Status Date Met   PAIN: Pt will report worst pain of 4/10 in order to progress toward max functional ability and improve quality of life [x] Progressing  [] Met  [] Not Met     FUNCTION: Patient will demonstrate improved function as indicated by a functional score of greater than or equal to predicted score on FOTO. [x] Progressing  [] Met  [] Not Met     MOBILITY: Patient will improve AROM to stated goals, listed in objective measures above, in order to return to maximal functional potential and improve quality of life.  [x] Progressing  [] Met  [] Not Met     STRENGTH: Patient will improve strength to stated goals, listed in objective measures above, in order to improve functional independence and quality of life.  [x] Progressing  [] Met  [] Not Met     GAIT: Patient will demonstrate normalized gait mechanics with minimal compensation in order to return to PLOF. [x] Progressing  [] Met  [] Not Met     Patient will return to normal ADL's, IADL's, community involvement, recreational activities, and work-related activities with less than or equal to 4/10 pain and maximal function.  [x] Progressing  [] Met  [] Not Met          Demond Calix, PT

## 2025-04-16 ENCOUNTER — TELEPHONE (OUTPATIENT)
Dept: RHEUMATOLOGY | Facility: CLINIC | Age: 85
End: 2025-04-16
Payer: MEDICARE

## 2025-04-17 ENCOUNTER — CLINICAL SUPPORT (OUTPATIENT)
Dept: REHABILITATION | Facility: HOSPITAL | Age: 85
End: 2025-04-17
Payer: MEDICARE

## 2025-04-17 DIAGNOSIS — S72.451D CLOSED DISPLACED SUPRACONDYLAR FRACTURE OF DISTAL END OF RIGHT FEMUR WITHOUT INTRACONDYLAR EXTENSION WITH ROUTINE HEALING, SUBSEQUENT ENCOUNTER: Primary | ICD-10-CM

## 2025-04-17 PROCEDURE — 97140 MANUAL THERAPY 1/> REGIONS: CPT | Mod: HCNC,PN

## 2025-04-17 PROCEDURE — 97110 THERAPEUTIC EXERCISES: CPT | Mod: HCNC,PN

## 2025-04-17 PROCEDURE — 97112 NEUROMUSCULAR REEDUCATION: CPT | Mod: HCNC,PN

## 2025-04-21 ENCOUNTER — CLINICAL SUPPORT (OUTPATIENT)
Dept: REHABILITATION | Facility: HOSPITAL | Age: 85
End: 2025-04-21
Payer: MEDICARE

## 2025-04-21 DIAGNOSIS — S72.451D CLOSED DISPLACED SUPRACONDYLAR FRACTURE OF DISTAL END OF RIGHT FEMUR WITHOUT INTRACONDYLAR EXTENSION WITH ROUTINE HEALING, SUBSEQUENT ENCOUNTER: Primary | ICD-10-CM

## 2025-04-21 PROCEDURE — 97110 THERAPEUTIC EXERCISES: CPT | Mod: HCNC,PN

## 2025-04-21 PROCEDURE — 97530 THERAPEUTIC ACTIVITIES: CPT | Mod: HCNC,PN

## 2025-04-21 PROCEDURE — 97140 MANUAL THERAPY 1/> REGIONS: CPT | Mod: HCNC,PN

## 2025-04-27 NOTE — PROGRESS NOTES
"  Outpatient Rehab    Physical Therapy Visit    Patient Name: Odin Oliva  MRN: [id number]  YOB: 1940  Encounter Date: 4/17/2025    Therapy Diagnosis:   Encounter Diagnosis   Name Primary?    Closed displaced supracondylar fracture of distal end of right femur without intracondylar extension with routine healing, subsequent encounter Yes         Physician: Damian Jean Jr.,*    Physician Orders: Eval and Treat  Medical Diagnosis: S72.451D (ICD-10-CM) - Closed displaced supracondylar fracture of distal end of right femur without intracondylar extension with routine healing, subsequent encounter     Visit # / Visits Authorized:  10 / 20   Date of Evaluation:  3/12/2025   Insurance Authorization Period: 03/11/2025 to 03/12/2027  Plan of Care Certification:  3/12/2025 to 5/9/2025                 Time In: 1115  Time Out:  1215  Total Billable Time: 40 minutes         Subjective    Her pain has been much better today. She still hurts when she puts all her weight but it has improved.   Pain 5/10         Objective      Knee AROM/PROM Right Left Notes   Knee Flexion (135º) 116 120    Knee Extension (0º) 0 0            Treatment:     CPT Intervention  JointFocus Duration/ Intensity   04/17   MT Manual  10'   TE*  Nustep 7 mins   NMR* Standing Hip Flex/Abd X10 B   TE Calf Raises 2x10   TA Step Ups 6"   TE LAQ  2x10 2# R   TE Standing HS Curl 2x10 2# R   TA Sit to Stands 2x10 16" box   TE Shuttle Squat (Cover w/ [place]) 5B 3x10     SL 3B  2x10    PLAN             CPT Codes available for Billing:   (10) minutes of Manual therapy (MT) to improve pain and ROM.  (20) minutes of Therapeutic Exercise (TE) to develop strength, endurance, range of motion, and flexibility.  () minutes of Neuromuscular Re-Education (NMR)  to improve: Balance, Coordination, Kinesthetic, Sense, Proprioception, and Posture.  (10) minutes of Therapeutic Activities (TA) to improve functional performance.  (-) Unattended Electrical " Stimulation (ES) for muscle performance or pain modulation.  (-) Vasopneumatic Device Therapy () for management of swelling/edema. (28644)  (-) BFR: Blood flow restriction applied during exercise  NP or (-): Not Performed    Assessment & Plan   Assessment:   Patient with improved tolerance to weight bearing activities without significant complaints of pain. She continues to progress ROM.        Patient will continue to benefit from skilled outpatient physical therapy to address the deficits listed in the problem list box on initial evaluation, provide pt/family education and to maximize pt's level of independence in the home and community environment.     Patient's spiritual, cultural, and educational needs considered and patient agreeable to plan of care and goals.           Plan:  Continue to progress as tolerated under current POC.      Goals:     Short Term Goals:  4 weeks Status  Date Met   PAIN: Pt will report worst pain of 6/10 in order to progress toward max functional ability and improve quality of life. [x] Progressing  [] Met  [] Not Met     FUNCTION: Patient will demonstrate improved function as indicated by a functional score of greater than or equal to 50% predicted on FOTO. [x] Progressing  [] Met  [] Not Met     MOBILITY: Patient will improve AROM to 50% of stated goals, listed in objective measures above, in order to progress towards independence with functional activities.  [x] Progressing  [] Met  [] Not Met     STRENGTH: Patient will improve strength to 50% of stated goals, listed in objective measures above, in order to progress towards independence with functional activities. [x] Progressing  [] Met  [] Not Met     GAIT: Patient will demonstrate improved gait mechanics in order to improve functional mobility, improve quality of life, and decrease risk of further injury or fall.  [x] Progressing  [] Met  [] Not Met     HEP: Patient will demonstrate independence with HEP in order to progress  toward functional independence. [x] Progressing  [] Met  [] Not Met        Long Term Goals:  8 weeks Status Date Met   PAIN: Pt will report worst pain of 4/10 in order to progress toward max functional ability and improve quality of life [x] Progressing  [] Met  [] Not Met     FUNCTION: Patient will demonstrate improved function as indicated by a functional score of greater than or equal to predicted score on FOTO. [x] Progressing  [] Met  [] Not Met     MOBILITY: Patient will improve AROM to stated goals, listed in objective measures above, in order to return to maximal functional potential and improve quality of life.  [x] Progressing  [] Met  [] Not Met     STRENGTH: Patient will improve strength to stated goals, listed in objective measures above, in order to improve functional independence and quality of life.  [x] Progressing  [] Met  [] Not Met     GAIT: Patient will demonstrate normalized gait mechanics with minimal compensation in order to return to PLOF. [x] Progressing  [] Met  [] Not Met     Patient will return to normal ADL's, IADL's, community involvement, recreational activities, and work-related activities with less than or equal to 4/10 pain and maximal function.  [x] Progressing  [] Met  [] Not Met          Demond Calix, PT

## 2025-04-28 ENCOUNTER — TELEPHONE (OUTPATIENT)
Dept: DIABETES | Facility: CLINIC | Age: 85
End: 2025-04-28
Payer: MEDICARE

## 2025-04-28 ENCOUNTER — CLINICAL SUPPORT (OUTPATIENT)
Dept: DIABETES | Facility: CLINIC | Age: 85
End: 2025-04-28
Payer: MEDICARE

## 2025-04-28 VITALS — BODY MASS INDEX: 25.6 KG/M2 | WEIGHT: 126.75 LBS

## 2025-04-28 DIAGNOSIS — Z00.00 ENCOUNTER FOR MEDICARE ANNUAL WELLNESS EXAM: ICD-10-CM

## 2025-04-28 DIAGNOSIS — E11.69 TYPE 2 DIABETES MELLITUS WITH OTHER SPECIFIED COMPLICATION, WITHOUT LONG-TERM CURRENT USE OF INSULIN: ICD-10-CM

## 2025-04-28 PROCEDURE — G0108 DIAB MANAGE TRN  PER INDIV: HCPCS | Mod: HCNC,S$GLB,, | Performed by: DIETITIAN, REGISTERED

## 2025-04-28 PROCEDURE — 99999 PR PBB SHADOW E&M-EST. PATIENT-LVL III: CPT | Mod: PBBFAC,HCNC,, | Performed by: DIETITIAN, REGISTERED

## 2025-04-28 NOTE — PROGRESS NOTES
Diabetes Care Specialist Progress Note  Author: Ani Noe RD, [facility]  Date: 4/28/2025    Intake    Program Intake  Reason for Diabetes Program Visit:: Initial Diabetes Assessment (DM referral 3/5/25 Bertha Paul FNP)  Type of Intervention:: Individual  Individual: Education, Device Training  Education: Self-Management Skill Review  Device Training: Personal CGM (Dexcom G7)  Current diabetes risk level:: high    Current Diabetes Treatment:  (Jardiance 10mg 1tab daily. Tradjenta 5mg 1tab daily (stopped by Dr. Barbosa). Lantus 12units daily. Novolog ac 5units.)    Continuous Glucose Monitoring  Personal CGM type:: Dexcom G7 - pt in clinic today with her dtr for training visit.    Lab Results   Component Value Date    HGBA1C 6.3 (H) 03/21/2025     Weight: 57.5 kg (126 lb 12.2 oz)       Body mass index is 25.6 kg/m².    Lifestyle Coping Support & Clinical    Lifestyle/Coping/Support  Does anyone in your family have diabetes or does anyone in your family support you in your diabetes care?: Daughters assist with diabetes care  List anything about Diabetes that causes you stress?: none  How do you deal with stress/distress?: na  Culture or [employer] beliefs that may impact ability to access healthcare: No  Psychosocial/Coping Skills Assessment Completed: : Yes  Assessment indicates:: Adequate understanding  Area of need?: No    Problem Review  Active Comorbidities:  (HTN, CVD4, Hypothyroidism, Anemia, Retinopathy.)    Diabetes Self-Management Skills Assessment    Medication Skills Assessment  Patient is able to identify current diabetes medications, dosages, and appropriate timing of medications.: yes  Patient reports problems or concerns with current medication regimen.: no  Patient is  aware that some diabetes medications can cause low blood sugar?: Yes  Medication Skills Assessment Completed:: Yes  Assessment indicates:: Adequate understanding  Area of need?: No    Diabetes Disease Process/Treatment  Options  Diabetes Disease Process/Treatment Options: Skills Assessment Completed: No  Deferred due to:: Time  Area of need?: Deferred    Nutrition/Healthy Eating  Patient can identify foods that impact blood sugar.:  (Pt requests meal plan review.)  Challenges to healthy eating:: Irregular meal patterns (skips lunch, snacks).  Nutrition/Healthy Eating Skills Assessment Completed:: Yes  Assessment indicates:: Instruction Needed, Knowledge deficit  Area of need?: Yes    Physical Activity/Exercise  Physical Activity/Exercise Skills Assessment Completed: : No  Deffered due to:: Time  Area of need?: Deferred    Home Blood Glucose Monitoring  Patient states that blood sugar is checked at home daily.: yes  Monitoring Method:: home glucometer, personal continuous glucose monitor  Home glucometer meter type:: Meter/records unavail  Personal CGM type:: Dexcom G7 - pt in clinic today with her dtr for training visit.  Home Blood Glucose Monitoring Skills Assessment Completed: : Yes  Assessment indicates:: Instruction Needed, Knowledge deficit  Area of need?: Yes    Acute Complications  Have you ever had hypoglycemia (low BG 70 or less)?: yes  How often and what are your symptoms?: Pt states rare episodes and feels jittery, shaky. BG level unknown.  How do you treat hypoglycemia?: fruit  Have you ever had hyperglycemia (high  or more)?: yes (Fatigue)  Acute Complications Skills Assessment Completed: : Yes  Assessment indicates:: Instruction Needed  Area of need?: Yes    Chronic Complications  Chronic Complications Skills Assessment Completed: : No  Deferred due to:: Time  Area of need?: Deferred    Assessment Summary and Plan  Based on today's diabetes care assessment, the following areas of need were identified:      Identified Areas of Need      Medication/Current Diabetes Treatment: No   Lifestyle Coping/Support: No   Diabetes Disease Process/Treatment Options: Deferred   Nutrition/Healthy Eating: Yes   Reviewed the  "sources and role of Carbohydrate, Protein, and Fat and how each nutrient impact blood sugar.   Provided meal-planning instruction via plate method, food models.   Discussed carbohydrate counting and spacing.    Physical Activity/Exercise: Deferred    Home Blood Glucose Monitoring: Yes - provided DexG7 training   Acute Complications: Yes   Discussed prevention, identification signs/symptoms and treatment of hyperglycemia and hypoglycemia and when to contact clinic.    Chronic Complications: Deferred     DEXCOM G7 CGM TRAINING  Dexcom G7 mobile obdulia downloaded to phone. Education was provided using "Quick Start Guide" and demo device per Dexcom protocol. [facility] data share set-up. Dexcom Follow obdulia instructions sent to [employer] per dtr's request.       Overview:  5 minute glucose reading updates, trending arrows, BG graph screens, reports screen,  connectivity and functions.   Menus: Trend graph, start sensor, enter BG, events, alerts, settings, replace sensor, stop sensor, and shutdown  Dexcom G7 mobile obdulia/ Settings:                          * Urgent Low: 55 mg/dL                          * Urgent Low Soon: On                          * Low Alert: 75 mg/dL                          * High Alert: 260 mg/dL                          * Signal Loss: on                          * Brief Sensor Issue: on     Reviewed additional setting options.  Patient paired sensor using 4-digit code listed on sensor cap.  Reviewed where to find sensor insertion time and expiration date.   Reviewed appropriate calibration techniques.  Reviewed sensor site selection. Patient selected and prepped site using aseptic technique, inserted sensor, applied overlay tape and started session.   Reviewed sensor removal from site. Discussed times to remove sensor per  guidelines include MRI or diatherapy.   Patient able to demonstrate without difficulty. Encouraged to review manual and/or training videos prior to " starting another sensor.   Reviewed problem solving aspects of sensor transmission/variables that can disrupt RF transmission.  range 33 feet, but the first 3 hrs keep within 3 feet of transmitter.  Patient instructed on lag time of interstitial fluid from CBG and was advised to treat hypoglycemia and dose insulin based on SMBG values.  Dexcom technical support contact number given and examples of when to contact them discussed.         Today's interventions were provided through individual discussion, instruction, and written materials were provided.    Patient verbalized understanding of instruction and written materials.  Pt was able to return back demonstration of instructions today. Patient understood key points, needs reinforcement and further instruction.     Follow Up Plan  Follow up if symptoms worsen or fail to improve. As referred per pt and dtr's request.    Today's care plan and follow up schedule was discussed with patient.  Fatumah verbalized understanding of the care plan, goals, and agrees to follow up plan.        The patient was encouraged to communicate with his/her health care provider/physician and care team regarding his/her condition(s) and treatment.  I provided the patient with my contact information today and encouraged to contact me via phone or [facility] Patient Portal as needed.     Length of Visit   Total Time: 60 Minutes

## 2025-04-28 NOTE — PROGRESS NOTES
"  Outpatient Rehab    Physical Therapy Visit    Patient Name: Odin Oliva  MRN: [id number]  YOB: 1940  Encounter Date: 4/21/2025    Therapy Diagnosis:   Encounter Diagnosis   Name Primary?    Closed displaced supracondylar fracture of distal end of right femur without intracondylar extension with routine healing, subsequent encounter Yes         Physician: Damian Jean Jr.,*    Physician Orders: Eval and Treat  Medical Diagnosis: S72.451D (ICD-10-CM) - Closed displaced supracondylar fracture of distal end of right femur without intracondylar extension with routine healing, subsequent encounter     Visit # / Visits Authorized:  10 / 20   Date of Evaluation:  3/12/2025   Insurance Authorization Period: 03/11/2025 to 03/12/2027  Plan of Care Certification:  3/12/2025 to 5/9/2025                 Time In: 1100  Time Out:  1215  Total Billable Time: 40 minutes         Subjective    No new complaints.   Pain 4/10         Objective      Knee AROM/PROM Right Left Notes   Knee Flexion (135º) 116 120    Knee Extension (0º) 0 0            Treatment:     CPT Intervention  JointFocus Duration/ Intensity   04/21   MT Manual  10   TE  Nustep 7 mins   TE Standing Hip Flex/Abd X10B   TE Calf Raises 2x10   TA Step Ups 6" 2x10   TE LAQ  2x10 2# R   TE Standing HS Curl 2x10 2#    TA Sit to Stands 2x10 16" box   TE Shuttle Squat (Cover w/ [place]) 5B 3x10    TE Heel slides  2x10 R   PLAN          CPT Codes available for Billing:   (10) minutes of Manual therapy (MT) to improve pain and ROM.  (25) minutes of Therapeutic Exercise (TE) to develop strength, endurance, range of motion, and flexibility.  (-) minutes of Neuromuscular Re-Education (NMR)  to improve: Balance, Coordination, Kinesthetic, Sense, Proprioception, and Posture.  (10) minutes of Therapeutic Activities (TA) to improve functional performance.  (-) Unattended Electrical Stimulation (ES) for muscle performance or pain modulation.  (-) Vasopneumatic " Device Therapy () for management of swelling/edema. (53521)  (-) BFR: Blood flow restriction applied during exercise  NP or (-): Not Performed    Assessment & Plan   Assessment:   Progressed ambulation to quad cane with good tolerance. Instructed patient and family to purchase one for trains.        Patient will continue to benefit from skilled outpatient physical therapy to address the deficits listed in the problem list box on initial evaluation, provide pt/family education and to maximize pt's level of independence in the home and community environment.     Patient's spiritual, cultural, and educational needs considered and patient agreeable to plan of care and goals.           Plan:  Continue to progress as tolerated under current POC.      Goals:     Short Term Goals:  4 weeks Status  Date Met   PAIN: Pt will report worst pain of 6/10 in order to progress toward max functional ability and improve quality of life. [x] Progressing  [] Met  [] Not Met     FUNCTION: Patient will demonstrate improved function as indicated by a functional score of greater than or equal to 50% predicted on FOTO. [x] Progressing  [] Met  [] Not Met     MOBILITY: Patient will improve AROM to 50% of stated goals, listed in objective measures above, in order to progress towards independence with functional activities.  [x] Progressing  [] Met  [] Not Met     STRENGTH: Patient will improve strength to 50% of stated goals, listed in objective measures above, in order to progress towards independence with functional activities. [x] Progressing  [] Met  [] Not Met     GAIT: Patient will demonstrate improved gait mechanics in order to improve functional mobility, improve quality of life, and decrease risk of further injury or fall.  [x] Progressing  [] Met  [] Not Met     HEP: Patient will demonstrate independence with HEP in order to progress toward functional independence. [x] Progressing  [] Met  [] Not Met        Long Term Goals:  8  weeks Status Date Met   PAIN: Pt will report worst pain of 4/10 in order to progress toward max functional ability and improve quality of life [x] Progressing  [] Met  [] Not Met     FUNCTION: Patient will demonstrate improved function as indicated by a functional score of greater than or equal to predicted score on FOTO. [x] Progressing  [] Met  [] Not Met     MOBILITY: Patient will improve AROM to stated goals, listed in objective measures above, in order to return to maximal functional potential and improve quality of life.  [x] Progressing  [] Met  [] Not Met     STRENGTH: Patient will improve strength to stated goals, listed in objective measures above, in order to improve functional independence and quality of life.  [x] Progressing  [] Met  [] Not Met     GAIT: Patient will demonstrate normalized gait mechanics with minimal compensation in order to return to PLOF. [x] Progressing  [] Met  [] Not Met     Patient will return to normal ADL's, IADL's, community involvement, recreational activities, and work-related activities with less than or equal to 4/10 pain and maximal function.  [x] Progressing  [] Met  [] Not Met          Demond Calix, PT

## 2025-04-30 ENCOUNTER — CLINICAL SUPPORT (OUTPATIENT)
Dept: REHABILITATION | Facility: HOSPITAL | Age: 85
End: 2025-04-30
Payer: MEDICARE

## 2025-04-30 DIAGNOSIS — S72.451D CLOSED DISPLACED SUPRACONDYLAR FRACTURE OF DISTAL END OF RIGHT FEMUR WITHOUT INTRACONDYLAR EXTENSION WITH ROUTINE HEALING, SUBSEQUENT ENCOUNTER: Primary | ICD-10-CM

## 2025-04-30 PROCEDURE — 97110 THERAPEUTIC EXERCISES: CPT | Mod: HCNC,PN

## 2025-04-30 PROCEDURE — 97140 MANUAL THERAPY 1/> REGIONS: CPT | Mod: HCNC,PN

## 2025-04-30 PROCEDURE — 97112 NEUROMUSCULAR REEDUCATION: CPT | Mod: HCNC,PN

## 2025-04-30 NOTE — PROGRESS NOTES
"  Outpatient Rehab    Physical Therapy Visit    Patient Name: Odin Oliva  MRN: [id number]  YOB: 1940  Encounter Date: 4/30/2025    Therapy Diagnosis:   Encounter Diagnosis   Name Primary?    Closed displaced supracondylar fracture of distal end of right femur without intracondylar extension with routine healing, subsequent encounter Yes         Physician: Damian Jean Jr.,*    Physician Orders: Eval and Treat  Medical Diagnosis: S72.451D (ICD-10-CM) - Closed displaced supracondylar fracture of distal end of right femur without intracondylar extension with routine healing, subsequent encounter     Visit # / Visits Authorized:  10 / 20   Date of Evaluation:  3/12/2025   Insurance Authorization Period: 03/11/2025 to 03/12/2027  Plan of Care Certification:  3/12/2025 to 5/9/2025                 Time In: 1120  Time Out:  1215  Total Billable Time: 50 minutes         Subjective    She has been able to get a quad cane and has been using it. She feels she is doing ok but still has pain.  Pain 5/10         Objective    Objective Measures updated at progress report unless specified.       Treatment:     CPT Intervention  R Femur Duration/ Intensity   04/30   MT Manual  -   TE  Nustep 7 mins   NMR Standing Hip Flex/Abd 2x10 4#   TE Calf Raises 2x10   NMR Step Ups 6" 2x10   TE LAQ  3x10 4# R   TE Standing HS Curl 2x10 4#    TA Squats 2x10   TA Sit to Stands 2x10 16" box   TE Shuttle Squat (Cover w/ [place]) 5B 3x10   TE Heel slides  2x10 R   PLAN          CPT Codes available for Billing:   (0) minutes of Manual therapy (MT) to improve pain and ROM.  (30) minutes of Therapeutic Exercise (TE) to develop strength, endurance, range of motion, and flexibility.  (10) minutes of Neuromuscular Re-Education (NMR)  to improve: Balance, Coordination, Kinesthetic, Sense, Proprioception, and Posture.  (10) minutes of Therapeutic Activities (TA) to improve functional performance.  (-) Unattended Electrical Stimulation " (ES) for muscle performance or pain modulation.  (-) Vasopneumatic Device Therapy () for management of swelling/edema. (36556)  (-) BFR: Blood flow restriction applied during exercise  NP or (-): Not Performed    Assessment & Plan   Assessment:   Patient is progressing well.      Patient will continue to benefit from skilled outpatient physical therapy to address the deficits listed in the problem list box on initial evaluation, provide pt/family education and to maximize pt's level of independence in the home and community environment.     Patient's spiritual, cultural, and educational needs considered and patient agreeable to plan of care and goals.         Plan:  Continue to progress as tolerated under current POC.      Goals:     Short Term Goals:  4 weeks Status  Date Met   PAIN: Pt will report worst pain of 6/10 in order to progress toward max functional ability and improve quality of life. [x] Progressing  [] Met  [] Not Met     FUNCTION: Patient will demonstrate improved function as indicated by a functional score of greater than or equal to 50% predicted on FOTO. [x] Progressing  [] Met  [] Not Met     MOBILITY: Patient will improve AROM to 50% of stated goals, listed in objective measures above, in order to progress towards independence with functional activities.  [x] Progressing  [] Met  [] Not Met     STRENGTH: Patient will improve strength to 50% of stated goals, listed in objective measures above, in order to progress towards independence with functional activities. [x] Progressing  [] Met  [] Not Met     GAIT: Patient will demonstrate improved gait mechanics in order to improve functional mobility, improve quality of life, and decrease risk of further injury or fall.  [x] Progressing  [] Met  [] Not Met     HEP: Patient will demonstrate independence with HEP in order to progress toward functional independence. [x] Progressing  [] Met  [] Not Met        Long Term Goals:  8 weeks Status Date Met    PAIN: Pt will report worst pain of 4/10 in order to progress toward max functional ability and improve quality of life [x] Progressing  [] Met  [] Not Met     FUNCTION: Patient will demonstrate improved function as indicated by a functional score of greater than or equal to predicted score on FOTO. [x] Progressing  [] Met  [] Not Met     MOBILITY: Patient will improve AROM to stated goals, listed in objective measures above, in order to return to maximal functional potential and improve quality of life.  [x] Progressing  [] Met  [] Not Met     STRENGTH: Patient will improve strength to stated goals, listed in objective measures above, in order to improve functional independence and quality of life.  [x] Progressing  [] Met  [] Not Met     GAIT: Patient will demonstrate normalized gait mechanics with minimal compensation in order to return to PLOF. [x] Progressing  [] Met  [] Not Met     Patient will return to normal ADL's, IADL's, community involvement, recreational activities, and work-related activities with less than or equal to 4/10 pain and maximal function.  [x] Progressing  [] Met  [] Not Met          Demond Calix, PT

## 2025-05-02 ENCOUNTER — CLINICAL SUPPORT (OUTPATIENT)
Dept: REHABILITATION | Facility: HOSPITAL | Age: 85
End: 2025-05-02
Payer: MEDICARE

## 2025-05-02 DIAGNOSIS — S72.451D CLOSED DISPLACED SUPRACONDYLAR FRACTURE OF DISTAL END OF RIGHT FEMUR WITHOUT INTRACONDYLAR EXTENSION WITH ROUTINE HEALING, SUBSEQUENT ENCOUNTER: Primary | ICD-10-CM

## 2025-05-02 PROCEDURE — 97110 THERAPEUTIC EXERCISES: CPT | Mod: HCNC,PN

## 2025-05-02 PROCEDURE — 97112 NEUROMUSCULAR REEDUCATION: CPT | Mod: HCNC,PN

## 2025-05-02 PROCEDURE — 97530 THERAPEUTIC ACTIVITIES: CPT | Mod: HCNC,PN

## 2025-05-07 ENCOUNTER — LAB VISIT (OUTPATIENT)
Dept: LAB | Facility: HOSPITAL | Age: 85
End: 2025-05-07
Attending: FAMILY MEDICINE
Payer: MEDICARE

## 2025-05-07 DIAGNOSIS — N18.32 STAGE 3B CHRONIC KIDNEY DISEASE: ICD-10-CM

## 2025-05-07 DIAGNOSIS — E03.9 HYPOTHYROIDISM, UNSPECIFIED TYPE: ICD-10-CM

## 2025-05-07 DIAGNOSIS — D64.9 MILD ANEMIA: ICD-10-CM

## 2025-05-07 LAB
ABSOLUTE EOSINOPHIL (OHS): 0.45 K/UL
ABSOLUTE MONOCYTE (OHS): 0.44 K/UL (ref 0.3–1)
ABSOLUTE NEUTROPHIL COUNT (OHS): 3.09 K/UL (ref 1.8–7.7)
ALBUMIN SERPL BCP-MCNC: 3.6 G/DL (ref 3.5–5.2)
ALP SERPL-CCNC: 131 UNIT/L (ref 40–150)
ALT SERPL W/O P-5'-P-CCNC: 11 UNIT/L (ref 10–44)
ANION GAP (OHS): 10 MMOL/L (ref 8–16)
AST SERPL-CCNC: 16 UNIT/L (ref 11–45)
BASOPHILS # BLD AUTO: 0.07 K/UL
BASOPHILS NFR BLD AUTO: 1.3 %
BILIRUB SERPL-MCNC: 0.3 MG/DL (ref 0.1–1)
BUN SERPL-MCNC: 37 MG/DL (ref 8–23)
CALCIUM SERPL-MCNC: 9.5 MG/DL (ref 8.7–10.5)
CHLORIDE SERPL-SCNC: 104 MMOL/L (ref 95–110)
CO2 SERPL-SCNC: 23 MMOL/L (ref 23–29)
CREAT SERPL-MCNC: 1.4 MG/DL (ref 0.5–1.4)
ERYTHROCYTE [DISTWIDTH] IN BLOOD BY AUTOMATED COUNT: 13.1 % (ref 11.5–14.5)
GFR SERPLBLD CREATININE-BSD FMLA CKD-EPI: 37 ML/MIN/1.73/M2
GLUCOSE SERPL-MCNC: 145 MG/DL (ref 70–110)
HCT VFR BLD AUTO: 37.8 % (ref 37–48.5)
HGB BLD-MCNC: 12 GM/DL (ref 12–16)
IMM GRANULOCYTES # BLD AUTO: 0.01 K/UL (ref 0–0.04)
IMM GRANULOCYTES NFR BLD AUTO: 0.2 % (ref 0–0.5)
LYMPHOCYTES # BLD AUTO: 1.43 K/UL (ref 1–4.8)
MCH RBC QN AUTO: 28.1 PG (ref 27–31)
MCHC RBC AUTO-ENTMCNC: 31.7 G/DL (ref 32–36)
MCV RBC AUTO: 89 FL (ref 82–98)
NUCLEATED RBC (/100WBC) (OHS): 0 /100 WBC
PLATELET # BLD AUTO: 250 K/UL (ref 150–450)
PMV BLD AUTO: 10.7 FL (ref 9.2–12.9)
POTASSIUM SERPL-SCNC: 4.3 MMOL/L (ref 3.5–5.1)
PROT SERPL-MCNC: 7.5 GM/DL (ref 6–8.4)
RBC # BLD AUTO: 4.27 M/UL (ref 4–5.4)
RELATIVE EOSINOPHIL (OHS): 8.2 %
RELATIVE LYMPHOCYTE (OHS): 26 % (ref 18–48)
RELATIVE MONOCYTE (OHS): 8 % (ref 4–15)
RELATIVE NEUTROPHIL (OHS): 56.3 % (ref 38–73)
SODIUM SERPL-SCNC: 137 MMOL/L (ref 136–145)
TSH SERPL-ACNC: 4.59 UIU/ML (ref 0.4–4)
WBC # BLD AUTO: 5.49 K/UL (ref 3.9–12.7)

## 2025-05-07 PROCEDURE — 85025 COMPLETE CBC W/AUTO DIFF WBC: CPT | Mod: HCNC

## 2025-05-07 PROCEDURE — 84443 ASSAY THYROID STIM HORMONE: CPT | Mod: HCNC

## 2025-05-07 PROCEDURE — 36415 COLL VENOUS BLD VENIPUNCTURE: CPT | Mod: HCNC

## 2025-05-07 PROCEDURE — 80053 COMPREHEN METABOLIC PANEL: CPT | Mod: HCNC

## 2025-05-07 PROCEDURE — 84439 ASSAY OF FREE THYROXINE: CPT | Mod: HCNC

## 2025-05-08 ENCOUNTER — RESULTS FOLLOW-UP (OUTPATIENT)
Dept: INTERNAL MEDICINE | Facility: CLINIC | Age: 85
End: 2025-05-08

## 2025-05-08 ENCOUNTER — CLINICAL SUPPORT (OUTPATIENT)
Dept: REHABILITATION | Facility: HOSPITAL | Age: 85
End: 2025-05-08
Payer: MEDICARE

## 2025-05-08 DIAGNOSIS — S72.451D CLOSED DISPLACED SUPRACONDYLAR FRACTURE OF DISTAL END OF RIGHT FEMUR WITHOUT INTRACONDYLAR EXTENSION WITH ROUTINE HEALING, SUBSEQUENT ENCOUNTER: Primary | ICD-10-CM

## 2025-05-08 LAB — T4 FREE SERPL-MCNC: 1.11 NG/DL (ref 0.71–1.51)

## 2025-05-08 PROCEDURE — 97112 NEUROMUSCULAR REEDUCATION: CPT | Mod: HCNC,PN

## 2025-05-08 PROCEDURE — 97530 THERAPEUTIC ACTIVITIES: CPT | Mod: HCNC,PN

## 2025-05-09 NOTE — PROGRESS NOTES
"  Outpatient Rehab    Physical Therapy Visit    Patient Name: Odin Oliva  MRN: [id number]  YOB: 1940  Encounter Date: 5/2/2025    Therapy Diagnosis:   No diagnosis found.        Physician: Damian Jean Jr.,*    Physician Orders: Eval and Treat  Medical Diagnosis: S72.451D (ICD-10-CM) - Closed displaced supracondylar fracture of distal end of right femur without intracondylar extension with routine healing, subsequent encounter     Visit # / Visits Authorized:  10 / 20   Date of Evaluation:  3/12/2025   Insurance Authorization Period: 03/11/2025 to 03/12/2027  Plan of Care Certification:  3/12/2025 to 5/9/2025                 Time In: 1150  Time Out:  1300  Total Billable Time: 55 minutes         Subjective    She has been able to get a quad cane and has been using it. She feels she is doing ok but still has pain.  Pain 5/10         Objective    Objective Measures updated at progress report unless specified.       Treatment:     CPT Intervention  R Femur Duration/ Intensity   05/02   MT Manual  -   TE  Nustep 7 min   TE Seated knee flexion stretch  Heel slides with strap  -   NMR SLRx  X10 B   NMR Standing Hip Flex/Abd 2x10 4#   TE Calf Raises 2x10   NMR Step Ups 6" 2x10   TE LAQ  3x10 4# R   TE Standing HS Curl 2x10 4#   TA Squats 2x10   TA Sit to Stands 2x10 16" box   TE Shuttle Squat (Cover w/ [place]) 5B 3x10   TE Heel slides  2x10 R         PLAN          CPT Codes available for Billing:   (0) minutes of Manual therapy (MT) to improve pain and ROM.  (30) minutes of Therapeutic Exercise (TE) to develop strength, endurance, range of motion, and flexibility.  (15) minutes of Neuromuscular Re-Education (NMR)  to improve: Balance, Coordination, Kinesthetic, Sense, Proprioception, and Posture.  (10) minutes of Therapeutic Activities (TA) to improve functional performance.  (-) Unattended Electrical Stimulation (ES) for muscle performance or pain modulation.  (-) Vasopneumatic Device Therapy () " for management of swelling/edema. (45809)  (-) BFR: Blood flow restriction applied during exercise  NP or (-): Not Performed    Assessment & Plan   Assessment:   Patient is progressing well.      Patient will continue to benefit from skilled outpatient physical therapy to address the deficits listed in the problem list box on initial evaluation, provide pt/family education and to maximize pt's level of independence in the home and community environment.     Patient's spiritual, cultural, and educational needs considered and patient agreeable to plan of care and goals.         Plan:  Continue to progress as tolerated under current POC.      Goals:     Short Term Goals:  4 weeks Status  Date Met   PAIN: Pt will report worst pain of 6/10 in order to progress toward max functional ability and improve quality of life. [x] Progressing  [] Met  [] Not Met     FUNCTION: Patient will demonstrate improved function as indicated by a functional score of greater than or equal to 50% predicted on FOTO. [x] Progressing  [] Met  [] Not Met     MOBILITY: Patient will improve AROM to 50% of stated goals, listed in objective measures above, in order to progress towards independence with functional activities.  [x] Progressing  [] Met  [] Not Met     STRENGTH: Patient will improve strength to 50% of stated goals, listed in objective measures above, in order to progress towards independence with functional activities. [x] Progressing  [] Met  [] Not Met     GAIT: Patient will demonstrate improved gait mechanics in order to improve functional mobility, improve quality of life, and decrease risk of further injury or fall.  [x] Progressing  [] Met  [] Not Met     HEP: Patient will demonstrate independence with HEP in order to progress toward functional independence. [x] Progressing  [] Met  [] Not Met        Long Term Goals:  8 weeks Status Date Met   PAIN: Pt will report worst pain of 4/10 in order to progress toward max functional  ability and improve quality of life [x] Progressing  [] Met  [] Not Met     FUNCTION: Patient will demonstrate improved function as indicated by a functional score of greater than or equal to predicted score on FOTO. [x] Progressing  [] Met  [] Not Met     MOBILITY: Patient will improve AROM to stated goals, listed in objective measures above, in order to return to maximal functional potential and improve quality of life.  [x] Progressing  [] Met  [] Not Met     STRENGTH: Patient will improve strength to stated goals, listed in objective measures above, in order to improve functional independence and quality of life.  [x] Progressing  [] Met  [] Not Met     GAIT: Patient will demonstrate normalized gait mechanics with minimal compensation in order to return to PLOF. [x] Progressing  [] Met  [] Not Met     Patient will return to normal ADL's, IADL's, community involvement, recreational activities, and work-related activities with less than or equal to 4/10 pain and maximal function.  [x] Progressing  [] Met  [] Not Met          Demond Calix, PT

## 2025-05-13 ENCOUNTER — CLINICAL SUPPORT (OUTPATIENT)
Dept: REHABILITATION | Facility: HOSPITAL | Age: 85
End: 2025-05-13
Payer: MEDICARE

## 2025-05-13 DIAGNOSIS — S72.451D CLOSED DISPLACED SUPRACONDYLAR FRACTURE OF DISTAL END OF RIGHT FEMUR WITHOUT INTRACONDYLAR EXTENSION WITH ROUTINE HEALING, SUBSEQUENT ENCOUNTER: Primary | ICD-10-CM

## 2025-05-13 PROCEDURE — 97530 THERAPEUTIC ACTIVITIES: CPT | Mod: HCNC,PN

## 2025-05-13 PROCEDURE — 97112 NEUROMUSCULAR REEDUCATION: CPT | Mod: HCNC,PN

## 2025-05-14 ENCOUNTER — RESULTS FOLLOW-UP (OUTPATIENT)
Dept: INTERNAL MEDICINE | Facility: CLINIC | Age: 85
End: 2025-05-14

## 2025-05-14 ENCOUNTER — OFFICE VISIT (OUTPATIENT)
Dept: INTERNAL MEDICINE | Facility: CLINIC | Age: 85
End: 2025-05-14
Payer: MEDICARE

## 2025-05-14 ENCOUNTER — LAB VISIT (OUTPATIENT)
Dept: LAB | Facility: HOSPITAL | Age: 85
End: 2025-05-14
Attending: FAMILY MEDICINE
Payer: MEDICARE

## 2025-05-14 VITALS
HEART RATE: 64 BPM | BODY MASS INDEX: 25.96 KG/M2 | OXYGEN SATURATION: 97 % | HEIGHT: 59 IN | SYSTOLIC BLOOD PRESSURE: 126 MMHG | TEMPERATURE: 97 F | WEIGHT: 128.75 LBS | DIASTOLIC BLOOD PRESSURE: 64 MMHG

## 2025-05-14 DIAGNOSIS — E11.69 TYPE 2 DIABETES MELLITUS WITH OTHER SPECIFIED COMPLICATION, WITHOUT LONG-TERM CURRENT USE OF INSULIN: ICD-10-CM

## 2025-05-14 DIAGNOSIS — J45.21 MILD INTERMITTENT ASTHMA WITH ACUTE EXACERBATION: ICD-10-CM

## 2025-05-14 DIAGNOSIS — R30.0 DYSURIA: Primary | ICD-10-CM

## 2025-05-14 DIAGNOSIS — J45.901 EXACERBATION OF ASTHMA, UNSPECIFIED ASTHMA SEVERITY, UNSPECIFIED WHETHER PERSISTENT: ICD-10-CM

## 2025-05-14 DIAGNOSIS — E03.9 HYPOTHYROIDISM, UNSPECIFIED TYPE: ICD-10-CM

## 2025-05-14 DIAGNOSIS — N18.32 STAGE 3B CHRONIC KIDNEY DISEASE: ICD-10-CM

## 2025-05-14 DIAGNOSIS — M81.0 OSTEOPOROSIS, UNSPECIFIED OSTEOPOROSIS TYPE, UNSPECIFIED PATHOLOGICAL FRACTURE PRESENCE: ICD-10-CM

## 2025-05-14 DIAGNOSIS — R30.0 DYSURIA: ICD-10-CM

## 2025-05-14 DIAGNOSIS — I10 PRIMARY HYPERTENSION: ICD-10-CM

## 2025-05-14 DIAGNOSIS — Z79.899 POLYPHARMACY: ICD-10-CM

## 2025-05-14 LAB
BACTERIA #/AREA URNS HPF: NORMAL /HPF
MICROSCOPIC COMMENT: NORMAL
RBC #/AREA URNS HPF: 2 /HPF (ref 0–4)
WBC #/AREA URNS HPF: 1 /HPF (ref 0–5)

## 2025-05-14 PROCEDURE — 81000 URINALYSIS NONAUTO W/SCOPE: CPT | Mod: HCNC

## 2025-05-14 PROCEDURE — 1159F MED LIST DOCD IN RCRD: CPT | Mod: CPTII,HCNC,S$GLB, | Performed by: FAMILY MEDICINE

## 2025-05-14 PROCEDURE — G2211 COMPLEX E/M VISIT ADD ON: HCPCS | Mod: HCNC,S$GLB,, | Performed by: FAMILY MEDICINE

## 2025-05-14 PROCEDURE — 99214 OFFICE O/P EST MOD 30 MIN: CPT | Mod: HCNC,S$GLB,, | Performed by: FAMILY MEDICINE

## 2025-05-14 PROCEDURE — 1101F PT FALLS ASSESS-DOCD LE1/YR: CPT | Mod: CPTII,HCNC,S$GLB, | Performed by: FAMILY MEDICINE

## 2025-05-14 PROCEDURE — 3074F SYST BP LT 130 MM HG: CPT | Mod: CPTII,HCNC,S$GLB, | Performed by: FAMILY MEDICINE

## 2025-05-14 PROCEDURE — 3078F DIAST BP <80 MM HG: CPT | Mod: CPTII,HCNC,S$GLB, | Performed by: FAMILY MEDICINE

## 2025-05-14 PROCEDURE — 3288F FALL RISK ASSESSMENT DOCD: CPT | Mod: CPTII,HCNC,S$GLB, | Performed by: FAMILY MEDICINE

## 2025-05-14 PROCEDURE — 99999 PR PBB SHADOW E&M-EST. PATIENT-LVL IV: CPT | Mod: PBBFAC,HCNC,, | Performed by: FAMILY MEDICINE

## 2025-05-14 RX ORDER — ALBUTEROL SULFATE 90 UG/1
2 INHALANT RESPIRATORY (INHALATION) EVERY 4 HOURS PRN
Qty: 25.5 G | Refills: 3 | Status: SHIPPED | OUTPATIENT
Start: 2025-05-14

## 2025-05-14 RX ORDER — PREDNISONE 10 MG/1
10 TABLET ORAL DAILY
Qty: 5 TABLET | Refills: 0 | Status: SHIPPED | OUTPATIENT
Start: 2025-05-14

## 2025-05-14 RX ORDER — HYDROXYZINE HYDROCHLORIDE 10 MG/1
10 TABLET, FILM COATED ORAL NIGHTLY
Qty: 90 TABLET | Refills: 1 | Status: SHIPPED | OUTPATIENT
Start: 2025-05-14

## 2025-05-14 RX ORDER — MONTELUKAST SODIUM 10 MG/1
10 TABLET ORAL NIGHTLY
Qty: 90 TABLET | Refills: 1 | Status: SHIPPED | OUTPATIENT
Start: 2025-05-14 | End: 2025-08-12

## 2025-05-14 NOTE — PROGRESS NOTES
Subjective:       Patient ID: Odin Oliva is a 85 y.o. female.    Chief Complaint: Follow-up (lab) and Urinary Tract Infection (Less urine and burning)    Follow-up    Urinary Tract Infection         Problem List[1]    Past Medical History:   Diagnosis Date    CKD (chronic kidney disease), stage IV     Diabetes mellitus     HTN (hypertension)     Hyperuricemia     Seasonal allergies     Thyroid disease     Urinary tract infection        Past Surgical History:   Procedure Laterality Date    CATARACT EXTRACTION, BILATERAL      CHOLECYSTECTOMY      EXOSTECTOMY Bilateral 11/8/2019    Procedure: EXOSTECTOMY;  Surgeon: Lissy Comer DPM;  Location: [facility] OR;  Service: Podiatry;  Laterality: Bilateral;  left fourth digit, right fifth digit    FOOT MASS EXCISION Bilateral 11/8/2019    Procedure: HAMMER TOE, DEROTATIONAL ARTHROPLASTY FIFTH DIGIT;  Surgeon: Lissy Comer DPM;  Location: [facility] OR;  Service: Podiatry;  Laterality: Bilateral;  Left 5th toe incision @ 08:51.  Left 4th toe incision @ 08:54.  Right 5th toe incision : 09:24.    ORIF, FRACTURE, FEMUR, TRANSCONDYLAR WITH INTERCONDYLAR EXTENSION Right 2/6/2025    Procedure: ORIF, FRACTURE, FEMUR, TRANSCONDYLAR WITH INTERCONDYLAR EXTENSION;  Surgeon: Damian Jean Jr., MD;  Location: [facility] OR;  Service: Orthopedics;  Laterality: Right;    REMOVAL OF EXTERNAL FIXATION DEVICE Right 2/6/2025    Procedure: REMOVAL, EXTERNAL FIXATION DEVICE;  Surgeon: Damian Jean Jr., MD;  Location: [facility] OR;  Service: Orthopedics;  Laterality: Right;    TOTAL THYROIDECTOMY      UMBILICAL HERNIA REPAIR      x 2       Family History   Problem Relation Name Age of Onset    Asthma Mother      No Known Problems Father      Lung cancer Brother  65    Diabetes Brother      Diabetes Brother  80    Heart disease Daughter         Tobacco Use History[2]    Wt Readings from Last 5 Encounters:   05/14/25 58.4 kg (128 lb 12 oz)   04/28/25 57.5 kg (126 lb 12.2 oz)   04/14/25 58.1 kg  (128 lb)   04/03/25 58.5 kg (128 lb 15.5 oz)   03/26/25 56.2 kg (124 lb)     History of Present Illness    CHIEF COMPLAINT:  Patient presents today for follow up of multiple medical conditions    ACUTE RESPIRATORY SYMPTOMS:  She reports a cough with wheezing for the past 2-3 days. She has run out of her albuterol inhaler.    URINARY SYMPTOMS:  She reports dysuria with urinary frequency and urgency. She experiences difficulty initiating urination, resulting in small volume voids.    MUSCULOSKELETAL:  She sustained a closed displaced supracondylar fracture of the distal right femur. She reports making progress with physical therapy but continues to use a walker.     DIABETES MANAGEMENT:  A1C was 6.3 one month ago. She reports significant blood sugar fluctuations ranging from 50 to 300. Current insulin regimen includes NovoLog 10 units with meals and Lantus 12 units.    KIDNEY DISEASE:  She has chronic kidney disease stage 3B managed by nephrologist Dr. Sanchez, with associated secondary hyperparathyroidism of renal origin. Jardiance has been restarted.    THYROID:  TSH decreased from 30 to 4.5 within one month. She continues Synthroid 100 mcg.         Objective:      Vitals:    05/14/25 1417   BP: 126/64   Pulse: 64   Temp: 96.8 °F (36 °C)       Physical Exam  Constitutional:       General: She is not in acute distress.     Appearance: She is not ill-appearing.   Pulmonary:      Effort: Pulmonary effort is normal. No respiratory distress.      Breath sounds: Normal breath sounds. No wheezing.   Neurological:      General: No focal deficit present.      Mental Status: She is alert.   Psychiatric:         Mood and Affect: Mood normal.         Behavior: Behavior normal.         Assessment:       1. Dysuria    2. Mild intermittent asthma with acute exacerbation    3. Exacerbation of asthma, unspecified asthma severity, unspecified whether persistent    4. Osteoporosis, unspecified osteoporosis type, unspecified  pathological fracture presence    5. Type 2 diabetes mellitus with other specified complication, without long-term current use of insulin    6. Stage 3b chronic kidney disease    7. Hypothyroidism, unspecified type    8. Primary hypertension    9. Polypharmacy        Plan:   Dysuria  -     Urinalysis Microscopic; Future; Expected date: 05/14/2025  -     Urinalysis, Reflex to Urine Culture Urine, Clean Catch    Mild intermittent asthma with acute exacerbation  -     albuterol (PROVENTIL/VENTOLIN HFA) 90 mcg/actuation inhaler; Inhale 2 puffs into the lungs every 4 (four) hours as needed for Wheezing or Shortness of Breath. Rescue  Dispense: 25.5 g; Refill: 3    Exacerbation of asthma, unspecified asthma severity, unspecified whether persistent  -     predniSONE (DELTASONE) 10 MG tablet; Take 1 tablet (10 mg total) by mouth once daily.  Dispense: 5 tablet; Refill: 0    Osteoporosis, unspecified osteoporosis type, unspecified pathological fracture presence  -     Ambulatory referral/consult to Rheumatology; Future; Expected date: 05/21/2025    Type 2 diabetes mellitus with other specified complication, without long-term current use of insulin  -     empagliflozin (JARDIANCE) 10 mg tablet; Take 1 tablet (10 mg total) by mouth once daily.  Dispense: 90 tablet; Refill: 1    Stage 3b chronic kidney disease    Hypothyroidism, unspecified type    Primary hypertension    Polypharmacy    Other orders  -     hydrOXYzine HCL (ATARAX) 10 MG Tab; Take 1 tablet (10 mg total) by mouth every evening.  Dispense: 90 tablet; Refill: 1  -     montelukast (SINGULAIR) 10 mg tablet; Take 1 tablet (10 mg total) by mouth every evening.  Dispense: 90 tablet; Refill: 1    Assessment & Plan    PLAN SUMMARY:  Ordered urinalysis to evaluate for  UTI given history of dysuria and use of jardiance  Prescribed prednisone for short-term asthma management- with caution of glucose elevation  Refilled albuterol inhaler for as-needed use  Continued insulin  aspart 10 units with meals  Increased insulin glargine from 12 to 15 units during prednisone treatment - w/ further adjust as needed  Continued hydroxyzine at bedtime for itching and sleep  Continued levothyroxine 100 mcg for hypothyroid management  Continued empagliflozin 10 mg pending urinalysis results - discussed risk of UTI  Continued montelukast for asthma management  Continued valsartan with hydrochlorothiazide 160/12.5 mg for blood pressure  Referred to external rheumatologist for osteoarthritis and osteoporosis management  Ordered thyroid function test in 4-6 weeks  Schedule comprehensive medication review at next visit  Follow-up with orthopedic department in 1 month with repeat radiographs  Follow-up soon for treatment review    ## ASTHMA EXACERBATION:  Patient experiencing coughing and wheezing for 2-3 days, indicating asthma exacerbation.  Lung auscultation revealed some wheezing, though breathing is not severely compromised.  Prescribed prednisone for short-term use to reduce inflammation and improve breathing.  Refilled albuterol inhaler to be used as needed for symptoms.  Continued montelukast for ongoing asthma management.  Instructed patient to contact office if symptoms worsen or do not improve with current treatment.    ## URINARY TRACT INFECTION:  Patient reports dysuria and incomplete voiding, suggesting possible urinary tract infection.  Ordered urinalysis to confirm UTI diagnosis.  Advised to increase fluid intake for prevention.  Explained the mechanism of empagliflozin in diabetes management and its potential to increase risk of UTIs.  Will consider discontinuing empagliflozin if infection is confirmed.    ## FEMUR FRACTURE:  Patient is still using a walker for mobility due to healing supracondylar femur fracture.  Making progress with physical therapy.  Has a scheduled follow-up with orthopedic department in approximately 1 month with repeat radiographs to assess healing progress.    ##  TYPE 2 DIABETES WITH DIABETIC NEPHROPATHY:  Last HbA1c was 6.3%, indicating well-controlled diabetes, though current glucose levels are elevated at home  Adjusted diabetes management plan in anticipation of steroid-induced hyperglycemia.  Increased insulin glargine from 12 to 15 units while on corticosteroids.  Continued insulin aspart at 10 units with meals, with instructions to increase dose if glucose rises.  Continued empagliflozin 10 mg pending urinalysis results.  Discussed the impact of corticosteroids on glucose levels and advised close monitoring of glucose levels during steroid treatment.    ## CHRONIC KIDNEY DISEASE:  Chronic kidney disease stage 3B was recently evaluated by Dr. Sanchez, who restarted empagliflozin.  Will continue empagliflozin 10 mg for kidney protection unless urinary tract infections persist  / recurr  neph is monitoring secondary hyperparathyroidism of renal origin.    ## OSTEOPOROSIS:  Referred to external rheumatologist for osteoporosis management.  Considered risks and benefits of short-term corticosteroid use for asthma, weighing against potential impacts on glucose and bone health.        ## HYPOTHYROIDISM:  TSH decreased from 30 to 4.5 within 1 month, indicating rapid improvement in thyroid function.  This is the best thyroid function test in 9 months.  Ordered thyroid function test in 4-6 weeks to monitor TSH levels due to rapid decrease.  Continued levothyroxine 100 mcg for thyroid management for now    ## HYPERTENSION:  Hypertension remains well-controlled on current medication.  Continued valsartan with hydrochlorothiazide 160/12.5 mg for blood pressure management.    ## OTHER MEDICATIONS AND FOLLOW-UP:  Continued hydroxyzine at bedtime for itching and sleep.  Plan comprehensive medication review at next visit due to uncertainty about current regimen adherence; patient to bring all current medications.  Follow up soon for this review meds so I can be 100% confident her drug  list is accurate. Daughter present and asked for assistance ensure drug lis accuracy.          This note was verbally dictated, please excuse any type errors.         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

## 2025-05-15 ENCOUNTER — CLINICAL SUPPORT (OUTPATIENT)
Dept: REHABILITATION | Facility: HOSPITAL | Age: 85
End: 2025-05-15
Payer: MEDICARE

## 2025-05-15 DIAGNOSIS — S72.451D CLOSED DISPLACED SUPRACONDYLAR FRACTURE OF DISTAL END OF RIGHT FEMUR WITHOUT INTRACONDYLAR EXTENSION WITH ROUTINE HEALING, SUBSEQUENT ENCOUNTER: Primary | ICD-10-CM

## 2025-05-15 PROCEDURE — 97530 THERAPEUTIC ACTIVITIES: CPT | Mod: HCNC,PN

## 2025-05-15 PROCEDURE — 97112 NEUROMUSCULAR REEDUCATION: CPT | Mod: HCNC,PN

## 2025-05-16 ENCOUNTER — TELEPHONE (OUTPATIENT)
Dept: INTERNAL MEDICINE | Facility: CLINIC | Age: 85
End: 2025-05-16
Payer: MEDICARE

## 2025-05-16 ENCOUNTER — OFFICE VISIT (OUTPATIENT)
Dept: INTERNAL MEDICINE | Facility: CLINIC | Age: 85
End: 2025-05-16
Payer: MEDICARE

## 2025-05-16 VITALS
DIASTOLIC BLOOD PRESSURE: 72 MMHG | SYSTOLIC BLOOD PRESSURE: 112 MMHG | TEMPERATURE: 98 F | RESPIRATION RATE: 18 BRPM | HEART RATE: 76 BPM | OXYGEN SATURATION: 96 % | WEIGHT: 125 LBS | BODY MASS INDEX: 25.25 KG/M2

## 2025-05-16 DIAGNOSIS — J30.89 SEASONAL ALLERGIC RHINITIS DUE TO OTHER ALLERGIC TRIGGER: ICD-10-CM

## 2025-05-16 DIAGNOSIS — E11.69 TYPE 2 DIABETES MELLITUS WITH OTHER SPECIFIED COMPLICATION, WITHOUT LONG-TERM CURRENT USE OF INSULIN: ICD-10-CM

## 2025-05-16 DIAGNOSIS — E03.9 HYPOTHYROIDISM, UNSPECIFIED TYPE: Primary | ICD-10-CM

## 2025-05-16 PROCEDURE — 99999 PR PBB SHADOW E&M-EST. PATIENT-LVL III: CPT | Mod: PBBFAC,HCNC,, | Performed by: FAMILY MEDICINE

## 2025-05-16 RX ORDER — AZELASTINE 1 MG/ML
1 SPRAY, METERED NASAL 2 TIMES DAILY
Qty: 30 ML | Refills: 11 | Status: SHIPPED | OUTPATIENT
Start: 2025-05-16 | End: 2026-05-16

## 2025-05-16 RX ORDER — VALSARTAN AND HYDROCHLOROTHIAZIDE 160; 12.5 MG/1; MG/1
1 TABLET, FILM COATED ORAL DAILY
Qty: 90 TABLET | Refills: 1 | Status: SHIPPED | OUTPATIENT
Start: 2025-05-16

## 2025-05-16 NOTE — PROGRESS NOTES
Subjective:       Patient ID: Odin Oliva is a 85 y.o. female.    Chief Complaint: f/u on med list confusion    HPI    Problem List[1]    Past Medical History:   Diagnosis Date    CKD (chronic kidney disease), stage IV     Diabetes mellitus     HTN (hypertension)     Hyperuricemia     Seasonal allergies     Thyroid disease     Urinary tract infection        Past Surgical History:   Procedure Laterality Date    CATARACT EXTRACTION, BILATERAL      CHOLECYSTECTOMY      EXOSTECTOMY Bilateral 11/8/2019    Procedure: EXOSTECTOMY;  Surgeon: Lissy Comer DPM;  Location: [facility] OR;  Service: Podiatry;  Laterality: Bilateral;  left fourth digit, right fifth digit    FOOT MASS EXCISION Bilateral 11/8/2019    Procedure: HAMMER TOE, DEROTATIONAL ARTHROPLASTY FIFTH DIGIT;  Surgeon: Lissy Comer DPM;  Location: [facility] OR;  Service: Podiatry;  Laterality: Bilateral;  Left 5th toe incision @ 08:51.  Left 4th toe incision @ 08:54.  Right 5th toe incision : 09:24.    ORIF, FRACTURE, FEMUR, TRANSCONDYLAR WITH INTERCONDYLAR EXTENSION Right 2/6/2025    Procedure: ORIF, FRACTURE, FEMUR, TRANSCONDYLAR WITH INTERCONDYLAR EXTENSION;  Surgeon: Damian Jean Jr., MD;  Location: [facility] OR;  Service: Orthopedics;  Laterality: Right;    REMOVAL OF EXTERNAL FIXATION DEVICE Right 2/6/2025    Procedure: REMOVAL, EXTERNAL FIXATION DEVICE;  Surgeon: Damian Jean Jr., MD;  Location: [facility] OR;  Service: Orthopedics;  Laterality: Right;    TOTAL THYROIDECTOMY      UMBILICAL HERNIA REPAIR      x 2       Family History   Problem Relation Name Age of Onset    Asthma Mother      No Known Problems Father      Lung cancer Brother  65    Diabetes Brother      Diabetes Brother  80    Heart disease Daughter         Tobacco Use History[2]    Wt Readings from Last 5 Encounters:   05/16/25 56.7 kg (125 lb)   05/14/25 58.4 kg (128 lb 12 oz)   04/28/25 57.5 kg (126 lb 12.2 oz)   04/14/25 58.1 kg (128 lb)   04/03/25 58.5 kg (128 lb 15.5 oz)    History of Present Illness    CHIEF COMPLAINT:  Patient presents today for medication review and follow up of recent asthma exacerbation    ASTHMA:  She reports improvement in asthma symptoms following recent exacerbation. She was prescribed Prednisone but has not taken the medication.    DIABETES:  She reports elevated nocturnal glucose levels with frequent CGM alarms overnight. Her insulin regimen was recently adjusted with glargine increased from 12 to 15 units and short-acting insulin increased from 8 to 10 units. Despite these adjustments, nocturnal hyperglycemia persists.    CURRENT MEDICATIONS:  She takes Valsartan/HCTZ 160/12.5 for blood pressure, hydroxyzine nightly for itching, levothyroxine 100 mcg for thyroid, Excedrin migraine PRN for headaches, and Astelin nasal spray for allergies. She denies currently taking Tradjenta.     Objective:      Vitals:    05/16/25 1122   BP: 112/72   Pulse: 76   Resp: 18   Temp: 97.6 °F (36.4 °C)       Physical Exam  Constitutional:       General: She is not in acute distress.     Appearance: She is not ill-appearing.   Pulmonary:      Effort: Pulmonary effort is normal. No respiratory distress.   Neurological:      General: No focal deficit present.      Mental Status: She is alert.   Psychiatric:         Mood and Affect: Mood normal.         Behavior: Behavior normal.         Assessment:       1. Hypothyroidism, unspecified type    2. Seasonal allergic rhinitis due to other allergic trigger    3. Type 2 diabetes mellitus with other specified complication, without long-term current use of insulin        Plan:   Hypothyroidism, unspecified type  -     TSH; Future; Expected date: 05/16/2025    Seasonal allergic rhinitis due to other allergic trigger  -     azelastine (ASTELIN) 137 mcg (0.1 %) nasal spray; 1 spray (137 mcg total) by Nasal route 2 (two) times daily.  Dispense: 30 mL; Refill: 11    Type 2 diabetes mellitus with other specified complication, without long-term  current use of insulin  -     empagliflozin (JARDIANCE) 10 mg tablet; Take 1 tablet (10 mg total) by mouth once daily.  Dispense: 90 tablet; Refill: 1    Other orders  -     valsartan-hydrochlorothiazide (DIOVAN-HCT) 160-12.5 mg per tablet; Take 1 tablet by mouth once daily.  Dispense: 90 tablet; Refill: 1      Assessment & Plan    PLAN SUMMARY:  Order TSH level check in 1 month  Continue Jardiance 10 mg daily for glucose control  Discontinue Prednisone ( never used but has improved)   Maintain levothyroxine 100 mcg daily  Continue Valsartan with HCTZ 160/12.5 mg, prescribed 90 tablets with refill  Continue hydroxyzine nightly for sleep and itching  Increase long-acting insulin glargine to 15 units daily  Increase short-acting insulin to 13 units 3 times daily  Continue Excedrin migraine as needed for headaches  Continue montelukast and Astelin nasal spray for allergies as needed  Continue albuterol inhaler as needed for asthma  Drug list reviewed/ cleaned up     ASTHMA EXACERBATION:  Assessed patient's asthma exacerbation from previous visit, noting significant improvement in symptoms.  Patient was previously experiencing wheezing, coughing, and shortness of breath 2 days ago, but breathing is better today.  Discontinued Prednisone due to improvement.  Continued albuterol inhaler as needed and montelukast for asthma and allergies.    TYPE 2 DIABETES MELLITUS:  Evaluated glucose levels, finding them elevated despite recent insulin dose increases.  Patient's blood sugar was high overnight.  Discontinued Tradjenta in March to avoid hypoglycemia and simplify medication regimen.  Continued Jardiance 10 mg daily for glucose control, explaining its mechanism of action in excreting glucose through urine while cautioning about potential for UTIs due to increased urinary glucose.  Adjusted insulin regimen to address persistent hyperglycemia: increased long-acting insulin glargine from 12 to 15 units daily and short-acting  insulin from 10 to 13 units 3 times daily.  Instructed patient to use sliding scale for additional short-acting insulin doses if glucose remains high.    ESSENTIAL HYPERTENSION:  Blood pressure remains well-controlled with current medication regimen.  Continued Valsartan with HCTZ 160/12.5 mg and provided prescription for 90 tablets with refill.    HYPOTHYROIDISM:  Maintained current dose of levothyroxine 100 mcg daily based on satisfactory thyroid function.  Ordered TSH level to be checked in 1 month.    MIGRAINE:  Continued Excedrin migraine for headaches as needed.    ALLERGIC RHINITIS:  Continued montelukast (also for asthma) and Astelin nasal spray for allergies, to be used as needed.    PRURITUS:  Noted patient experiences itching if hydroxyzine is not taken.  Continued hydroxyzine nightly for sleep and itching.    FOLLOW-UP:  Scheduled follow up in 1 month for thyroid function check.               This note was verbally dictated, please excuse any type errors.         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

## 2025-05-20 ENCOUNTER — CLINICAL SUPPORT (OUTPATIENT)
Dept: REHABILITATION | Facility: HOSPITAL | Age: 85
End: 2025-05-20
Payer: MEDICARE

## 2025-05-20 DIAGNOSIS — S72.451D CLOSED DISPLACED SUPRACONDYLAR FRACTURE OF DISTAL END OF RIGHT FEMUR WITHOUT INTRACONDYLAR EXTENSION WITH ROUTINE HEALING, SUBSEQUENT ENCOUNTER: Primary | ICD-10-CM

## 2025-05-20 PROCEDURE — 97110 THERAPEUTIC EXERCISES: CPT | Mod: HCNC,PN

## 2025-05-20 NOTE — PROGRESS NOTES
"  Outpatient Rehab    Physical Therapy Visit    Patient Name: Odin Oliva  MRN: [id number]  YOB: 1940  Encounter Date: 5/8/2025    Therapy Diagnosis:   No diagnosis found.        Physician: aDmian Jean Jr.,*    Physician Orders: Eval and Treat  Medical Diagnosis: S72.451D (ICD-10-CM) - Closed displaced supracondylar fracture of distal end of right femur without intracondylar extension with routine healing, subsequent encounter     Visit # / Visits Authorized:  10 / 20   Date of Evaluation:  3/12/2025   Insurance Authorization Period: 03/11/2025 to 03/12/2027  Plan of Care Certification:  3/12/2025 to 5/9/2025                 Time In: 1345  Time Out:  1450  Total Billable Time: 25 minutes         Subjective    She has been walking with just the cane and is doing ok but she is still hurting and having difficulty with doing a lot of activity.   Pain 5/10         Objective    Objective Measures updated at progress report unless specified.    Knee AROM/PROM Right Left Pain/Dysfunction with Movement Goal   Knee Flexion (135º) 120 130 Pain R 120   Knee Extension (0º) 0 0          0         L/E MMT Right  (spine) Left Pain/Dysfunction with Movement Goal   Hip Flexion  4+/5 5/5   4+/5 B   Hip Extension  4+/5 5/5   4+/5 B   Hip Abduction  4/5 5/5   4+/5 B   Knee Extension 4/5 5/5   5/5 B   Knee Flexion 4/5 5/5   5/5 B   Ankle DF 5/5 5/5   5/5 B   Ankle PF 5/5 5/5   5/5 B           GAIT ANALYSIS The patient ambulated with the following assistive device: quad cane; the pt presents with the following gait abnormalities: increased RUPALI and decreased step length on left               Treatment:     CPT Intervention  R Femur Duration/ Intensity   05/08   MT Manual  -   TE*  Nustep 7 min   TE* Calf Raises 2x10   NMR Standing Hip Flex/Abd 2x10 4#   NMR* SLRx  2# 2x10 B   NMR Step Ups 6" 2x10   TE* LAQ  3x10 4# R   TE* Standing HS Curl 2x10 4#   TA Squats 2x10   TA Sit to Stands 2x10 16" box   TE* Heel slides  " 2x10 R   NMR SL balance 3 x 30s Airex c' fingertip support         PLAN          CPT Codes available for Billing:   (0) minutes of Manual therapy (MT) to improve pain and ROM.  (0) minutes of Therapeutic Exercise (TE) to develop strength, endurance, range of motion, and flexibility.  (15) minutes of Neuromuscular Re-Education (NMR)  to improve: Balance, Coordination, Kinesthetic, Sense, Proprioception, and Posture.  (10) minutes of Therapeutic Activities (TA) to improve functional performance.  (-) Unattended Electrical Stimulation (ES) for muscle performance or pain modulation.  (-) Vasopneumatic Device Therapy () for management of swelling/edema. (45677)  (-) BFR: Blood flow restriction applied during exercise  NP or (-): Not Performed  *Not billed due to tech assistance or not one on one care.    Assessment & Plan   Assessment:   Patient is progressing well. She continues to be limited due to pain and weakness and would benefit from continued skilled PT.     Patient will continue to benefit from skilled outpatient physical therapy to address the deficits listed in the problem list box on initial evaluation, provide pt/family education and to maximize pt's level of independence in the home and community environment.     Patient's spiritual, cultural, and educational needs considered and patient agreeable to plan of care and goals.         Plan:  Continue to progress as tolerated under current POC.      Goals:     Short Term Goals:  4 weeks Status  Date Met   PAIN: Pt will report worst pain of 6/10 in order to progress toward max functional ability and improve quality of life. [] Progressing  [x] Met  [] Not Met     FUNCTION: Patient will demonstrate improved function as indicated by a functional score of greater than or equal to 50% predicted on FOTO. [x] Progressing  [] Met  [] Not Met     MOBILITY: Patient will improve AROM to 50% of stated goals, listed in objective measures above, in order to progress  towards independence with functional activities.  [] Progressing  [x] Met  [] Not Met     STRENGTH: Patient will improve strength to 50% of stated goals, listed in objective measures above, in order to progress towards independence with functional activities. [] Progressing  [x] Met  [] Not Met     GAIT: Patient will demonstrate improved gait mechanics in order to improve functional mobility, improve quality of life, and decrease risk of further injury or fall.  [] Progressing  [x] Met  [] Not Met     HEP: Patient will demonstrate independence with HEP in order to progress toward functional independence. [] Progressing  [x] Met  [] Not Met        Long Term Goals:  8 weeks Status Date Met   PAIN: Pt will report worst pain of 4/10 in order to progress toward max functional ability and improve quality of life [x] Progressing  [] Met  [] Not Met     FUNCTION: Patient will demonstrate improved function as indicated by a functional score of greater than or equal to predicted score on FOTO. [x] Progressing  [] Met  [] Not Met     MOBILITY: Patient will improve AROM to stated goals, listed in objective measures above, in order to return to maximal functional potential and improve quality of life.  [x] Progressing  [] Met  [] Not Met     STRENGTH: Patient will improve strength to stated goals, listed in objective measures above, in order to improve functional independence and quality of life.  [x] Progressing  [] Met  [] Not Met     GAIT: Patient will demonstrate normalized gait mechanics with minimal compensation in order to return to PLOF. [x] Progressing  [] Met  [] Not Met     Patient will return to normal ADL's, IADL's, community involvement, recreational activities, and work-related activities with less than or equal to 4/10 pain and maximal function.  [x] Progressing  [] Met  [] Not Met          Demond Calix, PT

## 2025-05-20 NOTE — PROGRESS NOTES
Outpatient Rehab    Physical Therapy Visit    Patient Name: Odin Oliva  MRN: [id number]  YOB: 1940  Encounter Date: 5/20/2025    Therapy Diagnosis:   No diagnosis found.        Physician: Damian Jean Jr.,*    Physician Orders: Eval and Treat  Medical Diagnosis: S72.451D (ICD-10-CM) - Closed displaced supracondylar fracture of distal end of right femur without intracondylar extension with routine healing, subsequent encounter     Visit # / Visits Authorized:  10 / 20   Date of Evaluation:  3/12/2025   Insurance Authorization Period: 03/11/2025 to 03/12/2027  Plan of Care Certification:  3/12/2025 to 6/20/2025                 Time In: 1350  Time Out:  1500  Total Billable Time: 45 minutes         Subjective    She has been hurting more lately.   Pain 5/10         Objective    Objective Measures updated at progress report unless specified.       Treatment:     CPT Intervention  R Femur Duration/ Intensity   05/20   MT Manual  -   TE  Nustep 8 min   TE Hamstring stretch 3x30s   TE Prone Quad Stretch 3x30s   TE  Calf Stretch 3x30s   TE Calf Raises 2x10   NMR Standing Hip Flex/Abd 2x10 4#   TE LAQ  3x10 4# R   TE Standing HS Curl 2x10 4#   TE Heel slides  2x10 R   PLAN          CPT Codes available for Billing:   (0) minutes of Manual therapy (MT) to improve pain and ROM.  (40) minutes of Therapeutic Exercise (TE) to develop strength, endurance, range of motion, and flexibility.  (5) minutes of Neuromuscular Re-Education (NMR)  to improve: Balance, Coordination, Kinesthetic, Sense, Proprioception, and Posture.  (0) minutes of Therapeutic Activities (TA) to improve functional performance.  (-) Unattended Electrical Stimulation (ES) for muscle performance or pain modulation.  (-) Vasopneumatic Device Therapy () for management of swelling/edema. (49416)  (-) BFR: Blood flow restriction applied during exercise  NP or (-): Not Performed    Assessment & Plan   Assessment:   Modified program today due  to increased pain lately. Will continue to assess knee irritability and adjust as indicated.      Patient will continue to benefit from skilled outpatient physical therapy to address the deficits listed in the problem list box on initial evaluation, provide pt/family education and to maximize pt's level of independence in the home and community environment.     Patient's spiritual, cultural, and educational needs considered and patient agreeable to plan of care and goals.         Plan:  Continue to progress as tolerated under current POC.      Goals:     Short Term Goals:  4 weeks Status  Date Met   PAIN: Pt will report worst pain of 6/10 in order to progress toward max functional ability and improve quality of life. [x] Progressing  [] Met  [] Not Met     FUNCTION: Patient will demonstrate improved function as indicated by a functional score of greater than or equal to 50% predicted on FOTO. [x] Progressing  [] Met  [] Not Met     MOBILITY: Patient will improve AROM to 50% of stated goals, listed in objective measures above, in order to progress towards independence with functional activities.  [x] Progressing  [] Met  [] Not Met     STRENGTH: Patient will improve strength to 50% of stated goals, listed in objective measures above, in order to progress towards independence with functional activities. [x] Progressing  [] Met  [] Not Met     GAIT: Patient will demonstrate improved gait mechanics in order to improve functional mobility, improve quality of life, and decrease risk of further injury or fall.  [x] Progressing  [] Met  [] Not Met     HEP: Patient will demonstrate independence with HEP in order to progress toward functional independence. [x] Progressing  [] Met  [] Not Met        Long Term Goals:  8 weeks Status Date Met   PAIN: Pt will report worst pain of 4/10 in order to progress toward max functional ability and improve quality of life [x] Progressing  [] Met  [] Not Met     FUNCTION: Patient will  demonstrate improved function as indicated by a functional score of greater than or equal to predicted score on FOTO. [x] Progressing  [] Met  [] Not Met     MOBILITY: Patient will improve AROM to stated goals, listed in objective measures above, in order to return to maximal functional potential and improve quality of life.  [x] Progressing  [] Met  [] Not Met     STRENGTH: Patient will improve strength to stated goals, listed in objective measures above, in order to improve functional independence and quality of life.  [x] Progressing  [] Met  [] Not Met     GAIT: Patient will demonstrate normalized gait mechanics with minimal compensation in order to return to PLOF. [x] Progressing  [] Met  [] Not Met     Patient will return to normal ADL's, IADL's, community involvement, recreational activities, and work-related activities with less than or equal to 4/10 pain and maximal function.  [x] Progressing  [] Met  [] Not Met          Demond Calix, PT

## 2025-05-21 NOTE — PROGRESS NOTES
Outpatient Rehab    Physical Therapy Progress Note      Patient Name: Odin Oliva  MRN: [id number]  YOB: 1940  Encounter Date: 5/13/2025    Therapy Diagnosis:   Encounter Diagnosis   Name Primary?    Closed displaced supracondylar fracture of distal end of right femur without intracondylar extension with routine healing, subsequent encounter Yes       Physician: Damian Jean Jr.,*    Physician Orders: Eval and Treat  Medical Diagnosis: S72.451D (ICD-10-CM) - Closed displaced supracondylar fracture of distal end of right femur without intracondylar extension with routine healing, subsequent encounter     Visit # / Visits Authorized:  10 / 20   Date of Evaluation:  3/12/2025   Insurance Authorization Period: 03/11/2025 to 03/12/2027  Plan of Care Certification:  3/12/2025 to 6/20/2025                 Time In: 1310  Time Out:  1420  Total Billable Time: 35 minutes         Subjective    She has been walking with just the cane and is doing ok but she is still hurting and having difficulty with doing a lot of activity.   Pain 5/10         Objective    Objective Measures updated at progress report unless specified.    Knee AROM/PROM Right Left Pain/Dysfunction with Movement Goal   Knee Flexion (135º) 120 130 Pain R 120   Knee Extension (0º) 0 0          0         L/E MMT Right  (spine) Left Pain/Dysfunction with Movement Goal   Hip Flexion  4+/5 5/5   4+/5 B   Hip Extension  4+/5 5/5   4+/5 B   Hip Abduction  4/5 5/5   4+/5 B   Knee Extension 4/5 5/5   5/5 B   Knee Flexion 4/5 5/5   5/5 B   Ankle DF 5/5 5/5   5/5 B   Ankle PF 5/5 5/5   5/5 B           GAIT ANALYSIS The patient ambulated with the following assistive device: quad cane; the pt presents with the following gait abnormalities: increased RUPALI and decreased step length on left               Treatment:     CPT Intervention  R Femur Duration/ Intensity   05/13   TE*  Nustep 7 min   TE* Calf Raises 2x10   NMR Standing Hip Flex/Abd 2x10 4#   NMR*  "SLRx  2# 2x10 B   NMR Step Ups 6" 2x10   TE LAQ  3x10 4# R   TE Standing HS Curl 2x10 4#   TA Squats 2x10   TA Sit to Stands 2x10 16" box   TE* Heel slides  2x10 R   NMR SL balance 3 x 30s Airex c' fingertip support         PLAN          CPT Codes available for Billing:   (0) minutes of Manual therapy (MT) to improve pain and ROM.  (10) minutes of Therapeutic Exercise (TE) to develop strength, endurance, range of motion, and flexibility.  (15) minutes of Neuromuscular Re-Education (NMR)  to improve: Balance, Coordination, Kinesthetic, Sense, Proprioception, and Posture.  (10) minutes of Therapeutic Activities (TA) to improve functional performance.  (-) Unattended Electrical Stimulation (ES) for muscle performance or pain modulation.  (-) Vasopneumatic Device Therapy () for management of swelling/edema. (41485)  (-) BFR: Blood flow restriction applied during exercise  NP or (-): Not Performed      Assessment & Plan   Assessment:   Patient tolerated session with minimal complaints. Patient is progressing well. She continues to be limited due to pain and weakness and would benefit from continued skilled PT.     Patient will continue to benefit from skilled outpatient physical therapy to address the deficits listed in the problem list box on initial evaluation, provide pt/family education and to maximize pt's level of independence in the home and community environment.     Patient's spiritual, cultural, and educational needs considered and patient agreeable to plan of care and goals.         Plan:  Extend Outpatient Physical Therapy 2 times weekly for 6 weeks to allow more time to improve knee strength and functional mobility and decrease pain and irritability with ADLS. Skilled therapy to include any combination of the following interventions: virtual visits, dry needling, modalities, electrical stimulation (IFC, Pre-Mod, Attended with Functional Dry Needling), Cervical/Lumbar Traction, Gait Training, Manual " Therapy, Neuromuscular Re-ed, Patient Education, Self Care, Therapeutic Activites, and Therapeutic Exercise         Goals:     Short Term Goals:  4 weeks Status  Date Met   PAIN: Pt will report worst pain of 6/10 in order to progress toward max functional ability and improve quality of life. [] Progressing  [x] Met  [] Not Met     FUNCTION: Patient will demonstrate improved function as indicated by a functional score of greater than or equal to 50% predicted on FOTO. [x] Progressing  [] Met  [] Not Met     MOBILITY: Patient will improve AROM to 50% of stated goals, listed in objective measures above, in order to progress towards independence with functional activities.  [] Progressing  [x] Met  [] Not Met     STRENGTH: Patient will improve strength to 50% of stated goals, listed in objective measures above, in order to progress towards independence with functional activities. [] Progressing  [x] Met  [] Not Met     GAIT: Patient will demonstrate improved gait mechanics in order to improve functional mobility, improve quality of life, and decrease risk of further injury or fall.  [] Progressing  [x] Met  [] Not Met     HEP: Patient will demonstrate independence with HEP in order to progress toward functional independence. [] Progressing  [x] Met  [] Not Met        Long Term Goals:  8 weeks Status Date Met   PAIN: Pt will report worst pain of 4/10 in order to progress toward max functional ability and improve quality of life [x] Progressing  [] Met  [] Not Met     FUNCTION: Patient will demonstrate improved function as indicated by a functional score of greater than or equal to predicted score on FOTO. [x] Progressing  [] Met  [] Not Met     MOBILITY: Patient will improve AROM to stated goals, listed in objective measures above, in order to return to maximal functional potential and improve quality of life.  [x] Progressing  [] Met  [] Not Met     STRENGTH: Patient will improve strength to stated goals, listed in  objective measures above, in order to improve functional independence and quality of life.  [x] Progressing  [] Met  [] Not Met     GAIT: Patient will demonstrate normalized gait mechanics with minimal compensation in order to return to PLOF. [x] Progressing  [] Met  [] Not Met     Patient will return to normal ADL's, IADL's, community involvement, recreational activities, and work-related activities with less than or equal to 4/10 pain and maximal function.  [x] Progressing  [] Met  [] Not Met          Demond Calix, PT

## 2025-05-22 ENCOUNTER — CLINICAL SUPPORT (OUTPATIENT)
Dept: REHABILITATION | Facility: HOSPITAL | Age: 85
End: 2025-05-22
Payer: MEDICARE

## 2025-05-22 DIAGNOSIS — S72.451D CLOSED DISPLACED SUPRACONDYLAR FRACTURE OF DISTAL END OF RIGHT FEMUR WITHOUT INTRACONDYLAR EXTENSION WITH ROUTINE HEALING, SUBSEQUENT ENCOUNTER: Primary | ICD-10-CM

## 2025-05-22 PROCEDURE — 97140 MANUAL THERAPY 1/> REGIONS: CPT | Mod: HCNC,PN

## 2025-05-22 PROCEDURE — 97110 THERAPEUTIC EXERCISES: CPT | Mod: HCNC,PN

## 2025-05-22 NOTE — PROGRESS NOTES
Outpatient Rehab    Physical Therapy Visit    Patient Name: Odin Oliva  MRN: [id number]  YOB: 1940  Encounter Date: 5/15/2025    Therapy Diagnosis:   Encounter Diagnosis   Name Primary?    Closed displaced supracondylar fracture of distal end of right femur without intracondylar extension with routine healing, subsequent encounter Yes           Physician: Damian Jean Jr.,*    Physician Orders: Eval and Treat  Medical Diagnosis: S72.451D (ICD-10-CM) - Closed displaced supracondylar fracture of distal end of right femur without intracondylar extension with routine healing, subsequent encounter     Visit # / Visits Authorized:  10 / 20   Date of Evaluation:  3/12/2025   Insurance Authorization Period: 03/11/2025 to 03/12/2027  Plan of Care Certification:  3/12/2025 to 6/20/2025                 Time In: 1435  Time Out:  1540  Total Billable Time: 35 minutes         Subjective    She still has significant pain in her knee and feels uncomfortable putting all of her weight on it without the cane.   Pain 5/10         Objective    Objective Measures updated at progress report unless specified.    Knee AROM/PROM Right Left Pain/Dysfunction with Movement Goal   Knee Flexion (135º) 120 130 Pain R 120   Knee Extension (0º) 0 0          0         L/E MMT Right  (spine) Left Pain/Dysfunction with Movement Goal   Hip Flexion  4+/5 5/5   4+/5 B   Hip Extension  4+/5 5/5   4+/5 B   Hip Abduction  4/5 5/5   4+/5 B   Knee Extension 4/5 5/5   5/5 B   Knee Flexion 4/5 5/5   5/5 B   Ankle DF 5/5 5/5   5/5 B   Ankle PF 5/5 5/5   5/5 B           GAIT ANALYSIS The patient ambulated with the following assistive device: quad cane; the pt presents with the following gait abnormalities: increased RUPALI and decreased step length on left               Treatment:     CPT Intervention  R Femur Duration/ Intensity   05/15   MT Manual     TE*  Nustep 7 min   TE* Calf Raises 2x10   NMR Standing Hip Flex/Abd 2x10 4#   NMR SLRx  -  "  NMR Step Ups 6" 2x10   TE LAQ  3x10 4# R   TE Standing HS Curl 2x10 4#   TA Squats 2x10   TA Sit to Stands 2x10 16" box   TE Heel slides  2x10 R   PLAN          CPT Codes available for Billing:   (0) minutes of Manual therapy (MT) to improve pain and ROM.  (15) minutes of Therapeutic Exercise (TE) to develop strength, endurance, range of motion, and flexibility.  (10) minutes of Neuromuscular Re-Education (NMR)  to improve: Balance, Coordination, Kinesthetic, Sense, Proprioception, and Posture.  (10) minutes of Therapeutic Activities (TA) to improve functional performance.  (-) Unattended Electrical Stimulation (ES) for muscle performance or pain modulation.  (-) Vasopneumatic Device Therapy () for management of swelling/edema. (87765)  (-) BFR: Blood flow restriction applied during exercise  NP or (-): Not Performed  *Not billable due to tech assistance or not one on one care.      Assessment & Plan   Assessment:   Held SLS today due to increased pain.      Patient will continue to benefit from skilled outpatient physical therapy to address the deficits listed in the problem list box on initial evaluation, provide pt/family education and to maximize pt's level of independence in the home and community environment.     Patient's spiritual, cultural, and educational needs considered and patient agreeable to plan of care and goals.         Plan:  Continue to progress as tolerated under current POC.      Goals:     Short Term Goals:  4 weeks Status  Date Met   PAIN: Pt will report worst pain of 6/10 in order to progress toward max functional ability and improve quality of life. [] Progressing  [x] Met  [] Not Met     FUNCTION: Patient will demonstrate improved function as indicated by a functional score of greater than or equal to 50% predicted on FOTO. [x] Progressing  [] Met  [] Not Met     MOBILITY: Patient will improve AROM to 50% of stated goals, listed in objective measures above, in order to progress towards " independence with functional activities.  [] Progressing  [x] Met  [] Not Met     STRENGTH: Patient will improve strength to 50% of stated goals, listed in objective measures above, in order to progress towards independence with functional activities. [] Progressing  [x] Met  [] Not Met     GAIT: Patient will demonstrate improved gait mechanics in order to improve functional mobility, improve quality of life, and decrease risk of further injury or fall.  [] Progressing  [x] Met  [] Not Met     HEP: Patient will demonstrate independence with HEP in order to progress toward functional independence. [] Progressing  [x] Met  [] Not Met        Long Term Goals:  8 weeks Status Date Met   PAIN: Pt will report worst pain of 4/10 in order to progress toward max functional ability and improve quality of life [x] Progressing  [] Met  [] Not Met     FUNCTION: Patient will demonstrate improved function as indicated by a functional score of greater than or equal to predicted score on FOTO. [x] Progressing  [] Met  [] Not Met     MOBILITY: Patient will improve AROM to stated goals, listed in objective measures above, in order to return to maximal functional potential and improve quality of life.  [x] Progressing  [] Met  [] Not Met     STRENGTH: Patient will improve strength to stated goals, listed in objective measures above, in order to improve functional independence and quality of life.  [x] Progressing  [] Met  [] Not Met     GAIT: Patient will demonstrate normalized gait mechanics with minimal compensation in order to return to PLOF. [x] Progressing  [] Met  [] Not Met     Patient will return to normal ADL's, IADL's, community involvement, recreational activities, and work-related activities with less than or equal to 4/10 pain and maximal function.  [x] Progressing  [] Met  [] Not Met          Demond Calix, PT

## 2025-05-24 NOTE — PROGRESS NOTES
Outpatient Rehab    Physical Therapy Visit    Patient Name: Odin Oliva  MRN: [id number]  YOB: 1940  Encounter Date: 5/22/2025    Therapy Diagnosis:   No diagnosis found.        Physician: Damian Jean Jr.,*    Physician Orders: Eval and Treat  Medical Diagnosis: S72.451D (ICD-10-CM) - Closed displaced supracondylar fracture of distal end of right femur without intracondylar extension with routine healing, subsequent encounter     Visit # / Visits Authorized:  10 / 20   Date of Evaluation:  3/12/2025   Insurance Authorization Period: 03/11/2025 to 03/12/2027  Plan of Care Certification:  3/12/2025 to 6/20/2025                 Time In: 1345  Time Out:  1500  Total Billable Time: 40 minutes         Subjective    Patient states she is still hurting more than she had been.   Pain 5/10         Objective    Objective Measures updated at progress report unless specified.       Treatment:     CPT Intervention  R Femur Duration/ Intensity   05/22   MT Manual STM/knee distraction on EOM  10'   TE  Nustep 8 min   TE Hamstring stretch -   TE Prone Quad Stretch -   TE  Calf Stretch -   TE Calf Raises 2x10   NMR Standing Hip Flex/Abd 2x10 2#   TE LAQ  3x10 2# R   TE Standing HS Curl 2x10 2#   TE Heel slides  2x10 R            CPT Codes available for Billing:   (q0) minutes of Manual therapy (MT) to improve pain and ROM.  (25) minutes of Therapeutic Exercise (TE) to develop strength, endurance, range of motion, and flexibility.  (5) minutes of Neuromuscular Re-Education (NMR)  to improve: Balance, Coordination, Kinesthetic, Sense, Proprioception, and Posture.  (0) minutes of Therapeutic Activities (TA) to improve functional performance.  (-) Unattended Electrical Stimulation (ES) for muscle performance or pain modulation.  (-) Vasopneumatic Device Therapy () for management of swelling/edema. (76054)  (-) BFR: Blood flow restriction applied during exercise  NP or (-): Not Performed    Assessment & Plan    Assessment:   Improved symptoms following manual. Will hold therapy for 1 week and reassess.       Patient will continue to benefit from skilled outpatient physical therapy to address the deficits listed in the problem list box on initial evaluation, provide pt/family education and to maximize pt's level of independence in the home and community environment.     Patient's spiritual, cultural, and educational needs considered and patient agreeable to plan of care and goals.         Plan:  Continue to progress as tolerated under current POC.      Goals:     Short Term Goals:  4 weeks Status  Date Met   PAIN: Pt will report worst pain of 6/10 in order to progress toward max functional ability and improve quality of life. [x] Progressing  [] Met  [] Not Met     FUNCTION: Patient will demonstrate improved function as indicated by a functional score of greater than or equal to 50% predicted on FOTO. [x] Progressing  [] Met  [] Not Met     MOBILITY: Patient will improve AROM to 50% of stated goals, listed in objective measures above, in order to progress towards independence with functional activities.  [x] Progressing  [] Met  [] Not Met     STRENGTH: Patient will improve strength to 50% of stated goals, listed in objective measures above, in order to progress towards independence with functional activities. [x] Progressing  [] Met  [] Not Met     GAIT: Patient will demonstrate improved gait mechanics in order to improve functional mobility, improve quality of life, and decrease risk of further injury or fall.  [x] Progressing  [] Met  [] Not Met     HEP: Patient will demonstrate independence with HEP in order to progress toward functional independence. [x] Progressing  [] Met  [] Not Met        Long Term Goals:  8 weeks Status Date Met   PAIN: Pt will report worst pain of 4/10 in order to progress toward max functional ability and improve quality of life [x] Progressing  [] Met  [] Not Met     FUNCTION: Patient will  demonstrate improved function as indicated by a functional score of greater than or equal to predicted score on FOTO. [x] Progressing  [] Met  [] Not Met     MOBILITY: Patient will improve AROM to stated goals, listed in objective measures above, in order to return to maximal functional potential and improve quality of life.  [x] Progressing  [] Met  [] Not Met     STRENGTH: Patient will improve strength to stated goals, listed in objective measures above, in order to improve functional independence and quality of life.  [x] Progressing  [] Met  [] Not Met     GAIT: Patient will demonstrate normalized gait mechanics with minimal compensation in order to return to PLOF. [x] Progressing  [] Met  [] Not Met     Patient will return to normal ADL's, IADL's, community involvement, recreational activities, and work-related activities with less than or equal to 4/10 pain and maximal function.  [x] Progressing  [] Met  [] Not Met          Demond Calix, PT

## 2025-06-09 ENCOUNTER — PATIENT MESSAGE (OUTPATIENT)
Dept: INTERNAL MEDICINE | Facility: CLINIC | Age: 85
End: 2025-06-09
Payer: MEDICARE

## 2025-06-11 ENCOUNTER — CLINICAL SUPPORT (OUTPATIENT)
Dept: REHABILITATION | Facility: HOSPITAL | Age: 85
End: 2025-06-11
Payer: MEDICARE

## 2025-06-11 ENCOUNTER — OFFICE VISIT (OUTPATIENT)
Dept: DIABETES | Facility: CLINIC | Age: 85
End: 2025-06-11
Payer: MEDICARE

## 2025-06-11 VITALS
HEART RATE: 67 BPM | WEIGHT: 128.31 LBS | BODY MASS INDEX: 25.91 KG/M2 | SYSTOLIC BLOOD PRESSURE: 149 MMHG | DIASTOLIC BLOOD PRESSURE: 75 MMHG

## 2025-06-11 DIAGNOSIS — E11.69 TYPE 2 DIABETES MELLITUS WITH OTHER SPECIFIED COMPLICATION, WITHOUT LONG-TERM CURRENT USE OF INSULIN: Primary | ICD-10-CM

## 2025-06-11 DIAGNOSIS — I10 PRIMARY HYPERTENSION: ICD-10-CM

## 2025-06-11 DIAGNOSIS — E11.3312 TYPE 2 DIABETES MELLITUS WITH LEFT EYE AFFECTED BY MODERATE NONPROLIFERATIVE RETINOPATHY AND MACULAR EDEMA, WITHOUT LONG-TERM CURRENT USE OF INSULIN: ICD-10-CM

## 2025-06-11 DIAGNOSIS — N18.4 CHRONIC KIDNEY DISEASE (CKD), STAGE IV (SEVERE): ICD-10-CM

## 2025-06-11 DIAGNOSIS — S72.451D CLOSED DISPLACED SUPRACONDYLAR FRACTURE OF DISTAL END OF RIGHT FEMUR WITHOUT INTRACONDYLAR EXTENSION WITH ROUTINE HEALING, SUBSEQUENT ENCOUNTER: Primary | ICD-10-CM

## 2025-06-11 LAB — GLUCOSE SERPL-MCNC: 174 MG/DL (ref 70–110)

## 2025-06-11 PROCEDURE — 3288F FALL RISK ASSESSMENT DOCD: CPT | Mod: CPTII,HCNC,S$GLB, | Performed by: NURSE PRACTITIONER

## 2025-06-11 PROCEDURE — 95251 CONT GLUC MNTR ANALYSIS I&R: CPT | Mod: HCNC,S$GLB,, | Performed by: NURSE PRACTITIONER

## 2025-06-11 PROCEDURE — 3078F DIAST BP <80 MM HG: CPT | Mod: CPTII,HCNC,S$GLB, | Performed by: NURSE PRACTITIONER

## 2025-06-11 PROCEDURE — G2211 COMPLEX E/M VISIT ADD ON: HCPCS | Mod: HCNC,S$GLB,, | Performed by: NURSE PRACTITIONER

## 2025-06-11 PROCEDURE — 97140 MANUAL THERAPY 1/> REGIONS: CPT | Mod: HCNC,PN

## 2025-06-11 PROCEDURE — 3077F SYST BP >= 140 MM HG: CPT | Mod: CPTII,HCNC,S$GLB, | Performed by: NURSE PRACTITIONER

## 2025-06-11 PROCEDURE — 99999 PR PBB SHADOW E&M-EST. PATIENT-LVL III: CPT | Mod: PBBFAC,HCNC,, | Performed by: NURSE PRACTITIONER

## 2025-06-11 PROCEDURE — 1100F PTFALLS ASSESS-DOCD GE2>/YR: CPT | Mod: CPTII,HCNC,S$GLB, | Performed by: NURSE PRACTITIONER

## 2025-06-11 PROCEDURE — 82962 GLUCOSE BLOOD TEST: CPT | Mod: HCNC,S$GLB,, | Performed by: NURSE PRACTITIONER

## 2025-06-11 PROCEDURE — 99214 OFFICE O/P EST MOD 30 MIN: CPT | Mod: HCNC,S$GLB,, | Performed by: NURSE PRACTITIONER

## 2025-06-11 PROCEDURE — 97110 THERAPEUTIC EXERCISES: CPT | Mod: HCNC,PN

## 2025-06-11 PROCEDURE — 1125F AMNT PAIN NOTED PAIN PRSNT: CPT | Mod: CPTII,HCNC,S$GLB, | Performed by: NURSE PRACTITIONER

## 2025-06-11 PROCEDURE — 1159F MED LIST DOCD IN RCRD: CPT | Mod: CPTII,HCNC,S$GLB, | Performed by: NURSE PRACTITIONER

## 2025-06-11 RX ORDER — INSULIN GLARGINE 100 [IU]/ML
15 INJECTION, SOLUTION SUBCUTANEOUS DAILY
Qty: 4.5 ML | Refills: 11 | Status: SHIPPED | OUTPATIENT
Start: 2025-06-11 | End: 2026-06-11

## 2025-06-11 RX ORDER — INSULIN ASPART 100 [IU]/ML
8 INJECTION, SOLUTION INTRAVENOUS; SUBCUTANEOUS
Qty: 6 ML | Refills: 11 | Status: SHIPPED | OUTPATIENT
Start: 2025-06-11 | End: 2026-06-11

## 2025-06-11 RX ORDER — DASIGLUCAGON 0.6 MG/.6ML
1 INJECTION, SOLUTION SUBCUTANEOUS CONTINUOUS PRN
Qty: 1.2 ML | Refills: 2 | Status: SHIPPED | OUTPATIENT
Start: 2025-06-11 | End: 2025-06-11

## 2025-06-11 RX ORDER — GLUCAGON INJECTION, SOLUTION 1 MG/.2ML
1 INJECTION, SOLUTION SUBCUTANEOUS
Qty: 0.4 ML | Refills: 5 | Status: SHIPPED | OUTPATIENT
Start: 2025-06-11 | End: 2025-06-11

## 2025-06-11 RX ORDER — DASIGLUCAGON 0.6 MG/.6ML
1 INJECTION, SOLUTION SUBCUTANEOUS
Qty: 1.2 ML | Refills: 2 | Status: SHIPPED | OUTPATIENT
Start: 2025-06-11

## 2025-06-11 NOTE — PATIENT INSTRUCTIONS
PATIENT INSTRUCTIONS     Increase Lantus to 15 units subcutaneously daily.   Increase Novolog to to 8 units subcutaneously three times daily with meals.   Increase Jardiance to 25 mg by mouth daily.   Hydration important with this medication to avoid dehydration.    Dexcom G7 CGM    Blood Sugar Goals:       Fastin-130.       1-2 hours after a meal: Less than 180.       GVOKE Hypopen ordered for severe hypoglycemia emergencies - patient instructions provided with AVS for use and administration.  Use this link to watch instructional video on GVOKE - https://www.[employer].com/watch?v=[email or web address]     For low blood sugar between 55-69 mg/dL, raise it by following the 15-15 rule: have 15 grams of carbs and check your blood sugar after 15 minutes. If its still below your target range, have another serving. Repeat these steps until its in your target range. Once its in range, eat a nutritious meal or snack to ensure it doesnt get too low again.    These items have about 15 grams of carbs:  4 ounces (½ cup) of juice or regular soda.  1 tablespoon of sugar, honey, or syrup.  3-4 glucose tablets.  1 dose of glucose gel (usually 1 tube; follow instructions).    NOTIFY YOUR DIABETES MANAGEMENT PROVIDER FOR ANY GLUCOSE LESS THAN 70.

## 2025-06-11 NOTE — PROGRESS NOTES
Odin Oliva is a 85 y.o. female who presents for a follow up evaluation of Type 2 diabetes mellitus.     CHIEF COMPLAINT: Diabetes Consultation    PCP: Juventino Barbosa MD      Initial visit with me - 2/2023    The patient was initially diagnosed with diabetes at age 56.   Previously followed at [facility] by Inna Stephen NP, last visit 3/1/2022.      Previous failed treatments include:  Metformin - stopped due to decline in renal function.     Social Documentation:  Patient lives in her home in [place], daughter and son in law live with her.   Occupation: does not work.   Exercise: walking outside/gardening.     Diabetes related complications:   nephropathy.   denies Pancreatitis  denies Gastroparesis  denies DKA  denies Hx/family Hx of MEN2/MTC  denies Frequent UTIs/yeast infections    Diabetes Medications              empagliflozin (JARDIANCE) 10 mg tablet Take 1 tablet (10 mg total) by mouth once daily.    LANTUS SOLOSTAR U-100 INSULIN 100 unit/mL (3 mL) InPn pen Inject 12 Units into the skin once daily.    NOVOLOG FLEXPEN U-100 INSULIN 100 unit/mL (3 mL) InPn pen Inject 5 Units into the skin 3 (three) times daily with meals.     Current monitoring regimen: capillary blood glucose monitoring with finger sticks.    The patient's Dexcom CGM was downloaded and reviewed. For 14 days, patient average glucose was 213 mg/dL. She was above range 72% of the time, in range 28% of the time, and below range 0% of the time. The target range for this patient was 70 - 180 mg/dL. Overall, there was a pattern of hyperglycemia.      Patient currently taking insulin or sulfonylurea?  Yes. Emergency Glucagon Prescription current? no  Recent hypoglycemic episodes: No.     Patient compliant with glucose checks and medication administration? Yes    DIABETES MANAGEMENT STATUS  Statin: Taking  ACE/ARB: Taking  Screening or Prevention Patient's value Goal Complete/Controlled?   HgA1C Testing and Control   Lab  Results   Component Value Date    HGBA1C 6.3 (H) 03/21/2025      Annually/Less than 8% Yes   Lipid profile : 05/07/2024 Annually No   LDL control Lab Results   Component Value Date    LDLCALC 99.2 05/07/2024    Annually/Less than 100 mg/dl  No   Nephropathy screening Lab Results   Component Value Date    LABMICR 408.0 11/22/2024     Lab Results   Component Value Date    PROTEINUA Trace (A) 02/24/2023     Lab Results   Component Value Date    UTPCR 0.27 (H) 10/27/2021      Annually Yes   Blood pressure BP Readings from Last 1 Encounters:   06/11/25 (!) 149/75    Less than 140/90 Yes   Dilated retinal exam : 05/08/2024 Annually No   Foot exam   Most Recent Foot Exam Date: Not Found Annually No   Patient's medications, allergies, surgical, social and family histories were reviewed and updated as appropriate.     Review of Systems   Constitutional:  Negative for weight loss.   Eyes:  Negative for blurred vision and double vision.   Cardiovascular:  Negative for chest pain.   Gastrointestinal:  Negative for nausea and vomiting.   Genitourinary:  Negative for frequency.   Musculoskeletal:  Negative for falls.   Neurological:  Negative for dizziness and weakness.   Endo/Heme/Allergies:  Negative for polydipsia.   Psychiatric/Behavioral:  Negative for depression.    All other systems reviewed and are negative.       Physical Exam  Constitutional:       Appearance: Normal appearance.   HENT:      Head: Normocephalic and atraumatic.   Pulmonary:      Effort: No respiratory distress.   Musculoskeletal:      Cervical back: Normal range of motion.   Neurological:      Mental Status: She is alert and oriented to person, place, and time.   Psychiatric:         Mood and Affect: Mood normal.         Behavior: Behavior normal.        Blood pressure (!) 149/75, pulse 67, weight 58.2 kg (128 lb 4.9 oz).  Wt Readings from Last 3 Encounters:   06/11/25 58.2 kg (128 lb 4.9 oz)   05/16/25 56.7 kg (125 lb)   05/14/25 58.4 kg (128 lb 12 oz)  "      LAB REVIEW  Lab Results   Component Value Date     05/07/2025    K 4.3 05/07/2025     05/07/2025    CO2 23 05/07/2025    BUN 37 (H) 05/07/2025    CREATININE 1.4 05/07/2025    CALCIUM 9.5 05/07/2025    ANIONGAP 10 05/07/2025    EGFRNORACEVR 37 (L) 05/07/2025     No results found for: "CPEPTIDE", "GLUTAMICACID", "INSLNABS"  Hemoglobin A1C   Date Value Ref Range Status   03/21/2025 6.3 (H) 4.0 - 5.6 % Final     Comment:     ADA Screening Guidelines:  5.7-6.4%  Consistent with prediabetes  >or=6.5%  Consistent with diabetes    High levels of fetal hemoglobin interfere with the HbA1C  assay. Heterozygous hemoglobin variants (HbS, HgC, etc)do  not significantly interfere with this assay.   However, presence of multiple variants may affect accuracy.     01/15/2025 7.8 (H) 4.0 - 5.6 % Final     Comment:     ADA Screening Guidelines:  5.7-6.4%  Consistent with prediabetes  >or=6.5%  Consistent with diabetes    High levels of fetal hemoglobin interfere with the HbA1C  assay. Heterozygous hemoglobin variants (HbS, HgC, etc)do  not significantly interfere with this assay.   However, presence of multiple variants may affect accuracy.     11/22/2024 6.4 (H) 4.0 - 5.6 % Final     Comment:     ADA Screening Guidelines:  5.7-6.4%  Consistent with prediabetes  >or=6.5%  Consistent with diabetes    High levels of fetal hemoglobin interfere with the HbA1C  assay. Heterozygous hemoglobin variants (HbS, HgC, etc)do  not significantly interfere with this assay.   However, presence of multiple variants may affect accuracy.          ASSESSMENT    ICD-10-CM ICD-9-CM   1. Type 2 diabetes mellitus with other specified complication, without long-term current use of insulin  E11.69 250.80   2. Primary hypertension  I10 401.9   3. Chronic kidney disease (CKD), stage IV (severe)  N18.4 585.4   4. 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  -     LANTUS SOLOSTAR U-100 INSULIN 100 unit/mL (3 mL) InPn pen; Inject 15 Units into the skin once daily.  -     NOVOLOG FLEXPEN U-100 INSULIN 100 unit/mL (3 mL) InPn pen; Inject 8 Units into the skin 3 (three) times daily with meals.  -     Discontinue: glucagon (GVOKE HYPOPEN 2-PACK) 1 mg/0.2 mL AtIn; Inject 1 mg into the skin as needed (SEVERE HYPOGLYCEMIA).  -     Discontinue: ZEGALOGUE AUTOINJECTOR 0.6 mg/0.6 mL AtIn; Inject 1 Box  into the skin continuous prn (Severe Hypoglycemia).  -     ZEGALOGUE AUTOINJECTOR 0.6 mg/0.6 mL AtIn; Inject 1 Pen into the skin as needed (Severe Hypoglycemia).    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 Explained MOA, SE, dosage of medications. Written instructions given and reviewed with patient and patient verbalizes understanding.     3/5/2025 - Pt fell on 1/27/25 while on vacation in [place] sustaining right femur fracture. Pt states she had surgery on her femur in [place] on 1/29/25. After returning back home, patient was admitted and had ORIF supracondylar femur fracture on 2/5/25. Her postoperative course was complicated by hyperglycemia requiring Lantus and anemia requiring 1 unit pRBC on 2/8/25 and IV iron. She was discharged to [facility] for continued supportive care including PT/OT.   Daughter stopped lantus after d/c from SNF, was unsure she was supposed to continue. Will restart as glucoses over 300 at home per patient. Will order G7 and set up training. Daughter would like initial training to be done by education, then will continue to help with sensor application.     25 - since fall in January glucoses have be higher than baseline. Patient not as active as she once was, unable to work out in garden, more sedentary. Also, with pain in leg. Seen by pcp last month where he had made increases to insulin doses, but per patient and daughter, she is still taking previous doses. Will make adjustments today and f/u 6 wks. Discussed goal of 50% time in range for patient.    PATIENT INSTRUCTIONS     Increase Lantus to 15 units subcutaneously daily.   Increase Novolog to to 8 units subcutaneously three times daily with meals.   Increase Jardiance to 25 mg by mouth daily.   Hydration important with this medication to avoid dehydration.    Dexcom G7 CGM    Blood Sugar Goals:       Fastin-130.       1-2 hours after a meal: Less than 180.       GVOKE Hypopen ordered for severe hypoglycemia emergencies - patient instructions provided with AVS for use and administration.  Use this link to watch instructional video on GVOKE - https://www.youtube.com/watch?v=[email or web address]     Follow up in about 6 weeks (around 2025) for Dexcom.

## 2025-06-13 ENCOUNTER — CLINICAL SUPPORT (OUTPATIENT)
Dept: REHABILITATION | Facility: HOSPITAL | Age: 85
End: 2025-06-13
Payer: MEDICARE

## 2025-06-13 DIAGNOSIS — M25.561 CHRONIC PAIN OF RIGHT KNEE: Primary | ICD-10-CM

## 2025-06-13 DIAGNOSIS — G89.29 CHRONIC PAIN OF RIGHT KNEE: Primary | ICD-10-CM

## 2025-06-13 PROCEDURE — 97140 MANUAL THERAPY 1/> REGIONS: CPT | Mod: HCNC,PN

## 2025-06-13 PROCEDURE — 97110 THERAPEUTIC EXERCISES: CPT | Mod: HCNC,PN

## 2025-06-16 ENCOUNTER — TELEPHONE (OUTPATIENT)
Dept: ORTHOPEDICS | Facility: CLINIC | Age: 85
End: 2025-06-16
Payer: MEDICARE

## 2025-06-16 NOTE — TELEPHONE ENCOUNTER
Phoned patient to get appointment rescheduled due to the provider being out of clinic and left a voicemail for call back to get the patient rescheduled.

## 2025-06-20 ENCOUNTER — DOCUMENTATION ONLY (OUTPATIENT)
Dept: REHABILITATION | Facility: HOSPITAL | Age: 85
End: 2025-06-20
Payer: MEDICARE

## 2025-06-20 ENCOUNTER — CLINICAL SUPPORT (OUTPATIENT)
Dept: REHABILITATION | Facility: HOSPITAL | Age: 85
End: 2025-06-20
Payer: MEDICARE

## 2025-06-20 DIAGNOSIS — S72.451D CLOSED DISPLACED SUPRACONDYLAR FRACTURE OF DISTAL END OF RIGHT FEMUR WITHOUT INTRACONDYLAR EXTENSION WITH ROUTINE HEALING, SUBSEQUENT ENCOUNTER: Primary | ICD-10-CM

## 2025-06-20 PROCEDURE — 97110 THERAPEUTIC EXERCISES: CPT | Mod: HCNC,PN,CQ

## 2025-06-20 PROCEDURE — 97112 NEUROMUSCULAR REEDUCATION: CPT | Mod: HCNC,PN,CQ

## 2025-06-20 PROCEDURE — 97140 MANUAL THERAPY 1/> REGIONS: CPT | Mod: HCNC,PN,CQ

## 2025-06-20 NOTE — PROGRESS NOTES
PT/PTA met face to face to discuss pt's treatment plan and progress towards established goals. Pt will be seen by a physical therapist minimally every 6th visit or every 30 days.    Ale Friend PTA

## 2025-06-20 NOTE — PROGRESS NOTES
Outpatient Rehab    Physical Therapy Visit    Patient Name: Odin Oliva  MRN: [id number]  YOB: 1940  Encounter Date: 6/20/2025    Therapy Diagnosis:   Encounter Diagnosis   Name Primary?    Closed displaced supracondylar fracture of distal end of right femur without intracondylar extension with routine healing, subsequent encounter Yes           Physician: Damian Jean Jr.,*    Physician Orders: Eval and Treat  Medical Diagnosis: S72.451D (ICD-10-CM) - Closed displaced supracondylar fracture of distal end of right femur without intracondylar extension with routine healing, subsequent encounter     Visit # / Visits Authorized:  11 / 20   Date of Evaluation:  3/12/2025   Insurance Authorization Period: 03/11/2025 to 03/12/2027  Plan of Care Certification:  3/12/2025 to 6/20/2025                 Time In: 1410  Time Out:  1455  Total Billable Time: 40 minutes         Subjective    Patient states she has a little pain in the knee and inner thigh.  Pain 3/10         Objective    Objective Measures updated at progress report unless specified.       Treatment:     CPT Intervention  R Femur Duration/ Intensity   06/20   MT Manual STM/knee distraction on EOM  10'    TE  Nustep 8 min   TE Hamstring stretch -   TE Prone Quad Stretch -   TE  Calf Stretch -   TE Calf Raises 3x10   NMR Standing Hip Flex/Abd 2x15 2#   TE LAQ  3x10 2# R   TE Standing HS Curl 2x15 2#   TE Heel slides  2x10 R   NMR Seated clams GTB  2x10      CPT Codes available for Billing:   (10) minutes of Manual therapy (MT) to improve pain and ROM.  (15) minutes of Therapeutic Exercise (TE) to develop strength, endurance, range of motion, and flexibility.  (15) minutes of Neuromuscular Re-Education (NMR)  to improve: Balance, Coordination, Kinesthetic, Sense, Proprioception, and Posture.  (0) minutes of Therapeutic Activities (TA) to improve functional performance.  (-) Unattended Electrical Stimulation (ES) for muscle performance or pain  modulation.  (-) Vasopneumatic Device Therapy () for management of swelling/edema. (21929)  (-) BFR: Blood flow restriction applied during exercise  NP or (-): Not Performed    Assessment & Plan   Assessment:    Patient presents to therapy with mild pain in right knee so joint mobilization performed prior to exercises to decrease joint stiffness. She was able to perform quad and hamstring strengthening exercises with resistance without any adverse symptoms noted. She continues to report compliance with HEP at home and shows good progress ambulating with cane. She reports muscle soreness with standing on RLE for glute med stabilization during leg kicks therefore limited with repetitions for this and added clamshells     Patient will continue to benefit from skilled outpatient physical therapy to address the deficits listed in the problem list box on initial evaluation, provide pt/family education and to maximize pt's level of independence in the home and community environment.     Patient's spiritual, cultural, and educational needs considered and patient agreeable to plan of care and goals.         Plan:  Continue to progress as tolerated under current POC.      Goals:     Short Term Goals:  4 weeks Status  Date Met   PAIN: Pt will report worst pain of 6/10 in order to progress toward max functional ability and improve quality of life. [x] Progressing  [] Met  [] Not Met     FUNCTION: Patient will demonstrate improved function as indicated by a functional score of greater than or equal to 50% predicted on FOTO. [x] Progressing  [] Met  [] Not Met     MOBILITY: Patient will improve AROM to 50% of stated goals, listed in objective measures above, in order to progress towards independence with functional activities.  [x] Progressing  [] Met  [] Not Met     STRENGTH: Patient will improve strength to 50% of stated goals, listed in objective measures above, in order to progress towards independence with functional  activities. [x] Progressing  [] Met  [] Not Met     GAIT: Patient will demonstrate improved gait mechanics in order to improve functional mobility, improve quality of life, and decrease risk of further injury or fall.  [x] Progressing  [] Met  [] Not Met     HEP: Patient will demonstrate independence with HEP in order to progress toward functional independence. [x] Progressing  [] Met  [] Not Met        Long Term Goals:  8 weeks Status Date Met   PAIN: Pt will report worst pain of 4/10 in order to progress toward max functional ability and improve quality of life [x] Progressing  [] Met  [] Not Met     FUNCTION: Patient will demonstrate improved function as indicated by a functional score of greater than or equal to predicted score on FOTO. [x] Progressing  [] Met  [] Not Met     MOBILITY: Patient will improve AROM to stated goals, listed in objective measures above, in order to return to maximal functional potential and improve quality of life.  [x] Progressing  [] Met  [] Not Met     STRENGTH: Patient will improve strength to stated goals, listed in objective measures above, in order to improve functional independence and quality of life.  [x] Progressing  [] Met  [] Not Met     GAIT: Patient will demonstrate normalized gait mechanics with minimal compensation in order to return to PLOF. [x] Progressing  [] Met  [] Not Met     Patient will return to normal ADL's, IADL's, community involvement, recreational activities, and work-related activities with less than or equal to 4/10 pain and maximal function.  [x] Progressing  [] Met  [] Not Met          Ale Friend, PTA

## 2025-06-23 ENCOUNTER — OFFICE VISIT (OUTPATIENT)
Dept: ORTHOPEDICS | Facility: CLINIC | Age: 85
End: 2025-06-23
Attending: PHYSICIAN ASSISTANT
Payer: MEDICARE

## 2025-06-23 ENCOUNTER — HOSPITAL ENCOUNTER (OUTPATIENT)
Dept: RADIOLOGY | Facility: HOSPITAL | Age: 85
Discharge: HOME | End: 2025-06-23
Attending: PHYSICIAN ASSISTANT
Payer: MEDICARE

## 2025-06-23 ENCOUNTER — TELEPHONE (OUTPATIENT)
Dept: PHARMACY | Facility: CLINIC | Age: 85
End: 2025-06-23
Payer: MEDICARE

## 2025-06-23 VITALS
HEIGHT: 59 IN | DIASTOLIC BLOOD PRESSURE: 67 MMHG | HEART RATE: 62 BPM | SYSTOLIC BLOOD PRESSURE: 127 MMHG | BODY MASS INDEX: 25.87 KG/M2 | WEIGHT: 128.31 LBS

## 2025-06-23 DIAGNOSIS — S72.461G CLOSED DISPLACED SUPRACONDYLAR FRACTURE OF DISTAL END OF RIGHT FEMUR WITH INTRACONDYLAR EXTENSION WITH DELAYED HEALING, SUBSEQUENT ENCOUNTER: Primary | ICD-10-CM

## 2025-06-23 DIAGNOSIS — M89.8X5 PAIN IN FEMUR: ICD-10-CM

## 2025-06-23 DIAGNOSIS — M89.8X5 PAIN IN RIGHT FEMUR: Primary | ICD-10-CM

## 2025-06-23 PROCEDURE — 3074F SYST BP LT 130 MM HG: CPT | Mod: CPTII,HCNC,S$GLB, | Performed by: ORTHOPAEDIC SURGERY

## 2025-06-23 PROCEDURE — 73552 X-RAY EXAM OF FEMUR 2/>: CPT | Mod: 26,HCNC,RT, | Performed by: RADIOLOGY

## 2025-06-23 PROCEDURE — 1160F RVW MEDS BY RX/DR IN RCRD: CPT | Mod: CPTII,HCNC,S$GLB, | Performed by: ORTHOPAEDIC SURGERY

## 2025-06-23 PROCEDURE — 3078F DIAST BP <80 MM HG: CPT | Mod: CPTII,HCNC,S$GLB, | Performed by: ORTHOPAEDIC SURGERY

## 2025-06-23 PROCEDURE — 73552 X-RAY EXAM OF FEMUR 2/>: CPT | Mod: TC,HCNC,RT

## 2025-06-23 PROCEDURE — 1100F PTFALLS ASSESS-DOCD GE2>/YR: CPT | Mod: CPTII,HCNC,S$GLB, | Performed by: ORTHOPAEDIC SURGERY

## 2025-06-23 PROCEDURE — 1125F AMNT PAIN NOTED PAIN PRSNT: CPT | Mod: CPTII,HCNC,S$GLB, | Performed by: ORTHOPAEDIC SURGERY

## 2025-06-23 PROCEDURE — 99999 PR PBB SHADOW E&M-EST. PATIENT-LVL IV: CPT | Mod: PBBFAC,HCNC,, | Performed by: ORTHOPAEDIC SURGERY

## 2025-06-23 PROCEDURE — 99214 OFFICE O/P EST MOD 30 MIN: CPT | Mod: HCNC,S$GLB,, | Performed by: ORTHOPAEDIC SURGERY

## 2025-06-23 PROCEDURE — 3288F FALL RISK ASSESSMENT DOCD: CPT | Mod: CPTII,HCNC,S$GLB, | Performed by: ORTHOPAEDIC SURGERY

## 2025-06-23 PROCEDURE — 1159F MED LIST DOCD IN RCRD: CPT | Mod: CPTII,HCNC,S$GLB, | Performed by: ORTHOPAEDIC SURGERY

## 2025-06-23 RX ORDER — BENZONATATE 100 MG/1
100 CAPSULE ORAL 3 TIMES DAILY PRN
COMMUNITY
Start: 2025-06-16

## 2025-06-23 RX ORDER — HYDROCODONE BITARTRATE AND ACETAMINOPHEN 5; 325 MG/1; MG/1
1 TABLET ORAL EVERY 4 HOURS PRN
Qty: 42 TABLET | Refills: 0 | Status: SHIPPED | OUTPATIENT
Start: 2025-06-23 | End: 2025-07-01

## 2025-06-23 NOTE — PROGRESS NOTES
"  Outpatient Rehab    Physical Therapy Visit    Patient Name: Odin Oliva  MRN: [id number]  YOB: 1940  Encounter Date: 6/11/2025    Therapy Diagnosis:   Encounter Diagnosis   Name Primary?    Closed displaced supracondylar fracture of distal end of right femur without intracondylar extension with routine healing, subsequent encounter Yes           Physician: Damian Jean Jr.,*    Physician Orders: Eval and Treat  Medical Diagnosis: S72.451D (ICD-10-CM) - Closed displaced supracondylar fracture of distal end of right femur without intracondylar extension with routine healing, subsequent encounter     Visit # / Visits Authorized:  10 / 20   Date of Evaluation:  3/12/2025   Insurance Authorization Period: 03/11/2025 to 03/12/2027  Plan of Care Certification:  3/12/2025 to 6/20/2025                 Time In: 1345  Time Out:  1500  Total Billable Time: 40 minutes         Subjective    Patient states she is doing ok. Her pain in her knee is ok today but still hurts when she puts all of her weight on it.   Pain 5/10         Objective    Objective Measures updated at progress report unless specified.       Treatment:     CPT Intervention  R Femur Duration/ Intensity   06/11   MT Manual STM/ TF joint distraction  10   TE  Nustep 8'   TE Hamstring stretch 3x30"   TE Prone Quad Stretch 3x30"   TE  Calf Stretch 3x30"   TE Calf Raises 2x10   TE LAQ  3x10 2# R    TE Shuttle Squat 2x10 4B   TE Standing HS Curl 3x10 2#   TE Heel slides  2x10 R    NMR SL balance  -         PLAN          CPT Codes available for Billing:   (10) minutes of Manual therapy (MT) to improve pain and ROM.  (40) minutes of Therapeutic Exercise (TE) to develop strength, endurance, range of motion, and flexibility.  (0) minutes of Neuromuscular Re-Education (NMR)  to improve: Balance, Coordination, Kinesthetic, Sense, Proprioception, and Posture.  (0) minutes of Therapeutic Activities (TA) to improve functional performance.  (-) Unattended " Electrical Stimulation (ES) for muscle performance or pain modulation.  (-) Vasopneumatic Device Therapy () for management of swelling/edema. (09676)  (-) BFR: Blood flow restriction applied during exercise  NP or (-): Not Performed    Assessment & Plan   Assessment:   Symptoms improved today compared to previous session. Will increase focus on pain reduction and knee strength to promote increased tolerance to ADLs and functional tasks.      Patient will continue to benefit from skilled outpatient physical therapy to address the deficits listed in the problem list box on initial evaluation, provide pt/family education and to maximize pt's level of independence in the home and community environment.     Patient's spiritual, cultural, and educational needs considered and patient agreeable to plan of care and goals.         Plan:  Continue to progress as tolerated under current POC.      Goals:     Short Term Goals:  4 weeks Status  Date Met   PAIN: Pt will report worst pain of 6/10 in order to progress toward max functional ability and improve quality of life. [x] Progressing  [] Met  [] Not Met     FUNCTION: Patient will demonstrate improved function as indicated by a functional score of greater than or equal to 50% predicted on FOTO. [x] Progressing  [] Met  [] Not Met     MOBILITY: Patient will improve AROM to 50% of stated goals, listed in objective measures above, in order to progress towards independence with functional activities.  [x] Progressing  [] Met  [] Not Met     STRENGTH: Patient will improve strength to 50% of stated goals, listed in objective measures above, in order to progress towards independence with functional activities. [x] Progressing  [] Met  [] Not Met     GAIT: Patient will demonstrate improved gait mechanics in order to improve functional mobility, improve quality of life, and decrease risk of further injury or fall.  [x] Progressing  [] Met  [] Not Met     HEP: Patient will demonstrate  independence with HEP in order to progress toward functional independence. [x] Progressing  [] Met  [] Not Met        Long Term Goals:  8 weeks Status Date Met   PAIN: Pt will report worst pain of 4/10 in order to progress toward max functional ability and improve quality of life [x] Progressing  [] Met  [] Not Met     FUNCTION: Patient will demonstrate improved function as indicated by a functional score of greater than or equal to predicted score on FOTO. [x] Progressing  [] Met  [] Not Met     MOBILITY: Patient will improve AROM to stated goals, listed in objective measures above, in order to return to maximal functional potential and improve quality of life.  [x] Progressing  [] Met  [] Not Met     STRENGTH: Patient will improve strength to stated goals, listed in objective measures above, in order to improve functional independence and quality of life.  [x] Progressing  [] Met  [] Not Met     GAIT: Patient will demonstrate normalized gait mechanics with minimal compensation in order to return to PLOF. [x] Progressing  [] Met  [] Not Met     Patient will return to normal ADL's, IADL's, community involvement, recreational activities, and work-related activities with less than or equal to 4/10 pain and maximal function.  [x] Progressing  [] Met  [] Not Met          Demond Calix, PT

## 2025-06-23 NOTE — PATIENT INSTRUCTIONS
Assessment:  Odin Oliva is a 85 y.o. female   Chief Complaint   Patient presents with    Right Femur - Pain       Encounter Diagnosis   Name Primary?    Closed displaced supracondylar fracture of distal end of right femur with intracondylar extension with delayed healing, subsequent encounter Yes        Plan:  Continue with physical therapy  Refill pain medication  Follow up in 2 months with xray    Follow-up: 2 months or sooner if there are any problems between now and then.    Thank you for choosing [facility] and Dr. Damian Jean for your orthopedic & sports medicine care. It is our goal to provide you with exceptional care that will help keep you healthy, active, and get you back in the game.    Please do not hesitate to reach out to us via email, phone, or MyChart with any questions, concerns, or feedback.     If you are experiencing pain/discomfort ,or have questions and would like to be connected to the [facility] [place] on-call team, please call this number and specify which Orthopedic / Sports Medicine provider is treating you: [phone]

## 2025-06-23 NOTE — PROGRESS NOTES
Patient ID: Odin Oliva  YOB: 1940  MRN: [id number]    Chief Complaint: Pain of the Right Femur      Referred By: Trevin Wiley PA-C     History of Present Illness: Odin Oliva is a  85 y.o. female   Data Unavailable with a chief complaint of Pain of the Right Femur    She returns today and is 4 months postop.  She is accompanied by her daughter acting as secondary historian.  She reports she can walk without the cane in the mornings but by afternoon she develops increasing pain in the knee and by nighttime it is sore enough she needs to take a pain pill.  She denies any injuries or falls.  Her worse complaints of pain or around the anterior ex fix sites on the thigh    HPI    Past Medical History:   Past Medical History:   Diagnosis Date    CKD (chronic kidney disease), stage IV     Diabetes mellitus     HTN (hypertension)     Hyperuricemia     Seasonal allergies     Thyroid disease     Urinary tract infection      Past Surgical History:   Procedure Laterality Date    CATARACT EXTRACTION, BILATERAL      CHOLECYSTECTOMY      EXOSTECTOMY Bilateral 11/8/2019    Procedure: EXOSTECTOMY;  Surgeon: Lissy Comer DPM;  Location: [facility] OR;  Service: Podiatry;  Laterality: Bilateral;  left fourth digit, right fifth digit    FOOT MASS EXCISION Bilateral 11/8/2019    Procedure: HAMMER TOE, DEROTATIONAL ARTHROPLASTY FIFTH DIGIT;  Surgeon: Lissy Comer DPM;  Location: [facility] OR;  Service: Podiatry;  Laterality: Bilateral;  Left 5th toe incision @ 08:51.  Left 4th toe incision @ 08:54.  Right 5th toe incision : 09:24.    ORIF, FRACTURE, FEMUR, TRANSCONDYLAR WITH INTERCONDYLAR EXTENSION Right 2/6/2025    Procedure: ORIF, FRACTURE, FEMUR, TRANSCONDYLAR WITH INTERCONDYLAR EXTENSION;  Surgeon: Damian Jean Jr., MD;  Location: [facility] OR;  Service: Orthopedics;  Laterality: Right;    REMOVAL OF EXTERNAL FIXATION DEVICE Right 2/6/2025    Procedure: REMOVAL, EXTERNAL FIXATION DEVICE;  Surgeon:  Damian Jean Jr., MD;  Location: [facility];  Service: Orthopedics;  Laterality: Right;    TOTAL THYROIDECTOMY      UMBILICAL HERNIA REPAIR      x 2     Family History   Problem Relation Name Age of Onset    Asthma Mother      No Known Problems Father      Lung cancer Brother  65    Diabetes Brother      Diabetes Brother  80    Heart disease Daughter       Social History[1]  Medication List with Changes/Refills   New Medications    HYDROCODONE-ACETAMINOPHEN (NORCO) 5-325 MG PER TABLET    Take 1 tablet by mouth every 4 (four) hours as needed for Pain.   Current Medications    ALBUTEROL (PROVENTIL/VENTOLIN HFA) 90 MCG/ACTUATION INHALER    Inhale 2 puffs into the lungs every 4 (four) hours as needed for Wheezing or Shortness of Breath. Rescue    ASPIRIN-ACETAMINOPHEN-CAFFEINE 250-250-65 MG (EXCEDRIN MIGRAINE) 250-250-65 MG PER TABLET    Take 1 tablet by mouth every 6 (six) hours as needed for Pain.    AZELASTINE (ASTELIN) 137 MCG (0.1 %) NASAL SPRAY    1 spray (137 mcg total) by Nasal route 2 (two) times daily.    BENZONATATE (TESSALON) 100 MG CAPSULE    Take 100 mg by mouth 3 (three) times daily as needed for Cough.    BLOOD SUGAR DIAGNOSTIC STRP    1 each by Misc.(Non-Drug; Combo Route) route 3 (three) times daily. Insurance preferred    BLOOD-GLUCOSE METER KIT    Use as instructed. Insurance preferred    BLOOD-GLUCOSE METER,CONTINUOUS (DEXCOM G7 ) MISC    1 Device by Misc.(Non-Drug; Combo Route) route once daily.    BLOOD-GLUCOSE SENSOR (DEXCOM G7 SENSOR) AFSHIN    1 Device by Misc.(Non-Drug; Combo Route) route every 10 days.    EMPAGLIFLOZIN (JARDIANCE) 25 MG TABLET    Take 1 tablet (25 mg total) by mouth once daily.    HYDROXYZINE HCL (ATARAX) 10 MG TAB    Take 1 tablet (10 mg total) by mouth every evening.    LANCETS MISC    1 each by Misc.(Non-Drug; Combo Route) route 3 (three) times daily.    LANTUS SOLOSTAR U-100 INSULIN 100 UNIT/ML (3 ML) INPN PEN    Inject 15 Units into the skin once daily.     "LEVOTHYROXINE (SYNTHROID) 100 MCG TABLET    Take 1 tablet (100 mcg total) by mouth before breakfast.    MONTELUKAST (SINGULAIR) 10 MG TABLET    Take 1 tablet (10 mg total) by mouth every evening.    NOVOLOG FLEXPEN U-100 INSULIN 100 UNIT/ML (3 ML) INPN PEN    Inject 8 Units into the skin 3 (three) times daily with meals.    PEN NEEDLE, DIABETIC (BD ULTRA-FINE JOSE J PEN NEEDLE) 32 GAUGE X 5/32" NDLE    Use with insulin 3 times daily    PREDNISONE (DELTASONE) 10 MG TABLET    Take 1 tablet (10 mg total) by mouth once daily.    VALSARTAN-HYDROCHLOROTHIAZIDE (DIOVAN-HCT) 160-12.5 MG PER TABLET    Take 1 tablet by mouth once daily.    ZEGALOGUE AUTOINJECTOR 0.6 MG/0.6 ML ATIN    Inject 1 Pen into the skin as needed (Severe Hypoglycemia).     Review of patient's allergies indicates:  No Known Allergies  ROS    Physical Exam:   Body mass index is 25.91 kg/m².  Vitals:    06/23/25 1152   BP: 127/67   Pulse: 62      GENERAL: Well appearing, appropriate for stated age, no acute distress.  CARDIOVASCULAR: Pulses regular by peripheral palpation.  PULMONARY: Respirations are even and non-labored.  NEURO: Awake, alert, and oriented x 3.  PSYCH: Mood & affect are appropriate.  HEENT: Head is normocephalic and atraumatic.  Ortho/SPM Exam  Incisions have all healed well there is no signs of erythema or infection  She has no large knee effusion  She walks with a antalgic gait but when she is sitting in a chair her range of motion is excellent she has  degrees  Neurovascular exam of the extremities intact      Imaging:    X-Ray Femur 2 View Right  Narrative: EXAM: XR FEMUR 2 VIEW RIGHT    CLINICAL HISTORY: Other specified disorders of bone, thigh    FINDINGS:  No comparison exams.  There is lateral plate-screw fixation of the distal fibular diametaphyseal fracture with the fracture fragments held in good anatomical alignment.  The fracture planes remain evident with no bony union across the fracture.  No dislocation.  Impression:  " See above    Finalized on: 4/15/2025 1:49 AM By:  Russ Thapa MD  [facility]# [id number]      2025-04-15 01:51:[id number]     [facility]      Relevant imaging results reviewed and interpreted by me, discussed with the patient and / or family today.  X-rays show no change in alignment or hardware change.  I am concerned about delayed union at this point.  She does demonstrate medial callus and some lateral callus but centrally and posteriorly she is not filling in    Other Tests:     None    Patient Instructions   Assessment:  Odin Oliva is a 85 y.o. female   Chief Complaint   Patient presents with    Right Femur - Pain       Encounter Diagnosis   Name Primary?    Closed displaced supracondylar fracture of distal end of right femur with intracondylar extension with delayed healing, subsequent encounter Yes        Plan:  Continue with physical therapy  Refill pain medication  Follow up in 2 months with xray    Follow-up: 2 months or sooner if there are any problems between now and then.    Thank you for choosing [facility] and Dr. Damian Jean for your orthopedic & sports medicine care. It is our goal to provide you with exceptional care that will help keep you healthy, active, and get you back in the game.    Please do not hesitate to reach out to us via email, phone, or MyChart with any questions, concerns, or feedback.     If you are experiencing pain/discomfort ,or have questions and would like to be connected to the [facility] [place] on-call team, please call this number and specify which Orthopedic / Sports Medicine provider is treating you: [phone]    Provider Note/Medical Decision Making:     At this point the answers give it more time.  She is still working with the therapist and I recommended her continue this.  I encouraged her to be as active as possible.  We also discussed the concern about delayed union and the potential need for bone grafting should this turn into a  nonunion.  At this point it is too early to tell.  I will have her return in 2 months for repeat knee x-rays and hopefully we will see more healing.  I answered all of their questions today at length.    I discussed worrisome and red flag signs and symptoms with the patient. The patient expressed understanding and agreed to alert me immediately or to go to the emergency room if they experience any of these.   Treatment plan was developed with input from the patient/family, and they expressed understanding and agreement with the plan. All questions were answered today.         Damian Jean MD  Orthopaedic Surgery       Disclaimer: This note was prepared using a voice recognition system and is likely to have sound alike errors within the text.            [1]   Social History  Socioeconomic History    Marital status:    Tobacco Use    Smoking status: Never     Passive exposure: Never    Smokeless tobacco: Never   Vaping Use    Vaping status: Never Used   Substance and Sexual Activity    Alcohol use: No    Drug use: No    Sexual activity: Never     Social Drivers of Health     Financial Resource Strain: Low Risk  (2/5/2025)    Overall Financial Resource Strain (CARDIA)     Difficulty of Paying Living Expenses: Not very hard   Recent Concern: Financial Resource Strain - High Risk (11/21/2024)    Overall Financial Resource Strain (CARDIA)     Difficulty of Paying Living Expenses: Hard   Food Insecurity: No Food Insecurity (2/5/2025)    Hunger Vital Sign     Worried About Running Out of Food in the Last Year: Never true     Ran Out of Food in the Last Year: Never true   Transportation Needs: No Transportation Needs (2/5/2025)    TRANSPORTATION NEEDS     Transportation : No   Physical Activity: Sufficiently Active (11/21/2024)    Exercise Vital Sign     Days of Exercise per Week: 7 days     Minutes of Exercise per Session: 30 min   Stress: Stress Concern Present (2/5/2025)    [employer] [place] [employer] - Occupational Stress Questionnaire     Feeling of Stress : To some extent   Housing Stability: Unknown (2/5/2025)    Housing Stability Vital Sign     Unable to Pay for Housing in the Last Year: No     Homeless in the Last Year: No

## 2025-06-23 NOTE — TELEPHONE ENCOUNTER
[facility] Chart Review & Patient Outreach Details For Medication Adherence Project    Reason for Outreach Encounter: 3rd Party payor non-compliance report ([employer], [employer], C, etc)  2.  Patient Outreach Method: Reviewed patient chart   3.   Medication in question:    Hypertension Medications              valsartan-hydrochlorothiazide (DIOVAN-HCT) 160-12.5 mg per tablet Take 1 tablet by mouth once daily.                 Valsartan/hctz  last filled  5/16 for 90 day supply      4.  Reviewed and or Updates Made To: Patient Chart  5. Outreach Outcomes and/or actions taken: Patient filled medication and is on track to be adherent  Additional Notes:

## 2025-06-25 ENCOUNTER — CLINICAL SUPPORT (OUTPATIENT)
Dept: REHABILITATION | Facility: HOSPITAL | Age: 85
End: 2025-06-25
Payer: MEDICARE

## 2025-06-25 DIAGNOSIS — S72.451D CLOSED DISPLACED SUPRACONDYLAR FRACTURE OF DISTAL END OF RIGHT FEMUR WITHOUT INTRACONDYLAR EXTENSION WITH ROUTINE HEALING, SUBSEQUENT ENCOUNTER: ICD-10-CM

## 2025-06-25 DIAGNOSIS — M25.561 CHRONIC PAIN OF RIGHT KNEE: Primary | ICD-10-CM

## 2025-06-25 DIAGNOSIS — G89.29 CHRONIC PAIN OF RIGHT KNEE: Primary | ICD-10-CM

## 2025-06-25 PROCEDURE — 97140 MANUAL THERAPY 1/> REGIONS: CPT | Mod: HCNC,PN

## 2025-06-25 PROCEDURE — 97110 THERAPEUTIC EXERCISES: CPT | Mod: HCNC,PN

## 2025-06-26 NOTE — PROGRESS NOTES
"  Outpatient Rehab    Physical Therapy Visit    Patient Name: Odin Oliva  MRN: [id number]  YOB: 1940  Encounter Date: 6/13/2025    Therapy Diagnosis:   Encounter Diagnosis   Name Primary?    Chronic pain of right knee Yes       Physician: Damian Jean Jr.,*    Physician Orders: Eval and Treat  Medical Diagnosis: S72.451D (ICD-10-CM) - Closed displaced supracondylar fracture of distal end of right femur without intracondylar extension with routine healing, subsequent encounter     Visit # / Visits Authorized:  10 / 20   Date of Evaluation:  3/12/2025   Insurance Authorization Period: 03/11/2025 to 03/12/2027  Plan of Care Certification:  3/12/2025 to 6/20/2025                 Time In: 1430  Time Out:  1545  Total Billable Time: 50 minutes         Subjective    Patient states her pain is getting better. It is usually manageable in the morning but starts to bother her later in the day as she is on her leg more.   Pain 5/10         Objective        Knee AROM/PROM Right Left Pain/Dysfunction with Movement Goal   Knee Flexion (135º) 115 135  120   Knee Extension (0º) 0 0          0        Treatment:     CPT Intervention  R Femur Duration/ Intensity   06/13   MT Manual  STM/ TF joint distraction 10'   TE  Nustep 8'   TE Hamstring stretch 3x30"   TE Prone Quad Stretch 3x30"   TE  Calf Stretch 3x30"   TE Calf Raises 2x10   TE LAQ  3x10 2# R    TE Standing HS Curl 3x10 2#   TE Heel slides  2x10 R    PLAN          CPT Codes available for Billing:   (10) minutes of Manual therapy (MT) to improve pain and ROM.  (40) minutes of Therapeutic Exercise (TE) to develop strength, endurance, range of motion, and flexibility.  (0) minutes of Neuromuscular Re-Education (NMR)  to improve: Balance, Coordination, Kinesthetic, Sense, Proprioception, and Posture.  (0) minutes of Therapeutic Activities (TA) to improve functional performance.  (-) Unattended Electrical Stimulation (ES) for muscle performance or pain " modulation.  (-) Vasopneumatic Device Therapy () for management of swelling/edema. (52419)  (-) BFR: Blood flow restriction applied during exercise  NP or (-): Not Performed    Assessment & Plan   Assessment:   Continues to progress well. Requires more time to achieve goals.      Patient will continue to benefit from skilled outpatient physical therapy to address the deficits listed in the problem list box on initial evaluation, provide pt/family education and to maximize pt's level of independence in the home and community environment.     Patient's spiritual, cultural, and educational needs considered and patient agreeable to plan of care and goals.         Plan:  Continue to progress as tolerated under current POC.      Goals:     Short Term Goals:  4 weeks Status  Date Met   PAIN: Pt will report worst pain of 6/10 in order to progress toward max functional ability and improve quality of life. [] Progressing  [x] Met  [] Not Met     FUNCTION: Patient will demonstrate improved function as indicated by a functional score of greater than or equal to 50% predicted on FOTO. [x] Progressing  [] Met  [] Not Met     MOBILITY: Patient will improve AROM to 50% of stated goals, listed in objective measures above, in order to progress towards independence with functional activities.  [] Progressing  [x] Met  [] Not Met     STRENGTH: Patient will improve strength to 50% of stated goals, listed in objective measures above, in order to progress towards independence with functional activities. [] Progressing  [x] Met  [] Not Met     GAIT: Patient will demonstrate improved gait mechanics in order to improve functional mobility, improve quality of life, and decrease risk of further injury or fall.  [] Progressing  [x] Met  [] Not Met     HEP: Patient will demonstrate independence with HEP in order to progress toward functional independence. [] Progressing  [x] Met  [] Not Met        Long Term Goals:  8 weeks Status Date Met    PAIN: Pt will report worst pain of 4/10 in order to progress toward max functional ability and improve quality of life [x] Progressing  [] Met  [] Not Met     FUNCTION: Patient will demonstrate improved function as indicated by a functional score of greater than or equal to predicted score on FOTO. [x] Progressing  [] Met  [] Not Met     MOBILITY: Patient will improve AROM to stated goals, listed in objective measures above, in order to return to maximal functional potential and improve quality of life.  [x] Progressing  [] Met  [] Not Met     STRENGTH: Patient will improve strength to stated goals, listed in objective measures above, in order to improve functional independence and quality of life.  [x] Progressing  [] Met  [] Not Met     GAIT: Patient will demonstrate normalized gait mechanics with minimal compensation in order to return to PLOF. [x] Progressing  [] Met  [] Not Met     Patient will return to normal ADL's, IADL's, community involvement, recreational activities, and work-related activities with less than or equal to 4/10 pain and maximal function.  [x] Progressing  [] Met  [] Not Met          Demond Calix, PT

## 2025-06-27 NOTE — PROGRESS NOTES
"  Outpatient Rehab    Physical Therapy Visit    Patient Name: Odin Oliva  MRN: [id number]  YOB: 1940  Encounter Date: 6/25/2025    Therapy Diagnosis:   Encounter Diagnoses   Name Primary?    Chronic pain of right knee Yes    Closed displaced supracondylar fracture of distal end of right femur without intracondylar extension with routine healing, subsequent encounter        Physician: Damian Jean Jr.,*    Physician Orders: Eval and Treat  Medical Diagnosis: S72.451D (ICD-10-CM) - Closed displaced supracondylar fracture of distal end of right femur without intracondylar extension with routine healing, subsequent encounter     Visit # / Visits Authorized:  10 / 20   Date of Evaluation:  3/12/2025   Insurance Authorization Period: 03/11/2025 to 03/12/2027  Plan of Care Certification:  3/12/2025 to 6/20/2025                 Time In: 1130  Time Out:  1230  Total Billable Time: 50 minutes         Subjective    Patient states her follow up went well but there is a spot in her bone that is not healing as it shoulder.   Pain 5/10         Objective    Objective Measures updated at progress report unless specified.       Treatment:     CPT Intervention  R Femur Duration/ Intensity   06/25   MT Manual  10   TE  Nustep 8'   TE Hamstring stretch  3x30"   TE Prone Quad Stretch  3x30"   TE  Calf Stretch  3x30"   TE Calf Raises  2x10   TE LAQ   3x10 2# R    TE Standing HS Curl  3x10 2#   TE Shuttle Squat (Cover w/ [place]) 2x10 3B   PLAN          CPT Codes available for Billing:   (10) minutes of Manual therapy (MT) to improve pain and ROM.  (40) minutes of Therapeutic Exercise (TE) to develop strength, endurance, range of motion, and flexibility.  (0) minutes of Neuromuscular Re-Education (NMR)  to improve: Balance, Coordination, Kinesthetic, Sense, Proprioception, and Posture.  (0) minutes of Therapeutic Activities (TA) to improve functional performance.  (-) Unattended Electrical Stimulation (ES) for muscle " performance or pain modulation.  (-) Vasopneumatic Device Therapy () for management of swelling/edema. ([id number])  (-) BFR: Blood flow restriction applied during exercise  NP or (-): Not Performed    Assessment & Plan   Assessment:   Patient tolerated session with minimal complaints.     Patient will continue to benefit from skilled outpatient physical therapy to address the deficits listed in the problem list box on initial evaluation, provide pt/family education and to maximize pt's level of independence in the home and community environment.     Patient's spiritual, cultural, and educational needs considered and patient agreeable to plan of care and goals.         Plan:  Continue to progress as tolerated under current POC.      Goals:     Short Term Goals:  4 weeks Status  Date Met   PAIN: Pt will report worst pain of 6/10 in order to progress toward max functional ability and improve quality of life. [x] Progressing  [] Met  [] Not Met     FUNCTION: Patient will demonstrate improved function as indicated by a functional score of greater than or equal to 50% predicted on FOTO. [x] Progressing  [] Met  [] Not Met     MOBILITY: Patient will improve AROM to 50% of stated goals, listed in objective measures above, in order to progress towards independence with functional activities.  [x] Progressing  [] Met  [] Not Met     STRENGTH: Patient will improve strength to 50% of stated goals, listed in objective measures above, in order to progress towards independence with functional activities. [x] Progressing  [] Met  [] Not Met     GAIT: Patient will demonstrate improved gait mechanics in order to improve functional mobility, improve quality of life, and decrease risk of further injury or fall.  [x] Progressing  [] Met  [] Not Met     HEP: Patient will demonstrate independence with HEP in order to progress toward functional independence. [x] Progressing  [] Met  [] Not Met        Long Term Goals:  8 weeks Status Date Met    PAIN: Pt will report worst pain of 4/10 in order to progress toward max functional ability and improve quality of life [x] Progressing  [] Met  [] Not Met     FUNCTION: Patient will demonstrate improved function as indicated by a functional score of greater than or equal to predicted score on FOTO. [x] Progressing  [] Met  [] Not Met     MOBILITY: Patient will improve AROM to stated goals, listed in objective measures above, in order to return to maximal functional potential and improve quality of life.  [x] Progressing  [] Met  [] Not Met     STRENGTH: Patient will improve strength to stated goals, listed in objective measures above, in order to improve functional independence and quality of life.  [x] Progressing  [] Met  [] Not Met     GAIT: Patient will demonstrate normalized gait mechanics with minimal compensation in order to return to PLOF. [x] Progressing  [] Met  [] Not Met     Patient will return to normal ADL's, IADL's, community involvement, recreational activities, and work-related activities with less than or equal to 4/10 pain and maximal function.  [x] Progressing  [] Met  [] Not Met          Demond Calix, PT

## 2025-06-28 PROBLEM — M25.561 CHRONIC PAIN OF RIGHT KNEE: Status: ACTIVE | Noted: 2025-06-28

## 2025-06-28 PROBLEM — G89.29 CHRONIC PAIN OF RIGHT KNEE: Status: ACTIVE | Noted: 2025-06-28

## 2025-07-23 ENCOUNTER — OFFICE VISIT (OUTPATIENT)
Dept: DIABETES | Facility: CLINIC | Age: 85
End: 2025-07-23
Payer: MEDICARE

## 2025-07-23 DIAGNOSIS — E11.69 TYPE 2 DIABETES MELLITUS WITH OTHER SPECIFIED COMPLICATION, WITHOUT LONG-TERM CURRENT USE OF INSULIN: ICD-10-CM

## 2025-07-23 PROCEDURE — G2211 COMPLEX E/M VISIT ADD ON: HCPCS | Mod: HCNC,95,, | Performed by: NURSE PRACTITIONER

## 2025-07-23 PROCEDURE — 98006 SYNCH AUDIO-VIDEO EST MOD 30: CPT | Mod: HCNC,95,, | Performed by: NURSE PRACTITIONER

## 2025-07-23 RX ORDER — INSULIN ASPART 100 [IU]/ML
10 INJECTION, SOLUTION INTRAVENOUS; SUBCUTANEOUS
Qty: 9 ML | Refills: 11 | Status: SHIPPED | OUTPATIENT
Start: 2025-07-23 | End: 2026-07-23

## 2025-07-23 NOTE — PROGRESS NOTES
The patient location is: Home  The chief complaint leading to consultation is: Diabetes    Visit type: audiovisual    Face to Face time with patient: 15  30 minutes of total time spent on the encounter, which includes face to face time and non-face to face time preparing to see the patient (eg, review of tests), Obtaining and/or reviewing separately obtained history, Documenting clinical information in the electronic or other health record, Independently interpreting results (not separately reported) and communicating results to the patient/family/caregiver, or Care coordination (not separately reported).  Each patient to whom he or she provides medical services by telemedicine is:  (1) informed of the relationship between the physician and patient and the respective role of any other health care provider with respect to management of the patient; and (2) notified that he or she may decline to receive medical services by telemedicine and may withdraw from such care at any time.     Odin Oliva is a 85 y.o. female who presents for a follow up evaluation of Type 2 diabetes mellitus.     CHIEF COMPLAINT: Diabetes Consultation    PCP: Juventino Barbosa MD      Initial visit with me - 2/2023    The patient was initially diagnosed with diabetes at age 56.   Previously followed at [facility] by Inna Stephen NP, last visit 3/1/2022.      Previous failed treatments include:  Metformin - stopped due to decline in renal function.     Social Documentation:  Patient lives in her home in [place], daughter and son in law live with her.   Occupation: does not work.   Exercise: walking outside/gardening.     Diabetes related complications:   nephropathy.   denies Pancreatitis  denies Gastroparesis  denies DKA  denies Hx/family Hx of MEN2/MTC  denies Frequent UTIs/yeast infections    Diabetes Medications              empagliflozin (JARDIANCE) 25 mg tablet Take 1 tablet (25 mg total) by mouth once daily.     LANTUS SOLOSTAR U-100 INSULIN 100 unit/mL (3 mL) InPn pen Inject 15 Units into the skin once daily.    NOVOLOG FLEXPEN U-100 INSULIN 100 unit/mL (3 mL) InPn pen Inject 8 Units into the skin 3 (three) times daily with meals.    ZEGALOGUE AUTOINJECTOR 0.6 mg/0.6 mL AtIn Inject 1 Pen into the skin as needed (Severe Hypoglycemia).     Current monitoring regimen: capillary blood glucose monitoring with finger sticks.    The patient's Dexcom CGM was downloaded and reviewed. For 14 days, patient average glucose was 179 mg/dL. She was above range 47% of the time, in range 53% of the time, and below range 0% of the time. The target range for this patient was 70 - 180 mg/dL. Overall, there was a pattern of fasting euglycemia. Post prandial hyperglycemia. Improvement since last visit.      Patient currently taking insulin or sulfonylurea?  Yes. Emergency Glucagon Prescription current? no  Recent hypoglycemic episodes: No.     Patient compliant with glucose checks and medication administration? Yes    DIABETES MANAGEMENT STATUS  Statin: Taking  ACE/ARB: Taking  Screening or Prevention Patient's value Goal Complete/Controlled?   HgA1C Testing and Control   Lab Results   Component Value Date    HGBA1C 6.3 (H) 03/21/2025      Annually/Less than 8% Yes   Lipid profile : 05/07/2024 Annually No   LDL control Lab Results   Component Value Date    LDLCALC 99.2 05/07/2024    Annually/Less than 100 mg/dl  No   Nephropathy screening Lab Results   Component Value Date    LABMICR 408.0 11/22/2024     Lab Results   Component Value Date    PROTEINUA Trace (A) 02/24/2023     Lab Results   Component Value Date    UTPCR 0.27 (H) 10/27/2021      Annually Yes   Blood pressure BP Readings from Last 1 Encounters:   06/23/25 127/67    Less than 140/90 Yes   Dilated retinal exam : 05/08/2024 Annually No   Foot exam   Most Recent Foot Exam Date: Not Found Annually No   Patient's medications, allergies, surgical, social and family histories were  "reviewed and updated as appropriate.     Review of Systems   Constitutional:  Negative for weight loss.   Eyes:  Negative for blurred vision and double vision.   Cardiovascular:  Negative for chest pain.   Gastrointestinal:  Negative for nausea and vomiting.   Genitourinary:  Negative for frequency.   Musculoskeletal:  Negative for falls.   Neurological:  Negative for dizziness and weakness.   Endo/Heme/Allergies:  Negative for polydipsia.   Psychiatric/Behavioral:  Negative for depression.    All other systems reviewed and are negative.       Physical Exam  Constitutional:       Appearance: Normal appearance.   HENT:      Head: Normocephalic and atraumatic.   Pulmonary:      Effort: No respiratory distress.   Musculoskeletal:      Cervical back: Normal range of motion.   Neurological:      Mental Status: She is alert and oriented to person, place, and time.   Psychiatric:         Mood and Affect: Mood normal.         Behavior: Behavior normal.        There were no vitals taken for this visit.  Wt Readings from Last 3 Encounters:   06/23/25 58.2 kg (128 lb 4.9 oz)   06/11/25 58.2 kg (128 lb 4.9 oz)   05/16/25 56.7 kg (125 lb)       LAB REVIEW  Lab Results   Component Value Date     05/07/2025    K 4.3 05/07/2025     05/07/2025    CO2 23 05/07/2025    BUN 37 (H) 05/07/2025    CREATININE 1.4 05/07/2025    CALCIUM 9.5 05/07/2025    ANIONGAP 10 05/07/2025    EGFRNORACEVR 37 (L) 05/07/2025     No results found for: "CPEPTIDE", "GLUTAMICACID", "INSLNABS"  Hemoglobin A1C   Date Value Ref Range Status   03/21/2025 6.3 (H) 4.0 - 5.6 % Final     Comment:     ADA Screening Guidelines:  5.7-6.4%  Consistent with prediabetes  >or=6.5%  Consistent with diabetes    High levels of fetal hemoglobin interfere with the HbA1C  assay. Heterozygous hemoglobin variants (HbS, HgC, etc)do  not significantly interfere with this assay.   However, presence of multiple variants may affect accuracy.     01/15/2025 7.8 (H) 4.0 - 5.6 % " Final     Comment:     ADA Screening Guidelines:  5.7-6.4%  Consistent with prediabetes  >or=6.5%  Consistent with diabetes    High levels of fetal hemoglobin interfere with the HbA1C  assay. Heterozygous hemoglobin variants (HbS, HgC, etc)do  not significantly interfere with this assay.   However, presence of multiple variants may affect accuracy.     11/22/2024 6.4 (H) 4.0 - 5.6 % Final     Comment:     ADA Screening Guidelines:  5.7-6.4%  Consistent with prediabetes  >or=6.5%  Consistent with diabetes    High levels of fetal hemoglobin interfere with the HbA1C  assay. Heterozygous hemoglobin variants (HbS, HgC, etc)do  not significantly interfere with this assay.   However, presence of multiple variants may affect accuracy.          ASSESSMENT    ICD-10-CM ICD-9-CM   1. Type 2 diabetes mellitus with other specified complication, without long-term current use of insulin  E11.69 250.80           PLAN  Diagnoses and all orders for this visit:    Type 2 diabetes mellitus with other specified complication, without long-term current use of insulin  -     NOVOLOG FLEXPEN U-100 INSULIN 100 unit/mL (3 mL) InPn pen; Inject 10 Units into the skin 3 (three) times daily with meals.            Reviewed pathophysiology of diabetes, complications related to the disease, importance of annual dilated eye exam and daily foot examination. Explained MOA, SE, dosage of medications. Written instructions given and reviewed with patient and patient verbalizes understanding.     3/5/2025 - Pt fell on 1/27/25 while on vacation in [place] sustaining right femur fracture. Pt states she had surgery on her femur in [place] on 1/29/25. After returning back home, patient was admitted and had ORIF supracondylar femur fracture on 2/5/25. Her postoperative course was complicated by hyperglycemia requiring Lantus and anemia requiring 1 unit pRBC on 2/8/25 and IV iron. She was discharged to [facility] for continued supportive care including  PT/OT.  Daughter stopped lantus after d/c from SNF, was unsure she was supposed to continue. Will restart as glucoses over 300 at home per patient. Will order G7 and set up training. Daughter would like initial training to be done by education, then will continue to help with sensor application.     25 - since fall in January glucoses have be higher than baseline. Patient not as active as she once was, unable to work out in garden, more sedentary. Also, with pain in leg. Seen by pcp last month where he had made increases to insulin doses, but per patient and daughter, she is still taking previous doses. Will make adjustments today and f/u 6 wks. Discussed goal of 50% time in range for patient.    2025 - improvement overall. Continues to have post prandial hyperglycemia, will increase novolog to 10 units.     PATIENT INSTRUCTIONS     Continue Lantus to 15 units subcutaneously daily.   Increase Novolog to 10 units subcutaneously three times daily with meals.   Continue Jardiance 25 mg by mouth daily.   Hydration important with this medication to avoid dehydration.    Dexcom G7 CGM    Blood Sugar Goals:       Fastin-130.       1-2 hours after a meal: Less than 180.         No follow-ups on file.

## 2025-07-23 NOTE — PATIENT INSTRUCTIONS
PATIENT INSTRUCTIONS     Continue Lantus to 15 units subcutaneously daily.   Increase Novolog to 10 units subcutaneously three times daily with meals.   Continue Jardiance 25 mg by mouth daily.   Hydration important with this medication to avoid dehydration.    Dexcom G7 CGM    Blood Sugar Goals:       Fastin-130.       1-2 hours after a meal: Less than 180.

## 2025-08-11 PROBLEM — G89.29 CHRONIC PAIN OF RIGHT KNEE: Status: RESOLVED | Noted: 2025-06-28 | Resolved: 2025-08-11

## 2025-08-11 PROBLEM — M25.561 CHRONIC PAIN OF RIGHT KNEE: Status: RESOLVED | Noted: 2025-06-28 | Resolved: 2025-08-11

## 2025-08-12 ENCOUNTER — CLINICAL SUPPORT (OUTPATIENT)
Dept: REHABILITATION | Facility: HOSPITAL | Age: 85
End: 2025-08-12
Payer: MEDICARE

## 2025-08-12 DIAGNOSIS — G89.29 CHRONIC PAIN OF RIGHT KNEE: Primary | ICD-10-CM

## 2025-08-12 DIAGNOSIS — M25.561 CHRONIC PAIN OF RIGHT KNEE: Primary | ICD-10-CM

## 2025-08-12 PROCEDURE — 97112 NEUROMUSCULAR REEDUCATION: CPT | Mod: HCNC,PN

## 2025-08-12 PROCEDURE — 97110 THERAPEUTIC EXERCISES: CPT | Mod: HCNC,PN

## 2025-08-12 PROCEDURE — 97140 MANUAL THERAPY 1/> REGIONS: CPT | Mod: HCNC,PN

## 2025-08-18 ENCOUNTER — LAB VISIT (OUTPATIENT)
Dept: LAB | Facility: HOSPITAL | Age: 85
End: 2025-08-18
Attending: FAMILY MEDICINE
Payer: MEDICARE

## 2025-08-18 ENCOUNTER — OFFICE VISIT (OUTPATIENT)
Dept: INTERNAL MEDICINE | Facility: CLINIC | Age: 85
End: 2025-08-18
Payer: MEDICARE

## 2025-08-18 VITALS
BODY MASS INDEX: 25.04 KG/M2 | WEIGHT: 124 LBS | RESPIRATION RATE: 18 BRPM | OXYGEN SATURATION: 98 % | TEMPERATURE: 98 F | DIASTOLIC BLOOD PRESSURE: 78 MMHG | HEART RATE: 62 BPM | SYSTOLIC BLOOD PRESSURE: 118 MMHG

## 2025-08-18 DIAGNOSIS — Z79.899 POLYPHARMACY: ICD-10-CM

## 2025-08-18 DIAGNOSIS — N18.32 STAGE 3B CHRONIC KIDNEY DISEASE: ICD-10-CM

## 2025-08-18 DIAGNOSIS — E03.9 HYPOTHYROIDISM, UNSPECIFIED TYPE: ICD-10-CM

## 2025-08-18 DIAGNOSIS — E11.69 TYPE 2 DIABETES MELLITUS WITH OTHER SPECIFIED COMPLICATION, WITHOUT LONG-TERM CURRENT USE OF INSULIN: Primary | ICD-10-CM

## 2025-08-18 DIAGNOSIS — I10 PRIMARY HYPERTENSION: ICD-10-CM

## 2025-08-18 DIAGNOSIS — Z87.39 HISTORY OF GOUT: ICD-10-CM

## 2025-08-18 DIAGNOSIS — J30.89 SEASONAL ALLERGIC RHINITIS DUE TO OTHER ALLERGIC TRIGGER: ICD-10-CM

## 2025-08-18 DIAGNOSIS — M25.50 POLYARTHRALGIA: ICD-10-CM

## 2025-08-18 LAB
TSH SERPL-ACNC: 1.02 UIU/ML (ref 0.4–4)
URATE SERPL-MCNC: 7.7 MG/DL (ref 2.4–5.7)

## 2025-08-18 PROCEDURE — 84443 ASSAY THYROID STIM HORMONE: CPT | Mod: HCNC

## 2025-08-18 PROCEDURE — 1125F AMNT PAIN NOTED PAIN PRSNT: CPT | Mod: CPTII,HCNC,S$GLB, | Performed by: FAMILY MEDICINE

## 2025-08-18 PROCEDURE — 3288F FALL RISK ASSESSMENT DOCD: CPT | Mod: CPTII,HCNC,S$GLB, | Performed by: FAMILY MEDICINE

## 2025-08-18 PROCEDURE — 99214 OFFICE O/P EST MOD 30 MIN: CPT | Mod: HCNC,S$GLB,, | Performed by: FAMILY MEDICINE

## 2025-08-18 PROCEDURE — 99999 PR PBB SHADOW E&M-EST. PATIENT-LVL III: CPT | Mod: PBBFAC,HCNC,, | Performed by: FAMILY MEDICINE

## 2025-08-18 PROCEDURE — 36415 COLL VENOUS BLD VENIPUNCTURE: CPT | Mod: HCNC

## 2025-08-18 PROCEDURE — 3078F DIAST BP <80 MM HG: CPT | Mod: CPTII,HCNC,S$GLB, | Performed by: FAMILY MEDICINE

## 2025-08-18 PROCEDURE — 1100F PTFALLS ASSESS-DOCD GE2>/YR: CPT | Mod: CPTII,HCNC,S$GLB, | Performed by: FAMILY MEDICINE

## 2025-08-18 PROCEDURE — 1159F MED LIST DOCD IN RCRD: CPT | Mod: CPTII,HCNC,S$GLB, | Performed by: FAMILY MEDICINE

## 2025-08-18 PROCEDURE — 3074F SYST BP LT 130 MM HG: CPT | Mod: CPTII,HCNC,S$GLB, | Performed by: FAMILY MEDICINE

## 2025-08-18 PROCEDURE — 84550 ASSAY OF BLOOD/URIC ACID: CPT | Mod: HCNC

## 2025-08-18 PROCEDURE — G2211 COMPLEX E/M VISIT ADD ON: HCPCS | Mod: HCNC,S$GLB,, | Performed by: FAMILY MEDICINE

## 2025-08-18 RX ORDER — VALSARTAN AND HYDROCHLOROTHIAZIDE 160; 12.5 MG/1; MG/1
1 TABLET, FILM COATED ORAL DAILY
Qty: 90 TABLET | Refills: 1 | Status: SHIPPED | OUTPATIENT
Start: 2025-08-18

## 2025-08-18 RX ORDER — MONTELUKAST SODIUM 10 MG/1
10 TABLET ORAL NIGHTLY
Qty: 90 TABLET | Refills: 1 | Status: SHIPPED | OUTPATIENT
Start: 2025-08-18 | End: 2025-11-16

## 2025-08-19 ENCOUNTER — CLINICAL SUPPORT (OUTPATIENT)
Dept: REHABILITATION | Facility: HOSPITAL | Age: 85
End: 2025-08-19
Payer: MEDICARE

## 2025-08-19 ENCOUNTER — HOSPITAL ENCOUNTER (OUTPATIENT)
Dept: RADIOLOGY | Facility: HOSPITAL | Age: 85
Discharge: HOME | End: 2025-08-19
Attending: ORTHOPAEDIC SURGERY
Payer: MEDICARE

## 2025-08-19 ENCOUNTER — OFFICE VISIT (OUTPATIENT)
Dept: ORTHOPEDICS | Facility: CLINIC | Age: 85
End: 2025-08-19
Payer: MEDICARE

## 2025-08-19 VITALS
SYSTOLIC BLOOD PRESSURE: 120 MMHG | WEIGHT: 123.88 LBS | HEART RATE: 65 BPM | BODY MASS INDEX: 24.97 KG/M2 | DIASTOLIC BLOOD PRESSURE: 50 MMHG | HEIGHT: 59 IN

## 2025-08-19 DIAGNOSIS — M89.8X5 PAIN IN RIGHT FEMUR: ICD-10-CM

## 2025-08-19 DIAGNOSIS — M89.8X5 PAIN IN RIGHT FEMUR: Primary | ICD-10-CM

## 2025-08-19 DIAGNOSIS — G89.29 CHRONIC PAIN OF RIGHT KNEE: Primary | ICD-10-CM

## 2025-08-19 DIAGNOSIS — M25.561 CHRONIC PAIN OF RIGHT KNEE: Primary | ICD-10-CM

## 2025-08-19 PROCEDURE — 99213 OFFICE O/P EST LOW 20 MIN: CPT | Mod: HCNC,S$GLB,, | Performed by: ORTHOPAEDIC SURGERY

## 2025-08-19 PROCEDURE — 97110 THERAPEUTIC EXERCISES: CPT | Mod: HCNC,PN

## 2025-08-19 PROCEDURE — 3078F DIAST BP <80 MM HG: CPT | Mod: CPTII,HCNC,S$GLB, | Performed by: ORTHOPAEDIC SURGERY

## 2025-08-19 PROCEDURE — 99999 PR PBB SHADOW E&M-EST. PATIENT-LVL IV: CPT | Mod: PBBFAC,HCNC,, | Performed by: ORTHOPAEDIC SURGERY

## 2025-08-19 PROCEDURE — 97112 NEUROMUSCULAR REEDUCATION: CPT | Mod: HCNC,PN

## 2025-08-19 PROCEDURE — 1159F MED LIST DOCD IN RCRD: CPT | Mod: CPTII,HCNC,S$GLB, | Performed by: ORTHOPAEDIC SURGERY

## 2025-08-19 PROCEDURE — 97140 MANUAL THERAPY 1/> REGIONS: CPT | Mod: HCNC,PN

## 2025-08-19 PROCEDURE — 73552 X-RAY EXAM OF FEMUR 2/>: CPT | Mod: TC,HCNC,RT

## 2025-08-19 PROCEDURE — 3288F FALL RISK ASSESSMENT DOCD: CPT | Mod: CPTII,HCNC,S$GLB, | Performed by: ORTHOPAEDIC SURGERY

## 2025-08-19 PROCEDURE — 1160F RVW MEDS BY RX/DR IN RCRD: CPT | Mod: CPTII,HCNC,S$GLB, | Performed by: ORTHOPAEDIC SURGERY

## 2025-08-19 PROCEDURE — 73552 X-RAY EXAM OF FEMUR 2/>: CPT | Mod: 26,HCNC,RT, | Performed by: RADIOLOGY

## 2025-08-19 PROCEDURE — 1100F PTFALLS ASSESS-DOCD GE2>/YR: CPT | Mod: CPTII,HCNC,S$GLB, | Performed by: ORTHOPAEDIC SURGERY

## 2025-08-19 PROCEDURE — 1125F AMNT PAIN NOTED PAIN PRSNT: CPT | Mod: CPTII,HCNC,S$GLB, | Performed by: ORTHOPAEDIC SURGERY

## 2025-08-19 PROCEDURE — 3074F SYST BP LT 130 MM HG: CPT | Mod: CPTII,HCNC,S$GLB, | Performed by: ORTHOPAEDIC SURGERY

## 2025-08-20 ENCOUNTER — CLINICAL SUPPORT (OUTPATIENT)
Dept: REHABILITATION | Facility: HOSPITAL | Age: 85
End: 2025-08-20
Payer: MEDICARE

## 2025-08-20 DIAGNOSIS — G89.29 CHRONIC PAIN OF RIGHT KNEE: Primary | ICD-10-CM

## 2025-08-20 DIAGNOSIS — M25.561 CHRONIC PAIN OF RIGHT KNEE: Primary | ICD-10-CM

## 2025-08-20 PROCEDURE — 97110 THERAPEUTIC EXERCISES: CPT | Mod: HCNC,PN

## 2025-08-20 PROCEDURE — 97112 NEUROMUSCULAR REEDUCATION: CPT | Mod: HCNC,PN

## (undated) DEVICE — TOWEL OR DISP STRL BLUE 4/PK

## (undated) DEVICE — ELECTRODE REM PLYHSV RETURN 9

## (undated) DEVICE — GLOVE SURGEONS ULTRA TOUCH 6.5

## (undated) DEVICE — SEE MEDLINE ITEM 157027

## (undated) DEVICE — GUIDEWIRE 2.5
Type: IMPLANTABLE DEVICE | Site: LEG | Status: NON-FUNCTIONAL
Removed: 2025-02-06

## (undated) DEVICE — SEE MEDLINE ITEM 146347

## (undated) DEVICE — SHOE POST-OP VEL CLOS W/MD

## (undated) DEVICE — SUT VICRYL PLUS 3-0 PS2 18

## (undated) DEVICE — SYR 10CC LUER LOCK

## (undated) DEVICE — BIT DRILL 3.2

## (undated) DEVICE — KIT PT CARE HANA PROFX SSXT

## (undated) DEVICE — SHOE POST-OP VEL CLOS W/SM

## (undated) DEVICE — DECANTER VIAL ASEPTIC TRANSFER

## (undated) DEVICE — MANIFOLD 4 PORT

## (undated) DEVICE — PAD CAST SPECIALIST STRL 4

## (undated) DEVICE — SPONGE COTTON TRAY 4X4IN

## (undated) DEVICE — SUT 4-0 ETHILON 18 PS-2

## (undated) DEVICE — SEE MEDLINE ITEM 157131

## (undated) DEVICE — TOURNIQUET SB QC DP 18X4IN

## (undated) DEVICE — SEE MEDLINE ITEM 146298

## (undated) DEVICE — SEE MEDLINE ITEM 152529

## (undated) DEVICE — DRAPE U SPLIT SHEET 54X76IN

## (undated) DEVICE — SEE MEDLINE ITEM 152522

## (undated) DEVICE — UNDERGLOVES BIOGEL PI SZ 7 LF

## (undated) DEVICE — GAUZE SPONGE 4X4 12PLY

## (undated) DEVICE — TAPE SILK 3IN

## (undated) DEVICE — DRESSING XEROFORM NONADH 1X8IN

## (undated) DEVICE — DRESSING SPONGE 16PLY 4X4 NS

## (undated) DEVICE — DRAPE MOBILE C-ARM

## (undated) DEVICE — SPONGE DERMACEA GAUZE 4X4

## (undated) DEVICE — NDL SAFETY 25G X 1.5 ECLIPSE

## (undated) DEVICE — APPLICATOR CHLORAPREP ORN 26ML

## (undated) DEVICE — DRAPE SURG W/TWL 17 5/8X23

## (undated) DEVICE — YANKAUER FLEX NO VENT REG CAP

## (undated) DEVICE — STAPLER SKIN PROXIMATE WIDE

## (undated) DEVICE — PAD UNDERPAD 30X30

## (undated) DEVICE — TAPE SURGICAL MICROFOAM 3IN

## (undated) DEVICE — BLADE SURG #15 CARBON STEEL

## (undated) DEVICE — DRAPE C-ARMOR EQUIPMENT COVER

## (undated) DEVICE — DRAPE MINI C-ARM

## (undated) DEVICE — SEE MEDLINE ITEM 146308

## (undated) DEVICE — PACK BASIC SETUP SC BR

## (undated) DEVICE — COVER LIGHT HANDLE 80/CA

## (undated) DEVICE — DRAPE PLASTIC U 60X72

## (undated) DEVICE — BIT DRILL QCK-CONN 4.3MMX180MM

## (undated) DEVICE — COVER OVERHEAD SURG LT BLUE

## (undated) DEVICE — SUT VICRYL 3-0 8-18 PS-1

## (undated) DEVICE — DRAPE INCISE IOBAN 2 23X33IN

## (undated) DEVICE — PAD ABDOMINAL STERILE 8X10IN

## (undated) DEVICE — DRESSING N ADH OIL EMUL 3X3

## (undated) DEVICE — GLOVE SURGICAL LATEX SZ 6.5

## (undated) DEVICE — SUT VICRYL 0 27 CT-2

## (undated) DEVICE — SUT VICRYL 3-0 CT UNDYED

## (undated) DEVICE — GOWN POLY REINF BRTH SLV XL

## (undated) DEVICE — BLADE SAW 16.0MM X 7.0MM

## (undated) DEVICE — STOCKINET TUBULAR 1 PLY 6X60IN

## (undated) DEVICE — BANDAGE CONFORM 3IN STRL

## (undated) DEVICE — SYR IRRIGATION BULB STER 60ML

## (undated) DEVICE — SCRUB 10% POVIDONE IODINE 4OZ

## (undated) DEVICE — GLOVE BIOGEL SENSOR SZ 6.5

## (undated) DEVICE — GLOVE SURG ULTRA TOUCH 7.5